# Patient Record
Sex: FEMALE | Race: BLACK OR AFRICAN AMERICAN | NOT HISPANIC OR LATINO | Employment: OTHER | URBAN - METROPOLITAN AREA
[De-identification: names, ages, dates, MRNs, and addresses within clinical notes are randomized per-mention and may not be internally consistent; named-entity substitution may affect disease eponyms.]

---

## 2017-01-01 DIAGNOSIS — E11.9 DIABETES MELLITUS TYPE II, CONTROLLED, WITH NO COMPLICATIONS (HCC): ICD-10-CM

## 2017-01-04 ENCOUNTER — HOSPITAL ENCOUNTER (OUTPATIENT)
Dept: CARDIOLOGY | Facility: HOSPITAL | Age: 72
Discharge: HOME | End: 2017-01-04
Attending: INTERNAL MEDICINE | Admitting: INTERNAL MEDICINE

## 2017-01-04 DIAGNOSIS — R06.09 DYSPNEA ON EXERTION: ICD-10-CM

## 2017-01-04 PROCEDURE — 93306 TTE W/DOPPLER COMPLETE: CPT

## 2017-01-04 PROCEDURE — 93306 TTE W/DOPPLER COMPLETE: CPT | Performed by: INTERNAL MEDICINE

## 2017-01-06 ENCOUNTER — TELEPHONE (OUTPATIENT)
Dept: INTERNAL MEDICINE | Facility: CLINIC | Age: 72
End: 2017-01-06

## 2017-01-18 ENCOUNTER — TELEPHONE (OUTPATIENT)
Dept: INTERNAL MEDICINE | Facility: CLINIC | Age: 72
End: 2017-01-18

## 2017-01-18 NOTE — TELEPHONE ENCOUNTER
----- Message from Sunny Ricardo Jr., MD sent at 1/18/2017  9:38 AM EST -----  2-D echocardiogram is normal with good valve and heart muscle function.

## 2017-01-20 LAB
BH CV ECHO MEAS - ACS: 1.8 CM
BH CV ECHO MEAS - AO MEAN PG (FULL): 1 MMHG
BH CV ECHO MEAS - AO MEAN PG: 5 MMHG
BH CV ECHO MEAS - AO ROOT AREA (BSA CORRECTED): 1.4
BH CV ECHO MEAS - AO ROOT AREA: 6.6 CM^2
BH CV ECHO MEAS - AO ROOT DIAM: 2.9 CM
BH CV ECHO MEAS - AO V2 MEAN: 103 CM/SEC
BH CV ECHO MEAS - AO V2 VTI: 33.8 CM
BH CV ECHO MEAS - AVA(I,A): 2.4 CM^2
BH CV ECHO MEAS - AVA(I,D): 2.4 CM^2
BH CV ECHO MEAS - BSA(HAYCOCK): 2.1 M^2
BH CV ECHO MEAS - BSA: 2 M^2
BH CV ECHO MEAS - BZI_BMI: 38.6 KILOGRAMS/M^2
BH CV ECHO MEAS - BZI_METRIC_HEIGHT: 160 CM
BH CV ECHO MEAS - BZI_METRIC_WEIGHT: 98.9 KG
BH CV ECHO MEAS - CONTRAST EF (2CH): 48.9 ML/M^2
BH CV ECHO MEAS - CONTRAST EF 4CH: 56.1 ML/M^2
BH CV ECHO MEAS - EDV(CUBED): 185.2 ML
BH CV ECHO MEAS - EDV(MOD-SP2): 90 ML
BH CV ECHO MEAS - EDV(MOD-SP4): 89 ML
BH CV ECHO MEAS - EDV(TEICH): 160 ML
BH CV ECHO MEAS - EF(CUBED): 74.8 %
BH CV ECHO MEAS - EF(MOD-SP2): 52 %
BH CV ECHO MEAS - EF(MOD-SP4): 54 %
BH CV ECHO MEAS - EF(TEICH): 66 %
BH CV ECHO MEAS - ESV(CUBED): 46.7 ML
BH CV ECHO MEAS - ESV(MOD-SP2): 42 ML
BH CV ECHO MEAS - ESV(MOD-SP4): 41 ML
BH CV ECHO MEAS - ESV(TEICH): 54.4 ML
BH CV ECHO MEAS - FS: 36.8 %
BH CV ECHO MEAS - IVS/LVPW: 1.1
BH CV ECHO MEAS - IVSD: 0.9 CM
BH CV ECHO MEAS - LAT PEAK E' VEL: 7 CM/SEC
BH CV ECHO MEAS - LV DIASTOLIC VOL/BSA (35-75): 53.3 ML/M^2
BH CV ECHO MEAS - LV MASS(C)D: 183.7 GRAMS
BH CV ECHO MEAS - LV MASS(C)DI: 91.6 GRAMS/M^2
BH CV ECHO MEAS - LV MEAN PG: 4 MMHG
BH CV ECHO MEAS - LV SYSTOLIC VOL/BSA (12-30): 23.4 ML/M^2
BH CV ECHO MEAS - LV V1 MEAN: 84.3 CM/SEC
BH CV ECHO MEAS - LV V1 VTI: 26.1 CM
BH CV ECHO MEAS - LVIDD: 5.7 CM
BH CV ECHO MEAS - LVIDS: 3.6 CM
BH CV ECHO MEAS - LVLD AP2: 7.7 CM
BH CV ECHO MEAS - LVLD AP4: 7.7 CM
BH CV ECHO MEAS - LVLS AP2: 7.1 CM
BH CV ECHO MEAS - LVLS AP4: 6.7 CM
BH CV ECHO MEAS - LVOT AREA (M): 3.1 CM^2
BH CV ECHO MEAS - LVOT AREA: 3.1 CM^2
BH CV ECHO MEAS - LVOT DIAM: 2 CM
BH CV ECHO MEAS - LVPWD: 0.8 CM
BH CV ECHO MEAS - MED PEAK E' VEL: 7 CM/SEC
BH CV ECHO MEAS - MR MAX PG: 60.5 MMHG
BH CV ECHO MEAS - MR MAX VEL: 384 CM/SEC
BH CV ECHO MEAS - MV A DUR: 0.11 SEC
BH CV ECHO MEAS - MV A MAX VEL: 120 CM/SEC
BH CV ECHO MEAS - MV DEC SLOPE: 738 CM/SEC^2
BH CV ECHO MEAS - MV DEC TIME: 0.11 SEC
BH CV ECHO MEAS - MV E MAX VEL: 103 CM/SEC
BH CV ECHO MEAS - MV E/A: 0.86
BH CV ECHO MEAS - MV MEAN PG: 2 MMHG
BH CV ECHO MEAS - MV P1/2T MAX VEL: 112 CM/SEC
BH CV ECHO MEAS - MV P1/2T: 44.4 MSEC
BH CV ECHO MEAS - MV V2 MEAN: 65.1 CM/SEC
BH CV ECHO MEAS - MV V2 VTI: 24.5 CM
BH CV ECHO MEAS - MVA P1/2T LCG: 2 CM^2
BH CV ECHO MEAS - MVA(P1/2T): 4.9 CM^2
BH CV ECHO MEAS - MVA(VTI): 3.3 CM^2
BH CV ECHO MEAS - PA ACC SLOPE: 0 CM/SEC^2
BH CV ECHO MEAS - PA ACC TIME: 0.13 SEC
BH CV ECHO MEAS - PA MAX PG (FULL): 1.3 MMHG
BH CV ECHO MEAS - PA MAX PG: 3.9 MMHG
BH CV ECHO MEAS - PA PR(ACCEL): 20.5 MMHG
BH CV ECHO MEAS - PA V2 MAX: 98.5 CM/SEC
BH CV ECHO MEAS - PULM A REVS DUR: 0.09 SEC
BH CV ECHO MEAS - PULM A REVS VEL: 26.2 CM/SEC
BH CV ECHO MEAS - PULM DIAS VEL: 58.2 CM/SEC
BH CV ECHO MEAS - PULM S/D: 0.92
BH CV ECHO MEAS - PULM SYS VEL: 53.6 CM/SEC
BH CV ECHO MEAS - PVA(V,A): 1.9 CM^2
BH CV ECHO MEAS - PVA(V,D): 1.9 CM^2
BH CV ECHO MEAS - QP/QS: 0.45
BH CV ECHO MEAS - RAP SYSTOLE: 3 MMHG
BH CV ECHO MEAS - RV MAX PG: 2.6 MMHG
BH CV ECHO MEAS - RV MEAN PG: 1 MMHG
BH CV ECHO MEAS - RV V1 MAX: 80.5 CM/SEC
BH CV ECHO MEAS - RV V1 MEAN: 48.4 CM/SEC
BH CV ECHO MEAS - RV V1 VTI: 16.1 CM
BH CV ECHO MEAS - RVOT AREA: 2.3 CM^2
BH CV ECHO MEAS - RVOT DIAM: 1.7 CM
BH CV ECHO MEAS - RVSP: 31 MMHG
BH CV ECHO MEAS - SI(AO): 111.3 ML/M^2
BH CV ECHO MEAS - SI(CUBED): 69.1 ML/M^2
BH CV ECHO MEAS - SI(LVOT): 40.9 ML/M^2
BH CV ECHO MEAS - SI(MOD-SP2): 21.9 ML/M^2
BH CV ECHO MEAS - SI(MOD-SP4): 29.9 ML/M^2
BH CV ECHO MEAS - SI(TEICH): 52.6 ML/M^2
BH CV ECHO MEAS - SV(AO): 223.3 ML
BH CV ECHO MEAS - SV(CUBED): 138.5 ML
BH CV ECHO MEAS - SV(LVOT): 82 ML
BH CV ECHO MEAS - SV(MOD-SP2): 44 ML
BH CV ECHO MEAS - SV(MOD-SP4): 60 ML
BH CV ECHO MEAS - SV(RVOT): 36.5 ML
BH CV ECHO MEAS - SV(TEICH): 105.6 ML
BH CV ECHO MEAS - TAPSE (>1.6): 2.3 CM2
BH CV ECHO MEAS - TR MAX VEL: 264 CM/SEC
BH CV XLRA - RV BASE: 2.6 CM
BH CV XLRA - TDI S': 14 CM/SEC
E/E' RATIO: 15
LEFT ATRIUM VOLUME INDEX: 26 ML/M2

## 2017-01-27 ENCOUNTER — TELEPHONE (OUTPATIENT)
Dept: ENDOCRINOLOGY | Age: 72
End: 2017-01-27

## 2017-01-27 DIAGNOSIS — R68.89 ABNORMAL ENDOCRINE LABORATORY TEST FINDING: Primary | ICD-10-CM

## 2017-01-27 NOTE — TELEPHONE ENCOUNTER
----- Message from Senait Ordaz sent at 1/26/2017  9:15 AM EST -----  Contact: patient  Should have received a fax from [facility] team last night     Medication went from a tier 2 to a tier 4  Asking for a tier reduction for     potassium chloride (KAYCIEL) 20 MEQ/15ML (10%) solution    No refills needed at this time    Let dr wall know she is asking for a tier reduction    Has to take liquid     Second medication has warning of side effects    Medication toviaz   Can not take pill potassium when taking toviaz  That's why she changed to the liquid

## 2017-01-27 NOTE — TELEPHONE ENCOUNTER
----- Message from Senait Ordaz sent at 1/26/2017  9:15 AM EST -----  Contact: patient  Should have received a fax from [facility] team last night     Medication went from a tier 2 to a tier 4  Asking for a tier reduction for     potassium chloride (KAYCIEL) 20 MEQ/15ML (10%) solution    No refills needed at this time    Let dr wall know she is asking for a tier reduction    Has to take liquid     Second medication has warning of side effects    Medication toviaz   Can not take pill potassium when taking toviaz  That's why she changed to the liquid

## 2017-02-04 LAB
BUN SERPL-MCNC: 8 MG/DL (ref 8–23)
BUN/CREAT SERPL: 9.8 (ref 7–25)
CALCIUM SERPL-MCNC: 9.7 MG/DL (ref 8.6–10.5)
CHLORIDE SERPL-SCNC: 102 MMOL/L (ref 98–107)
CO2 SERPL-SCNC: 26 MMOL/L (ref 22–29)
CREAT SERPL-MCNC: 0.82 MG/DL (ref 0.57–1)
GLUCOSE SERPL-MCNC: 159 MG/DL (ref 65–99)
POTASSIUM SERPL-SCNC: 3.7 MMOL/L (ref 3.5–5.2)
SODIUM SERPL-SCNC: 142 MMOL/L (ref 136–145)

## 2017-02-17 ENCOUNTER — TELEPHONE (OUTPATIENT)
Dept: ENDOCRINOLOGY | Age: 72
End: 2017-02-17

## 2017-02-17 NOTE — TELEPHONE ENCOUNTER
----- Message from Jessica Ellington sent at 2/17/2017  1:36 PM EST -----  Contact: PATIENT  THE PATIENT IS WONDERING IF SHE NEEDS TO CONTINUE TAKING HER POTASSIUM BASED ON HER LABS FROM 2/3/17. PLEASE CALL THE PATIENT TO LET HER KNOW.

## 2017-02-24 ENCOUNTER — TELEPHONE (OUTPATIENT)
Dept: INTERNAL MEDICINE | Facility: CLINIC | Age: 72
End: 2017-02-24

## 2017-02-24 DIAGNOSIS — I10 ESSENTIAL HYPERTENSION: ICD-10-CM

## 2017-02-24 RX ORDER — AMLODIPINE BESYLATE 2.5 MG/1
2.5 TABLET ORAL DAILY
Qty: 90 TABLET | Refills: 3 | Status: SHIPPED | OUTPATIENT
Start: 2017-02-24 | End: 2018-01-02 | Stop reason: SDUPTHER

## 2017-02-24 NOTE — TELEPHONE ENCOUNTER
----- Message from Marilou Storm sent at 2/24/2017  2:31 PM EST -----  Contact: pt  Pt calling would like a new prescription for Amlodipine sent into the pharmacy. She says last time it was written the directions were wrong.     Pt says she take Amlodipine 2.5 mg, 1 table daily, 90 days    To [facility] mail order     #[phone]

## 2017-03-15 DIAGNOSIS — F32.A DEPRESSION: ICD-10-CM

## 2017-03-15 RX ORDER — ATORVASTATIN CALCIUM 10 MG/1
TABLET, FILM COATED ORAL
Qty: 90 TABLET | Refills: 1 | Status: SHIPPED | OUTPATIENT
Start: 2017-03-15 | End: 2017-09-06 | Stop reason: SDUPTHER

## 2017-03-16 RX ORDER — VENLAFAXINE 75 MG/1
TABLET ORAL
Qty: 360 TABLET | Refills: 0 | OUTPATIENT
Start: 2017-03-16

## 2017-03-25 DIAGNOSIS — F32.A DEPRESSION: ICD-10-CM

## 2017-03-27 RX ORDER — VENLAFAXINE 75 MG/1
TABLET ORAL
Qty: 360 TABLET | Refills: 1 | Status: SHIPPED | OUTPATIENT
Start: 2017-03-27 | End: 2017-08-22 | Stop reason: SDUPTHER

## 2017-04-11 ENCOUNTER — OFFICE VISIT (OUTPATIENT)
Dept: INTERNAL MEDICINE | Facility: CLINIC | Age: 72
End: 2017-04-11

## 2017-04-11 VITALS
DIASTOLIC BLOOD PRESSURE: 84 MMHG | HEIGHT: 64 IN | HEART RATE: 90 BPM | BODY MASS INDEX: 37.11 KG/M2 | SYSTOLIC BLOOD PRESSURE: 142 MMHG | WEIGHT: 217.4 LBS

## 2017-04-11 DIAGNOSIS — I47.9 PAROXYSMAL TACHYCARDIA (HCC): Primary | ICD-10-CM

## 2017-04-11 PROCEDURE — 99213 OFFICE O/P EST LOW 20 MIN: CPT | Performed by: INTERNAL MEDICINE

## 2017-04-11 NOTE — PROGRESS NOTES
Subjective   Dejah Casey is a 72 y.o. female.     History of Present Illness she relates a long history of paroxysmal tachycardia for which she had been seeing a cardiologist in the past and for which she had been placed on metoprolol ER 25 mg once daily.  This medication was discontinued in January due to her complaint of fatigue when seen a cardiologist.  Since that time she has noticed increasing difficulty with paroxysmal tachycardia especially with exertion but also at rest.  Recent episode lasted 15-20 minutes and was associated with some sweating and dyspnea without chest pain or dizziness.  Her heart rate was 140 by her estimation.  She had episodes while on metoprolol though less frequent.  She is unclear as to the specific arrhythmia noted by her cardiologist.  She also relates that her sense of fatigue has not changed off low-dose metoprolol.        Review of Systems   Constitutional: Positive for fatigue. Negative for activity change and appetite change.   Respiratory: Positive for shortness of breath. Negative for cough, chest tightness and wheezing.    Cardiovascular: Positive for palpitations. Negative for chest pain and leg swelling.   Gastrointestinal: Negative for abdominal pain, anal bleeding, blood in stool, diarrhea, nausea and vomiting.   Neurological: Negative for dizziness, syncope, weakness and headaches.       Objective   Physical Exam   Constitutional: She is oriented to person, place, and time. She appears well-developed and well-nourished. She is active. She does not appear ill.   Eyes: Conjunctivae are normal.   Neck: Carotid bruit is not present. Thyromegaly present. No thyroid mass present.       Cardiovascular: Regular rhythm, S1 normal and S2 normal.  Tachycardia present.  Exam reveals no S3 and no S4.    No murmur heard.  Pulmonary/Chest: No tachypnea. No respiratory distress. She has no decreased breath sounds. She has no wheezes. She has no rhonchi. She has no rales.    Abdominal: Soft. Normal appearance and bowel sounds are normal. She exhibits no abdominal bruit and no mass. There is no hepatosplenomegaly. There is no tenderness.       Vascular Status -  Her exam exhibits no right foot edema. Her exam exhibits no left foot edema.  Neurological: She is alert and oriented to person, place, and time.   Psychiatric: She has a normal mood and affect. Her speech is normal and behavior is normal. Judgment and thought content normal. Cognition and memory are normal.       Assessment/Plan EKG shows borderline sinus tachycardia with a rate of 100.  Recent echocardiogram was normal with an ejection fraction of 54%.    Assessment #1 paroxysmal tachycardia-question sinus tachycardia versus SVT versus PAF.  The fact that she is not on anticoagulative therapy makes it more likely to be sinus tachycardia or SVT.  This was discussed with her.    Plan #1 restart metoprolol ER 25 mg by mouth daily.  I offered to make her an appointment to see her cardiologist but she does not wish to do so at present.  Routine follow-up with me in 6 months.

## 2017-04-12 DIAGNOSIS — I47.9 PAROXYSMAL TACHYCARDIA (HCC): Primary | ICD-10-CM

## 2017-04-12 PROCEDURE — 93000 ELECTROCARDIOGRAM COMPLETE: CPT | Performed by: INTERNAL MEDICINE

## 2017-04-25 ENCOUNTER — TELEPHONE (OUTPATIENT)
Dept: NEPHROLOGY | Facility: HOSPITAL | Age: 72
End: 2017-04-25

## 2017-04-25 NOTE — TELEPHONE ENCOUNTER
Regarding: Visit Follow-Up Question  Contact: [phone]  ----- Message from Ruma Chow sent at 4/25/2017 10:01 AM EDT -----       ----- Message from Dejah Casey to Sunny Ricardo Jr., MD sent at 4/25/2017  4:42 AM -----   Jeri Ricardo,    [facility].  I would like to have a Cardiologist with [facility] so all my doctors will have access to my medical records.  Could you recommend a Cardiologist for me please?    Thank you.    Dejah Casey  1945  [id number]  [place] [address]  [phone]

## 2017-05-15 ENCOUNTER — TELEPHONE (OUTPATIENT)
Dept: INTERNAL MEDICINE | Facility: CLINIC | Age: 72
End: 2017-05-15

## 2017-05-15 DIAGNOSIS — I47.9 PAROXYSMAL TACHYCARDIA (HCC): Primary | ICD-10-CM

## 2017-05-19 DIAGNOSIS — E11.9 CONTROLLED TYPE 2 DIABETES MELLITUS WITHOUT COMPLICATION, UNSPECIFIED LONG TERM INSULIN USE STATUS: ICD-10-CM

## 2017-05-19 RX ORDER — LANCETS
EACH MISCELLANEOUS
Qty: 300 EACH | Refills: 3 | Status: SHIPPED | OUTPATIENT
Start: 2017-05-19 | End: 2018-04-05 | Stop reason: CLARIF

## 2017-06-07 ENCOUNTER — OFFICE VISIT (OUTPATIENT)
Dept: ENDOCRINOLOGY | Age: 72
End: 2017-06-07

## 2017-06-07 VITALS
HEIGHT: 64 IN | SYSTOLIC BLOOD PRESSURE: 152 MMHG | HEART RATE: 89 BPM | OXYGEN SATURATION: 95 % | DIASTOLIC BLOOD PRESSURE: 88 MMHG | BODY MASS INDEX: 37.22 KG/M2 | WEIGHT: 218 LBS

## 2017-06-07 DIAGNOSIS — E11.9 TYPE 2 DIABETES MELLITUS WITHOUT COMPLICATION, WITHOUT LONG-TERM CURRENT USE OF INSULIN (HCC): Primary | ICD-10-CM

## 2017-06-07 DIAGNOSIS — E04.2 NONTOXIC MULTINODULAR GOITER: ICD-10-CM

## 2017-06-07 DIAGNOSIS — M85.80 OSTEOPENIA: ICD-10-CM

## 2017-06-07 DIAGNOSIS — E55.9 VITAMIN D DEFICIENCY: ICD-10-CM

## 2017-06-07 DIAGNOSIS — I10 ESSENTIAL HYPERTENSION: ICD-10-CM

## 2017-06-07 DIAGNOSIS — R68.89 ABNORMAL ENDOCRINE LABORATORY TEST FINDING: ICD-10-CM

## 2017-06-07 DIAGNOSIS — E78.5 HYPERLIPIDEMIA, UNSPECIFIED HYPERLIPIDEMIA TYPE: ICD-10-CM

## 2017-06-07 PROCEDURE — 99214 OFFICE O/P EST MOD 30 MIN: CPT | Performed by: INTERNAL MEDICINE

## 2017-06-07 RX ORDER — METOPROLOL SUCCINATE 25 MG/1
25 TABLET, EXTENDED RELEASE ORAL DAILY
COMMUNITY
Start: 2017-06-04 | End: 2017-06-26 | Stop reason: SDUPTHER

## 2017-06-07 RX ORDER — DESONIDE 0.5 MG/G
CREAM TOPICAL
Refills: 5 | COMMUNITY
Start: 2017-04-28 | End: 2017-06-07

## 2017-06-07 NOTE — PROGRESS NOTES
Subjective   Dejah Casey is a 72 y.o. female.     HPI Comments: F.u for dm2,hyperlipidemia, hypertension, OAB,cad, nontoxic MNG,DJD,osteopenia / testing bs 1 x day / last dm eye exam 1/16/ last dm foot exam today with dr Hernandez     Diabetes   Hypoglycemia symptoms include nervousness/anxiousness. Associated symptoms include fatigue.   Hyperlipidemia   Associated symptoms include shortness of breath.   Hypertension   Associated symptoms include shortness of breath. Palpitations: on metoprolol.   Coronary Artery Disease   Symptoms include shortness of breath. Palpitations: on metoprolol. Risk factors include hyperlipidemia.      Patient is a 72-year-old female who came in for followup. She was found to have a goiter on examination last August 2012. She had thyroid ultrasound done on 08/21/2012 which showed a multinodular goiter with the dominant solid nodule in the left measuring 1.8 cm. She had followup thyroid ultrasound in 4/14 showed multinodular goiter with stable nodules. Thyroid function tests have been normal.        She denies any voice changes. She denies any pressure symptoms or dysphagia. She denies any bowel changes. She denies any heat or cold intolerance. She has no previous history of head or neck radiation therapy.      She has known diabetes mellitus since 2003 and has been on metformin 500 mg twice a day. Blood sugar has ranged from . She has no microalbuminuria on urine sample taken 12/16. Her last eye exam was 1/16. She has early cataracts but no retinopathy. She denies any paresthesias.      She has hyperlipidemia and has been on Lipitor 10 mg once a day.  She denies any myalgia.  She has no significant weight change since December 2016.  Her last meal was at 1:30 PM.     She has mild coronary artery disease by cardiac catheterization done in 2008 by Dr. CAMILLE Mcmullen. She denies any chest pain. She is taken off Toprol in January 2017 by Dr. Mcmullen and was restarted in April 2017 because of  tachycardia.  Holter monitor done in August 2016 showed sinus rhythm without any sustained atrial or ventricular tachyarrhythmia's.  Some PACs, PVCs and 2 very short episodes of atrial tachycardia noted.  Echocardiogram done in August 2016 showed left ventricular wall motion and contractility at the lower limits of normal.  Ejection fraction is 50-55%.      She has hypertension and has been on amlodipine 2.5 mg once a day and Toprol. She was taken off hydrochlorothiazide because of hypercalcemia. She had cough with lisinopril in the past. She has not used ARB in the past.       She has overactive bladder diagnosed by Dr. Dean and improved with Toviaz. She was taken off Vesicare because of cost.        She has DJD of knees and had a steroid injection in her left knee done by Dr. Mcgee several years ago. She complains of pain on bottom of left foot and has been off meloxicam.      She has osteopenia on bone density done at [facility] last November 2015. She is on vitamin D 400 u/day and calcium plus D supplements. She is a nonsmoker and non-alcohol drinker. There is no family history of osteoporosis.      The following portions of the patient's history were reviewed and updated as appropriate: allergies, current medications, past family history, past medical history, past social history, past surgical history and problem list.    Review of Systems   Constitutional: Positive for fatigue.   Eyes: Positive for visual disturbance ( cloudy ).   Respiratory: Positive for shortness of breath.    Cardiovascular: Palpitations: on metoprolol.   Gastrointestinal: Negative.    Endocrine: Negative.    Genitourinary: Negative.    Musculoskeletal: Joint swelling: knees    Skin: Negative.    Allergic/Immunologic: Negative.    Neurological: Negative.    Hematological: Bruises/bleeds easily.   Psychiatric/Behavioral: Positive for sleep disturbance ( sleep apnea on c pap machine ). The patient is nervous/anxious.        Objective   "    Vitals:    06/07/17 1421   BP: 152/88   BP Location: Right arm   Patient Position: Sitting   Cuff Size: Large Adult   Pulse: 89   SpO2: 95%   Weight: 218 lb (98.9 kg)   Height: 63.75\" (161.9 cm)     Physical Exam   Constitutional: She is oriented to person, place, and time. She appears well-developed and well-nourished. No distress.   HENT:   Head: Normocephalic.   Nose: Nose normal.   Mouth/Throat: No oropharyngeal exudate.   Eyes: Conjunctivae and EOM are normal. Right eye exhibits no discharge. Left eye exhibits no discharge. No scleral icterus.   Neck: Normal range of motion. Neck supple. No JVD present. No tracheal deviation present. No thyromegaly present.   Cardiovascular: Normal rate, regular rhythm, normal heart sounds and intact distal pulses.  Exam reveals no gallop and no friction rub.    No murmur heard.  Pulmonary/Chest: Effort normal and breath sounds normal. No respiratory distress. She has no wheezes. She has no rales. She exhibits no tenderness.   Abdominal: Soft. Bowel sounds are normal. She exhibits no distension and no mass. There is no tenderness.   Musculoskeletal: She exhibits no edema, tenderness or deformity.   No plantar ulcers   Lymphadenopathy:     She has no cervical adenopathy.   Neurological: She is alert and oriented to person, place, and time. She has normal reflexes. She displays normal reflexes. No cranial nerve deficit. Coordination normal.   Intact light touch on both upper and lower ext   Skin: Skin is warm and dry. No rash noted. No erythema. No pallor.   Psychiatric: She has a normal mood and affect. Her behavior is normal.     Hospital Outpatient Visit on 01/04/2017   Component Date Value Ref Range Status   • BSA 01/04/2017 2.0  m^2 Preliminary   • IVSd 01/04/2017 0.9  cm Preliminary   • LVIDd 01/04/2017 5.7  cm Preliminary   • LVIDs 01/04/2017 3.6  cm Preliminary   • LVPWd 01/04/2017 0.8  cm Preliminary   • IVS/LVPW 01/04/2017 1.1   Preliminary   • FS 01/04/2017 36.8  % " Preliminary   • EDV(Teich) 01/04/2017 160.0  ml Preliminary   • ESV(Teich) 01/04/2017 54.4  ml Preliminary   • EF(Teich) 01/04/2017 66.0  % Preliminary   • EDV(cubed) 01/04/2017 185.2  ml Preliminary   • ESV(cubed) 01/04/2017 46.7  ml Preliminary   • EF(cubed) 01/04/2017 74.8  % Preliminary   • LV mass(C)d 01/04/2017 183.7  grams Preliminary   • LV mass(C)dI 01/04/2017 91.6  grams/m^2 Preliminary   • SV(Teich) 01/04/2017 105.6  ml Preliminary   • SI(Teich) 01/04/2017 52.6  ml/m^2 Preliminary   • SV(cubed) 01/04/2017 138.5  ml Preliminary   • SI(cubed) 01/04/2017 69.1  ml/m^2 Preliminary   • Ao root diam 01/04/2017 2.9  cm Preliminary   • Ao root area 01/04/2017 6.6  cm^2 Preliminary   • ACS 01/04/2017 1.8  cm Preliminary   • LVOT diam 01/04/2017 2.0  cm Preliminary   • LVOT area 01/04/2017 3.1  cm^2 Preliminary   • LVOT area(traced) 01/04/2017 3.1  cm^2 Preliminary   • RVOT diam 01/04/2017 1.7  cm Preliminary   • RVOT area 01/04/2017 2.3  cm^2 Preliminary   • LVLd ap4 01/04/2017 7.7  cm Preliminary   • EDV(MOD-sp4) 01/04/2017 89.0  ml Preliminary   • LVLs ap4 01/04/2017 6.7  cm Preliminary   • ESV(MOD-sp4) 01/04/2017 41.0  ml Preliminary   • EF(MOD-sp4) 01/04/2017 54.0  % Preliminary   • LVLd ap2 01/04/2017 7.7  cm Preliminary   • EDV(MOD-sp2) 01/04/2017 90.0  ml Preliminary   • LVLs ap2 01/04/2017 7.1  cm Preliminary   • ESV(MOD-sp2) 01/04/2017 42.0  ml Preliminary   • EF(MOD-sp2) 01/04/2017 52.0  % Preliminary   • SV(MOD-sp4) 01/04/2017 60.0  ml Preliminary   • SI(MOD-sp4) 01/04/2017 29.9  ml/m^2 Preliminary   • SV(MOD-sp2) 01/04/2017 44.0  ml Preliminary   • SI(MOD-sp2) 01/04/2017 21.9  ml/m^2 Preliminary   • Ao root area (BSA corrected) 01/04/2017 1.4   Preliminary   • Ao root area (BSA corrected) 01/04/2017 48.9  ml/m^2 Preliminary   • CONTRAST EF 4CH 01/04/2017 56.1  ml/m^2 Preliminary   • LV Diastolic corrected for BSA 01/04/2017 53.3  ml/m^2 Preliminary   • LV Systolic corrected for BSA 01/04/2017 23.4   ml/m^2 Preliminary   • MV A dur 01/04/2017 0.11  sec Preliminary   • MV E max mode 01/04/2017 103.0  cm/sec Preliminary   • MV A max mode 01/04/2017 120.0  cm/sec Preliminary   • MV E/A 01/04/2017 0.86   Preliminary   • MV V2 mean 01/04/2017 65.1  cm/sec Preliminary   • MV mean PG 01/04/2017 2.0  mmHg Preliminary   • MV V2 VTI 01/04/2017 24.5  cm Preliminary   • MVA(VTI) 01/04/2017 3.3  cm^2 Preliminary   • MV P1/2t max mode 01/04/2017 112.0  cm/sec Preliminary   • MV P1/2t 01/04/2017 44.4  msec Preliminary   • MVA(P1/2t) 01/04/2017 4.9  cm^2 Preliminary   • MV dec slope 01/04/2017 738.0  cm/sec^2 Preliminary   • MV dec time 01/04/2017 0.11  sec Preliminary   • Ao V2 mean 01/04/2017 103.0  cm/sec Preliminary   • Ao mean PG 01/04/2017 5.0  mmHg Preliminary   • Ao mean PG (full) 01/04/2017 1.0  mmHg Preliminary   • Ao V2 VTI 01/04/2017 33.8  cm Preliminary   • GAMALIEL(I,A) 01/04/2017 2.4  cm^2 Preliminary   • GAMALIEL(I,D) 01/04/2017 2.4  cm^2 Preliminary   • LV V1 mean PG 01/04/2017 4.0  mmHg Preliminary   • LV V1 mean 01/04/2017 84.3  cm/sec Preliminary   • LV V1 VTI 01/04/2017 26.1  cm Preliminary   • MR max mode 01/04/2017 384.0  cm/sec Preliminary   • MR max PG 01/04/2017 60.5  mmHg Preliminary   • SV(Ao) 01/04/2017 223.3  ml Preliminary   • SI(Ao) 01/04/2017 111.3  ml/m^2 Preliminary   • SV(LVOT) 01/04/2017 82.0  ml Preliminary   • SV(RVOT) 01/04/2017 36.5  ml Preliminary   • SI(LVOT) 01/04/2017 40.9  ml/m^2 Preliminary   • PA V2 max 01/04/2017 98.5  cm/sec Preliminary   • PA max PG 01/04/2017 3.9  mmHg Preliminary   • PA max PG (full) 01/04/2017 1.3  mmHg Preliminary   • BH CV ECHO MARE - PVA(V,A) 01/04/2017 1.9  cm^2 Preliminary   • BH CV ECHO MARE - PVA(V,D) 01/04/2017 1.9  cm^2 Preliminary   • PA acc slope 01/04/2017 0  cm/sec^2 Preliminary   • PA acc time 01/04/2017 0.13  sec Preliminary   • RV V1 max PG 01/04/2017 2.6  mmHg Preliminary   • RV V1 mean PG 01/04/2017 1.0  mmHg Preliminary   • RV V1 max 01/04/2017 80.5   cm/sec Preliminary   • RV V1 mean 01/04/2017 48.4  cm/sec Preliminary   • RV V1 VTI 01/04/2017 16.1  cm Preliminary   • TR max michele 01/04/2017 264.0  cm/sec Preliminary   • PA pr(Accel) 01/04/2017 20.5  mmHg Preliminary   • Pulm Sys Michele 01/04/2017 53.6  cm/sec Preliminary   • Pulm Bhatti Michele 01/04/2017 58.2  cm/sec Preliminary   • Pulm S/D 01/04/2017 0.92   Preliminary   • Qp/Qs 01/04/2017 0.45   Preliminary   • Pulm A Revs Dur 01/04/2017 0.09  sec Preliminary   • Pulm A Revs Michele 01/04/2017 26.2  cm/sec Preliminary   • MVA P1/2T LCG 01/04/2017 2.0  cm^2 Preliminary   • BH CV ECHO MARE - BZI_BMI 01/04/2017 38.6  kilograms/m^2 Preliminary   • BH CV ECHO MARE - BSA(HAY[place]OCK) 01/04/2017 2.1  m^2 Preliminary   • BH CV ECHO MARE - BZI_METRIC_WEIGHT 01/04/2017 98.9  kg Preliminary   • BH CV ECHO MARE - BZI_METRIC_HEIGHT 01/04/2017 160.0  cm Preliminary   • TDI S' 01/04/2017 14.00  cm/sec Final-Edited   • RV Base 01/04/2017 2.60  cm Final-Edited   • E/E' ratio 01/04/2017 15.0   Final-Edited   • LA Volume Index 01/04/2017 26.0  mL/m2 Final-Edited   • Lat Peak E' Michele 01/04/2017 7.0  cm/sec Final-Edited   • Med Peak E' Michele 01/04/2017 7.00  cm/sec Final-Edited   • RAP systole 01/04/2017 3  mmHg Final-Edited   • RVSP(TR) 01/04/2017 31  mmHg Final-Edited   • TAPSE (>1.6) 01/04/2017 2.30  cm2 Final-Edited     Assessment/Plan   Dejah was seen today for diabetes, hyperlipidemia, hypertension and coronary artery disease.    Diagnoses and all orders for this visit:    Type 2 diabetes mellitus without complication, without long-term current use of insulin  -     Comprehensive Metabolic Panel  -     Hemoglobin A1c    Abnormal endocrine laboratory test finding  -     Basic Metabolic Panel    Nontoxic multinodular goiter  -     TSH  -     T4, Free    Vitamin D deficiency  -     Vitamin D 25 Hydroxy    Osteopenia    Essential hypertension    Hyperlipidemia, unspecified hyperlipidemia type  -     Lipid Panel      Check thyroid function  tests.  Continue metformin.  Check hemoglobin A1c.  Continue Lipitor.  Check lipid profile.  Continue amlodipine and Toprol-XL.  Continue vitamin D and calcium plus D.  Check vitamin D.  Consider follow-up bone density after November 2017.    Send copy of my notes and labs to Dr. Ricardo, Dr. Castillo, and Dr. Worley    RTC 4 mos.

## 2017-06-08 LAB
25(OH)D3+25(OH)D2 SERPL-MCNC: 58.6 NG/ML (ref 30–100)
ALBUMIN SERPL-MCNC: 4.5 G/DL (ref 3.5–5.2)
ALBUMIN/GLOB SERPL: 1.5 G/DL
ALP SERPL-CCNC: 93 U/L (ref 39–117)
ALT SERPL-CCNC: 17 U/L (ref 1–33)
AST SERPL-CCNC: 16 U/L (ref 1–32)
BILIRUB SERPL-MCNC: 0.2 MG/DL (ref 0.1–1.2)
BUN SERPL-MCNC: 14 MG/DL (ref 8–23)
BUN/CREAT SERPL: 15.4 (ref 7–25)
CALCIUM SERPL-MCNC: 10.6 MG/DL (ref 8.6–10.5)
CHLORIDE SERPL-SCNC: 100 MMOL/L (ref 98–107)
CHOLEST SERPL-MCNC: 146 MG/DL (ref 0–200)
CO2 SERPL-SCNC: 24.4 MMOL/L (ref 22–29)
CREAT SERPL-MCNC: 0.91 MG/DL (ref 0.57–1)
GLOBULIN SER CALC-MCNC: 3.1 GM/DL
GLUCOSE SERPL-MCNC: 75 MG/DL (ref 65–99)
HBA1C MFR BLD: 6.3 % (ref 4.8–5.6)
HDLC SERPL-MCNC: 66 MG/DL (ref 40–60)
LDLC SERPL CALC-MCNC: 58 MG/DL (ref 0–100)
POTASSIUM SERPL-SCNC: 4.2 MMOL/L (ref 3.5–5.2)
PROT SERPL-MCNC: 7.6 G/DL (ref 6–8.5)
SODIUM SERPL-SCNC: 140 MMOL/L (ref 136–145)
T4 FREE SERPL-MCNC: 0.95 NG/DL (ref 0.93–1.7)
TRIGL SERPL-MCNC: 110 MG/DL (ref 0–150)
TSH SERPL DL<=0.005 MIU/L-ACNC: 0.66 MIU/ML (ref 0.27–4.2)
VLDLC SERPL CALC-MCNC: 22 MG/DL (ref 5–40)

## 2017-06-09 ENCOUNTER — OFFICE VISIT (OUTPATIENT)
Dept: CARDIOLOGY | Facility: CLINIC | Age: 72
End: 2017-06-09

## 2017-06-09 VITALS
BODY MASS INDEX: 37.71 KG/M2 | DIASTOLIC BLOOD PRESSURE: 82 MMHG | SYSTOLIC BLOOD PRESSURE: 128 MMHG | WEIGHT: 218 LBS | HEART RATE: 88 BPM

## 2017-06-09 DIAGNOSIS — I47.9 PAROXYSMAL TACHYCARDIA (HCC): ICD-10-CM

## 2017-06-09 DIAGNOSIS — G47.33 OBSTRUCTIVE SLEEP APNEA ON CPAP: ICD-10-CM

## 2017-06-09 DIAGNOSIS — Z99.89 OBSTRUCTIVE SLEEP APNEA ON CPAP: ICD-10-CM

## 2017-06-09 DIAGNOSIS — E78.2 MIXED HYPERLIPIDEMIA: Primary | ICD-10-CM

## 2017-06-09 DIAGNOSIS — R94.31 ABNORMAL ELECTROCARDIOGRAM: ICD-10-CM

## 2017-06-09 DIAGNOSIS — R94.31 ABNORMAL ECG: ICD-10-CM

## 2017-06-09 DIAGNOSIS — I10 ESSENTIAL HYPERTENSION: ICD-10-CM

## 2017-06-09 PROCEDURE — 93000 ELECTROCARDIOGRAM COMPLETE: CPT | Performed by: INTERNAL MEDICINE

## 2017-06-09 PROCEDURE — 99204 OFFICE O/P NEW MOD 45 MIN: CPT | Performed by: INTERNAL MEDICINE

## 2017-06-09 NOTE — PROGRESS NOTES
[facility]  Cardiology Consult Note  .  Patient Identification:  Name: Dejah Casey  Age: 72 y.o.  Sex: female  :  1945  MRN: [id number]       2017      Requesting Physician: Dr. Ricardo  Reason for Consultation: Shortness of breath    Chief Complaint   Patient presents with   • Rapid Heart Rate     HPI     She is mildy obese and complains of shortness of breath on exertion for the last year. She can walk upto two blocks. She denies any chest pains now but in the past had some chest pressure in the chest. This doesn't have any association with exercise. Not much swelling of the legs. Her ECG shows LVH> no syncopal episodes. Her BP has been stable. No pnd or orthopnea. Not much leg edema.    She is a diabetic and her lipid panel is good. Her past echo showed normal LVEF, LVH    Past Medical History:  Past Medical History:   Diagnosis Date   • Anxiety    • CAD (coronary artery disease)    • Contact dermatitis    • Depression    • Headache    • History of mammogram     2016   • Hypercalcemia    • Hyperlipidemia    • Hypertension    • Nightmares    • Nontoxic multinodular goiter    • Obesity    • Osteoarthritis    • Osteopenia    • Rotator cuff tendinitis    • Superficial phlebitis    • Type 2 diabetes mellitus    • Vitiligo      Past Surgical History:  Past Surgical History:   Procedure Laterality Date   • BREAST LUMPECTOMY Right    • COLONOSCOPY  08/15/2010   • HYSTERECTOMY     • TONSILLECTOMY     • TOTAL HIP ARTHROPLASTY Right       Home Meds:    (Not in a hospital admission)  Current Meds:     Current Outpatient Prescriptions:   •  ACCU-CHEK GUMARO PLUS test strip, TEST ONCE DAILY, Disp: 100 each, Rfl: 3  •  ACCU-CHEK SOFTCLIX LANCETS lancets, TEST ONCE DAILY, Disp: 300 each, Rfl: 3  •  amLODIPine (NORVASC) 2.5 MG tablet, Take 1 tablet by mouth Daily., Disp: 90 tablet, Rfl: 3  •  aspirin 81 MG tablet, Take 81 mg by mouth daily., Disp: , Rfl:   •  atorvastatin (LIPITOR) 10 MG tablet,  TAKE 1 TABLET DAILY., Disp: 90 tablet, Rfl: 1  •  Calcium Carb-Cholecalciferol 500-200 MG-UNIT tablet, Take 2 tablets by mouth daily., Disp: , Rfl:   •  Cholecalciferol (VITAMIN D-3) 1000 UNITS capsule, Take 800 Units by mouth daily., Disp: , Rfl:   •  clonazePAM (KlonoPIN) 1 MG tablet, Take 1 tablet by mouth at night as needed. Prescribed by Pulmonary, Dr. Bell, Disp: , Rfl:   •  fesoterodine fumarate (TOVIAZ ER) 4 MG tablet sustained-release 24 hour capsule, Take 4 mg by mouth daily., Disp: , Rfl:   •  loratadine (CLARITIN) 5 MG chewable tablet, Chew 10 mg Daily., Disp: , Rfl:   •  metFORMIN (GLUCOPHAGE) 500 MG tablet, TAKE 1 TABLET TWICE DAILY WITH MEALS, Disp: 180 tablet, Rfl: 1  •  metoprolol succinate XL (TOPROL-XL) 25 MG 24 hr tablet, Take 25 mg by mouth Daily., Disp: , Rfl:   •  Multiple Vitamins-Minerals (CENTRUM SILVER ADULT 50+) tablet, Take 1 tablet by mouth., Disp: , Rfl:   •  venlafaxine (EFFEXOR) 75 MG tablet, TAKE 2 TABLETS TWICE DAILY, Disp: 360 tablet, Rfl: 1  Allergies:  Allergies   Allergen Reactions   • Dexamethasone Dermatitis   • Lisinopril      Social History:   Social History   Substance Use Topics   • Smoking status: Never Smoker   • Smokeless tobacco: Not on file   • Alcohol use No      Family History:  Family History   Problem Relation Age of Onset   • Diabetes Mother    • Thyroid disease Mother    • Hypertension Father    • Asthma Sister    • Hypertension Sister    • Diabetes Brother    • Kidney disease Brother      CHRONIC RENAL FAILURE   • Diabetes Sister    • Thyroid disease Sister    • Lung cancer Brother    • Kidney disease Brother    • Diabetes Brother       Review of Systems   Constitution: Positive for chills and malaise/fatigue. Negative for fever.   HENT: Positive for headaches.    Eyes: Negative for blurred vision and double vision.   Cardiovascular: Positive for irregular heartbeat and palpitations. Negative for chest pain and leg swelling.   Respiratory: Positive for  shortness of breath. Snoring: cpap.    Skin: Positive for color change.   Musculoskeletal: Positive for arthritis and joint pain.   Gastrointestinal: Negative for abdominal pain, nausea and vomiting.   Neurological: Negative for dizziness, light-headedness and numbness.      Al the other Ros are stable  Objective:  /82  Pulse 88  Wt 218 lb (98.9 kg)  BMI 37.71 kg/m2  Exam:  Physical Exam    General: No acute distress, well developed, well nourished    Skin: Warm and dry, no diaphoresis noted; well hydrated; no rash; no pallor or cyanosis   HEENT: Normal Pupills; no conjunctiva erythema; external ear and nose normal; oral mucosa moist, no xanthelasma, lips not cyanotic   Neck: Supple; no carotid bruits; no JVP; thyroid not enlarged. Trahea mid line    Heart: S1S2 regular rate and rhythm; no murmurs, no rubs or gallops are auscultated.   Chest: Respirations regular, unlabored at rest, bilateral breath sounds have good air entry throughout all lung fields; no crackles, rubs or wheezes auscultated.     Abdomen: Soft, non-tender, non-distended, positive bowel sounds, no organomegaly   Extremities: Bilateral lower extremities have no pre-tibial pitting edema; Femoral pulses are palpable   Musculoskeletal:  Moves all extremities well; no deformities; no tenderness; no clubbing of the fingers   Neurological/  Psychological:  Alert and oriented x 3; no new  motor deficits; speech and behavior appropriate; normal mood and affect    Rest of the exam is stable       ECG 12 Lead  Date/Time: 6/9/2017 11:09 AM  Performed by: MANGO BLAKE  Authorized by: MANGO BLAKE   Rhythm: sinus rhythm  Rate: normal  QRS axis: left  Other findings: LVH          Comparison to previous ECG:  Similar to  previous ecg    Assessment:    Problem List Items Addressed This Visit        Cardiovascular and Mediastinum    Essential hypertension    Hyperlipidemia - Primary    Paroxysmal tachycardia       Respiratory     Obstructive sleep apnea on CPAP          Recommendations:    Overall clincially she has been stable with no angina now. Overall not a good historian. She denies any pnd or orthopnea. She uses a cpap.  I will obtain a stress test and her pain is more atypical . And weight loss is remphasized.      I not only counseled the patient today on the risk factor modification of significant factors noted in the assessment and plan, and I also recommended that the patient reduce salt and saturated animal fat intake in diet, about the advantages of plant based diet, as well as to perform scheduled exercise on a regular basis.    Thi Castillo MD  6/9/2017, 11:09 AM

## 2017-06-22 ENCOUNTER — HOSPITAL ENCOUNTER (OUTPATIENT)
Dept: CARDIOLOGY | Facility: HOSPITAL | Age: 72
Discharge: HOME | End: 2017-06-22
Attending: INTERNAL MEDICINE

## 2017-06-22 VITALS
HEART RATE: 85 BPM | DIASTOLIC BLOOD PRESSURE: 80 MMHG | BODY MASS INDEX: 38.62 KG/M2 | SYSTOLIC BLOOD PRESSURE: 130 MMHG | HEIGHT: 63 IN | WEIGHT: 218 LBS

## 2017-06-22 DIAGNOSIS — I10 ESSENTIAL HYPERTENSION: ICD-10-CM

## 2017-06-22 DIAGNOSIS — I47.9 PAROXYSMAL TACHYCARDIA (HCC): ICD-10-CM

## 2017-06-22 DIAGNOSIS — G47.33 OBSTRUCTIVE SLEEP APNEA ON CPAP: ICD-10-CM

## 2017-06-22 DIAGNOSIS — E78.2 MIXED HYPERLIPIDEMIA: ICD-10-CM

## 2017-06-22 DIAGNOSIS — R94.31 ABNORMAL ELECTROCARDIOGRAM: ICD-10-CM

## 2017-06-22 DIAGNOSIS — Z99.89 OBSTRUCTIVE SLEEP APNEA ON CPAP: ICD-10-CM

## 2017-06-22 PROCEDURE — 0 TECHNETIUM SESTAMIBI: Performed by: INTERNAL MEDICINE

## 2017-06-22 PROCEDURE — A9500 TC99M SESTAMIBI: HCPCS | Performed by: INTERNAL MEDICINE

## 2017-06-22 PROCEDURE — 93018 CV STRESS TEST I&R ONLY: CPT | Performed by: INTERNAL MEDICINE

## 2017-06-22 PROCEDURE — 78452 HT MUSCLE IMAGE SPECT MULT: CPT

## 2017-06-22 PROCEDURE — 93017 CV STRESS TEST TRACING ONLY: CPT

## 2017-06-22 PROCEDURE — 78452 HT MUSCLE IMAGE SPECT MULT: CPT | Performed by: INTERNAL MEDICINE

## 2017-06-22 PROCEDURE — 93016 CV STRESS TEST SUPVJ ONLY: CPT | Performed by: INTERNAL MEDICINE

## 2017-06-22 RX ADMIN — Medication 1 DOSE: at 07:54

## 2017-06-22 RX ADMIN — Medication 1 DOSE: at 09:36

## 2017-06-26 ENCOUNTER — TELEPHONE (OUTPATIENT)
Dept: INTERNAL MEDICINE | Facility: CLINIC | Age: 72
End: 2017-06-26

## 2017-06-26 DIAGNOSIS — E11.9 CONTROLLED TYPE 2 DIABETES MELLITUS WITHOUT COMPLICATION, UNSPECIFIED LONG TERM INSULIN USE STATUS: ICD-10-CM

## 2017-06-26 RX ORDER — LANCING DEVICE/LANCETS
1 KIT MISCELLANEOUS DAILY
Qty: 1 KIT | Refills: 0 | Status: SHIPPED | OUTPATIENT
Start: 2017-06-26 | End: 2021-11-16 | Stop reason: SDUPTHER

## 2017-06-26 RX ORDER — METOPROLOL SUCCINATE 25 MG/1
25 TABLET, EXTENDED RELEASE ORAL DAILY
Qty: 90 TABLET | Refills: 3 | Status: SHIPPED | OUTPATIENT
Start: 2017-06-26 | End: 2018-01-31 | Stop reason: ALTCHOICE

## 2017-06-26 NOTE — TELEPHONE ENCOUNTER
----- Message from Nohemi Hinojosa MA sent at 6/26/2017  1:51 PM EDT -----  Pt calling for refill    Toprol XL 25mg qd #90    [facility]

## 2017-06-29 ENCOUNTER — TELEPHONE (OUTPATIENT)
Dept: CARDIOLOGY | Facility: CLINIC | Age: 72
End: 2017-06-29

## 2017-06-30 LAB
BH CV STRESS BP STAGE 1: NORMAL
BH CV STRESS BP STAGE 2: NORMAL
BH CV STRESS BP STAGE 3: NORMAL
BH CV STRESS DURATION MIN STAGE 1: 3
BH CV STRESS DURATION MIN STAGE 2: 3
BH CV STRESS DURATION MIN STAGE 3: 2
BH CV STRESS DURATION SEC STAGE 1: 0
BH CV STRESS DURATION SEC STAGE 2: 0
BH CV STRESS DURATION SEC STAGE 3: 0
BH CV STRESS GRADE STAGE 1: 0
BH CV STRESS GRADE STAGE 2: 5
BH CV STRESS GRADE STAGE 3: 10
BH CV STRESS HR STAGE 1: 121
BH CV STRESS HR STAGE 2: 131
BH CV STRESS HR STAGE 3: 142
BH CV STRESS METS STAGE 1: 2.3
BH CV STRESS METS STAGE 2: 3.5
BH CV STRESS METS STAGE 3: 4.2
BH CV STRESS PROTOCOL 1: NORMAL
BH CV STRESS RECOVERY BP: NORMAL MMHG
BH CV STRESS RECOVERY HR: 93 BPM
BH CV STRESS SPEED STAGE 1: 1.7
BH CV STRESS SPEED STAGE 2: 1.7
BH CV STRESS SPEED STAGE 3: 1.7
BH CV STRESS STAGE 1: 1
BH CV STRESS STAGE 2: 2
BH CV STRESS STAGE 3: 3
LV EF NUC BP: 52 %
MAXIMAL PREDICTED HEART RATE: 147 BPM
PERCENT MAX PREDICTED HR: 96.6 %
STRESS BASELINE BP: NORMAL MMHG
STRESS BASELINE HR: 87 BPM
STRESS PERCENT HR: 114 %
STRESS POST ESTIMATED WORKLOAD: 4.2 METS
STRESS POST EXERCISE DUR MIN: 8 MIN
STRESS POST EXERCISE DUR SEC: 0 SEC
STRESS POST PEAK BP: NORMAL MMHG
STRESS POST PEAK HR: 142 BPM
STRESS TARGET HR: 125 BPM

## 2017-08-01 ENCOUNTER — OFFICE VISIT (OUTPATIENT)
Dept: INTERNAL MEDICINE | Facility: CLINIC | Age: 72
End: 2017-08-01

## 2017-08-01 VITALS
SYSTOLIC BLOOD PRESSURE: 140 MMHG | DIASTOLIC BLOOD PRESSURE: 78 MMHG | BODY MASS INDEX: 37.18 KG/M2 | WEIGHT: 217.8 LBS | HEART RATE: 90 BPM | HEIGHT: 64 IN

## 2017-08-01 DIAGNOSIS — Z12.31 ENCOUNTER FOR MAMMOGRAM TO ESTABLISH BASELINE MAMMOGRAM: ICD-10-CM

## 2017-08-01 DIAGNOSIS — R10.9 RIGHT FLANK PAIN: Primary | ICD-10-CM

## 2017-08-01 PROCEDURE — 99213 OFFICE O/P EST LOW 20 MIN: CPT | Performed by: INTERNAL MEDICINE

## 2017-08-01 NOTE — PROGRESS NOTES
Subjective   Dejah Casey is a 72 y.o. female.     History of Present Illness she is here today for evaluation of right flank pain present for 3 or 4 weeks.  It occurs daily and lasts a few minutes.  It is described as an ache.  It does not awaken her from sleep.  It is especially common when she is up from a seated position or gets up out of bed.  She denies any associated nausea and vomiting, change in bowel habit, melanotic stool, rectal bleeding.  She denies any back pain or dysuria.  She has not had any cough or dyspnea.  She was having pain on the left side in a similar area previously.        Review of Systems   Constitutional: Negative for activity change, appetite change, chills, diaphoresis, fatigue, fever and unexpected weight change.   Respiratory: Negative for cough, chest tightness, shortness of breath and wheezing.    Cardiovascular: Negative for chest pain, palpitations and leg swelling.   Gastrointestinal: Negative for abdominal pain, anal bleeding, blood in stool, constipation, diarrhea, nausea and vomiting.   Genitourinary: Positive for flank pain. Negative for dysuria and hematuria.   Musculoskeletal: Negative for arthralgias, back pain and gait problem.   Neurological: Negative for dizziness, syncope and weakness.       Objective   Physical Exam   Constitutional: She is oriented to person, place, and time. Vital signs are normal. She appears well-developed and well-nourished. She is active. She does not appear ill.   Eyes: Conjunctivae are normal.   Cardiovascular: Normal rate, regular rhythm, S1 normal and S2 normal.  Exam reveals no S3 and no S4.    No murmur heard.  Pulmonary/Chest: No tachypnea. No respiratory distress. She has no decreased breath sounds. She has no wheezes. She has no rhonchi. She has no rales.   Abdominal: Soft. Normal appearance and bowel sounds are normal. She exhibits no abdominal bruit and no mass. There is no hepatosplenomegaly. There is no tenderness.    Musculoskeletal:        Arms:      Vascular Status -  Her exam exhibits no right foot edema. Her exam exhibits no left foot edema.  Neurological: She is alert and oriented to person, place, and time. Gait normal.   Psychiatric: She has a normal mood and affect. Her speech is normal and behavior is normal. Judgment and thought content normal. Cognition and memory are normal.       Assessment/Plan June lab work showed a normal CBC with exception of a calcium of 10.6.  Hemoglobin A1c 6.3.  TSH normal.  Total cholesterol 146 triglycerides 110 HDL cholesterol 66 LDL cholesterol 58    Assessment #1 right flank pain-it is difficult to make much of this other than musculoskeletal.    Plan #1 urinalysis, CBC, CMP.  #2 CT scan of the abdomen without IV contrast.  #3 yearly mammogram.  Routine follow-up with me in 3 weeks.

## 2017-08-11 ENCOUNTER — HOSPITAL ENCOUNTER (OUTPATIENT)
Dept: CT IMAGING | Facility: HOSPITAL | Age: 72
Discharge: HOME | End: 2017-08-11
Attending: INTERNAL MEDICINE | Admitting: INTERNAL MEDICINE

## 2017-08-11 PROCEDURE — 74176 CT ABD & PELVIS W/O CONTRAST: CPT

## 2017-08-21 ENCOUNTER — OFFICE VISIT (OUTPATIENT)
Dept: INTERNAL MEDICINE | Facility: CLINIC | Age: 72
End: 2017-08-21

## 2017-08-21 VITALS
HEART RATE: 84 BPM | SYSTOLIC BLOOD PRESSURE: 134 MMHG | HEIGHT: 64 IN | DIASTOLIC BLOOD PRESSURE: 84 MMHG | WEIGHT: 217.8 LBS | BODY MASS INDEX: 37.18 KG/M2

## 2017-08-21 DIAGNOSIS — R10.9 RIGHT FLANK PAIN: ICD-10-CM

## 2017-08-21 DIAGNOSIS — M47.26 OSTEOARTHRITIS OF SPINE WITH RADICULOPATHY, LUMBAR REGION: Primary | ICD-10-CM

## 2017-08-21 PROCEDURE — 99213 OFFICE O/P EST LOW 20 MIN: CPT | Performed by: INTERNAL MEDICINE

## 2017-08-21 NOTE — PROGRESS NOTES
Subjective   Dejah Casey is a 72 y.o. female.     History of Present Illness she is here today for follow-up of right greater than left flank pain now present for approximately 7 or 8 weeks.  It is never present at rest or nocturnally.  It only occurs after arising from a chair or getting out of bed and persists while walking.  It subsides fairly quickly when sitting down.  There is no radiation of the pain down the legs or associated numbness in the legs.  She denies any back pain.  It is described as an ache.  She denies any cough or chest pain.  There has been no dysuria, change in bowel habit, melanotic stool, or rectal bleeding.  Her appetite has been good.  She denies any anorexia.  There has been no nausea or vomiting.        Review of Systems   Constitutional: Negative for activity change, appetite change, fatigue and fever.   Respiratory: Positive for shortness of breath. Negative for cough, chest tightness and wheezing.    Cardiovascular: Negative for chest pain, palpitations and leg swelling.   Gastrointestinal: Positive for abdominal pain. Negative for anal bleeding, blood in stool, constipation, diarrhea, nausea and vomiting.   Genitourinary: Negative for dysuria, flank pain and hematuria.   Musculoskeletal: Negative for arthralgias, back pain and gait problem.   Neurological: Negative for dizziness, weakness and headaches.       Objective   Physical Exam   Constitutional: She is oriented to person, place, and time. Vital signs are normal. She appears well-developed and well-nourished. She is active. She does not appear ill.   Eyes: Conjunctivae are normal.   Cardiovascular: Normal rate, regular rhythm, S1 normal and S2 normal.  Exam reveals no S3 and no S4.    No murmur heard.  Pulmonary/Chest: No tachypnea. No respiratory distress. She has no decreased breath sounds. She has no wheezes. She has no rhonchi. She has no rales.   Abdominal: Soft. Normal appearance and bowel sounds are normal. She  exhibits no abdominal bruit and no mass. There is no hepatosplenomegaly. There is no tenderness.   Musculoskeletal:        Arms:      Vascular Status -  Her exam exhibits no right foot edema. Her exam exhibits no left foot edema.  Neurological: She is alert and oriented to person, place, and time. She has normal strength. Gait normal.   Negative straight leg raising bilaterally   Psychiatric: She has a normal mood and affect. Her speech is normal and behavior is normal. Judgment and thought content normal. Cognition and memory are normal.       Assessment/Plan CT scan of the abdomen and pelvis was normal    Assessment #1 flank pain-very likely related to lumbar spine degenerative joint and degenerative disc disease.  There is nothing to suggest significant intra-abdominal or intrathoracic process.  This was discussed with the patient and her .    Plan #1 MRI of the lumbar spine without IV contrast.

## 2017-08-22 DIAGNOSIS — E11.9 DIABETES MELLITUS TYPE II, CONTROLLED, WITH NO COMPLICATIONS (HCC): ICD-10-CM

## 2017-08-22 DIAGNOSIS — F32.A DEPRESSION: ICD-10-CM

## 2017-08-23 RX ORDER — BLOOD SUGAR DIAGNOSTIC
STRIP MISCELLANEOUS
Qty: 100 EACH | Refills: 3 | Status: SHIPPED | OUTPATIENT
Start: 2017-08-23 | End: 2018-11-27 | Stop reason: SDUPTHER

## 2017-08-23 RX ORDER — VENLAFAXINE 75 MG/1
TABLET ORAL
Qty: 360 TABLET | Refills: 1 | Status: SHIPPED | OUTPATIENT
Start: 2017-08-23 | End: 2018-03-13 | Stop reason: SDUPTHER

## 2017-09-01 ENCOUNTER — HOSPITAL ENCOUNTER (OUTPATIENT)
Dept: MRI IMAGING | Facility: HOSPITAL | Age: 72
Discharge: HOME | End: 2017-09-01
Attending: INTERNAL MEDICINE | Admitting: INTERNAL MEDICINE

## 2017-09-01 ENCOUNTER — HOSPITAL ENCOUNTER (OUTPATIENT)
Dept: MAMMOGRAPHY | Facility: HOSPITAL | Age: 72
Discharge: HOME | End: 2017-09-01

## 2017-09-01 DIAGNOSIS — Z12.31 ENCOUNTER FOR MAMMOGRAM TO ESTABLISH BASELINE MAMMOGRAM: ICD-10-CM

## 2017-09-01 PROCEDURE — G0202 SCR MAMMO BI INCL CAD: HCPCS

## 2017-09-01 PROCEDURE — 72148 MRI LUMBAR SPINE W/O DYE: CPT

## 2017-09-05 ENCOUNTER — TELEPHONE (OUTPATIENT)
Dept: INTERNAL MEDICINE | Facility: CLINIC | Age: 72
End: 2017-09-05

## 2017-09-05 DIAGNOSIS — M51.36 DISC DEGENERATION, LUMBAR: ICD-10-CM

## 2017-09-05 DIAGNOSIS — I47.9 PAROXYSMAL TACHYCARDIA (HCC): ICD-10-CM

## 2017-09-05 DIAGNOSIS — M19.90 ARTHRITIS: Primary | ICD-10-CM

## 2017-09-05 NOTE — TELEPHONE ENCOUNTER
Mrs. Casey does want to go to physical therapy.      She also needs a referral for a new cardiologist as Dr. Castillo is leaving the practice.

## 2017-09-05 NOTE — TELEPHONE ENCOUNTER
----- Message from Sunny Ricardo Jr., MD sent at 9/5/2017 10:52 AM EDT -----  MRI of the lumbar spine confirms arthritis and disc degeneration as suspected.  Do you wish to undergo physical therapy?

## 2017-09-07 RX ORDER — ATORVASTATIN CALCIUM 10 MG/1
TABLET, FILM COATED ORAL
Qty: 90 TABLET | Refills: 1 | Status: SHIPPED | OUTPATIENT
Start: 2017-09-07 | End: 2017-12-11 | Stop reason: SDUPTHER

## 2017-09-11 ENCOUNTER — TREATMENT (OUTPATIENT)
Dept: PHYSICAL THERAPY | Facility: CLINIC | Age: 72
End: 2017-09-11

## 2017-09-11 DIAGNOSIS — M51.36 DEGENERATIVE DISC DISEASE, LUMBAR: Primary | ICD-10-CM

## 2017-09-11 PROCEDURE — 97161 PT EVAL LOW COMPLEX 20 MIN: CPT | Performed by: PHYSICAL THERAPIST

## 2017-09-11 PROCEDURE — 97035 APP MDLTY 1+ULTRASOUND EA 15: CPT | Performed by: PHYSICAL THERAPIST

## 2017-09-11 PROCEDURE — 97140 MANUAL THERAPY 1/> REGIONS: CPT | Performed by: PHYSICAL THERAPIST

## 2017-09-11 PROCEDURE — G0283 ELEC STIM OTHER THAN WOUND: HCPCS | Performed by: PHYSICAL THERAPIST

## 2017-09-11 NOTE — PROGRESS NOTES
Physical Therapy Initial Evaluation and Plan of Care    Patient: Dejah Casey   : 1945  Diagnosis/ICD-10 Code:  Degenerative disc disease, lumbar [M51.36]  Referring practitioner: Sunny Ricardo Jr., MD    Subjective Evaluation    History of Present Illness  Date of onset: 2017  Mechanism of injury: Slowly developed LBP with radicular symptoms in the left LE, takes Tylenol occasionally for pain  To PMD - MRI DDD, disc bulging, to PT    No specific outside activities, does light housekeeping       Patient Occupation: Reired electronic worker Pain  Current pain ratin  At best pain ratin  At worst pain ratin  Location: Right lateral thigh, mild in lower back, denies NT  Quality: dull ache  Relieving factors: change in position, rest and relaxation (supine, in recliner witb heat on)  Aggravating factors: standing, squatting, ambulation, lifting and movement    Social Support  Lives in: one-story house  Lives with: spouse    Diagnostic Tests  MRI studies: abnormal (DDD)    Treatments  Current treatment: physical therapy  Patient Goals  Patient goals for therapy: decreased pain and independence with ADLs/IADLs  Patient goal: Be able to get out more without pain           Objective     Special Questions  Patient is experiencing disturbed sleep.     Additional Special Questions  Negative Valsalva, Reports right LLD with 1/2 inch lift in left shoe    Postural Observations  Seated posture: good  Standing posture: good    Additional Postural Observation Details  Very guarded transitional movements    Palpation   Left   Hypertonic in the erector spinae, lumbar paraspinals and quadratus lumborum.   Tenderness of the erector spinae, lumbar paraspinals and quadratus lumborum.     Right   Hypertonic in the erector spinae, lumbar paraspinals and quadratus lumborum. Tenderness of the erector spinae, lumbar paraspinals and quadratus lumborum.     Tenderness     Lumbar Spine  Tenderness in the spinous process.      Right Hip   Tenderness in the PSIS.     Additional Tenderness Details  Tenderness to palpation L4 & L5 spinous process    Neurological Testing   Sensation     Lumbar   Left   Intact: light touch    Right   Intact: light touch    Active Range of Motion     Lumbar   Flexion: 25 degrees with pain  Extension: 50 degrees   Left lateral flexion: 75 degrees   Right lateral flexion: 50 degrees with pain  Left rotation: 75 degrees   Right rotation: 75 degrees with pain    Strength/Myotome Testing     Lumbar   Left   Normal strength    Right   Normal strength    Tests     Lumbar     Left   Negative passive SLR.     Right   Negative passive SLR.     Left Pelvic Girdle/Sacrum   Positive: sacral spring.     Right Pelvic Girdle/Sacrum   Positive: sacral spring.          Assessment & Plan     Assessment  Impairments: abnormal gait, abnormal muscle tone, abnormal or restricted ROM, activity intolerance, lacks appropriate home exercise program and pain with function  Assessment details: 72 y.o. Female with DDD and lumbar pain presents with: 1. Intermittent lumbar and right LE pain, 2. Decreased spinal AROM, 3. Increased muscle tone in lumbar paravertebral and quadratus lumborum muscles, 4. No myotomal weakness, 5. Decreased pain with lumbar distraction, 6. Decreased tolerance for many normal activities to include standing, walking  Prognosis: good  Functional Limitations: carrying objects, lifting, sleeping, walking, pulling, pushing, uncomfortable because of pain, standing and stooping  Goals  Plan Goals: Short Term Goals: 2 weeks  Patient will be able to tolerate initial exercises  Patient will have pain <5/10  Patient will be able to sleep without pain interruption  Patient will be able to transfer sit to stand without pain    Long Term Goals: 4 weeks  Patient will be independent in performing home exercise program.  Patient will have functional pain free spinal AROM  Patient will be able to stand/walk for >15 minutes without  increased symptoms   Patient will be able to climb up/down stairs without increased symptoms    Plan  Therapy options: will be seen for skilled physical therapy services  Planned modality interventions: ultrasound, traction, thermotherapy (hydrocollator packs) and electrical stimulation/Russian stimulation  Planned therapy interventions: manual therapy, spinal/joint mobilization, strengthening, stretching, home exercise program and soft tissue mobilization  Frequency: 2x week  Duration in visits: 8  Duration in weeks: 4  Treatment plan discussed with: patient and caregiver  Plan details:  was presents for entire treatment and discussion        Manual Therapy:    10     mins  19038;  Therapeutic Exercise:    7     mins  56467;     Neuromuscular Giancarlo:    0    mins  78231;    Therapeutic Activity:     0     mins  47990;       Evaluation Time:     25  mins  Timed Treatment:   25   mins   Total Treatment:     80   mins    PT SIGNATURE: Suzie Fortune, PT   DATE TREATMENT INITIATED: 9/11/2017    Initial Certification  Certification Period: 12/10/2017  I certify that the therapy services are furnished while this patient is under my care.  The services outlined above are required by this patient, and will be reviewed every 90 days.     PHYSICIAN: Sunny Ricardo Jr., MD      DATE:     Please sign and return via fax to [phone].. Thank you, [facility].

## 2017-09-14 ENCOUNTER — TREATMENT (OUTPATIENT)
Dept: PHYSICAL THERAPY | Facility: CLINIC | Age: 72
End: 2017-09-14

## 2017-09-14 DIAGNOSIS — M51.36 DEGENERATIVE DISC DISEASE, LUMBAR: Primary | ICD-10-CM

## 2017-09-14 PROCEDURE — 97140 MANUAL THERAPY 1/> REGIONS: CPT | Performed by: PHYSICAL THERAPIST

## 2017-09-14 PROCEDURE — G0283 ELEC STIM OTHER THAN WOUND: HCPCS | Performed by: PHYSICAL THERAPIST

## 2017-09-14 PROCEDURE — 97110 THERAPEUTIC EXERCISES: CPT | Performed by: PHYSICAL THERAPIST

## 2017-09-14 NOTE — PROGRESS NOTES
Physical Therapy Daily Progress Note    VISIT#: 2    Subjective   Dejah Casey reports: that she was more sore after her last session.  States that she is having quite a bit of muscle spasm in the bilateral hamstrings.  Still sleeping without pain interruption.       Objective   Presents holding on to  for support as she has occasional sharp pains with walking    Very guarded transiitonal movements    See Exercise, Manual, and Modality Logs for complete treatment.     Patient Education: Discussed possibility of taking OTC anti inflams if she is able to take them with her current meds and medical history. Check with her PMD    Assessment/Plan  Patient still having quite a bit of back pain and radicular symptoms.  Unable to tolerate some positions for treatment. Should respond to anti inflams if OK to take them.    Progress per Plan of Care           Manual Therapy:    10     mins  11139;  Therapeutic Exercise:    20     mins  80083;     Neuromuscular Giancarlo:    0    mins  71311;    Therapeutic Activity:     0     mins  56686;       Timed Treatment:   30   mins   Total Treatment:     60   mins    Suzie Fortune, PT  [place] License # [id number]  Physical Therapist

## 2017-09-18 ENCOUNTER — TREATMENT (OUTPATIENT)
Dept: PHYSICAL THERAPY | Facility: CLINIC | Age: 72
End: 2017-09-18

## 2017-09-18 DIAGNOSIS — M51.36 DEGENERATIVE DISC DISEASE, LUMBAR: Primary | ICD-10-CM

## 2017-09-18 PROCEDURE — 97140 MANUAL THERAPY 1/> REGIONS: CPT | Performed by: PHYSICAL THERAPIST

## 2017-09-18 PROCEDURE — 97035 APP MDLTY 1+ULTRASOUND EA 15: CPT | Performed by: PHYSICAL THERAPIST

## 2017-09-18 PROCEDURE — G0283 ELEC STIM OTHER THAN WOUND: HCPCS | Performed by: PHYSICAL THERAPIST

## 2017-09-18 NOTE — PROGRESS NOTES
Physical Therapy Daily Progress Note    VISIT#: 3    Subjective   Dejah Casey reports: that she is started to take naproxen 1 qd as she is also taking aspirin.   States that she still has pain in the posterior thighs but denies any pain into the lower legs.  Reports having some difficutly with the hip abd/ER exercise      Objective   Presents ambulating with a cane today    Relief with lumbar distraction in both prone and supine    See Exercise, Manual, and Modality Logs for complete treatment.     Patient Education: reinstruction in technique for some exercises    Assessment/Plan  Patient receives relief with spinal distraction but still has pain with WB.  Decreased after Tx tdoay.    Progress per Plan of Care           Manual Therapy:    12     mins  02856;  Therapeutic Exercise:    5     mins  23988;   Reinstructed in proper exercise technique  Neuromuscular Giancarlo:    0    mins  54144;    Therapeutic Activity:     0     mins  01358;       Timed Treatment:   25   mins   Total Treatment:     60   mins    Suzie Fortune, PT  [place] License # [id number]  Physical Therapist

## 2017-09-20 ENCOUNTER — TREATMENT (OUTPATIENT)
Dept: PHYSICAL THERAPY | Facility: CLINIC | Age: 72
End: 2017-09-20

## 2017-09-20 PROCEDURE — 97110 THERAPEUTIC EXERCISES: CPT | Performed by: PHYSICAL THERAPIST

## 2017-09-20 PROCEDURE — G0283 ELEC STIM OTHER THAN WOUND: HCPCS | Performed by: PHYSICAL THERAPIST

## 2017-09-20 PROCEDURE — 97140 MANUAL THERAPY 1/> REGIONS: CPT | Performed by: PHYSICAL THERAPIST

## 2017-09-20 PROCEDURE — 97035 APP MDLTY 1+ULTRASOUND EA 15: CPT | Performed by: PHYSICAL THERAPIST

## 2017-09-20 NOTE — PROGRESS NOTES
Physical Therapy Daily Progress Note    VISIT#: 4    Subjective   Dejah Casey reports: that she is not having the burning pain in her posterior thighs now but is having some tightness in them.  Denies any back pain today. States that she feels like she is able to walk a little better today.      Objective   Presents with a straight cane for gait assist but with a quicker clarence than last session    spinal flexion 5% with increased back pain    Decreased pain with supine leg pulls, spinal distraction    See Exercise, Manual, and Modality Logs for complete treatment.     Patient Education: new clam shell exercise    Assessment/Plan  Slow yet steady improvement as her mobility has increased slightly and her leg pain has decreased.  Needs added core stability.    Progress per Plan of Care           Manual Therapy:    12     mins  37765;  Therapeutic Exercise:    20     mins  92345;     Neuromuscular Giancarlo:    0    mins  91391;    Therapeutic Activity:     0     mins  94046;       Timed Treatment:   40   mins   Total Treatment:     70   mins    Suzie Fortune, PT  [place] License # [id number]  Physical Therapist

## 2017-09-26 ENCOUNTER — TREATMENT (OUTPATIENT)
Dept: PHYSICAL THERAPY | Facility: CLINIC | Age: 72
End: 2017-09-26

## 2017-09-26 DIAGNOSIS — M51.36 DEGENERATIVE DISC DISEASE, LUMBAR: Primary | ICD-10-CM

## 2017-09-26 PROCEDURE — G0283 ELEC STIM OTHER THAN WOUND: HCPCS | Performed by: PHYSICAL THERAPIST

## 2017-09-26 PROCEDURE — 97110 THERAPEUTIC EXERCISES: CPT | Performed by: PHYSICAL THERAPIST

## 2017-09-26 NOTE — PROGRESS NOTES
Physical Therapy Daily Progress Note    VISIT#: 5    Subjective   Dejah Casey reports: that her left hip was very sore upon awakening this morning, but after moving around some she feels better.  Denies any LE symptoms today.  Denies any central back pain or right hip pain.       Objective   Presents without her cane walking with a fluid gait pattern    Spinal flexion 75%    Tenderness in left SI region as well as piriformis muscle     See Exercise, Manual, and Modality Logs for complete treatment.     Patient Education:    Assessment/Plan  Pain has decreased and mobility is much more fluid.  Lumbar strain resolving and no radicular symptoms.  Persistent tenderness in piriformis but pelvis is level.    Progress strengthening /stabilization /functional activity           Manual Therapy:    0     mins  53830;  Therapeutic Exercise:    25     mins  22496;     Neuromuscular Giancarlo:    0    mins  34518;    Therapeutic Activity:     0     mins  44012;       Timed Treatment:   33   mins   Total Treatment:     60   mins    Suzie Fortune, PT  [place] License # [id number]  Physical Therapist

## 2017-09-28 ENCOUNTER — TREATMENT (OUTPATIENT)
Dept: PHYSICAL THERAPY | Facility: CLINIC | Age: 72
End: 2017-09-28

## 2017-09-28 DIAGNOSIS — M51.36 DEGENERATIVE DISC DISEASE, LUMBAR: Primary | ICD-10-CM

## 2017-09-28 PROCEDURE — G0283 ELEC STIM OTHER THAN WOUND: HCPCS | Performed by: PHYSICAL THERAPIST

## 2017-09-28 PROCEDURE — 97110 THERAPEUTIC EXERCISES: CPT | Performed by: PHYSICAL THERAPIST

## 2017-09-28 NOTE — PROGRESS NOTES
Physical Therapy Daily Progress Note    VISIT#: 6    Subjective   Dejah Casey reports: that she is feeling so much better than she was a few weeks ago.  States that she has had pain only with sitting in the straight backed dining room chair.       Objective   Presents walking with a normal gait pattern without the assist of her cane    Spinal flexion 75%, extension 50%    See Exercise, Manual, and Modality Logs for complete treatment.     Patient Education: discussion of equipment to use at gym    Assessment/Plan  Patient doing very well.  Pain has greatly decreased and activity tolerance has increased.  No lumbar pain today at all.  Nearing ready for DC.      Anticipate DC next Visit  Progress to gym work outs         Manual Therapy:    0     mins  26698;  Therapeutic Exercise:    30/40     mins  38384;     Neuromuscular Giancarlo:    0    mins  63359;    Therapeutic Activity:     0     mins  81892;       Timed Treatment:   30   mins   Total Treatment:     60   mins    Suzie Fortune, PT  [place] License # [id number]  Physical Therapist

## 2017-10-03 ENCOUNTER — TREATMENT (OUTPATIENT)
Dept: PHYSICAL THERAPY | Facility: CLINIC | Age: 72
End: 2017-10-03

## 2017-10-03 DIAGNOSIS — M51.36 DEGENERATIVE DISC DISEASE, LUMBAR: Primary | ICD-10-CM

## 2017-10-03 PROCEDURE — 97110 THERAPEUTIC EXERCISES: CPT | Performed by: PHYSICAL THERAPIST

## 2017-10-03 PROCEDURE — G0283 ELEC STIM OTHER THAN WOUND: HCPCS | Performed by: PHYSICAL THERAPIST

## 2017-10-03 PROCEDURE — 97530 THERAPEUTIC ACTIVITIES: CPT | Performed by: PHYSICAL THERAPIST

## 2017-10-03 NOTE — PROGRESS NOTES
Physical Therapy Daily Progress Note - Discharge summary    VISIT#: 7    Subjective   Dejah Casey reports: that she is no longer having the back pain that she initially presented with but is having some muscle soreness in the right buttocks from the exercises.  States that she has now joined two different gyms      Objective   Spinal AROM - full in all planes of motion for lisbeth's age    Sensation - intact    Tenderness to palpation in both piriformis muscles, no longer tenderness in the lumbar PVM    See Exercise, Manual, and Modality Logs for complete treatment.     Patient Education: discussion of progression to work outs at the gym.  Use pain as her guide and avoid any high impact activities.     Assessment/Plan  Lisbeth doing very well in resolution of lumbar strain.  No longer any lumbar symptoms but does have some muscle soreness from exercise.  Planning on starting work outs at  [employer]ym once MD gives her the OK.    Discharge Therapy at this time.  All goals have been met.           Manual Therapy:    0     mins  05492;  Therapeutic Exercise:    25     mins  96018;     Neuromuscular Giancarlo:    0    mins  11517;    Therapeutic Activity:     15     mins  23571;   Patient education about old vs new exercises, pacing self with home activities     Timed Treatment:   40   mins   Total Treatment:     70   mins    Suzie Fortune, PT  [place] License # [id number]  Physical Therapist

## 2017-10-11 ENCOUNTER — OFFICE VISIT (OUTPATIENT)
Dept: INTERNAL MEDICINE | Facility: CLINIC | Age: 72
End: 2017-10-11

## 2017-10-11 VITALS
SYSTOLIC BLOOD PRESSURE: 126 MMHG | HEIGHT: 64 IN | BODY MASS INDEX: 37.05 KG/M2 | RESPIRATION RATE: 14 BRPM | DIASTOLIC BLOOD PRESSURE: 72 MMHG | WEIGHT: 217 LBS

## 2017-10-11 DIAGNOSIS — M47.26 OSTEOARTHRITIS OF SPINE WITH RADICULOPATHY, LUMBAR REGION: Primary | ICD-10-CM

## 2017-10-11 PROCEDURE — 99213 OFFICE O/P EST LOW 20 MIN: CPT | Performed by: INTERNAL MEDICINE

## 2017-10-11 NOTE — PROGRESS NOTES
Subjective   Dejah Casey is a 72 y.o. female.     History of Present Illness she is here today for follow-up of lumbar spine degenerative joint and degenerative disc disease.  I am happy to see that after 7 physical therapy sessions she has no further pain.        Review of Systems   Constitutional: Positive for activity change. Negative for appetite change and fatigue.   Respiratory: Negative for chest tightness and shortness of breath.    Cardiovascular: Negative for chest pain and leg swelling.   Gastrointestinal: Negative for abdominal pain.   Genitourinary: Negative for dysuria, flank pain and hematuria.   Musculoskeletal: Negative for arthralgias, back pain and gait problem.   Neurological: Negative for dizziness and weakness.       Objective   Physical Exam   Constitutional: She is oriented to person, place, and time. Vital signs are normal. She appears well-developed and well-nourished. She is active. She does not appear ill.   Eyes: Conjunctivae are normal.   Cardiovascular: Normal rate, regular rhythm, S1 normal and S2 normal.  Exam reveals no S3 and no S4.    No murmur heard.  Pulmonary/Chest: No tachypnea. No respiratory distress. She has no decreased breath sounds. She has no wheezes. She has no rhonchi. She has no rales.   Abdominal: Soft. Normal appearance and bowel sounds are normal. She exhibits no abdominal bruit and no mass. There is no hepatosplenomegaly. There is no tenderness.       Vascular Status -  Her exam exhibits no right foot edema. Her exam exhibits no left foot edema.  Neurological: She is alert and oriented to person, place, and time. Gait normal.   Psychiatric: She has a normal mood and affect. Her speech is normal and behavior is normal. Judgment and thought content normal. Cognition and memory are normal.       Assessment/Plan MRI of the lumbar spine shows mild to moderate canal stenosis at L2-L3 and moderate canal stenosis at L3-L4    Assessment #1 lumbar spine degenerative  joint and degenerative disc disease-much improved after physical therapy and she is back to normal physical activity.    Plan #1 no change medication #2 she is instructed to use medium heat for 10-15 minutes each morning over the low back area and then do 5 minutes of gentle stretching exercises every day.  Routine follow-up with me in 4 months.

## 2017-10-19 RX ORDER — LANCETS
EACH MISCELLANEOUS
Qty: 102 EACH | Refills: 3 | Status: SHIPPED | OUTPATIENT
Start: 2017-10-19 | End: 2017-10-20 | Stop reason: SDUPTHER

## 2017-10-20 RX ORDER — LANCETS
EACH MISCELLANEOUS
Qty: 102 EACH | Refills: 3 | Status: SHIPPED | OUTPATIENT
Start: 2017-10-20 | End: 2018-10-24 | Stop reason: SDUPTHER

## 2017-10-25 ENCOUNTER — TELEPHONE (OUTPATIENT)
Dept: INTERNAL MEDICINE | Facility: CLINIC | Age: 72
End: 2017-10-25

## 2017-11-03 ENCOUNTER — OFFICE VISIT (OUTPATIENT)
Dept: CARDIOLOGY | Facility: CLINIC | Age: 72
End: 2017-11-03

## 2017-11-03 ENCOUNTER — LAB (OUTPATIENT)
Dept: LAB | Facility: HOSPITAL | Age: 72
End: 2017-11-03

## 2017-11-03 VITALS
HEART RATE: 85 BPM | SYSTOLIC BLOOD PRESSURE: 124 MMHG | HEIGHT: 64 IN | DIASTOLIC BLOOD PRESSURE: 76 MMHG | BODY MASS INDEX: 37.56 KG/M2 | WEIGHT: 220 LBS

## 2017-11-03 DIAGNOSIS — R06.09 DYSPNEA ON EXERTION: ICD-10-CM

## 2017-11-03 DIAGNOSIS — R06.09 DYSPNEA ON EXERTION: Primary | ICD-10-CM

## 2017-11-03 DIAGNOSIS — I10 ESSENTIAL HYPERTENSION: ICD-10-CM

## 2017-11-03 DIAGNOSIS — I49.49 ECTOPIC BEATS: ICD-10-CM

## 2017-11-03 LAB — NT-PROBNP SERPL-MCNC: 298.3 PG/ML (ref 0–900)

## 2017-11-03 PROCEDURE — 36415 COLL VENOUS BLD VENIPUNCTURE: CPT

## 2017-11-03 PROCEDURE — 83880 ASSAY OF NATRIURETIC PEPTIDE: CPT

## 2017-11-03 PROCEDURE — 93000 ELECTROCARDIOGRAM COMPLETE: CPT | Performed by: INTERNAL MEDICINE

## 2017-11-03 PROCEDURE — 99214 OFFICE O/P EST MOD 30 MIN: CPT | Performed by: INTERNAL MEDICINE

## 2017-11-03 NOTE — PROGRESS NOTES
Date of Office Visit: 2017  Encounter Provider: Daniel Leal MD  Place of Service: [facility] CARDIOLOGY  Patient Name: Dejah Casey  :1945    Chief Complaint   Patient presents with   • Rapid Heart Rate     NEW PT    :     HPI: Dejah Casey is a 72 y.o. female who presents today to [facility] care.  She was previously followed by Dr. Mcmullen ([facility]) and last saw him in 2017. This year, she was evaluated by Dr. Castillo.    In 2016, she was having palpitations; a Holter showed rare PVCs and PACs and rare bursts of PACs lasting up to six beats.  Metoprolol was started and her palpitations have resolved.    In , she had a cath which showed minimal coronary atherosclerosis (30% LAD); a SPECT stress in 2017 was completely normal.  This was performed due to dyspnea.    She had an echocardiogram in 2017 that was unremarkable; it showed an LVEF of 55%, grade I diastolic dysfunction, and mild MR.      She continues to have exertional dyspnea.  She denies chest pain.  She denies lightheadedness or syncope.  She has occasional pedal edema.  She was previously on HCTZ but it was stopped in  due to hypercalcemia; her serum calcium level has remained elevated though.    She is a somewhat difficult historian.  She is a little vague.    Past Medical History:   Diagnosis Date   • Anxiety    • Contact dermatitis    • DDD (degenerative disc disease), lumbar    • Depression    • Essential hypertension    • Female climacteric state    • Headache    • History of mammogram     2016   • History of total right hip replacement    • Hypercalcemia    • Hyperlipidemia    • Nightmares REM-sleep type    • Nonocclusive coronary atherosclerosis of native coronary artery     cath in  with 30% LAD disease   • Nontoxic multinodular goiter    • Obesity    • Osteoarthritis     both knees, back and hips   • Osteopenia    • Pain, joint, shoulder    • Polycythemia     • Rotator cuff tendinitis    • Snoring    • Superficial phlebitis    • Type 2 diabetes mellitus     Type II   • Vitiligo        Past Surgical History:   Procedure Laterality Date   • BREAST BIOPSY     • COLONOSCOPY  08/15/2010   • HYSTERECTOMY     • TONSILLECTOMY     • TOTAL HIP ARTHROPLASTY Right        Social History     Social History   • Marital status:      Spouse name: N/A   • Number of children: N/A   • Years of education: N/A     Occupational History   • Not on file.     Social History Main Topics   • Smoking status: Never Smoker   • Smokeless tobacco: Never Used      Comment: I have never smoked.   • Alcohol use No      Comment: CAFFEINE: DECAF ONCE PER WK.    • Drug use: Defer   • Sexual activity: Not on file     Other Topics Concern   • Not on file     Social History Narrative       Family History   Problem Relation Age of Onset   • Diabetes Mother    • Thyroid disease Mother    • Hypertension Father    • Heart disease Father      Father   • Asthma Sister    • Hypertension Sister    • Diabetes Brother    • Kidney disease Brother      CHRONIC RENAL FAILURE   • Diabetes Sister    • Thyroid disease Sister    • Lung cancer Brother    • Kidney disease Brother    • Diabetes Brother    • Hypertension Brother        Review of Systems   Constitution: Positive for malaise/fatigue.   Cardiovascular: Positive for dyspnea on exertion.   Respiratory: Positive for snoring.    Musculoskeletal: Positive for joint pain.   All other systems reviewed and are negative.      Allergies   Allergen Reactions   • Dexamethasone Dermatitis   • Lisinopril          Current Outpatient Prescriptions:   •  ACCU-CHEK GUMARO PLUS test strip, TEST ONE TIME DAILY, Disp: 100 each, Rfl: 3  •  ACCU-CHEK FASTCLIX LANCETS misc, Use to check blood sugar once daily, Disp: 102 each, Rfl: 3  •  ACCU-CHEK SOFTCLIX LANCETS lancets, TEST ONCE DAILY, Disp: 300 each, Rfl: 3  •  amLODIPine (NORVASC) 2.5 MG tablet, Take 1 tablet by mouth Daily.,  "Disp: 90 tablet, Rfl: 3  •  aspirin 81 MG tablet, Take 81 mg by mouth daily., Disp: , Rfl:   •  atorvastatin (LIPITOR) 10 MG tablet, TAKE 1 TABLET EVERY DAY, Disp: 90 tablet, Rfl: 1  •  Calcium Carb-Cholecalciferol 500-200 MG-UNIT tablet, Take 2 tablets by mouth daily., Disp: , Rfl:   •  Cholecalciferol (VITAMIN D-3) 1000 UNITS capsule, Take 800 Units by mouth daily., Disp: , Rfl:   •  clonazePAM (KlonoPIN) 1 MG tablet, Take 1 tablet by mouth at night as needed. Prescribed by Pulmonary, Dr. Bell, Disp: , Rfl:   •  fesoterodine fumarate (TOVIAZ ER) 4 MG tablet sustained-release 24 hour capsule, Take 4 mg by mouth daily., Disp: , Rfl:   •  Lancets Misc. (ACCU-CHEK FASTCLIX LANCET) kit, 1 kit Daily., Disp: 1 kit, Rfl: 0  •  loratadine (CLARITIN) 5 MG chewable tablet, Chew 10 mg Daily., Disp: , Rfl:   •  metFORMIN (GLUCOPHAGE) 500 MG tablet, Take 1 tablet by mouth 2 (Two) Times a Day With Meals., Disp: 180 tablet, Rfl: 3  •  metoprolol succinate XL (TOPROL-XL) 25 MG 24 hr tablet, Take 1 tablet by mouth Daily., Disp: 90 tablet, Rfl: 3  •  Multiple Vitamins-Minerals (CENTRUM SILVER ADULT 50+) tablet, Take 1 tablet by mouth., Disp: , Rfl:   •  venlafaxine (EFFEXOR) 75 MG tablet, TAKE 2 TABLETS TWICE DAILY, Disp: 360 tablet, Rfl: 1     Objective:     Vitals:    11/03/17 1201   BP: 124/76   BP Location: Left arm   Pulse: 85   Weight: 220 lb (99.8 kg)   Height: 63.75\" (161.9 cm)     Body mass index is 38.06 kg/(m^2).    Physical Exam   Constitutional: She is oriented to person, place, and time.   Obese   HENT:   Head: Normocephalic.   Nose: Nose normal.   Mouth/Throat: Oropharynx is clear and moist.   Eyes: Conjunctivae and EOM are normal. Pupils are equal, round, and reactive to light.   Neck: Normal range of motion.   Cannot assess for JVD due to habitus   Cardiovascular: Normal rate, regular rhythm, normal heart sounds and intact distal pulses.    No murmur heard.  Pulmonary/Chest: Effort normal.   Abdominal: Soft. "   Obesity limits abdominal exam   Musculoskeletal: Normal range of motion. She exhibits no edema.   Neurological: She is alert and oriented to person, place, and time. No cranial nerve deficit.   Skin: Skin is warm and dry. No rash noted.   Psychiatric: She has a normal mood and affect. Her behavior is normal. Judgment and thought content normal.   Vitals reviewed.        ECG 12 Lead  Date/Time: 11/3/2017 2:03 PM  Performed by: DANIEL LEAL  Authorized by: DANIEL LEAL   Comparison: compared with previous ECG   Similar to previous ECG  Rhythm: sinus rhythm  Conduction: conduction normal  ST Segments: ST segments normal  T Waves: T waves normal  QRS axis: normal  Other: no other findings  Clinical impression: normal ECG              Assessment:       Diagnosis Plan   1. Dyspnea on exertion  proBNP   2. Essential hypertension     3. Ectopic beats            Plan:       1. She has had somewhat longstanding exertional dyspnea.  This year, she has had an echo and a SPECT stress, both of which are normal. I checked a proBNP today, and it's completely normal for age at 300.  I do not feel her dyspnea is cardiac.  I suspect it's related to obesity and deconditioning.  I don't feel she needs a diuretic with the normal BNP.  If she did, it would have to be furosemide due to her potassium/calcium issues.    2.  Her BP is within goal.    3.  She had palpitations before and a Holter showed benign ectopics.  There were bursts of PACs lasting up to six beats but there was not sustained tachycardia.  This is a pretty normal findings.  Her palpitations have resolved with metoprolol.      Her cardiac status seems stable; I will see her as needed.   Sincerely,       Daniel Leal MD

## 2017-12-08 ENCOUNTER — OFFICE VISIT (OUTPATIENT)
Dept: ENDOCRINOLOGY | Age: 72
End: 2017-12-08

## 2017-12-08 VITALS
SYSTOLIC BLOOD PRESSURE: 152 MMHG | WEIGHT: 219 LBS | DIASTOLIC BLOOD PRESSURE: 86 MMHG | HEART RATE: 94 BPM | BODY MASS INDEX: 37.39 KG/M2 | HEIGHT: 64 IN | OXYGEN SATURATION: 97 %

## 2017-12-08 DIAGNOSIS — G47.33 OBSTRUCTIVE SLEEP APNEA ON CPAP: ICD-10-CM

## 2017-12-08 DIAGNOSIS — R93.6 ABNORMAL FINDINGS ON DIAGNOSTIC IMAGING OF LIMBS: ICD-10-CM

## 2017-12-08 DIAGNOSIS — E55.9 VITAMIN D DEFICIENCY: ICD-10-CM

## 2017-12-08 DIAGNOSIS — E04.2 NONTOXIC MULTINODULAR GOITER: ICD-10-CM

## 2017-12-08 DIAGNOSIS — Z99.89 OBSTRUCTIVE SLEEP APNEA ON CPAP: ICD-10-CM

## 2017-12-08 DIAGNOSIS — I10 ESSENTIAL HYPERTENSION: ICD-10-CM

## 2017-12-08 DIAGNOSIS — E78.5 HYPERLIPIDEMIA, UNSPECIFIED HYPERLIPIDEMIA TYPE: ICD-10-CM

## 2017-12-08 DIAGNOSIS — M85.80 OSTEOPENIA, UNSPECIFIED LOCATION: ICD-10-CM

## 2017-12-08 DIAGNOSIS — E11.9 TYPE 2 DIABETES MELLITUS WITHOUT COMPLICATION, WITHOUT LONG-TERM CURRENT USE OF INSULIN (HCC): Primary | ICD-10-CM

## 2017-12-08 PROCEDURE — 99214 OFFICE O/P EST MOD 30 MIN: CPT | Performed by: INTERNAL MEDICINE

## 2017-12-08 NOTE — PROGRESS NOTES
Subjective   Dejah Casey is a 72 y.o. female.     HPI Comments: F/u for dm2,hyperlipidemia, hypertension, OAB,CAD, nontoxic MNG,DJD,osteopenia / testing bs 1 x day / last dm eye exam 1/2016/ last dm foot exam 6/7/17 with dr Hernandez     Diabetes   She has type 2 diabetes mellitus. MedicAlert identification noted. The initial diagnosis of diabetes was made 14 years ago. Hypoglycemia symptoms include mood changes, nervousness/anxiousness, sleepiness, sweats and tremors. Pertinent negatives for hypoglycemia include no confusion, dizziness, headaches, hunger, pallor, seizures or speech difficulty. Associated symptoms include blurred vision, chest pain, fatigue, visual change and weakness. Pertinent negatives for diabetes include no foot paresthesias, no foot ulcerations, no polydipsia, no polyphagia, no polyuria and no weight loss. Hypoglycemia complications include required assistance. Pertinent negatives for hypoglycemia complications include no blackouts, no hospitalization, no nocturnal hypoglycemia and no required glucagon injection. Symptoms are stable. Diabetic complications include heart disease. Pertinent negatives for diabetic complications include no CVA, impotence, nephropathy, peripheral neuropathy, PVD or retinopathy. Risk factors for coronary artery disease include hypertension, obesity, post-menopausal and stress. Current diabetic treatment includes diet and oral agent (monotherapy). She is compliant with treatment all of the time. Her weight is stable. She is following a diabetic, generally healthy and low salt diet. Meal planning includes avoidance of concentrated sweets and carbohydrate counting. She has not had a previous visit with a dietitian. She participates in exercise three times a week. She monitors blood glucose at home 1-2 x per day. Blood glucose monitoring compliance is excellent. There is no change in her home blood glucose trend. Her lunch blood glucose is taken after 3 pm. Her lunch  blood glucose range is generally  mg/dl. Her highest blood glucose is  mg/dl. Her overall blood glucose range is  mg/dl. She does not see a podiatrist.Eye exam is not current.   Hyperlipidemia   Associated symptoms include chest pain and shortness of breath.   Hypertension   Associated symptoms include blurred vision, chest pain, shortness of breath and sweats. Pertinent negatives include no headaches. There is no history of CVA, PVD or retinopathy.   Coronary Artery Disease   Symptoms include chest pain and shortness of breath. Pertinent negatives include no dizziness. Risk factors include hyperlipidemia.   Goiter   Associated symptoms include chest pain, fatigue, a visual change and weakness. Pertinent negatives include no headaches.      Patient is a 72-year-old female who came in for followup. She was found to have a goiter on examination last August 2012. She had thyroid ultrasound done on 08/21/2012 which showed a multinodular goiter with the dominant solid nodule in the left measuring 1.8 cm. She had followup thyroid ultrasound in 4/14 at [facility] showed multinodular goiter with stable nodules. Thyroid function tests have been normal.        She denies any voice changes. She denies any pressure symptoms or dysphagia. She denies any bowel changes. She denies any heat or cold intolerance. She has no previous history of head or neck radiation therapy.      She has known diabetes mellitus since 2003 and has been on metformin 500 mg twice a day. Blood sugar has ranged from . She has no microalbuminuria on urine sample taken 12/16. Her last eye exam was 1/16. She has early cataracts but no retinopathy. She has intermittent tingling in her feet but no pain.       She has hyperlipidemia and has been on Lipitor 10 mg once a day.  She denies any myalgia.  She has no significant weight change since 6/17.  Her last meal was at 2 PM.     She has mild coronary artery disease by cardiac  catheterization done in 2008 by Dr. CAMILLE Mcmullen. She denies any chest pain.  She is now following up with Dr. Leal.      She has hypertension and has been on amlodipine 2.5 mg once a day and Toprol. She was taken off hydrochlorothiazide because of hypercalcemia. She had cough with lisinopril in the past. She has not used ARB in the past.       She has overactive bladder diagnosed by Dr. Dean and improved with Toviaz. She was taken off Vesicare because of cost.    He has low back pain due to lumbar degenerative arthritis with spinal stenosis.  She was referred to physical therapy by Dr. Ricardo.  Back pain has improved.  She denies urinary or bowel incontinence tenderness or muscle weakness.    She has osteopenia on bone density done at [facility] in November 2015.  She is on vitamin D 800 units per day and calcium 500 plus D 1 tab BID.    The following portions of the patient's history were reviewed and updated as appropriate: allergies, current medications, past family history, past medical history, past social history, past surgical history and problem list.    Review of Systems   Constitutional: Positive for fatigue. Negative for weight loss.   Eyes: Positive for blurred vision and visual disturbance.   Respiratory: Positive for shortness of breath.    Cardiovascular: Positive for chest pain.   Gastrointestinal: Negative.    Endocrine: Negative.  Negative for polydipsia, polyphagia and polyuria.   Genitourinary: Negative.  Negative for impotence.   Musculoskeletal: Negative.    Skin: Negative.  Negative for pallor.   Allergic/Immunologic: Negative.    Neurological: Positive for tremors and weakness. Negative for dizziness, seizures, speech difficulty and headaches.   Hematological: Bruises/bleeds easily.   Psychiatric/Behavioral: Positive for sleep disturbance (sleep apnea on c pap machine ). Negative for confusion. The patient is nervous/anxious.        Objective      Vitals:    12/08/17 1452   BP: 152/86   BP  "Location: Right arm   Patient Position: Sitting   Cuff Size: Large Adult   Pulse: 94   SpO2: 97%   Weight: 99.3 kg (219 lb)   Height: 161.9 cm (63.74\")     Physical Exam   Constitutional: She is oriented to person, place, and time. She appears well-developed and well-nourished. No distress.   HENT:   Head: Normocephalic.   Nose: Nose normal.   Mouth/Throat: No oropharyngeal exudate.   Eyes: Conjunctivae and EOM are normal. Right eye exhibits no discharge. Left eye exhibits no discharge. No scleral icterus.   Neck: Neck supple. No JVD present. No tracheal deviation present. No thyromegaly present.   Cardiovascular: Normal rate, regular rhythm, normal heart sounds and intact distal pulses.  Exam reveals no gallop and no friction rub.    No murmur heard.  Pulmonary/Chest: Effort normal and breath sounds normal. No respiratory distress. She has no wheezes. She has no rales. She exhibits no tenderness.   Abdominal: Soft. Bowel sounds are normal. She exhibits no distension and no mass. There is no tenderness.   Musculoskeletal: Normal range of motion. She exhibits no edema, tenderness or deformity.   Lymphadenopathy:     She has no cervical adenopathy.   Neurological: She is alert and oriented to person, place, and time. She has normal reflexes. She displays normal reflexes.   Intact light touch   Skin: Skin is warm and dry. No rash noted. No erythema.   Psychiatric: She has a normal mood and affect. Her behavior is normal.     Lab on 11/03/2017   Component Date Value Ref Range Status   • proBNP 11/03/2017 298.3  0.0 - 900.0 pg/mL Final     Assessment/Plan   Dejah was seen today for diabetes, hyperlipidemia, hypertension, coronary artery disease and goiter.    Diagnoses and all orders for this visit:    Type 2 diabetes mellitus without complication, without long-term current use of insulin  -     Comprehensive Metabolic Panel  -     Hemoglobin A1c  -     Microalbumin / Creatinine Urine Ratio - Urine, Clean " Catch    Nontoxic multinodular goiter    Essential hypertension    Hyperlipidemia, unspecified hyperlipidemia type  -     Lipid Panel  -     TSH  -     T4, Free  -     US Thyroid    Obstructive sleep apnea on CPAP    Vitamin D deficiency    Osteopenia, unspecified location  -     DEXA Bone Density Axial    Abnormal findings on diagnostic imaging of limbs   -     DEXA Bone Density Axial      Check thyroid function tests.  Schedule thyroid ultrasound at [facility].  Continue Toprol and amlodipine.  Continue Lipitor.  Continue calcium plus D one tablet twice a day and vitamin D 800 units per day.  Schedule follow-up bone density at [facility].    Send copy of my notes and labs to Dr. Ricardo.    RTC 6 mos

## 2017-12-09 LAB
ALBUMIN SERPL-MCNC: 4.2 G/DL (ref 3.5–5.2)
ALBUMIN/CREAT UR: 8.2 MG/G CREAT (ref 0–30)
ALBUMIN/GLOB SERPL: 1.3 G/DL
ALP SERPL-CCNC: 106 U/L (ref 39–117)
ALT SERPL-CCNC: 11 U/L (ref 1–33)
AST SERPL-CCNC: 13 U/L (ref 1–32)
BILIRUB SERPL-MCNC: 0.2 MG/DL (ref 0.1–1.2)
BUN SERPL-MCNC: 14 MG/DL (ref 8–23)
BUN/CREAT SERPL: 15.7 (ref 7–25)
CALCIUM SERPL-MCNC: 10.2 MG/DL (ref 8.6–10.5)
CHLORIDE SERPL-SCNC: 103 MMOL/L (ref 98–107)
CHOLEST SERPL-MCNC: 234 MG/DL (ref 0–200)
CO2 SERPL-SCNC: 30.8 MMOL/L (ref 22–29)
CREAT SERPL-MCNC: 0.89 MG/DL (ref 0.57–1)
CREAT UR-MCNC: 140.9 MG/DL
GFR SERPLBLD CREATININE-BSD FMLA CKD-EPI: 62 ML/MIN/1.73
GFR SERPLBLD CREATININE-BSD FMLA CKD-EPI: 75 ML/MIN/1.73
GLOBULIN SER CALC-MCNC: 3.2 GM/DL
GLUCOSE SERPL-MCNC: 117 MG/DL (ref 65–99)
HBA1C MFR BLD: 6.4 % (ref 4.8–5.6)
HDLC SERPL-MCNC: 74 MG/DL (ref 40–60)
INTERPRETATION: NORMAL
LDLC SERPL CALC-MCNC: 140 MG/DL (ref 0–100)
Lab: NORMAL
MICROALBUMIN UR-MCNC: 11.5 UG/ML
POTASSIUM SERPL-SCNC: 4.7 MMOL/L (ref 3.5–5.2)
PROT SERPL-MCNC: 7.4 G/DL (ref 6–8.5)
SODIUM SERPL-SCNC: 146 MMOL/L (ref 136–145)
T4 FREE SERPL-MCNC: 0.88 NG/DL (ref 0.93–1.7)
TRIGL SERPL-MCNC: 101 MG/DL (ref 0–150)
TSH SERPL DL<=0.005 MIU/L-ACNC: 0.98 MIU/ML (ref 0.27–4.2)
VLDLC SERPL CALC-MCNC: 20.2 MG/DL (ref 5–40)

## 2017-12-11 RX ORDER — ATORVASTATIN CALCIUM 20 MG/1
20 TABLET, FILM COATED ORAL NIGHTLY
Qty: 90 TABLET | Refills: 1 | Status: SHIPPED | OUTPATIENT
Start: 2017-12-11 | End: 2018-05-29 | Stop reason: SDUPTHER

## 2017-12-27 ENCOUNTER — HOSPITAL ENCOUNTER (OUTPATIENT)
Dept: BONE DENSITY | Facility: HOSPITAL | Age: 72
Discharge: HOME | End: 2017-12-27
Attending: INTERNAL MEDICINE

## 2017-12-27 ENCOUNTER — HOSPITAL ENCOUNTER (OUTPATIENT)
Dept: ULTRASOUND IMAGING | Facility: HOSPITAL | Age: 72
Discharge: HOME | End: 2017-12-27
Attending: INTERNAL MEDICINE | Admitting: INTERNAL MEDICINE

## 2017-12-27 PROCEDURE — 76536 US EXAM OF HEAD AND NECK: CPT

## 2017-12-27 PROCEDURE — 77080 DXA BONE DENSITY AXIAL: CPT

## 2018-01-02 ENCOUNTER — TELEPHONE (OUTPATIENT)
Dept: INTERNAL MEDICINE | Facility: CLINIC | Age: 73
End: 2018-01-02

## 2018-01-02 DIAGNOSIS — I10 ESSENTIAL HYPERTENSION: ICD-10-CM

## 2018-01-02 RX ORDER — AMLODIPINE BESYLATE 5 MG/1
5 TABLET ORAL DAILY
Qty: 90 TABLET | Refills: 3 | Status: SHIPPED | OUTPATIENT
Start: 2018-01-02 | End: 2019-01-04 | Stop reason: SDUPTHER

## 2018-01-02 NOTE — TELEPHONE ENCOUNTER
----- Message from Nohemi Hinojosa MA sent at 1/2/2018  1:42 PM EST -----  Pt having episodes of high blood pressures 150's/90's  [facility] verified these average.    Pt would like to know should she resume 5mg Amlodipine if so pls send it to [facility]-until then she could take 2 of the 2.5mg she has on hand    Pt#[phone]

## 2018-01-02 NOTE — TELEPHONE ENCOUNTER
Please resume 5 mg a day, refills ordered in [facility] home delivery, ask patient to schedule robert with  in a month.

## 2018-01-31 ENCOUNTER — OFFICE VISIT (OUTPATIENT)
Dept: INTERNAL MEDICINE | Facility: CLINIC | Age: 73
End: 2018-01-31

## 2018-01-31 VITALS
DIASTOLIC BLOOD PRESSURE: 82 MMHG | WEIGHT: 219 LBS | OXYGEN SATURATION: 96 % | HEIGHT: 64 IN | SYSTOLIC BLOOD PRESSURE: 140 MMHG | HEART RATE: 103 BPM | BODY MASS INDEX: 37.39 KG/M2

## 2018-01-31 DIAGNOSIS — E04.2 NONTOXIC MULTINODULAR GOITER: ICD-10-CM

## 2018-01-31 DIAGNOSIS — E11.9 TYPE 2 DIABETES MELLITUS WITHOUT COMPLICATION, WITHOUT LONG-TERM CURRENT USE OF INSULIN (HCC): ICD-10-CM

## 2018-01-31 DIAGNOSIS — E78.5 HYPERLIPIDEMIA, UNSPECIFIED HYPERLIPIDEMIA TYPE: ICD-10-CM

## 2018-01-31 DIAGNOSIS — I10 ESSENTIAL HYPERTENSION: Primary | ICD-10-CM

## 2018-01-31 PROBLEM — I47.9 PAROXYSMAL TACHYCARDIA: Status: RESOLVED | Noted: 2017-04-11 | Resolved: 2018-01-31

## 2018-01-31 PROBLEM — R10.9 RIGHT FLANK PAIN: Status: RESOLVED | Noted: 2017-08-01 | Resolved: 2018-01-31

## 2018-01-31 PROCEDURE — 99214 OFFICE O/P EST MOD 30 MIN: CPT | Performed by: INTERNAL MEDICINE

## 2018-01-31 RX ORDER — METOPROLOL SUCCINATE 50 MG/1
50 TABLET, EXTENDED RELEASE ORAL DAILY
Qty: 90 TABLET | Refills: 3 | Status: SHIPPED | OUTPATIENT
Start: 2018-01-31 | End: 2019-01-31 | Stop reason: SDUPTHER

## 2018-01-31 NOTE — PROGRESS NOTES
Subjective   Dejah Casey is a 73 y.o. female.     History of Present Illness she is here today for her yearly follow-up which includes hypertension, hypercholesterolemia, diabetes mellitus, and obstructive sleep apnea.  Overall she has done well although she has chronic dyspnea with relatively mild exertion.  Evaluation in the past is been negative for significant cardiopulmonary problems.  She denies any PND or chest pain.  There has been no swelling in the ankles.  She uses her CPAP nightly for obstructive sleep apnea.  She denies any dizziness, syncope, or focal neurologic episodes.  Her blood pressure generally runs 140s to 150s systolic over 80s to 90s diastolic.  She has one per night nocturia without dysuria.  The remainder of her review systems is negative.  She had a normal mammogram last year.        Review of Systems   Constitutional: Negative for activity change, appetite change and fatigue.   HENT: Negative for trouble swallowing.    Respiratory: Positive for shortness of breath. Negative for cough, chest tightness and wheezing.    Cardiovascular: Negative for chest pain, palpitations and leg swelling.   Gastrointestinal: Negative for abdominal pain, anal bleeding, blood in stool, constipation, diarrhea, nausea and vomiting.   Genitourinary: Negative for dysuria, flank pain, frequency and hematuria.   Musculoskeletal: Negative for gait problem and neck pain.   Neurological: Negative for dizziness, syncope, facial asymmetry, speech difficulty, weakness and numbness.   Psychiatric/Behavioral: Negative for dysphoric mood. The patient is not nervous/anxious.        Objective   Physical Exam   Constitutional: She is oriented to person, place, and time. She appears well-developed and well-nourished. She is active. She does not appear ill.   Eyes: Conjunctivae are normal.   Fundoscopic exam:       The right eye shows no AV nicking, no exudate and no hemorrhage.        The left eye shows no AV nicking, no  exudate and no hemorrhage.   Neck: Carotid bruit is not present. Thyromegaly present. No thyroid mass present.       Cardiovascular: Normal rate, regular rhythm and S1 normal.  Exam reveals no S3 and no S4.    No murmur heard.  Pulses:       Dorsalis pedis pulses are 2+ on the right side, and 2+ on the left side.   Pulmonary/Chest: No tachypnea. No respiratory distress. She has no decreased breath sounds. She has no wheezes. She has no rhonchi. She has no rales.   Abdominal: Soft. Normal appearance and bowel sounds are normal. She exhibits no abdominal bruit and no mass. There is no hepatosplenomegaly. There is no tenderness.       Vascular Status -  Her exam exhibits no right foot edema. Her exam exhibits no left foot edema.  Neurological: She is alert and oriented to person, place, and time. Gait normal.   Reflex Scores:       Bicep reflexes are 2+ on the right side.  Psychiatric: She has a normal mood and affect. Her speech is normal and behavior is normal. Judgment and thought content normal. Cognition and memory are normal.       Assessment/Plan Lexiscan was negative for ischemia in June of this year.  Echocardiogram done in 2016 showed an ejection fraction of 54%.  December lab showed a normal TSH and CMP.  Free T4 was 0.88.  Hemoglobin A1c 6.4.  Total cholesterol 231 triglycerides 101 HDL cholesteryl 74 LDL cholesterol 140.  Blood pressure by me today 142/86.    Assessment #1 hypertension-needs somewhat better control #2 diabetes mellitus-as usual very good control #3 hypercholesterolemia-now on 20 mg of Lipitor.  No sign of target organ injury #4 obstructive sleep apnea-compliant and benefiting    Plan #1 increase metoprolol ER to 50 mg by mouth daily.  Routine follow-up with me in 4 months.

## 2018-03-13 DIAGNOSIS — F32.A DEPRESSION, UNSPECIFIED DEPRESSION TYPE: ICD-10-CM

## 2018-03-13 RX ORDER — VENLAFAXINE 75 MG/1
150 TABLET ORAL 2 TIMES DAILY
Qty: 360 TABLET | Refills: 3 | Status: SHIPPED | OUTPATIENT
Start: 2018-03-13 | End: 2019-05-09 | Stop reason: SDUPTHER

## 2018-04-25 ENCOUNTER — TELEPHONE (OUTPATIENT)
Dept: INTERNAL MEDICINE | Facility: CLINIC | Age: 73
End: 2018-04-25

## 2018-04-25 NOTE — TELEPHONE ENCOUNTER
----- Message from Vanessa Whitley sent at 4/25/2018  1:54 PM EDT -----  Contact: Patient  Patient calling she is taking metoprolol succinate XL (TOPROL-XL) 50 MG 24 hr tablet and her feet and ankles swell during the day and they go down at night. Please advise    Patient:[phone]    Pharmacy:[facility] - [facility] [address] - [phone] [facility] [phone]

## 2018-04-25 NOTE — TELEPHONE ENCOUNTER
I doubt seriously it has anything to do with metoprolol.  I would suggest she make an appointment for next week but not on Monday.

## 2018-05-01 ENCOUNTER — OFFICE VISIT (OUTPATIENT)
Dept: INTERNAL MEDICINE | Facility: CLINIC | Age: 73
End: 2018-05-01

## 2018-05-01 VITALS
WEIGHT: 217 LBS | DIASTOLIC BLOOD PRESSURE: 80 MMHG | HEART RATE: 81 BPM | OXYGEN SATURATION: 98 % | SYSTOLIC BLOOD PRESSURE: 126 MMHG | BODY MASS INDEX: 37.05 KG/M2 | HEIGHT: 64 IN

## 2018-05-01 DIAGNOSIS — I87.2 CHRONIC VENOUS INSUFFICIENCY: Primary | ICD-10-CM

## 2018-05-01 DIAGNOSIS — I10 ESSENTIAL HYPERTENSION: ICD-10-CM

## 2018-05-01 PROCEDURE — 99213 OFFICE O/P EST LOW 20 MIN: CPT | Performed by: INTERNAL MEDICINE

## 2018-05-01 RX ORDER — CLONAZEPAM 1 MG/1
0.5 TABLET ORAL DAILY
COMMUNITY
End: 2018-06-12

## 2018-05-01 RX ORDER — LORATADINE 10 MG/1
10 TABLET, ORALLY DISINTEGRATING ORAL EVERY OTHER DAY
COMMUNITY
End: 2020-06-19

## 2018-05-01 NOTE — PROGRESS NOTES
Subjective   Dejah Casey is a 73 y.o. female.     History of Present Illness she is here today for follow-up of hypertension as well as dyspnea with mild exertion and swelling in the legs left greater than right.  Swelling has been present over the last 2 weeks.  The dyspnea on exertion by her history has been present for several months.  At her last visit metoprolol was increased to 50 mg per day for better control of hypertension.        Review of Systems   Constitutional: Negative for activity change, appetite change and fatigue.   Respiratory: Positive for shortness of breath. Negative for cough, chest tightness and wheezing.    Cardiovascular: Positive for leg swelling. Negative for chest pain and palpitations.   Gastrointestinal: Negative for abdominal pain.   Genitourinary: Negative for flank pain and hematuria.   Neurological: Negative for dizziness, syncope and weakness.       Objective   Physical Exam   Constitutional: She is oriented to person, place, and time. Vital signs are normal. She appears well-developed and well-nourished. She is active. She does not appear ill.   Eyes: Conjunctivae are normal.   Cardiovascular: Normal rate, regular rhythm, S1 normal and S2 normal.  Exam reveals no S3 and no S4.    No murmur heard.  Pulses:       Dorsalis pedis pulses are 2+ on the right side, and 2+ on the left side.   Pulmonary/Chest: No tachypnea. No respiratory distress. She has no decreased breath sounds. She has no wheezes. She has no rhonchi. She has no rales.   Abdominal: Soft. Normal appearance and bowel sounds are normal. She exhibits no abdominal bruit and no mass. There is no hepatosplenomegaly. There is no tenderness.     Vascular Status -  Her right foot exhibits no edema. Her left foot exhibits abnormal foot edema (Trace to 1+ malleolar edema).  Neurological: She is alert and oriented to person, place, and time. Gait normal.   Psychiatric: She has a normal mood and affect. Her speech is normal  and behavior is normal. Judgment and thought content normal. Cognition and memory are normal.         Assessment/Plan the patient had a negative stress test in June 2017.  Ejection fraction was 52%.  November lab showed a normal urinalysis, TSH, CMP, BNP.  It should be noted that stress test was done due to dyspnea on exertion.    Assessment #1 hypertension-improved control with increased dose of metoprolol # 2 dyspnea on exertion-this is a chronic problem related to being overweight and poor conditioning.  Her edema is related to chronic venous insufficiency.  All of this was discussed with her and her .    Plan #1 compression stockings have been recommended and a prescription has been written.  #2 no change medication.  Routine follow-up in 4 months.

## 2018-05-29 DIAGNOSIS — E11.9 CONTROLLED TYPE 2 DIABETES MELLITUS WITHOUT COMPLICATION, UNSPECIFIED LONG TERM INSULIN USE STATUS: ICD-10-CM

## 2018-05-29 RX ORDER — ATORVASTATIN CALCIUM 20 MG/1
TABLET, FILM COATED ORAL
Qty: 90 TABLET | Refills: 1 | Status: SHIPPED | OUTPATIENT
Start: 2018-05-29 | End: 2019-01-04 | Stop reason: SDUPTHER

## 2018-06-12 ENCOUNTER — OFFICE VISIT (OUTPATIENT)
Dept: ENDOCRINOLOGY | Age: 73
End: 2018-06-12

## 2018-06-12 VITALS
WEIGHT: 218.6 LBS | HEART RATE: 98 BPM | BODY MASS INDEX: 37.32 KG/M2 | HEIGHT: 64 IN | DIASTOLIC BLOOD PRESSURE: 80 MMHG | OXYGEN SATURATION: 97 % | SYSTOLIC BLOOD PRESSURE: 132 MMHG

## 2018-06-12 DIAGNOSIS — I25.10 CORONARY ARTERY DISEASE INVOLVING NATIVE CORONARY ARTERY OF NATIVE HEART WITHOUT ANGINA PECTORIS: ICD-10-CM

## 2018-06-12 DIAGNOSIS — E55.9 VITAMIN D DEFICIENCY: ICD-10-CM

## 2018-06-12 DIAGNOSIS — I10 ESSENTIAL HYPERTENSION: ICD-10-CM

## 2018-06-12 DIAGNOSIS — E78.5 HYPERLIPIDEMIA, UNSPECIFIED HYPERLIPIDEMIA TYPE: ICD-10-CM

## 2018-06-12 DIAGNOSIS — M85.80 OSTEOPENIA, UNSPECIFIED LOCATION: ICD-10-CM

## 2018-06-12 DIAGNOSIS — G47.33 OBSTRUCTIVE SLEEP APNEA ON CPAP: ICD-10-CM

## 2018-06-12 DIAGNOSIS — E11.9 TYPE 2 DIABETES MELLITUS WITHOUT COMPLICATION, WITHOUT LONG-TERM CURRENT USE OF INSULIN (HCC): Primary | ICD-10-CM

## 2018-06-12 DIAGNOSIS — E04.2 NONTOXIC MULTINODULAR GOITER: ICD-10-CM

## 2018-06-12 DIAGNOSIS — Z99.89 OBSTRUCTIVE SLEEP APNEA ON CPAP: ICD-10-CM

## 2018-06-12 DIAGNOSIS — N32.81 OVERACTIVE BLADDER: ICD-10-CM

## 2018-06-12 PROBLEM — J42 CHRONIC BRONCHITIS: Status: ACTIVE | Noted: 2018-06-12

## 2018-06-12 PROCEDURE — 99214 OFFICE O/P EST MOD 30 MIN: CPT | Performed by: INTERNAL MEDICINE

## 2018-06-12 RX ORDER — TIOTROPIUM BROMIDE AND OLODATEROL 3.124; 2.736 UG/1; UG/1
2 SPRAY, METERED RESPIRATORY (INHALATION) AS NEEDED
COMMUNITY
Start: 2018-05-04

## 2018-06-12 NOTE — PROGRESS NOTES
Subjective   Dejah Casey is a 73 y.o. female.     F/u for dm 2, hyperlipidemia, hypertension, OAB,CAD, nontoxic MNG,osteopenia/ testing bs 1 x day / last dm eye exam 5/11/18 with dr NILSA Birch / last dm foot exam today with dr Hernandez       Diabetes   She has type 2 diabetes mellitus. MedicAlert identification noted. The initial diagnosis of diabetes was made 15 years ago. Hypoglycemia symptoms include mood changes, nervousness/anxiousness and sweats. Pertinent negatives for hypoglycemia include no confusion, dizziness, headaches, hunger, pallor, seizures, sleepiness, speech difficulty or tremors. Associated symptoms include fatigue. Pertinent negatives for diabetes include no blurred vision, no chest pain, no foot paresthesias, no foot ulcerations, no polydipsia, no polyphagia, no polyuria, no visual change, no weakness and no weight loss. Pertinent negatives for hypoglycemia complications include no blackouts, no hospitalization, no nocturnal hypoglycemia, no required assistance and no required glucagon injection. Symptoms are stable. Diabetic complications include heart disease. Pertinent negatives for diabetic complications include no CVA, impotence, nephropathy, peripheral neuropathy, PVD or retinopathy. Risk factors for coronary artery disease include hypertension, obesity, post-menopausal and stress. Current diabetic treatment includes diet and oral agent (monotherapy). She is compliant with treatment all of the time. Her weight is stable. She is following a diabetic diet. Meal planning includes avoidance of concentrated sweets. She has not had a previous visit with a dietitian. She participates in exercise intermittently. She monitors blood glucose at home 1-2 x per day. Blood glucose monitoring compliance is excellent. There is no change in her home blood glucose trend. Her dinner blood glucose is taken between 4-5 pm. Her dinner blood glucose range is generally  mg/dl. Her highest blood glucose is   mg/dl. Her overall blood glucose range is  mg/dl. She does not see a podiatrist.Eye exam is current.   Hyperlipidemia   Associated symptoms include shortness of breath. Pertinent negatives include no chest pain.   Hypertension   Associated symptoms include shortness of breath and sweats. Pertinent negatives include no blurred vision, chest pain or headaches. There is no history of CVA, PVD or retinopathy.   Coronary Artery Disease   Symptoms include leg swelling and shortness of breath. Pertinent negatives include no chest pain or dizziness. Risk factors include hyperlipidemia.   Goiter   Associated symptoms include fatigue and joint swelling (left  knee and right  ankle  ). Pertinent negatives include no chest pain, headaches, visual change or weakness.      Patient is a 73-year-old female who came in for followup. She was found to have a goiter on examination last August 2012. She had thyroid ultrasound done on 08/21/2012 which showed a multinodular goiter with the dominant solid nodule in the left measuring 1.8 cm. She had followup thyroid ultrasound in 4/14 at [facility] showed multinodular goiter with stable nodules. Thyroid function tests have been normal.        She denies any voice changes. She denies any pressure symptoms or dysphagia. She denies any bowel changes. She denies any heat or cold intolerance. She has no previous history of head or neck radiation therapy.      She has known diabetes mellitus since 2003 and has been on metformin 500 mg twice a day. Blood sugar has ranged from . She has no microalbuminuria on urine sample taken 12/17. Her last eye exam was 5/18. She has early cataracts but no retinopathy. She denies tingling in her feet.       She has hyperlipidemia and has been on Lipitor 10 mg once a day.  She denies any myalgia.  She has no significant weight change since 12/17.  Her last meal was at 1 PM.     She has mild coronary artery disease by cardiac catheterization done  in 2008 by Dr. CAMILLE Mcmullen. She denies any chest pain.  She is now following up with Dr. Leal.      She has hypertension and has been on amlodipine 2.5 mg once a day and Toprol. She was taken off hydrochlorothiazide because of hypercalcemia. She had cough with lisinopril in the past. She has not used ARB in the past.       She has overactive bladder diagnosed by Dr. Dean and is on Myrbetriq.  She was taken off Toviaz because of mouth dryness.  She was taken off Vesicare because of cost.     She has low back pain due to lumbar degenerative arthritis with spinal stenosis which improved with physical therapy.  She is no longer having back pain     She has osteopenia on bone density done at [facility] in November 2015.  She is on vitamin D 800 units per day and calcium 500 plus D 1 tab BID..  Bone density done in December 2017 showed improvement of the left hip density and a slight decrease in the lumbar spine.    She was treated on Stiolto by Dr. Worley but does not know the diagnosis associated with it..  She has sleep apnea and uses a CPAP.  She is not longer is snoring but wakes up tired.     The following portions of the patient's history were reviewed and updated as appropriate: allergies, current medications, past family history, past medical history, past social history, past surgical history and problem list.    Review of Systems   Constitutional: Positive for fatigue. Negative for weight loss.   HENT: Negative.    Eyes: Negative.  Negative for blurred vision.   Respiratory: Positive for shortness of breath.    Cardiovascular: Positive for leg swelling. Negative for chest pain.   Gastrointestinal: Negative.    Endocrine: Negative.  Negative for polydipsia, polyphagia and polyuria.   Genitourinary: Negative.  Negative for impotence.   Musculoskeletal: Positive for joint swelling (left  knee and right  ankle  ).   Skin: Negative.  Negative for pallor.   Allergic/Immunologic: Negative.    Neurological: Negative.   "Negative for dizziness, tremors, seizures, speech difficulty, weakness and headaches.   Hematological: Bruises/bleeds easily.   Psychiatric/Behavioral: Negative for confusion. The patient is nervous/anxious.        Objective      Vitals:    06/12/18 1456   BP: 132/80   BP Location: Left arm   Patient Position: Sitting   Cuff Size: Large Adult   Pulse: 98   SpO2: 97%   Weight: 99.2 kg (218 lb 9.6 oz)   Height: 161.9 cm (63.74\")     Physical Exam   Constitutional: She is oriented to person, place, and time. She appears well-developed and well-nourished. No distress.   HENT:   Head: Normocephalic.   Nose: Nose normal.   Mouth/Throat: No oropharyngeal exudate.   Eyes: Conjunctivae and EOM are normal. Right eye exhibits no discharge. Left eye exhibits no discharge. No scleral icterus.   Neck: Neck supple. No JVD present. No tracheal deviation present. No thyromegaly present.   Cardiovascular: Normal rate, regular rhythm, normal heart sounds and intact distal pulses.  Exam reveals no friction rub.    No murmur heard.  Pulmonary/Chest: Effort normal and breath sounds normal. No respiratory distress. She has no wheezes. She has no rales. She exhibits no tenderness.   Abdominal: Soft. Bowel sounds are normal. She exhibits no distension and no mass. There is no tenderness. There is no guarding.   Musculoskeletal: Normal range of motion. She exhibits no edema, tenderness or deformity.   Lymphadenopathy:     She has no cervical adenopathy.   Neurological: She is alert and oriented to person, place, and time. She displays normal reflexes. No cranial nerve deficit. She exhibits normal muscle tone. Coordination normal.   Skin: Skin is warm and dry. No rash noted. No erythema. No pallor.   Psychiatric: She has a normal mood and affect. Her behavior is normal.     Office Visit on 12/08/2017   Component Date Value Ref Range Status   • Glucose 12/08/2017 117* 65 - 99 mg/dL Final   • BUN 12/08/2017 14  8 - 23 mg/dL Final   • Creatinine " 12/08/2017 0.89  0.57 - 1.00 mg/dL Final   • eGFR Non  Am 12/08/2017 62  >60 mL/min/1.73 Final    Comment: The MDRD GFR formula is only valid for adults with stable  renal function between ages 18 and 70.     • eGFR  Am 12/08/2017 75  >60 mL/min/1.73 Final   • BUN/Creatinine Ratio 12/08/2017 15.7  7.0 - 25.0 Final   • Sodium 12/08/2017 146* 136 - 145 mmol/L Final   • Potassium 12/08/2017 4.7  3.5 - 5.2 mmol/L Final   • Chloride 12/08/2017 103  98 - 107 mmol/L Final   • Total CO2 12/08/2017 30.8* 22.0 - 29.0 mmol/L Final   • Calcium 12/08/2017 10.2  8.6 - 10.5 mg/dL Final   • Total Protein 12/08/2017 7.4  6.0 - 8.5 g/dL Final   • Albumin 12/08/2017 4.20  3.50 - 5.20 g/dL Final   • Globulin 12/08/2017 3.2  gm/dL Final   • A/G Ratio 12/08/2017 1.3  g/dL Final   • Total Bilirubin 12/08/2017 0.2  0.1 - 1.2 mg/dL Final   • Alkaline Phosphatase 12/08/2017 106  39 - 117 U/L Final   • AST (SGOT) 12/08/2017 13  1 - 32 U/L Final   • ALT (SGPT) 12/08/2017 11  1 - 33 U/L Final   • Total Cholesterol 12/08/2017 234* 0 - 200 mg/dL Final   • Triglycerides 12/08/2017 101  0 - 150 mg/dL Final   • HDL Cholesterol 12/08/2017 74* 40 - 60 mg/dL Final   • VLDL Cholesterol 12/08/2017 20.2  5 - 40 mg/dL Final   • LDL Cholesterol  12/08/2017 140* 0 - 100 mg/dL Final   • Hemoglobin A1C 12/08/2017 6.40* 4.80 - 5.60 % Final    Comment: Hemoglobin A1C Ranges:  Increased Risk for Diabetes  5.7% to 6.4%  Diabetes                     >= 6.5%  Diabetic Goal                < 7.0%     • Creatinine, Urine 12/08/2017 140.9  Not Estab. mg/dL Final   • Microalbumin, Urine 12/08/2017 11.5  Not Estab. ug/mL Final   • Microalbumin/Creatinine Ratio 12/08/2017 8.2  0.0 - 30.0 mg/g creat Final   • TSH 12/08/2017 0.976  0.270 - 4.200 mIU/mL Final   • Free T4 12/08/2017 0.88* 0.93 - 1.70 ng/dL Final   • Interpretation 12/08/2017 Note   Final    Supplemental report is available.   • PDF Image 12/08/2017 Not applicable   Final     Assessment/Plan    Dejah was seen today for diabetes, hyperlipidemia, hypertension, coronary artery disease and goiter.    Diagnoses and all orders for this visit:    Type 2 diabetes mellitus without complication, without long-term current use of insulin  -     Comprehensive Metabolic Panel  -     Hemoglobin A1c    Nontoxic multinodular goiter  -     Comprehensive Metabolic Panel  -     TSH  -     T4, Free    Hyperlipidemia, unspecified hyperlipidemia type  -     Comprehensive Metabolic Panel  -     Lipid Panel  -     TSH  -     T4, Free    Essential hypertension  -     Comprehensive Metabolic Panel    Coronary artery disease involving native coronary artery of native heart without angina pectoris  -     Comprehensive Metabolic Panel    Obstructive sleep apnea on CPAP    Vitamin D deficiency  -     Comprehensive Metabolic Panel  -     Vitamin D 25 Hydroxy    Osteopenia, unspecified location  -     Comprehensive Metabolic Panel  -     Vitamin D 25 Hydroxy    Overactive bladder      Check thyroid function tests.  Repeat thyroid ultrasound with next follow-up.  Continue metformin 500 mg twice a day.  Check hemoglobin A1c.  Continue amlodipine and Toprol per Dr. Leal.  Continue Myrbetriq.  Continue vitamin D 800 units per day and calcium plus D one tablet twice a day.  Check vitamin D levels.  Continue CPAP and Stiolto per Dr. Worley.    RTC 4 mos.    Send copy of my note and labs to Dr. Ricardo, Dr. Worley, Dr. Dean and Dr. Leal.

## 2018-06-13 LAB
25(OH)D3+25(OH)D2 SERPL-MCNC: 71.8 NG/ML (ref 30–100)
ALBUMIN SERPL-MCNC: 4.5 G/DL (ref 3.5–5.2)
ALBUMIN/GLOB SERPL: 1.6 G/DL
ALP SERPL-CCNC: 90 U/L (ref 39–117)
ALT SERPL-CCNC: 13 U/L (ref 1–33)
AST SERPL-CCNC: 10 U/L (ref 1–32)
BILIRUB SERPL-MCNC: 0.2 MG/DL (ref 0.1–1.2)
BUN SERPL-MCNC: 15 MG/DL (ref 8–23)
BUN/CREAT SERPL: 15.5 (ref 7–25)
CALCIUM SERPL-MCNC: 10.7 MG/DL (ref 8.6–10.5)
CHLORIDE SERPL-SCNC: 104 MMOL/L (ref 98–107)
CHOLEST SERPL-MCNC: 128 MG/DL (ref 0–200)
CO2 SERPL-SCNC: 30.5 MMOL/L (ref 22–29)
CREAT SERPL-MCNC: 0.97 MG/DL (ref 0.57–1)
GFR SERPLBLD CREATININE-BSD FMLA CKD-EPI: 56 ML/MIN/1.73
GFR SERPLBLD CREATININE-BSD FMLA CKD-EPI: 68 ML/MIN/1.73
GLOBULIN SER CALC-MCNC: 2.8 GM/DL
GLUCOSE SERPL-MCNC: 115 MG/DL (ref 65–99)
HBA1C MFR BLD: 6 % (ref 4.8–5.6)
HDLC SERPL-MCNC: 68 MG/DL (ref 40–60)
INTERPRETATION: NORMAL
LDLC SERPL CALC-MCNC: 37 MG/DL (ref 0–100)
Lab: NORMAL
POTASSIUM SERPL-SCNC: 4.7 MMOL/L (ref 3.5–5.2)
PROT SERPL-MCNC: 7.3 G/DL (ref 6–8.5)
SODIUM SERPL-SCNC: 145 MMOL/L (ref 136–145)
T4 FREE SERPL-MCNC: 0.99 NG/DL (ref 0.93–1.7)
TRIGL SERPL-MCNC: 117 MG/DL (ref 0–150)
TSH SERPL DL<=0.005 MIU/L-ACNC: 0.78 MIU/ML (ref 0.27–4.2)
VLDLC SERPL CALC-MCNC: 23.4 MG/DL (ref 5–40)

## 2018-07-06 ENCOUNTER — OFFICE VISIT (OUTPATIENT)
Dept: INTERNAL MEDICINE | Facility: CLINIC | Age: 73
End: 2018-07-06

## 2018-07-06 VITALS
WEIGHT: 217 LBS | BODY MASS INDEX: 37.05 KG/M2 | HEART RATE: 104 BPM | OXYGEN SATURATION: 96 % | HEIGHT: 64 IN | SYSTOLIC BLOOD PRESSURE: 124 MMHG | DIASTOLIC BLOOD PRESSURE: 74 MMHG

## 2018-07-06 DIAGNOSIS — G47.33 OBSTRUCTIVE SLEEP APNEA ON CPAP: ICD-10-CM

## 2018-07-06 DIAGNOSIS — J41.0 SIMPLE CHRONIC BRONCHITIS (HCC): ICD-10-CM

## 2018-07-06 DIAGNOSIS — Z99.89 OBSTRUCTIVE SLEEP APNEA ON CPAP: ICD-10-CM

## 2018-07-06 DIAGNOSIS — M17.12 PRIMARY OSTEOARTHRITIS OF LEFT KNEE: ICD-10-CM

## 2018-07-06 DIAGNOSIS — E11.9 TYPE 2 DIABETES MELLITUS WITHOUT COMPLICATION, WITHOUT LONG-TERM CURRENT USE OF INSULIN (HCC): ICD-10-CM

## 2018-07-06 DIAGNOSIS — I25.10 CORONARY ARTERY DISEASE INVOLVING NATIVE CORONARY ARTERY OF NATIVE HEART WITHOUT ANGINA PECTORIS: Primary | ICD-10-CM

## 2018-07-06 DIAGNOSIS — I10 ESSENTIAL HYPERTENSION: ICD-10-CM

## 2018-07-06 PROCEDURE — 99213 OFFICE O/P EST LOW 20 MIN: CPT | Performed by: INTERNAL MEDICINE

## 2018-07-06 NOTE — PROGRESS NOTES
Subjective   Dejah Casey is a 73 y.o. female.     History of Present Illness she is here today for preoperative clearance for left total knee replacement on August 6.  Previously she has successfully undergone right total hip replacement in 2013.  She uses CPAP nightly.  She does relate some recent difficulty with dyspnea with light housework tasks although no dyspnea with exertion when walking.  She has been using a walker for the last 2 weeks due to worsening left knee pain.  She denies any PND, chest pain, wheezing, or persistent cough.  There has been no dizziness, syncope, or focal neurologic episodes.  She denies any abdominal pain, melanotic stool, rectal bleeding, dysphagia, or change in bowel habit.  She has one per night nocturia without dysuria.  The remainder of her review systems is negative.        Review of Systems   Constitutional: Positive for activity change. Negative for appetite change and fatigue.   HENT: Negative for trouble swallowing.    Respiratory: Positive for shortness of breath. Negative for cough, chest tightness and wheezing.    Cardiovascular: Negative for chest pain, palpitations and leg swelling.   Gastrointestinal: Negative for abdominal pain, anal bleeding, blood in stool, constipation, nausea and vomiting.   Genitourinary: Negative for dysuria, flank pain, frequency and hematuria.   Neurological: Negative for dizziness, syncope, facial asymmetry, speech difficulty, weakness, numbness and headache.       Objective   Physical Exam   Constitutional: She is oriented to person, place, and time. Vital signs are normal. She appears well-developed and well-nourished. She is active. She does not appear ill.   Eyes: Conjunctivae are normal.   Neck: Carotid bruit is not present. Thyromegaly present. No thyroid mass present.   Cardiovascular: Normal rate, regular rhythm, S1 normal and S2 normal.  Exam reveals no S3 and no S4.    No murmur heard.  Pulmonary/Chest: No tachypnea. No  respiratory distress. She has no decreased breath sounds. She has no wheezes. She has no rhonchi. She has no rales.   Abdominal: Soft. Normal appearance and bowel sounds are normal. She exhibits no abdominal bruit and no mass. There is no hepatosplenomegaly. There is no tenderness.     Vascular Status -  Her right foot exhibits no edema. Her left foot exhibits no edema.  Neurological: She is alert and oriented to person, place, and time. Gait abnormal.   Moderate slowing but stable walk with use of a walker.   Psychiatric: She has a normal mood and affect. Her speech is normal and behavior is normal. Judgment and thought content normal. Cognition and memory are normal.         Assessment/Plan recent lab showed normal CMP.  Hemoglobin A1c 6.0.  Total cholesterol 128 triglycerides 117 HDL cholesterol 68 LDL cholesterol 37    Assessment #1 coronary artery disease-quiescent but needs to be reassessed preoperatively and this was discussed with the patient and her .    Plan #1 no change medication #2 Lexiscan.  Routine follow-up in 4 months.

## 2018-07-13 ENCOUNTER — TELEPHONE (OUTPATIENT)
Dept: INTERNAL MEDICINE | Facility: CLINIC | Age: 73
End: 2018-07-13

## 2018-07-13 ENCOUNTER — TELEPHONE (OUTPATIENT)
Dept: CARDIOLOGY | Facility: CLINIC | Age: 73
End: 2018-07-13

## 2018-07-13 NOTE — TELEPHONE ENCOUNTER
----- Message from Annika Hernandez sent at 7/13/2018  9:17 AM EDT -----  Contact: [facility] - Dr Ricardo's pt - RE: Surgival Clearance letter  [facility] calling and would like a note or addendum stating pt is cleared for surgery. Last office note for surgical clearance does not state if pt is cleared for surgery or not. Could you please write note & fax to [facility] for pt to have surgery? Please advise. thanks      [facility]  #[phone]  Fax # [phone]

## 2018-07-13 NOTE — TELEPHONE ENCOUNTER
Date of Office Visit:  2018  Encounter Provider:  Daniel Leal MD  Place of Service:  [facility] CARDIOLOGY  Patient Name: Dejah Casey  :  1945      Dear Dr. Mcgee,    I have seen Ms. Casey for benign palpitations.  She does not have a history of CAD or CHF, and had unremarkable cardiac testing in 2017.      Please contact our office with any questions or concerns. As always, it has been a pleasure to participate in your patient's care.      Daniel Leal MD  [facility]

## 2018-07-13 NOTE — TELEPHONE ENCOUNTER
Laura with Dr. Bishnu Mcgee's office called for cardiac clearance on pt for a left total knee replacement.   Pt was last seen on 11/3/17    Mini [phone]

## 2018-07-16 ENCOUNTER — HOSPITAL ENCOUNTER (OUTPATIENT)
Dept: NUCLEAR MEDICINE | Facility: HOSPITAL | Age: 73
Discharge: HOME | End: 2018-07-16
Attending: INTERNAL MEDICINE

## 2018-07-16 DIAGNOSIS — I25.10 CORONARY ARTERY DISEASE INVOLVING NATIVE CORONARY ARTERY OF NATIVE HEART WITHOUT ANGINA PECTORIS: ICD-10-CM

## 2018-07-16 LAB
BH CV STRESS COMMENTS STAGE 1: NORMAL
BH CV STRESS DOSE REGADENOSON STAGE 1: 0.4
BH CV STRESS DURATION MIN STAGE 1: 0
BH CV STRESS DURATION SEC STAGE 1: 10
BH CV STRESS PROTOCOL 1: NORMAL
BH CV STRESS RECOVERY BP: NORMAL MMHG
BH CV STRESS RECOVERY HR: 90 BPM
BH CV STRESS STAGE 1: 1
LV EF NUC BP: 53 %
MAXIMAL PREDICTED HEART RATE: 146 BPM
PERCENT MAX PREDICTED HR: 71.23 %
STRESS BASELINE BP: NORMAL MMHG
STRESS BASELINE HR: 72 BPM
STRESS PERCENT HR: 84 %
STRESS POST PEAK BP: NORMAL MMHG
STRESS POST PEAK HR: 104 BPM
STRESS TARGET HR: 124 BPM

## 2018-07-16 PROCEDURE — 93017 CV STRESS TEST TRACING ONLY: CPT

## 2018-07-16 PROCEDURE — 0 TECHNETIUM SESTAMIBI: Performed by: INTERNAL MEDICINE

## 2018-07-16 PROCEDURE — A9500 TC99M SESTAMIBI: HCPCS | Performed by: INTERNAL MEDICINE

## 2018-07-16 PROCEDURE — 78452 HT MUSCLE IMAGE SPECT MULT: CPT | Performed by: INTERNAL MEDICINE

## 2018-07-16 PROCEDURE — 93016 CV STRESS TEST SUPVJ ONLY: CPT | Performed by: INTERNAL MEDICINE

## 2018-07-16 PROCEDURE — 78452 HT MUSCLE IMAGE SPECT MULT: CPT

## 2018-07-16 PROCEDURE — 93018 CV STRESS TEST I&R ONLY: CPT | Performed by: INTERNAL MEDICINE

## 2018-07-16 PROCEDURE — 25010000002 REGADENOSON 0.4 MG/5ML SOLUTION: Performed by: INTERNAL MEDICINE

## 2018-07-16 RX ORDER — CLONAZEPAM 1 MG/1
1.5 TABLET ORAL NIGHTLY PRN
COMMUNITY
End: 2020-06-19

## 2018-07-16 RX ADMIN — REGADENOSON 0.4 MG: 0.08 INJECTION, SOLUTION INTRAVENOUS at 11:30

## 2018-07-16 RX ADMIN — TECHNETIUM TC 99M SESTAMIBI 1 DOSE: 1 INJECTION INTRAVENOUS at 11:15

## 2018-07-16 RX ADMIN — TECHNETIUM TC 99M SESTAMIBI 1 DOSE: 1 INJECTION INTRAVENOUS at 08:30

## 2018-07-20 ENCOUNTER — TELEPHONE (OUTPATIENT)
Dept: INTERNAL MEDICINE | Facility: CLINIC | Age: 73
End: 2018-07-20

## 2018-07-20 NOTE — TELEPHONE ENCOUNTER
----- Message from Dejah Casey sent at 2018  2:36 PM EDT -----  Regarding: Non-Urgent Medical Question  Contact: [phone]  2018    [facility] Dr. Ricardo,    Is there a difference between Compression Stockings and Compression Socks?  If they are the same, will it be alright for me to wear Compression Socks?    Thank You,  Dejah Casey   1945  [phone]   [email or web address]

## 2018-08-27 ENCOUNTER — TELEPHONE (OUTPATIENT)
Dept: INTERNAL MEDICINE | Facility: CLINIC | Age: 73
End: 2018-08-27

## 2018-08-27 NOTE — TELEPHONE ENCOUNTER
----- Message from Vanessa Whitley sent at 8/27/2018  2:35 PM EDT -----  Contact: Patient  Patient calling states she had surgery on August 5 and released from rehab on 8/23. Was told top follow up with her PCP. Also her last x-ray showed a spot on her lung and was to follow up. Please advise.     Patient:[phone]

## 2018-08-27 NOTE — TELEPHONE ENCOUNTER
I called patient and spoke with her and her  and they are wanting to follow up with someone that can look at the spot on her lung and follow up with that. Would you like to refer her to another doctor? Please advise

## 2018-08-27 NOTE — TELEPHONE ENCOUNTER
Patient stated that she didn't have much energy and wouldn't be able to make in in for an appointment since she just had her knee replaced and that she also had an appointment for physical therapy Wednesday. I spoke with Dr Ricardo and he suggest that when she gets established with a new doctor that she bring up that there was a spot on her lung and they could maybe do another Xray and maybe a CT. But Dr Ricardo stated that a spot on the lung is very common and is more then likely nothing to worry about. I called and informed patient and she wanted to know what doctor she could go to. I gave her Dr Turcios,Dr Guthrie, and Dr Sun and their phone numbers to try to call to make an appointment to get established.

## 2018-10-24 ENCOUNTER — OFFICE VISIT (OUTPATIENT)
Dept: ENDOCRINOLOGY | Age: 73
End: 2018-10-24

## 2018-10-24 VITALS
OXYGEN SATURATION: 98 % | WEIGHT: 197.8 LBS | BODY MASS INDEX: 33.77 KG/M2 | SYSTOLIC BLOOD PRESSURE: 134 MMHG | HEART RATE: 93 BPM | DIASTOLIC BLOOD PRESSURE: 70 MMHG | HEIGHT: 64 IN

## 2018-10-24 DIAGNOSIS — M17.0 PRIMARY OSTEOARTHRITIS OF BOTH KNEES: ICD-10-CM

## 2018-10-24 DIAGNOSIS — E11.9 TYPE 2 DIABETES MELLITUS WITHOUT COMPLICATION, WITHOUT LONG-TERM CURRENT USE OF INSULIN (HCC): ICD-10-CM

## 2018-10-24 DIAGNOSIS — M85.80 OSTEOPENIA, UNSPECIFIED LOCATION: ICD-10-CM

## 2018-10-24 DIAGNOSIS — E55.9 VITAMIN D DEFICIENCY: ICD-10-CM

## 2018-10-24 DIAGNOSIS — E04.2 NONTOXIC MULTINODULAR GOITER: Primary | ICD-10-CM

## 2018-10-24 DIAGNOSIS — E78.5 HYPERLIPIDEMIA, UNSPECIFIED HYPERLIPIDEMIA TYPE: ICD-10-CM

## 2018-10-24 DIAGNOSIS — I25.10 CORONARY ARTERY DISEASE INVOLVING NATIVE CORONARY ARTERY OF NATIVE HEART WITHOUT ANGINA PECTORIS: ICD-10-CM

## 2018-10-24 DIAGNOSIS — I10 ESSENTIAL HYPERTENSION: ICD-10-CM

## 2018-10-24 PROCEDURE — 99214 OFFICE O/P EST MOD 30 MIN: CPT | Performed by: INTERNAL MEDICINE

## 2018-10-24 RX ORDER — HYDROCODONE BITARTRATE AND ACETAMINOPHEN 7.5; 325 MG/1; MG/1
1 TABLET ORAL
COMMUNITY
Start: 2018-09-18 | End: 2018-10-24

## 2018-10-24 RX ORDER — LANCETS
EACH MISCELLANEOUS
Qty: 102 EACH | Refills: 5 | Status: SHIPPED | OUTPATIENT
Start: 2018-10-24 | End: 2020-03-17 | Stop reason: SDUPTHER

## 2018-10-24 NOTE — PROGRESS NOTES
Subjective   Dejah Casey is a 73 y.o. female.     F/u for dm 2,hyperlipidemia, hypertension, OAB,CAD,nontoxic MNG,osteopenia / testing bs 1 x day / last dm eye exam 5/11/18 with dr Birch/ last dm foot exam 6/12/18 with dr Hernandez       Diabetes   She presents for her follow-up diabetic visit. She has type 2 diabetes mellitus. Hypoglycemia symptoms include nervousness/anxiousness. Associated symptoms include fatigue.   Hyperlipidemia     Hypertension     Coronary Artery Disease   Symptoms include leg swelling. Risk factors include hyperlipidemia.   Goiter   Associated symptoms include chills and fatigue.      Patient is a 73-year-old female who came in for followup. She was found to have a goiter on examination last August 2012. She had thyroid ultrasound done on 08/21/2012 which showed a multinodular goiter with the dominant solid nodule in the left measuring 1.8 cm. She had followup thyroid ultrasound in 4/14 at [facility] showed multinodular goiter with stable nodules. Thyroid function tests have been normal.        She denies any voice changes. She denies any pressure symptoms or dysphagia. She denies any bowel changes. She denies any heat or cold intolerance. She has no previous history of head or neck radiation therapy.      She has known diabetes mellitus since 2003 and has been on metformin 500 mg twice a day. Blood sugar has ranged from . She has no microalbuminuria on urine sample taken 12/17. Her last eye exam was 5/18. She has early cataracts but no retinopathy. She denies tingling in her feet.       She has hyperlipidemia and has been on Lipitor 10 mg once a day.  She denies any myalgia.  She has lost 21 pounds 6 since June 2018 due to her her 's cooking.  Her last meal was at 2 PM.     She has mild coronary artery disease by cardiac catheterization done in 2008 by Dr. CAMILLE Mcmullen. She denies any chest pain.  She had a normal nuclear stress tests in July 2018.  She is following up with   [facility].      She has hypertension and has been on amlodipine 2.5 mg once a day and Toprol. She was taken off hydrochlorothiazide because of hypercalcemia. She had cough with lisinopril in the past. She has not used ARB in the past.       She has overactive bladder diagnosed by Dr. Dean and is on Myrbetriq.  She was taken off Toviaz because of mouth dryness.  She was taken off Vesicare because of cost.     She has low back pain due to lumbar degenerative arthritis with spinal stenosis which improved with physical therapy.  She is no longer having back pain     She has osteopenia on bone density done at [facility] in November 2015.  She is on vitamin D 800 units per day and calcium 500 plus D 1 tab BID..  Bone density done in December 2017 showed improvement of the left hip density and a slight decrease in the lumbar spine.     She was treated on Stiolto by Dr. Worley.  She is following with Dr. Worley with regards to the abnormal CT of the chest which was read as possible pericardial cyst or lymphadenopathy.   She has sleep apnea and uses a CPAP.  She is not longer is snoring but wakes up tired.    She had a left knee replacement complicated by pneumonia in Aug 2018.  She did not require blood transfusion.  Physical therapy is on hold due to pain on her right knee.  She will see Dr. Larson tomorrow    The following portions of the patient's history were reviewed and updated as appropriate: allergies, current medications, past family history, past medical history, past social history, past surgical history and problem list.    Review of Systems   Constitutional: Positive for chills and fatigue.   HENT: Negative.    Eyes: Negative.    Respiratory: Positive for choking.    Cardiovascular: Positive for leg swelling.   Gastrointestinal: Negative.    Endocrine: Negative.    Genitourinary: Positive for frequency and urgency.   Musculoskeletal: Negative.    Skin: Negative.    Allergic/Immunologic: Negative.   "  Neurological: Negative.    Hematological: Bruises/bleeds easily.   Psychiatric/Behavioral: Positive for sleep disturbance (sleep apnea using CPAP machine ). The patient is nervous/anxious.        Objective      Vitals:    10/24/18 1510   BP: 134/70   BP Location: Right arm   Patient Position: Sitting   Cuff Size: Large Adult   Pulse: 93   SpO2: 98%   Weight: 89.7 kg (197 lb 12.8 oz)   Height: 161.9 cm (63.74\")     Physical Exam   Constitutional: She is oriented to person, place, and time. She appears well-developed and well-nourished. No distress.   HENT:   Head: Normocephalic.   Nose: Nose normal.   Mouth/Throat: No oropharyngeal exudate.   Eyes: Conjunctivae and EOM are normal. Right eye exhibits no discharge. Left eye exhibits no discharge. No scleral icterus.   Neck: Neck supple. No JVD present. No tracheal deviation present. No thyromegaly present.   Cardiovascular: Normal rate, regular rhythm and normal heart sounds.  Exam reveals no friction rub.    No murmur heard.  Pulmonary/Chest: Effort normal and breath sounds normal. No respiratory distress. She has no wheezes. She has no rales. She exhibits no tenderness.   Abdominal: Soft. Bowel sounds are normal. She exhibits no distension. There is no tenderness. There is no guarding.   Musculoskeletal: Normal range of motion. She exhibits edema.   No calf tenderness   Lymphadenopathy:     She has no cervical adenopathy.   Neurological: She is alert and oriented to person, place, and time.   Skin: Skin is warm and dry.   Psychiatric: She has a normal mood and affect. Her behavior is normal.     Hospital Outpatient Visit on 07/16/2018   Component Date Value Ref Range Status   •  CV STRESS PROTOCOL 1 07/16/2018 Pharmacologic   Final   • Stage 1 07/16/2018 1   Final   • Duration Min Stage 1 07/16/2018 0   Final   • Duration Sec Stage 1 07/16/2018 10   Final   • Stress Dose Regadenoson Stage 1 07/16/2018 0.4   Final   • Stress Comments Stage 1 07/16/2018 10 sec " bolus injection   Final   • Baseline HR 07/16/2018 72  bpm Final   • Baseline BP 07/16/2018 133/71  mmHg Final   • Peak HR 07/16/2018 104  bpm Final   • Percent Max Pred HR 07/16/2018 71.23  % Final   • Percent Target HR 07/16/2018 84  % Final   • Peak BP 07/16/2018 133/73  mmHg Final   • Recovery HR 07/16/2018 90  bpm Final   • Recovery BP 07/16/2018 110/64  mmHg Final   • Target HR (85%) 07/16/2018 124  bpm Final   • Max. Pred. HR (100%) 07/16/2018 146  bpm Final   • Nuc Stress EF 07/16/2018 53  % Final     Assessment/Plan   Dejah was seen today for diabetes, hyperlipidemia, hypertension, coronary artery disease, goiter and sleep apnea.    Diagnoses and all orders for this visit:    Nontoxic multinodular goiter  -     Comprehensive Metabolic Panel  -     TSH  -     T4, Free    Type 2 diabetes mellitus without complication, without long-term current use of insulin (CMS/[facility])  -     Comprehensive Metabolic Panel  -     Hemoglobin A1c  -     ACCU-CHEK FASTCLIX LANCETS misc; Use to check blood sugar once daily    Hyperlipidemia, unspecified hyperlipidemia type  -     Comprehensive Metabolic Panel  -     TSH  -     T4, Free    Essential hypertension  -     Comprehensive Metabolic Panel    Coronary artery disease involving native coronary artery of native heart without angina pectoris  -     Comprehensive Metabolic Panel  -     Lipid Panel    Osteopenia, unspecified location  -     Comprehensive Metabolic Panel  -     Vitamin D 25 Hydroxy    Primary osteoarthritis of both knees    Vitamin D deficiency  -     Comprehensive Metabolic Panel  -     Vitamin D 25 Hydroxy      Check thyroid function tests.    Continue metformin.  Check hemoglobin A1c.  Continue Lipitor 10 mg per day.  Check lipid profile.  Continue amlodipine and Toprol.  Continue vitamin D 800 units per day and calcium plus D one tablet twice a day.  Check vitamin D levels.  Follow-up with Dr. Larson and Dr. Worley as scheduled.    Send copy of my notes and  labs to Dr. Worley and Dr. Bishnu Larson    RT 4 mos

## 2018-10-30 LAB
25(OH)D3+25(OH)D2 SERPL-MCNC: 54.1 NG/ML (ref 30–100)
ALBUMIN SERPL-MCNC: 4.3 G/DL (ref 3.5–5.2)
ALBUMIN/GLOB SERPL: 1.4 G/DL
ALP SERPL-CCNC: 95 U/L (ref 39–117)
ALT SERPL-CCNC: 7 U/L (ref 1–33)
AST SERPL-CCNC: 6 U/L (ref 1–32)
BILIRUB SERPL-MCNC: 0.2 MG/DL (ref 0.1–1.2)
BUN SERPL-MCNC: 7 MG/DL (ref 8–23)
BUN/CREAT SERPL: 8.1 (ref 7–25)
CALCIUM SERPL-MCNC: 10.1 MG/DL (ref 8.6–10.5)
CHLORIDE SERPL-SCNC: 101 MMOL/L (ref 98–107)
CHOLEST SERPL-MCNC: 121 MG/DL (ref 0–200)
CO2 SERPL-SCNC: 25.7 MMOL/L (ref 22–29)
CREAT SERPL-MCNC: 0.86 MG/DL (ref 0.57–1)
GLOBULIN SER CALC-MCNC: 3.1 GM/DL
GLUCOSE SERPL-MCNC: 98 MG/DL (ref 65–99)
HBA1C MFR BLD: 5.4 % (ref 4.8–5.6)
HDLC SERPL-MCNC: 61 MG/DL (ref 40–60)
INTERPRETATION: NORMAL
LDLC SERPL CALC-MCNC: 46 MG/DL (ref 0–100)
Lab: NORMAL
POTASSIUM SERPL-SCNC: 4.8 MMOL/L (ref 3.5–5.2)
PROT SERPL-MCNC: 7.4 G/DL (ref 6–8.5)
SODIUM SERPL-SCNC: 140 MMOL/L (ref 136–145)
T4 FREE SERPL-MCNC: 0.97 NG/DL (ref 0.93–1.7)
TRIGL SERPL-MCNC: 70 MG/DL (ref 0–150)
TSH SERPL DL<=0.005 MIU/L-ACNC: 0.66 MIU/ML (ref 0.27–4.2)
VLDLC SERPL CALC-MCNC: 14 MG/DL (ref 5–40)

## 2018-11-01 DIAGNOSIS — I10 ESSENTIAL HYPERTENSION: Primary | ICD-10-CM

## 2018-11-01 DIAGNOSIS — E11.9 TYPE 2 DIABETES MELLITUS WITHOUT COMPLICATION, WITHOUT LONG-TERM CURRENT USE OF INSULIN (HCC): ICD-10-CM

## 2018-11-01 DIAGNOSIS — E78.5 HYPERLIPIDEMIA, UNSPECIFIED HYPERLIPIDEMIA TYPE: ICD-10-CM

## 2018-11-01 DIAGNOSIS — E04.2 NONTOXIC MULTINODULAR GOITER: ICD-10-CM

## 2018-11-01 DIAGNOSIS — E55.9 VITAMIN D DEFICIENCY: ICD-10-CM

## 2018-11-27 ENCOUNTER — OFFICE VISIT (OUTPATIENT)
Dept: FAMILY MEDICINE CLINIC | Facility: CLINIC | Age: 73
End: 2018-11-27

## 2018-11-27 VITALS
BODY MASS INDEX: 32.78 KG/M2 | RESPIRATION RATE: 16 BRPM | HEART RATE: 88 BPM | SYSTOLIC BLOOD PRESSURE: 112 MMHG | DIASTOLIC BLOOD PRESSURE: 60 MMHG | WEIGHT: 192 LBS | TEMPERATURE: 98.6 F | HEIGHT: 64 IN

## 2018-11-27 DIAGNOSIS — E11.9 TYPE 2 DIABETES MELLITUS WITHOUT COMPLICATION, WITHOUT LONG-TERM CURRENT USE OF INSULIN (HCC): ICD-10-CM

## 2018-11-27 DIAGNOSIS — N32.81 OVERACTIVE BLADDER: ICD-10-CM

## 2018-11-27 DIAGNOSIS — J41.0 SIMPLE CHRONIC BRONCHITIS (HCC): ICD-10-CM

## 2018-11-27 DIAGNOSIS — M85.80 OSTEOPENIA, UNSPECIFIED LOCATION: ICD-10-CM

## 2018-11-27 DIAGNOSIS — E78.5 HYPERLIPIDEMIA, UNSPECIFIED HYPERLIPIDEMIA TYPE: ICD-10-CM

## 2018-11-27 DIAGNOSIS — E11.9 DIABETES MELLITUS TYPE II, CONTROLLED, WITH NO COMPLICATIONS (HCC): ICD-10-CM

## 2018-11-27 DIAGNOSIS — I10 ESSENTIAL HYPERTENSION: Primary | ICD-10-CM

## 2018-11-27 DIAGNOSIS — E04.2 NONTOXIC MULTINODULAR GOITER: ICD-10-CM

## 2018-11-27 PROCEDURE — 99204 OFFICE O/P NEW MOD 45 MIN: CPT | Performed by: FAMILY MEDICINE

## 2018-11-27 NOTE — PROGRESS NOTES
Subjective   Dejah Casey is a 73 y.o. female.     73 year old female here to [facility] after her last PCP retired.     DM: Diagnosed in 2003, patient is on Metformin 500 mg BID and DM is controlled. Last A1C in 10/2018 was normal. Her last DM eye exam was 5/2018 and foot exam was 6/2018. BG ranges:     HTN: Patient is on Amlodipine 2.5 mg QD and Metoprolol XR 50 mg QD. She was on HCTZ in the past however had to be d/c due to hypercalcemia. Has not been on an ARB. Lisinopril d/c due to cough. When her BP is elevated she feels anxious, notes palpitations and at times vision changes. Has not had issues with this lately. BP today is: 112/60.    CAD: Patient had a cardiac cath in 2005 which showed mild CAD. She had nuclear stress test done in 2018 (surgery clearance) and per recs this was normal. Does not follow regularly with a Cardiologist.     HLD: Patient's most recent lipid panel showed slight depression of HDL and LDL was noted to be 46; was started on Atorvastatin by Endo. Patient is taking this currently.     Overactive bladder: Patient has been taking Myrbetriq ER 50 mg QD for this and this controls her symptoms. Of note, when she was post op for TKR in 8/2018, the Percocet she was initially prescribed for pain control interacted with her Myrbetriq and dec its effectiveness. When Percocet was d/c'd, symptoms resolved. Dr. Dean of Urology follows this. Has a follow up scheduled in Dec 2018.     Nontoxic Multinodular Goiter: Follows with Endo; in 2012 thyroid US showed multinodular goiter with dominant solid nodule on L; 1.8 cm. Thyroid labs have remained normal and stable per recs. Repeat US in 2014 showed stable multinodular goiter. Patient recently saw Endo last month; at that time noted no voice changes/pressure symptoms/dysphagia. Patient continues to deny these symptoms today. Most recent thyroid labs: 10/2018 and were normal. Sees Endo again in March 2019 with labs planned for Feb 2019.     H/o DDD at  "L spine: pain improved; patient unsure if she attributes this to PT however today patient denies any issues with back pain    Osteopenia: Next DXA scan due 12/2019. Scan in 2015 showed osteopenia. Since then patient had been on Vit D and Ca supplements. Last scan in 12/2017 showed increase in L hip density and decrease in L spine density.     Depression: Rx'd Effexor 75mg QD. Has been on this for at least 6 years. Had some issues with depression symptoms getting worse after her L TKR in August; she had run out of Effexor and was between physicians and found herself crying very easily and often. However once she was able to get back onto her medication she notes she has not had any issues. Will occasionally have a sad day 1-2x a month. Does not feel she needs a therapist at the time.     SISSY on CPAP: Patient uses CPAP nightly. Follows with Pulm. Feels well rested after using CPAP. Denies snoring. Uses all night.     REM Behavior disorder: Takes Klonopin 1.5 mg QHS; recently increased from 1 mg QHS due to some breakthrough episodes. Follows with Pulm/Sleep for this.    Chronic Bronchitis: Patient unsure why this diagnosis is in her chart. She notes she is also following with Pulm regarding an abnormal chest x-ray which showed a \"spot on her lug\" Per Endo note from last month there is said to be a CT chest which showed concern for pericardial cyst vs LN. Patient unsure about this and no CT chest report seen in [facility]. She does note that Pulm is in the process of scheduling an US or some sort of imaging for her lungs soon. Per recs patient started on Stiolto Respimat by Pulm. Patient states this was due to a cough she had which is well controlled now. Daryl is Dr. Marcum.     Flu vaccine: UTD  PNA vaccines: UTD  C-scope: UTD; due in 2020  Mammogram: UTD; due 9/2019  DXA scan: UTD; due 12/2019    Of note is having R TKR in 1 week.          No flowsheet data found.    The following portions of the patient's history were " reviewed and updated as appropriate: allergies, current medications, past family history, past medical history, past social history, past surgical history and problem list.    Review of Systems   Constitutional: Negative for chills and fatigue.   Eyes: Positive for blurred vision (when BP spikes).   Respiratory: Negative for shortness of breath.    Cardiovascular: Negative for chest pain and palpitations.   Musculoskeletal: Negative for neck pain.       Objective   Physical Exam   Constitutional: She is oriented to person, place, and time. She appears well-developed and well-nourished.   HENT:   Head: Normocephalic and atraumatic.   Right Ear: Hearing, tympanic membrane, external ear and ear canal normal.   Left Ear: Hearing, tympanic membrane, external ear and ear canal normal.   Nose: Nose normal.   Mouth/Throat: Uvula is midline and mucous membranes are normal. No oropharyngeal exudate, posterior oropharyngeal edema, posterior oropharyngeal erythema or tonsillar abscesses.   Eyes: Conjunctivae and EOM are normal. Pupils are equal, round, and reactive to light.   Cardiovascular: Normal rate and regular rhythm.   Pulmonary/Chest: Effort normal and breath sounds normal. No respiratory distress. She has no decreased breath sounds.   Musculoskeletal:        Right knee: She exhibits decreased range of motion (2/2 pain). Tenderness found. Medial joint line and lateral joint line tenderness noted.   Ambulates with walker  L knee post op 8/2018 TKR   Lymphadenopathy:        Right cervical: No superficial cervical adenopathy present.       Left cervical: No superficial cervical adenopathy present.   Neurological: She is alert and oriented to person, place, and time.   Psychiatric: She has a normal mood and affect. Her speech is normal.       Assessment/Plan     Diagnoses and all orders for this visit:    Essential hypertension        - Cont meds     Hyperlipidemia, unspecified hyperlipidemia type        -  Cont  meds    Type 2 diabetes mellitus without complication, without long-term current use of insulin (CMS/[facility])  -     glucose blood (ACCU-CHEK GUMARO PLUS) test strip; Use as instructed    Nontoxic multinodular goiter        -  Cont follow up with Endo     Overactive bladder        - Cont Myrbetriq; follow up with Urology scheduled for next month    Osteopenia, unspecified location        - Cont Vit D and Ca supplements; next DXA scan due 12/2019    Simple chronic bronchitis (CMS/[facility])        - Will request records from Pul and review     Diabetes mellitus type II, controlled, with no complications (CMS/[facility])  -     glucose blood (ACCU-CHEK GUMARO PLUS) test strip; Use as instructed        - Cont Metformin    Lab orders already placed by Endo to FU in 2/2019.  RTC in 6 months for follow up or sooner if needed.     Patient may call for refills in January. At that time will refill meds prescribed by last PCP so as to give enough to last till 6 month follow up. Then will cont to refill meds only in person.

## 2018-12-13 ENCOUNTER — HOSPITAL ENCOUNTER (EMERGENCY)
Facility: HOSPITAL | Age: 73
Discharge: HOME | End: 2018-12-13
Attending: EMERGENCY MEDICINE | Admitting: EMERGENCY MEDICINE

## 2018-12-13 ENCOUNTER — APPOINTMENT (OUTPATIENT)
Dept: CT IMAGING | Facility: HOSPITAL | Age: 73
End: 2018-12-13

## 2018-12-13 ENCOUNTER — APPOINTMENT (OUTPATIENT)
Dept: CARDIOLOGY | Facility: HOSPITAL | Age: 73
End: 2018-12-13
Attending: EMERGENCY MEDICINE

## 2018-12-13 ENCOUNTER — APPOINTMENT (OUTPATIENT)
Dept: GENERAL RADIOLOGY | Facility: HOSPITAL | Age: 73
End: 2018-12-13

## 2018-12-13 ENCOUNTER — TELEPHONE (OUTPATIENT)
Dept: CARDIOLOGY | Facility: CLINIC | Age: 73
End: 2018-12-13

## 2018-12-13 VITALS
HEART RATE: 87 BPM | RESPIRATION RATE: 15 BRPM | DIASTOLIC BLOOD PRESSURE: 68 MMHG | TEMPERATURE: 97.1 F | WEIGHT: 195.3 LBS | SYSTOLIC BLOOD PRESSURE: 138 MMHG | OXYGEN SATURATION: 94 % | HEIGHT: 64 IN | BODY MASS INDEX: 33.34 KG/M2

## 2018-12-13 DIAGNOSIS — R07.89 ATYPICAL CHEST PAIN: Primary | ICD-10-CM

## 2018-12-13 LAB
ALBUMIN SERPL-MCNC: 3.6 G/DL (ref 3.5–5.2)
ALBUMIN/GLOB SERPL: 1.1 G/DL
ALP SERPL-CCNC: 87 U/L (ref 39–117)
ALT SERPL W P-5'-P-CCNC: 8 U/L (ref 1–33)
ANION GAP SERPL CALCULATED.3IONS-SCNC: 12.4 MMOL/L
AST SERPL-CCNC: 12 U/L (ref 1–32)
BASOPHILS # BLD AUTO: 0.04 10*3/MM3 (ref 0–0.2)
BASOPHILS NFR BLD AUTO: 0.4 % (ref 0–1.5)
BH CV LOWER VASCULAR LEFT COMMON FEMORAL AUGMENT: NORMAL
BH CV LOWER VASCULAR LEFT COMMON FEMORAL COMPETENT: NORMAL
BH CV LOWER VASCULAR LEFT COMMON FEMORAL COMPRESS: NORMAL
BH CV LOWER VASCULAR LEFT COMMON FEMORAL PHASIC: NORMAL
BH CV LOWER VASCULAR LEFT COMMON FEMORAL SPONT: NORMAL
BH CV LOWER VASCULAR RIGHT COMMON FEMORAL AUGMENT: NORMAL
BH CV LOWER VASCULAR RIGHT COMMON FEMORAL COMPETENT: NORMAL
BH CV LOWER VASCULAR RIGHT COMMON FEMORAL COMPRESS: NORMAL
BH CV LOWER VASCULAR RIGHT COMMON FEMORAL PHASIC: NORMAL
BH CV LOWER VASCULAR RIGHT COMMON FEMORAL SPONT: NORMAL
BH CV LOWER VASCULAR RIGHT DISTAL FEMORAL COMPRESS: NORMAL
BH CV LOWER VASCULAR RIGHT GASTRONEMIUS COMPRESS: NORMAL
BH CV LOWER VASCULAR RIGHT GREATER SAPH AK COMPRESS: NORMAL
BH CV LOWER VASCULAR RIGHT GREATER SAPH BK COMPRESS: NORMAL
BH CV LOWER VASCULAR RIGHT MID FEMORAL AUGMENT: NORMAL
BH CV LOWER VASCULAR RIGHT MID FEMORAL COMPETENT: NORMAL
BH CV LOWER VASCULAR RIGHT MID FEMORAL COMPRESS: NORMAL
BH CV LOWER VASCULAR RIGHT MID FEMORAL PHASIC: NORMAL
BH CV LOWER VASCULAR RIGHT MID FEMORAL SPONT: NORMAL
BH CV LOWER VASCULAR RIGHT PERONEAL COMPRESS: NORMAL
BH CV LOWER VASCULAR RIGHT POPLITEAL AUGMENT: NORMAL
BH CV LOWER VASCULAR RIGHT POPLITEAL COMPETENT: NORMAL
BH CV LOWER VASCULAR RIGHT POPLITEAL COMPRESS: NORMAL
BH CV LOWER VASCULAR RIGHT POPLITEAL PHASIC: NORMAL
BH CV LOWER VASCULAR RIGHT POPLITEAL SPONT: NORMAL
BH CV LOWER VASCULAR RIGHT POSTERIOR TIBIAL COMPRESS: NORMAL
BH CV LOWER VASCULAR RIGHT PROXIMAL FEMORAL COMPRESS: NORMAL
BH CV LOWER VASCULAR RIGHT SAPHENOFEMORAL JUNCTION AUGMENT: NORMAL
BH CV LOWER VASCULAR RIGHT SAPHENOFEMORAL JUNCTION COMPETENT: NORMAL
BH CV LOWER VASCULAR RIGHT SAPHENOFEMORAL JUNCTION COMPRESS: NORMAL
BH CV LOWER VASCULAR RIGHT SAPHENOFEMORAL JUNCTION PHASIC: NORMAL
BH CV LOWER VASCULAR RIGHT SAPHENOFEMORAL JUNCTION SPONT: NORMAL
BILIRUB SERPL-MCNC: 0.3 MG/DL (ref 0.1–1.2)
BUN BLD-MCNC: 11 MG/DL (ref 8–23)
BUN/CREAT SERPL: 15.9 (ref 7–25)
CALCIUM SPEC-SCNC: 9.6 MG/DL (ref 8.6–10.5)
CHLORIDE SERPL-SCNC: 101 MMOL/L (ref 98–107)
CO2 SERPL-SCNC: 27.6 MMOL/L (ref 22–29)
CREAT BLD-MCNC: 0.69 MG/DL (ref 0.57–1)
D DIMER PPP FEU-MCNC: 3.02 MCGFEU/ML (ref 0–0.49)
DEPRECATED RDW RBC AUTO: 43.6 FL (ref 37–54)
EOSINOPHIL # BLD AUTO: 0.23 10*3/MM3 (ref 0–0.7)
EOSINOPHIL NFR BLD AUTO: 2.2 % (ref 0.3–6.2)
ERYTHROCYTE [DISTWIDTH] IN BLOOD BY AUTOMATED COUNT: 14.6 % (ref 11.7–13)
GFR SERPL CREATININE-BSD FRML MDRD: 101 ML/MIN/1.73
GLOBULIN UR ELPH-MCNC: 3.4 GM/DL
GLUCOSE BLD-MCNC: 115 MG/DL (ref 65–99)
HCT VFR BLD AUTO: 35.8 % (ref 35.6–45.5)
HGB BLD-MCNC: 11 G/DL (ref 11.9–15.5)
IMM GRANULOCYTES # BLD: 0.02 10*3/MM3 (ref 0–0.03)
IMM GRANULOCYTES NFR BLD: 0.2 % (ref 0–0.5)
INR PPP: 1.9 (ref 0.9–1.1)
LYMPHOCYTES # BLD AUTO: 1.1 10*3/MM3 (ref 0.9–4.8)
LYMPHOCYTES NFR BLD AUTO: 10.5 % (ref 19.6–45.3)
MCH RBC QN AUTO: 25.2 PG (ref 26.9–32)
MCHC RBC AUTO-ENTMCNC: 30.7 G/DL (ref 32.4–36.3)
MCV RBC AUTO: 81.9 FL (ref 80.5–98.2)
MONOCYTES # BLD AUTO: 0.78 10*3/MM3 (ref 0.2–1.2)
MONOCYTES NFR BLD AUTO: 7.4 % (ref 5–12)
NEUTROPHILS # BLD AUTO: 8.35 10*3/MM3 (ref 1.9–8.1)
NEUTROPHILS NFR BLD AUTO: 79.3 % (ref 42.7–76)
PLATELET # BLD AUTO: 481 10*3/MM3 (ref 140–500)
PMV BLD AUTO: 8.9 FL (ref 6–12)
POTASSIUM BLD-SCNC: 3.6 MMOL/L (ref 3.5–5.2)
PROT SERPL-MCNC: 7 G/DL (ref 6–8.5)
PROTHROMBIN TIME: 21.5 SECONDS (ref 11.7–14.2)
RBC # BLD AUTO: 4.37 10*6/MM3 (ref 3.9–5.2)
SODIUM BLD-SCNC: 141 MMOL/L (ref 136–145)
TROPONIN T SERPL-MCNC: <0.01 NG/ML (ref 0–0.03)
TROPONIN T SERPL-MCNC: <0.01 NG/ML (ref 0–0.03)
WBC NRBC COR # BLD: 10.52 10*3/MM3 (ref 4.5–10.7)
WHOLE BLOOD HOLD SPECIMEN: NORMAL

## 2018-12-13 PROCEDURE — 80053 COMPREHEN METABOLIC PANEL: CPT | Performed by: EMERGENCY MEDICINE

## 2018-12-13 PROCEDURE — 85379 FIBRIN DEGRADATION QUANT: CPT | Performed by: EMERGENCY MEDICINE

## 2018-12-13 PROCEDURE — 84484 ASSAY OF TROPONIN QUANT: CPT | Performed by: EMERGENCY MEDICINE

## 2018-12-13 PROCEDURE — 0 IOPAMIDOL PER 1 ML: Performed by: EMERGENCY MEDICINE

## 2018-12-13 PROCEDURE — 93005 ELECTROCARDIOGRAM TRACING: CPT | Performed by: EMERGENCY MEDICINE

## 2018-12-13 PROCEDURE — 71046 X-RAY EXAM CHEST 2 VIEWS: CPT

## 2018-12-13 PROCEDURE — 71275 CT ANGIOGRAPHY CHEST: CPT

## 2018-12-13 PROCEDURE — 93971 EXTREMITY STUDY: CPT

## 2018-12-13 PROCEDURE — 85610 PROTHROMBIN TIME: CPT | Performed by: EMERGENCY MEDICINE

## 2018-12-13 PROCEDURE — 85025 COMPLETE CBC W/AUTO DIFF WBC: CPT | Performed by: EMERGENCY MEDICINE

## 2018-12-13 PROCEDURE — 99283 EMERGENCY DEPT VISIT LOW MDM: CPT

## 2018-12-13 PROCEDURE — 93010 ELECTROCARDIOGRAM REPORT: CPT | Performed by: INTERNAL MEDICINE

## 2018-12-13 RX ORDER — SODIUM CHLORIDE 0.9 % (FLUSH) 0.9 %
10 SYRINGE (ML) INJECTION AS NEEDED
Status: DISCONTINUED | OUTPATIENT
Start: 2018-12-13 | End: 2018-12-13 | Stop reason: HOSPADM

## 2018-12-13 RX ADMIN — IOPAMIDOL 95 ML: 755 INJECTION, SOLUTION INTRAVENOUS at 19:26

## 2018-12-13 NOTE — ED PROVIDER NOTES
EMERGENCY DEPARTMENT ENCOUNTER    CHIEF COMPLAINT  Chief Complaint: chest pressure  History given by: patient  History limited by: none  Room Number: 14/14  PMD: Nanci Bansal MD Dr. Kemp, [facility] [place]    HPI:  Pt is a 73 y.o. female who presents complaining of chest pain that began this morning before 0900 lasting approximately 5 minutes. Pain presented while she was cleaning the bathroom and started as a pressure in the sternum that did not radiate. She had associated diaphoresis and SOA. Sx were not exacerbated or alleviated by any activities. She's had three other episodes since the initial and were not brought on by exertion. Sx was a 7/10 at its worse, but currently is pain free. Pt has hx of total R knee replacement 10 days ago (Dr. Mcgee, [facility]). Pt is not a smoker and does not use ETOH. She denies known family cardiac hx.    Duration: 5 minutes  Onset: sudden  Timing: constant  Location: sternal  Radiation: none  Quality: 'pressure'  Intensity/Severity: moderate  Progression: resolved  Associated Symptoms: diaphoresis, SOA.  Aggravating Factors: denies  Alleviating Factors: denies  Previous Episodes: none  Treatment before arrival: none    PAST MEDICAL HISTORY  Active Ambulatory Problems     Diagnosis Date Noted   • Essential hypertension 03/17/2016   • Hyperlipidemia 03/17/2016   • Overactive bladder 03/17/2016   • Osteoarthritis 03/17/2016   • Type 2 diabetes mellitus without complication, without long-term current use of insulin (CMS/[facility]) 03/17/2016   • Vitamin D deficiency 03/17/2016   • Nontoxic multinodular goiter 06/06/2016   • Osteopenia 06/06/2016   • Chronic depression 08/25/2016   • Obstructive sleep apnea on CPAP 08/25/2016   • Osteoarthritis of spine with radiculopathy, lumbar region 08/25/2016   • Chronic venous insufficiency 05/01/2018   • Chronic bronchitis (CMS/[facility]) 06/12/2018   • Coronary artery disease involving native coronary artery without angina pectoris 06/12/2018     Resolved Ambulatory  Problems     Diagnosis Date Noted   • Anxiety 03/17/2016   • Erythrocytosis 03/17/2016   • Menopausal syndrome 03/17/2016   • Headache 03/17/2016   • Hypercalcemia 03/17/2016   • Hypertension 03/17/2016   • Adiposity 03/17/2016   • Knee pain, left 05/20/2016   • Coronary artery disease involving native coronary artery of native heart without angina pectoris 06/06/2016   • Routine health maintenance 07/28/2016   • Breast cancer screening 07/28/2016   • Arthritis of left hip 07/28/2016   • Sleep disorder 08/25/2016   • Left foot pain 11/22/2016   • Paroxysmal tachycardia (CMS/HCC) 04/11/2017   • Right flank pain 08/01/2017     Past Medical History:   Diagnosis Date   • Allergic    • Anxiety    • Cataract    • Contact dermatitis    • DDD (degenerative disc disease), lumbar    • Depression    • Essential hypertension    • Female climacteric state    • Headache    • History of mammogram    • History of total right hip replacement 2013   • Hypercalcemia    • Hyperlipidemia    • Low back pain    • Neuromuscular disorder (CMS/HCC) March 2016   • Nightmares REM-sleep type    • Nonocclusive coronary atherosclerosis of native coronary artery    • Nontoxic multinodular goiter    • Obesity    • Osteoarthritis    • Osteopenia    • Pain, joint, shoulder    • Polycythemia    • Rotator cuff tendinitis    • Snoring    • Superficial phlebitis    • Type 2 diabetes mellitus (CMS/HCC)    • Vitiligo        PAST SURGICAL HISTORY  Past Surgical History:   Procedure Laterality Date   • BREAST BIOPSY     • CARDIAC CATHETERIZATION     • COLONOSCOPY  08/15/2010   • HYSTERECTOMY     • JOINT REPLACEMENT  July 10, 2013    Total Hip Replacement   • REPLACEMENT TOTAL KNEE Left 08/06/2018    Dr. Bishnu Larson   • TONSILLECTOMY     • TOTAL HIP ARTHROPLASTY Right        FAMILY HISTORY  Family History   Problem Relation Age of Onset   • Diabetes Mother         Mother   • Thyroid disease Mother    • Hypertension Father         Father   • Heart disease  Father         Father   • Arthritis Father         Father   • Hyperlipidemia Father         Father   • Asthma Sister    • Hypertension Sister    • Diabetes Brother    • Kidney disease Brother         CHRONIC RENAL FAILURE   • Diabetes Sister    • Thyroid disease Sister    • Lung cancer Brother    • Kidney disease Brother    • Diabetes Brother    • Hypertension Brother         Brother   • Diabetes Brother         Brother   • Kidney disease Brother         Brother, Renal Failure   • Vision loss Brother         Brother   • Asthma Sister         Sister   • Hypertension Sister         Sister   • Miscarriages / Stillbirths Sister         Sister   • Cancer Brother         Brother   • Diabetes Sister         Sister   • Hypertension Sister         Sister   • Early death Sister    • Kidney disease Brother         Brother,  Renal Failure   • Stroke Brother         Brother   • Thyroid disease Sister         Siister       SOCIAL HISTORY  Social History     Socioeconomic History   • Marital status:      Spouse name: Not on file   • Number of children: Not on file   • Years of education: Not on file   • Highest education level: Not on file   Social Needs   • Financial resource strain: Not on file   • Food insecurity - worry: Not on file   • Food insecurity - inability: Not on file   • Transportation needs - medical: Not on file   • Transportation needs - non-medical: Not on file   Occupational History   • Not on file   Tobacco Use   • Smoking status: Never Smoker   • Smokeless tobacco: Never Used   • Tobacco comment: I have never smoked.   Substance and Sexual Activity   • Alcohol use: No     Comment: CAFFEINE: DECAF ONCE PER WK.    • Drug use: No   • Sexual activity: Yes     Partners: Male     Birth control/protection: Post-menopausal     Comment: I had a Hysterectomy in 1982.   Other Topics Concern   • Not on file   Social History Narrative   • Not on file       ALLERGIES  Dexamethasone and Lisinopril    REVIEW OF  SYSTEMS  Review of Systems   Constitutional: Positive for diaphoresis. Negative for fever.   HENT: Negative for sore throat.    Eyes: Negative.    Respiratory: Positive for shortness of breath. Negative for cough.    Cardiovascular: Positive for chest pain.   Gastrointestinal: Negative for abdominal pain, diarrhea and vomiting.   Genitourinary: Negative for dysuria.   Musculoskeletal: Negative for neck pain.   Skin: Negative for rash.   Allergic/Immunologic: Negative.    Neurological: Negative for weakness, numbness and headaches.   Hematological: Negative.    Psychiatric/Behavioral: Negative.    All other systems reviewed and are negative.      PHYSICAL EXAM  ED Triage Vitals   Temp Heart Rate Resp BP SpO2   12/13/18 1617 12/13/18 1617 12/13/18 1617 12/13/18 1939 12/13/18 1617   97.1 °F (36.2 °C) 116 22 138/68 97 %      Temp src Heart Rate Source Patient Position BP Location FiO2 (%)   12/13/18 1617 12/13/18 1617 -- -- --   Tympanic Monitor            Physical Exam   Constitutional: She is oriented to person, place, and time. No distress.   HENT:   Head: Normocephalic and atraumatic.   Eyes: EOM are normal. Pupils are equal, round, and reactive to light.   Neck: Normal range of motion. Neck supple.   Cardiovascular: Normal rate, regular rhythm and normal heart sounds.   No murmur heard.  Pulmonary/Chest: Effort normal and breath sounds normal. No respiratory distress.   Abdominal: Soft. There is no tenderness. There is no rebound and no guarding.   Musculoskeletal:   RLE: healing anterior knee incision and slightly larger than LLE. 2+ pedal edema on the RLE and 1+ pedal edema on the LLE. R posterior calf tenderness and medial R thigh tenderness.   Neurological: She is alert and oriented to person, place, and time. She has normal sensation and normal strength.   Skin: Skin is warm and dry. No rash noted.   Psychiatric: Mood and affect normal.   Nursing note and vitals reviewed.      LAB RESULTS  Lab Results (last 24  hours)     Procedure Component Value Units Date/Time    CBC & Differential [[id number]] Collected:  12/13/18 1725    Specimen:  Blood Updated:  12/13/18 1744    Narrative:       The following orders were created for panel order CBC & Differential.  Procedure                               Abnormality         Status                     ---------                               -----------         ------                     CBC Auto Differential[[id number]]        Abnormal            Final result                 Please view results for these tests on the individual orders.    Comprehensive Metabolic Panel [[id number]]  (Abnormal) Collected:  12/13/18 1725    Specimen:  Blood Updated:  12/13/18 1800     Glucose 115 mg/dL      BUN 11 mg/dL      Creatinine 0.69 mg/dL      Sodium 141 mmol/L      Potassium 3.6 mmol/L      Chloride 101 mmol/L      CO2 27.6 mmol/L      Calcium 9.6 mg/dL      Total Protein 7.0 g/dL      Albumin 3.60 g/dL      ALT (SGPT) 8 U/L      AST (SGOT) 12 U/L      Alkaline Phosphatase 87 U/L      Total Bilirubin 0.3 mg/dL      eGFR  African Amer 101 mL/min/1.73      Globulin 3.4 gm/dL      A/G Ratio 1.1 g/dL      BUN/Creatinine Ratio 15.9     Anion Gap 12.4 mmol/L     Narrative:       The MDRD GFR formula is only valid for adults with stable renal function between ages 18 and 70.    Protime-INR [[id number]]  (Abnormal) Collected:  12/13/18 1725    Specimen:  Blood Updated:  12/13/18 1800     Protime 21.5 Seconds      INR 1.90    Troponin [[id number]]  (Normal) Collected:  12/13/18 1725    Specimen:  Blood Updated:  12/13/18 1800     Troponin T <0.010 ng/mL     Narrative:       Troponin T Reference Ranges:  Less than 0.03 ng/mL:    Negative for AMI  0.03 to 0.09 ng/mL:      Indeterminant for AMI  Greater than 0.09 ng/mL: Positive for AMI    CBC Auto Differential [[id number]]  (Abnormal) Collected:  12/13/18 1725    Specimen:  Blood Updated:  12/13/18 1744     WBC 10.52 10*3/mm3      RBC 4.37 10*6/mm3       Hemoglobin 11.0 g/dL      Hematocrit 35.8 %      MCV 81.9 fL      MCH 25.2 pg      MCHC 30.7 g/dL      RDW 14.6 %      RDW-SD 43.6 fl      MPV 8.9 fL      Platelets 481 10*3/mm3      Neutrophil % 79.3 %      Lymphocyte % 10.5 %      Monocyte % 7.4 %      Eosinophil % 2.2 %      Basophil % 0.4 %      Immature Grans % 0.2 %      Neutrophils, Absolute 8.35 10*3/mm3      Lymphocytes, Absolute 1.10 10*3/mm3      Monocytes, Absolute 0.78 10*3/mm3      Eosinophils, Absolute 0.23 10*3/mm3      Basophils, Absolute 0.04 10*3/mm3      Immature Grans, Absolute 0.02 10*3/mm3     D-dimer, Quantitative [[id number]]  (Abnormal) Collected:  12/13/18 1725    Specimen:  Blood Updated:  12/13/18 1800     D-Dimer, Quantitative 3.02 MCGFEU/mL     Narrative:       The Stago D-Dimer test used in conjunction with a clinical pretest probability (PTP) assessment model, has been approved by the FDA to rule out the presence of venous thromboembolism (VTE) in outpatients suspected of deep venous thrombosis (DVT) or pulmonary embolism (PE).     Troponin [[id number]]  (Normal) Collected:  12/13/18 1943    Specimen:  Blood Updated:  12/13/18 2017     Troponin T <0.010 ng/mL     Narrative:       Troponin T Reference Ranges:  Less than 0.03 ng/mL:    Negative for AMI  0.03 to 0.09 ng/mL:      Indeterminant for AMI  Greater than 0.09 ng/mL: Positive for AMI          I ordered the above labs and reviewed the results    RADIOLOGY  CT Angiogram Chest With Contrast   1. No pulmonary embolus, aortic aneurysm or dissection.  2. Rounded fluid-filled structure in the right paratracheal region,  probable bronchogenic cyst, less likely prominent superior pericardial  recess.      XR Chest 2 View   Final Result   Tiny calcified benign granuloma right lung base. Fine linear  opacity left mid lung zone suggests parenchymal scar. No pulmonary edema  nor pleural effusion. No focal consolidation. Heart size within normal  limits. There is atherosclerotic calcification  of the aorta. Mediastinum  is otherwise satisfactory in appearance. The remainder is unremarkable.        I ordered the above noted radiological studies. Interpreted by radiologist. Reviewed by me in PACS.       PROCEDURES  Procedures  EKG          EKG time: 1622  Rhythm/Rate: NSR 99  P waves and HI: nml  QRS, axis: nml   ST and T waves: nml     Interpreted Contemporaneously by me, independently viewed  No prior for comparison.    PROGRESS AND CONSULTS     1914  BP-   HR- 116 Temp- 97.1 °F (36.2 °C) (Tympanic) O2 sat- 97%  Rechecked the patient who is in NAD and is resting comfortably. Discussed CXR being unremarkable, doppler showing no evidence of DVT, as well as Troponin is <0.010. Pt also made aware that her d-dimer is 3.02 and the plan to do CTA chest. Pt told that if CTA chest is nml pt will be d/c w/ her to f/u as needed w/ PCP.    2019  BP- 138/68 HR- 87 Temp- 97.1 °F (36.2 °C) (Tympanic) O2 sat- 97%  Rechecked the patient who is in NAD and is resting comfortably. Discussed repeat troponin was still negative and that since sx presented with bending over it could be stomach related. Pt told the plan to d/c. Pt instructed to hold her Metformin for the next 24 hours due to contrast dye. Pt and family also given pain medication instructions and advised to start taking a stool softner. Pt given return to ED instructions. Pt understands and agrees with the plan, all questions answered.    MEDICAL DECISION MAKING  Results were reviewed/discussed with the patient and they were also made aware of online access. Pt also made aware that some labs, such as cultures, will not be resulted during ER visit and follow up with PMD is necessary.     MDM  Number of Diagnoses or Management Options     Amount and/or Complexity of Data Reviewed  Clinical lab tests: reviewed (D-dimer 3.02)  Tests in the radiology section of CPT®: reviewed (Doppler-No DVT  CTA chest-negative, no PE  CXR-Tiny calcified benign granuloma right lung  base. Fine linear opacity left mid lung zone suggests parenchymal scar. No pulmonary edema nor pleural effusion. No focal consolidation. Heart size within normal limits. There is atherosclerotic calcification of the aorta. Mediastinum is otherwise satisfactory in appearance. The remainder is unremarkable.)  Tests in the medicine section of CPT®: reviewed (See EKG procedure note.)  Independent visualization of images, tracings, or specimens: yes    Patient Progress  Patient progress: stable         DIAGNOSIS  Final diagnoses:   Atypical chest pain       DISPOSITION  DISCHARGE    Patient discharged in stable condition.    Reviewed implications of results, diagnosis, meds, responsibility to follow up, warning signs and symptoms of possible worsening, potential complications and reasons to return to ER.    Patient/Family voiced understanding of above instructions.    Discussed plan for discharge, as there is no emergent indication for admission. Patient referred to primary care provider for BP management due to today's BP. Pt/family is agreeable and understands need for follow up and repeat testing.  Pt is aware that discharge does not mean that nothing is wrong but it indicates no emergency is present that requires admission and they must continue care with follow-up as given below or physician of their choice.     FOLLOW-UP  Nanci Bansal MD  [address] [facility] [address]  [facility] [address]  [phone]    Schedule an appointment as soon as possible for a visit            Medication List      Changed    venlafaxine 75 MG tablet  Commonly known as:  EFFEXOR  Take 2 tablets by mouth 2 (Two) Times a Day.  What changed:  when to take this            Latest Documented Vital Signs:  As of 8:34 PM  BP- 138/68 HR- 87 Temp- 97.1 °F (36.2 °C) (Tympanic) O2 sat- 97%    --  Documentation assistance provided by laxmi Medina for Dr. Hidalgo.  Information recorded by the laxmi was done at my direction and has been  verified and validated by me.     Sarah Medina  12/13/18 2031       Sajan Hidalgo MD  12/13/18 1606

## 2018-12-13 NOTE — TELEPHONE ENCOUNTER
12/13/18  2:04 PM  Dejah Casey  1945    Home Phone [phone]       Dejah Casey is a patient of Dr. Leal, was being seen by Parminder PT, said she had chest pain/pressure (6), diaphoresis and SOA this morning. BP at time of call was 160/100, 150/82 after calming down, and HR was 96.    Talked w/Dr. Leal, pt has not been seen in the last 12 months, and she would not comment other than to say she needed to be seen at the ER, if she is having issues.    Called Parminder Junior PT back and informed. PT was agreeable.    Litzy Cruz  Triage RN

## 2018-12-14 NOTE — PROGRESS NOTES
Right leg venous Doppler study preliminary report: negative for dvt.  Called report to Sajan Hidalgo MD.

## 2018-12-14 NOTE — ED NOTES
Report received from Leena FLEMING, this RN will now provide care      Samantha Faith RN  12/13/18 2022

## 2018-12-14 NOTE — DISCHARGE INSTRUCTIONS
Try over the counter Pepcid or Prilosec for two weeks.  Please return to the ED if you develop sudden sweating, dizziness or chest pain changes or worsens.

## 2018-12-18 ENCOUNTER — OFFICE VISIT (OUTPATIENT)
Dept: FAMILY MEDICINE CLINIC | Facility: CLINIC | Age: 73
End: 2018-12-18

## 2018-12-18 VITALS
SYSTOLIC BLOOD PRESSURE: 136 MMHG | RESPIRATION RATE: 16 BRPM | TEMPERATURE: 97.3 F | OXYGEN SATURATION: 99 % | WEIGHT: 192 LBS | HEART RATE: 97 BPM | HEIGHT: 64 IN | DIASTOLIC BLOOD PRESSURE: 80 MMHG | BODY MASS INDEX: 32.78 KG/M2

## 2018-12-18 DIAGNOSIS — Z87.898 H/O CHEST PAIN: Primary | ICD-10-CM

## 2018-12-18 PROCEDURE — 99213 OFFICE O/P EST LOW 20 MIN: CPT | Performed by: FAMILY MEDICINE

## 2018-12-18 NOTE — PROGRESS NOTES
Subjective   Dejah Casey is a 73 y.o. female.     73-year-old female with history of hypertension, hyperlipidemia, type 2 diabetes and shoulder sleep apnea presents today for a ER visit follow-up.  Was seen in the ER last week for up to the chest pain which lasted about 5 minutes.  When seen in the ER EKG showed normal sinus rhythm, d-dimer was elevated at around 3, CTA was negative for pulmonary embolism and lower extremity Dopplers were negative for DVT.  Patient has not had chest pain or shortness of breath since that episode.  She is to hear from physical therapy today about continuing her physical therapy as she is postop total knee replacement about 2 weeks.  Taking oxycodone for pain as needed; patient reports she could take it every 4 hours await is prescribed however she is taking it less frequently.  No repeat of chest pain or shortness of breath and about a week ago.  Has follow-up with orthopedics on 12/27.         No flowsheet data found.    The following portions of the patient's history were reviewed and updated as appropriate: allergies, current medications, past family history, past medical history, past social history, past surgical history and problem list.    Review of Systems   Constitutional: Negative for chills and fever.   Respiratory: Negative for cough and shortness of breath.    Cardiovascular: Negative for chest pain, palpitations and leg swelling.   Musculoskeletal: Positive for arthralgias and myalgias.       Objective   Physical Exam   Constitutional: She appears well-developed and well-nourished.   HENT:   Head: Normocephalic and atraumatic.   Eyes: Conjunctivae and EOM are normal. Pupils are equal, round, and reactive to light.   Neck: Normal range of motion. Neck supple.   Cardiovascular: Normal rate, regular rhythm, S1 normal, S2 normal and normal heart sounds.   No murmur heard.  Pulmonary/Chest: Effort normal. No stridor. She has no decreased breath sounds. She has no wheezes.  She has no rhonchi. She has no rales.   Musculoskeletal:   Status post total knee replacement on right; incision site is clean/dry and not draining.  No surrounding induration.  Mild tenderness to palpation.   Psychiatric: She has a normal mood and affect.               Assessment/Plan     Dejah was seen today for shortness of breath and chest pain.    Diagnoses and all orders for this visit:    H/O chest pain      Repeated symptoms of chest pain or shortness of breath.  Blood pressure stable today.  Normal heart sounds are normal lung sounds on exam.  Incision site of total knee replacement and right is healing well.  Discussed d-dimer level being around 3 is nonspecific and does not need to be rechecked today given patient has not had any chest pain or shortness of breath or calf pain.  Patient has follow-up with orthopedics on 12/27.  Advised to go to ER for any repeat of chest pain or shortness of breath.  Patient expressed understanding and agreeable to plan.

## 2019-01-04 DIAGNOSIS — I10 ESSENTIAL HYPERTENSION: ICD-10-CM

## 2019-01-04 RX ORDER — AMLODIPINE BESYLATE 5 MG/1
TABLET ORAL
Qty: 90 TABLET | Refills: 3 | Status: SHIPPED | OUTPATIENT
Start: 2019-01-04 | End: 2019-12-16 | Stop reason: SDUPTHER

## 2019-01-04 RX ORDER — ATORVASTATIN CALCIUM 20 MG/1
TABLET, FILM COATED ORAL
Qty: 90 TABLET | Refills: 1 | Status: SHIPPED | OUTPATIENT
Start: 2019-01-04 | End: 2019-04-13 | Stop reason: SDUPTHER

## 2019-01-31 RX ORDER — METOPROLOL SUCCINATE 50 MG/1
50 TABLET, EXTENDED RELEASE ORAL DAILY
Qty: 90 TABLET | Refills: 3 | Status: SHIPPED | OUTPATIENT
Start: 2019-01-31 | End: 2019-12-30

## 2019-02-20 ENCOUNTER — RESULTS ENCOUNTER (OUTPATIENT)
Dept: ENDOCRINOLOGY | Age: 74
End: 2019-02-20

## 2019-02-20 DIAGNOSIS — E55.9 VITAMIN D DEFICIENCY: ICD-10-CM

## 2019-02-20 DIAGNOSIS — E11.9 TYPE 2 DIABETES MELLITUS WITHOUT COMPLICATION, WITHOUT LONG-TERM CURRENT USE OF INSULIN (HCC): ICD-10-CM

## 2019-02-20 DIAGNOSIS — E04.2 NONTOXIC MULTINODULAR GOITER: ICD-10-CM

## 2019-02-20 DIAGNOSIS — I10 ESSENTIAL HYPERTENSION: ICD-10-CM

## 2019-02-20 DIAGNOSIS — E78.5 HYPERLIPIDEMIA, UNSPECIFIED HYPERLIPIDEMIA TYPE: ICD-10-CM

## 2019-02-21 LAB
25(OH)D3+25(OH)D2 SERPL-MCNC: 43.9 NG/ML (ref 30–100)
ALBUMIN SERPL-MCNC: 4.2 G/DL (ref 3.5–4.8)
ALBUMIN/GLOB SERPL: 1.6 {RATIO} (ref 1.2–2.2)
ALP SERPL-CCNC: 110 IU/L (ref 39–117)
ALT SERPL-CCNC: 7 IU/L (ref 0–32)
AST SERPL-CCNC: 7 IU/L (ref 0–40)
BILIRUB SERPL-MCNC: 0.4 MG/DL (ref 0–1.2)
BUN SERPL-MCNC: 10 MG/DL (ref 8–27)
BUN/CREAT SERPL: 13 (ref 12–28)
CALCIUM SERPL-MCNC: 10 MG/DL (ref 8.7–10.3)
CHLORIDE SERPL-SCNC: 103 MMOL/L (ref 96–106)
CHOLEST SERPL-MCNC: 122 MG/DL (ref 100–199)
CO2 SERPL-SCNC: 25 MMOL/L (ref 20–29)
CREAT SERPL-MCNC: 0.8 MG/DL (ref 0.57–1)
GLOBULIN SER CALC-MCNC: 2.7 G/DL (ref 1.5–4.5)
GLUCOSE SERPL-MCNC: 123 MG/DL (ref 65–99)
HBA1C MFR BLD: 5.7 % (ref 4.8–5.6)
HDLC SERPL-MCNC: 62 MG/DL
INTERPRETATION: NORMAL
LDLC SERPL CALC-MCNC: 45 MG/DL (ref 0–99)
Lab: NORMAL
POTASSIUM SERPL-SCNC: 4.3 MMOL/L (ref 3.5–5.2)
PROT SERPL-MCNC: 6.9 G/DL (ref 6–8.5)
SODIUM SERPL-SCNC: 142 MMOL/L (ref 134–144)
T4 FREE SERPL-MCNC: 0.88 NG/DL (ref 0.82–1.77)
TRIGL SERPL-MCNC: 73 MG/DL (ref 0–149)
TSH SERPL DL<=0.005 MIU/L-ACNC: 0.5 UIU/ML (ref 0.45–4.5)
VLDLC SERPL CALC-MCNC: 15 MG/DL (ref 5–40)

## 2019-03-05 RX ORDER — POLYETHYLENE GLYCOL 3350
POWDER (GRAM) MISCELLANEOUS
COMMUNITY
Start: 2018-12-05 | End: 2019-03-06

## 2019-03-05 RX ORDER — OXYCODONE AND ACETAMINOPHEN 7.5; 325 MG/1; MG/1
TABLET ORAL
Refills: 0 | COMMUNITY
Start: 2018-12-05 | End: 2019-03-06

## 2019-03-05 RX ORDER — RIVAROXABAN 10 MG/1
10 TABLET, FILM COATED ORAL
COMMUNITY
Start: 2018-12-05 | End: 2019-03-06

## 2019-03-05 NOTE — PROGRESS NOTES
Subjective   Dejah Casey is a 74 y.o. female.     F/u for dm 2, hyperlipidemia, hypertension, OAB,CAD, nontoxic MNG, osteopenia / testing bs 1 x day / last dm eye exan 5/11/18 with dr Birch / last dm foot exam today with dr Hernandez        Patient is a 74-year-old female who came in for followup. She was found to have a goiter on examination last August 2012. She had thyroid ultrasound done on 08/21/2012 which showed a multinodular goiter with the dominant solid nodule in the left measuring 1.8 cm. She had followup thyroid ultrasound in 4/14 at [facility] showed multinodular goiter with stable nodules. Thyroid function tests have been normal.        She denies any voice changes. She denies any pressure symptoms or dysphagia. She denies any bowel changes. She denies any heat or cold intolerance. She has no previous history of head or neck radiation therapy.      She has known diabetes mellitus since 2003 and has been on metformin 500 mg twice a day. Blood sugar has ranged from . She has no microalbuminuria on urine sample taken 12/17. Her last eye exam was 5/18. She has early cataracts but no retinopathy. She denies tingling in her feet.       She has hyperlipidemia and has been on Lipitor 10 mg once a day.  She denies any myalgia.  She has lost 5 pounds since Oct 2018 due to her her 's cooking.       She has mild coronary artery disease by cardiac catheterization done in 2008 by Dr. CAMILLE Mcmullen. She denies any chest pain.  She had a normal nuclear stress tests in July 2018.  She is following up with Dr. Leal.      She has hypertension and has been on amlodipine 2.5 mg once a day and Toprol. She was taken off hydrochlorothiazide because of hypercalcemia. She had cough with lisinopril in the past. She has not used ARB in the past.       She has overactive bladder diagnosed by Dr. Dean and is on Myrbetriq.  She was taken off Toviaz because of mouth dryness.  She was taken off Vesicare because of  "cost.     She has osteopenia on bone density done at [facility] in November 2015.  She is on vitamin D 800 units per day and calcium 500 plus D 1 tab BID..  Bone density done in December 2017 showed improvement of the left hip density and a slight decrease in the lumbar spine.    She had a right knee replacement in December 2018 and is going through outpatient rehabilitation     She was treated on Stiolto by Dr. Worley.  She is following with Dr. Worley with regards to the abnormal CT of the chest which was read as possible pericardial cyst or lymphadenopathy.   She has sleep apnea and uses a CPAP.  She is no longer is snoring but wakes up tired.       The following portions of the patient's history were reviewed and updated as appropriate: allergies, current medications, past family history, past medical history, past social history, past surgical history and problem list.    Review of Systems   Constitutional: Positive for fatigue.   Eyes: Negative.    Respiratory: Negative.    Cardiovascular: Negative.    Gastrointestinal: Negative.    Endocrine: Negative.    Genitourinary: Negative.  Negative for frequency.   Musculoskeletal: Negative.    Skin: Negative.    Allergic/Immunologic: Negative.    Neurological: Positive for headaches.   Hematological: Bruises/bleeds easily.   Psychiatric/Behavioral: The patient is nervous/anxious.        Objective      Vitals:    03/06/19 1510   BP: 132/76   BP Location: Left arm   Patient Position: Sitting   Cuff Size: Large Adult   Pulse: 97   SpO2: 96%   Weight: 87.5 kg (192 lb 12.8 oz)   Height: 162.6 cm (64.02\")     Physical Exam   Constitutional: She is oriented to person, place, and time. She appears well-developed and well-nourished. No distress.   HENT:   Head: Normocephalic.   Nose: Nose normal.   Mouth/Throat: No oropharyngeal exudate.   Eyes: Conjunctivae and EOM are normal. Right eye exhibits no discharge. Left eye exhibits no discharge. No scleral icterus.   Neck: " Neck supple. No JVD present. No tracheal deviation present. No thyromegaly present.   Cardiovascular: Normal rate, regular rhythm, normal heart sounds and intact distal pulses. Exam reveals no gallop and no friction rub.   No murmur heard.  Pulmonary/Chest: Effort normal and breath sounds normal. No stridor. No respiratory distress. She has no wheezes. She has no rales.   Abdominal: Soft. Bowel sounds are normal. She exhibits no distension and no mass. There is no tenderness. There is no rebound and no guarding.   Musculoskeletal: She exhibits edema. She exhibits no tenderness or deformity.   Lymphadenopathy:     She has no cervical adenopathy.   Neurological: She is alert and oriented to person, place, and time. She displays normal reflexes. She exhibits normal muscle tone. Coordination normal.   Skin: Skin is warm and dry. No rash noted. She is not diaphoretic. No erythema. No pallor.   Psychiatric: She has a normal mood and affect. Her behavior is normal.     Results Encounter on 02/20/2019   Component Date Value Ref Range Status   • Glucose 02/20/2019 123* 65 - 99 mg/dL Final   • BUN 02/20/2019 10  8 - 27 mg/dL Final   • Creatinine 02/20/2019 0.80  0.57 - 1.00 mg/dL Final   • eGFR Non  Am 02/20/2019 72  >59 mL/min/1.73 Final   • eGFR African Am 02/20/2019 83  >59 mL/min/1.73 Final   • BUN/Creatinine Ratio 02/20/2019 13  12 - 28 Final   • Sodium 02/20/2019 142  134 - 144 mmol/L Final   • Potassium 02/20/2019 4.3  3.5 - 5.2 mmol/L Final   • Chloride 02/20/2019 103  96 - 106 mmol/L Final   • Total CO2 02/20/2019 25  20 - 29 mmol/L Final   • Calcium 02/20/2019 10.0  8.7 - 10.3 mg/dL Final   • Total Protein 02/20/2019 6.9  6.0 - 8.5 g/dL Final   • Albumin 02/20/2019 4.2  3.5 - 4.8 g/dL Final   • Globulin 02/20/2019 2.7  1.5 - 4.5 g/dL Final   • A/G Ratio 02/20/2019 1.6  1.2 - 2.2 Final   • Total Bilirubin 02/20/2019 0.4  0.0 - 1.2 mg/dL Final   • Alkaline Phosphatase 02/20/2019 110  39 - 117 IU/L Final   • AST  (SGOT) 02/20/2019 7  0 - 40 IU/L Final   • ALT (SGPT) 02/20/2019 7  0 - 32 IU/L Final   • Total Cholesterol 02/20/2019 122  100 - 199 mg/dL Final   • Triglycerides 02/20/2019 73  0 - 149 mg/dL Final   • HDL Cholesterol 02/20/2019 62  >39 mg/dL Final   • VLDL Cholesterol 02/20/2019 15  5 - 40 mg/dL Final   • LDL Cholesterol  02/20/2019 45  0 - 99 mg/dL Final   • Hemoglobin A1C 02/20/2019 5.7* 4.8 - 5.6 % Final    Comment:          Prediabetes: 5.7 - 6.4           Diabetes: >6.4           Glycemic control for adults with diabetes: <7.0     • Free T4 02/20/2019 0.88  0.82 - 1.77 ng/dL Final   • TSH 02/20/2019 0.503  0.450 - 4.500 uIU/mL Final   • 25 Hydroxy, Vitamin D 02/20/2019 43.9  30.0 - 100.0 ng/mL Final    Comment: Vitamin D deficiency has been defined by the [place] of  Medicine and an Endocrine Society practice guideline as a  level of serum 25-OH vitamin D less than 20 ng/mL (1,2).  The Endocrine Society went on to further define vitamin D  insufficiency as a level between 21 and 29 ng/mL (2).  1. IOM ([place] of [employer]). 2010. Dietary reference     intakes for calcium and D. [place]: The     National Academies Press.  2. Pepper MF, Aurelio NC, Nga COOK, et al.     Evaluation, treatment, and prevention of vitamin D     deficiency: an Endocrine Society clinical practice     guideline. JCEM. 2011 Jul; 96(7):1911-30.     • Interpretation 02/20/2019 Note   Final    Supplemental report is available.   • PDF Image 02/20/2019 Not applicable   Final     Assessment/Plan   Dejah was seen today for diabetes, hyperlipidemia, hypertension, coronary artery disease and goiter.    Diagnoses and all orders for this visit:    Nontoxic multinodular goiter    Type 2 diabetes mellitus without complication, without long-term current use of insulin (CMS/[facility])  -     Microalbumin / Creatinine Urine Ratio - Urine, Clean Catch    Essential hypertension    Hyperlipidemia, unspecified hyperlipidemia type    Coronary  artery disease involving native coronary artery of native heart without angina pectoris    Obstructive sleep apnea on CPAP    Chronic venous insufficiency    Vitamin D deficiency    Osteopenia, unspecified location      Continue Metformin 500 mg twice daily.  Continue Lipitor 10 mg/day.    Continue amlodipine and Toprol.  Continue vitamin D 800 units/day and calcium plus D 1 tablet twice a day.  Continue CPAP.    Send copy of my note to Dr. Bansal.    RTC 4 mos

## 2019-03-06 ENCOUNTER — OFFICE VISIT (OUTPATIENT)
Dept: ENDOCRINOLOGY | Age: 74
End: 2019-03-06

## 2019-03-06 VITALS
SYSTOLIC BLOOD PRESSURE: 132 MMHG | DIASTOLIC BLOOD PRESSURE: 76 MMHG | HEART RATE: 97 BPM | WEIGHT: 192.8 LBS | HEIGHT: 64 IN | BODY MASS INDEX: 32.91 KG/M2 | OXYGEN SATURATION: 96 %

## 2019-03-06 DIAGNOSIS — M85.80 OSTEOPENIA, UNSPECIFIED LOCATION: ICD-10-CM

## 2019-03-06 DIAGNOSIS — E11.9 TYPE 2 DIABETES MELLITUS WITHOUT COMPLICATION, WITHOUT LONG-TERM CURRENT USE OF INSULIN (HCC): ICD-10-CM

## 2019-03-06 DIAGNOSIS — I10 ESSENTIAL HYPERTENSION: ICD-10-CM

## 2019-03-06 DIAGNOSIS — I25.10 CORONARY ARTERY DISEASE INVOLVING NATIVE CORONARY ARTERY OF NATIVE HEART WITHOUT ANGINA PECTORIS: ICD-10-CM

## 2019-03-06 DIAGNOSIS — E04.2 NONTOXIC MULTINODULAR GOITER: Primary | ICD-10-CM

## 2019-03-06 DIAGNOSIS — E55.9 VITAMIN D DEFICIENCY: ICD-10-CM

## 2019-03-06 DIAGNOSIS — I87.2 CHRONIC VENOUS INSUFFICIENCY: ICD-10-CM

## 2019-03-06 DIAGNOSIS — G47.33 OBSTRUCTIVE SLEEP APNEA ON CPAP: ICD-10-CM

## 2019-03-06 DIAGNOSIS — E78.5 HYPERLIPIDEMIA, UNSPECIFIED HYPERLIPIDEMIA TYPE: ICD-10-CM

## 2019-03-06 DIAGNOSIS — Z99.89 OBSTRUCTIVE SLEEP APNEA ON CPAP: ICD-10-CM

## 2019-03-06 PROCEDURE — 99214 OFFICE O/P EST MOD 30 MIN: CPT | Performed by: INTERNAL MEDICINE

## 2019-03-07 LAB
ALBUMIN/CREAT UR: 5.9 MG/G CREAT (ref 0–30)
CREAT UR-MCNC: 117.8 MG/DL
MICROALBUMIN UR-MCNC: 6.9 UG/ML

## 2019-04-15 RX ORDER — ATORVASTATIN CALCIUM 10 MG/1
10 TABLET, FILM COATED ORAL NIGHTLY
Qty: 90 TABLET | Refills: 1 | Status: SHIPPED | OUTPATIENT
Start: 2019-04-15 | End: 2019-11-20 | Stop reason: SDUPTHER

## 2019-05-02 ENCOUNTER — OFFICE VISIT (OUTPATIENT)
Dept: FAMILY MEDICINE CLINIC | Facility: CLINIC | Age: 74
End: 2019-05-02

## 2019-05-02 VITALS
RESPIRATION RATE: 16 BRPM | DIASTOLIC BLOOD PRESSURE: 50 MMHG | HEIGHT: 64 IN | WEIGHT: 192 LBS | OXYGEN SATURATION: 97 % | SYSTOLIC BLOOD PRESSURE: 100 MMHG | HEART RATE: 84 BPM | BODY MASS INDEX: 32.78 KG/M2 | TEMPERATURE: 99.1 F

## 2019-05-02 DIAGNOSIS — R22.2 SUPRACLAVICULAR MASS: ICD-10-CM

## 2019-05-02 DIAGNOSIS — M54.2 NECK PAIN: Primary | ICD-10-CM

## 2019-05-02 PROCEDURE — 99214 OFFICE O/P EST MOD 30 MIN: CPT | Performed by: NURSE PRACTITIONER

## 2019-05-02 PROCEDURE — 72050 X-RAY EXAM NECK SPINE 4/5VWS: CPT | Performed by: NURSE PRACTITIONER

## 2019-05-02 NOTE — PROGRESS NOTES
Subjective   Dejah Casey is a 74 y.o. female.     History of Present Illness   Dejah Casey 74 y.o. female who presents for evaluation of neck pain. Event that precipitated these symptoms:  none recalled by the patient but believes it happened in her sleep due to restlessness from nightmare.  Onset of symptoms was 3 weeks ago, and have been gradually worsening since that time.  Location of this is in the right , left and trapezius area.   Current pain symptoms are described as aching and occurs constantly.  The pain intensity is moderate.  Other associated symptoms include:  ROM pain. Treatment efforts have included: tylenol. with relief.  Patient has had no prior neck problems.       Reports noticing enlargement to left clavicular area for about a week.  Area is tender to the touch.     The following portions of the patient's history were reviewed and updated as appropriate: allergies, current medications, past family history, past medical history, past social history, past surgical history and problem list.    Review of Systems   HENT: Negative for trouble swallowing.    Respiratory: Negative for shortness of breath.    Cardiovascular: Negative for chest pain and palpitations.   Musculoskeletal: Positive for neck pain and neck stiffness.   Neurological: Negative for weakness, numbness and headaches.   Psychiatric/Behavioral: Negative for behavioral problems.       Objective   Physical Exam   Constitutional: She is oriented to person, place, and time. She appears well-developed and well-nourished.   Neck: No spinous process tenderness and no muscular tenderness present. Normal range of motion present.   Full ROM but reports pain with rotation to both sides.      Swelling to left supraclavicular joint.  Tenderness to palpation    Pulmonary/Chest: Effort normal.   Neurological: She is alert and oriented to person, place, and time. No sensory deficit.   Reflex Scores:       Tricep reflexes are 1+ on the right  side and 1+ on the left side.       Bicep reflexes are 1+ on the right side and 1+ on the left side.       Brachioradialis reflexes are 1+ on the right side and 1+ on the left side.   strength equal bilaterally    Psychiatric: She has a normal mood and affect. Judgment normal.   Nursing note and vitals reviewed.  5 view xray of cervical spine ordered and reviewed by me.  Normal curvature.  Osteophyte formation noted C5 and C6. No comparison on file.     Assessment/Plan   Dejah was seen today for neck pain and mass.    Diagnoses and all orders for this visit:    Neck pain  -     XR Spine Cervical Complete 4 or 5 View (In Office)    Supraclavicular mass  -     US Head Neck Soft Tissue        Topical analgesic compound prescribed through [facility]

## 2019-05-09 ENCOUNTER — TELEPHONE (OUTPATIENT)
Dept: FAMILY MEDICINE CLINIC | Facility: CLINIC | Age: 74
End: 2019-05-09

## 2019-05-09 DIAGNOSIS — F32.A DEPRESSION, UNSPECIFIED DEPRESSION TYPE: ICD-10-CM

## 2019-05-09 RX ORDER — VENLAFAXINE 75 MG/1
150 TABLET ORAL 2 TIMES DAILY
Qty: 360 TABLET | Refills: 1 | Status: SHIPPED | OUTPATIENT
Start: 2019-05-09 | End: 2019-05-20 | Stop reason: SDUPTHER

## 2019-05-09 RX ORDER — VENLAFAXINE 75 MG/1
150 TABLET ORAL 2 TIMES DAILY
Qty: 56 TABLET | Refills: 0 | Status: SHIPPED | OUTPATIENT
Start: 2019-05-09 | End: 2019-05-09 | Stop reason: SDUPTHER

## 2019-05-09 NOTE — TELEPHONE ENCOUNTER
Pt calling asking for a Med refill 90 day supply to [facility]    Short term supply was sent to local [facility]

## 2019-05-15 ENCOUNTER — HOSPITAL ENCOUNTER (OUTPATIENT)
Dept: ULTRASOUND IMAGING | Facility: HOSPITAL | Age: 74
Discharge: HOME | End: 2019-05-15
Admitting: NURSE PRACTITIONER

## 2019-05-15 PROCEDURE — 76536 US EXAM OF HEAD AND NECK: CPT

## 2019-05-20 ENCOUNTER — TELEPHONE (OUTPATIENT)
Dept: FAMILY MEDICINE CLINIC | Facility: CLINIC | Age: 74
End: 2019-05-20

## 2019-05-20 DIAGNOSIS — F32.A DEPRESSION, UNSPECIFIED DEPRESSION TYPE: ICD-10-CM

## 2019-05-20 RX ORDER — VENLAFAXINE 75 MG/1
150 TABLET ORAL 2 TIMES DAILY
Qty: 360 TABLET | Refills: 1 | Status: SHIPPED | OUTPATIENT
Start: 2019-05-20 | End: 2019-05-20 | Stop reason: SDUPTHER

## 2019-05-20 RX ORDER — VENLAFAXINE 75 MG/1
150 TABLET ORAL 2 TIMES DAILY
Qty: 120 TABLET | Refills: 0 | Status: SHIPPED | OUTPATIENT
Start: 2019-05-20 | End: 2019-06-25 | Stop reason: SDUPTHER

## 2019-06-21 ENCOUNTER — OFFICE VISIT (OUTPATIENT)
Dept: FAMILY MEDICINE CLINIC | Facility: CLINIC | Age: 74
End: 2019-06-21

## 2019-06-21 VITALS
RESPIRATION RATE: 16 BRPM | SYSTOLIC BLOOD PRESSURE: 134 MMHG | TEMPERATURE: 98.1 F | BODY MASS INDEX: 35.17 KG/M2 | DIASTOLIC BLOOD PRESSURE: 85 MMHG | WEIGHT: 206 LBS | HEART RATE: 85 BPM | HEIGHT: 64 IN | OXYGEN SATURATION: 98 %

## 2019-06-21 DIAGNOSIS — F32.A CHRONIC DEPRESSION: ICD-10-CM

## 2019-06-21 DIAGNOSIS — I10 ESSENTIAL HYPERTENSION: Primary | ICD-10-CM

## 2019-06-21 DIAGNOSIS — E78.5 HYPERLIPIDEMIA, UNSPECIFIED HYPERLIPIDEMIA TYPE: ICD-10-CM

## 2019-06-21 PROBLEM — J39.8: Status: ACTIVE | Noted: 2018-11-29

## 2019-06-21 PROBLEM — Z96.651 H/O TOTAL KNEE REPLACEMENT, RIGHT: Status: ACTIVE | Noted: 2018-12-03

## 2019-06-21 PROBLEM — Q34.9: Status: ACTIVE | Noted: 2018-11-29

## 2019-06-21 PROCEDURE — 99214 OFFICE O/P EST MOD 30 MIN: CPT | Performed by: FAMILY MEDICINE

## 2019-06-21 NOTE — PROGRESS NOTES
Subjective   Dejah Casey is a 74 y.o. female.     74-year female presents today to follow-up Hypertension, hyperlipidemia.  Follows with endocrinology for type 2 diabetes mellitus, nontoxic multinodular goiter.    Hypertension: Patient is on amlodipine 5 mg a day and metoprolol 50 mg a day.  Blood pressure in office today at 134/85. Continues to deny CP/SOB/HA with vision changes.    Hyperlipidemia: Patient is on Lipitor 10 mg a day.  Last lipid panel was checked by her endocrinologist in February 2019 and was okay.  Labs also show the diabetes is well under control as well.  Thyroid was also normal.    Patient follows with urology for overactive bladder; is on Myrbetriq 50 mg a day.  Has had some episodes of urinary incontinence during sleep.  Has appointment with urologist coming up soon.  Feels like Myrbetriq is not working as well as it used to. She reports she is taking it consistently as prescribed. Feels that nighttime awakenings to urinate and/or due to urinary incontinence are interrupting her sleep and making her feel more tired.     Chronic bronchitis: Says that she does have follow-up coming up with pulmonary (for her chronic bronchitis as well as her sleep apnea.  Has increased fatigue which could be related to increased nighttime awakenings related to nocturia.  Has some issues with shortness of breath which will discuss with pulmonary when she follows up with them soon.    Osteopenia: Is on vitamin D and calcium supplements at her next DEXA scan is due in December 2019.    Chronic depression: Is on venlafaxine.  Sometimes does feel overwhelmed with her feelings of sadness however this is not often.  Denies suicidal ideation self injury or homicidal ideation.    Can do Medicare wellness visit in 6 months when she is due for further labs.    At last visit discussed memory issues.  These have stayed the same since last visit they have not worsened.  Patient is doing sudoku daily which she is very  happy about and is very good at as well. Her  is also with her today and reports that he feels her memory issues have not worsened since she began doing sudoku daily.          No flowsheet data found.    The following portions of the patient's history were reviewed and updated as appropriate: allergies, current medications, past family history, past medical history, past social history, past surgical history and problem list.    Review of Systems   Constitutional: Negative for chills and fever.   HENT: Negative for congestion.    Respiratory: Negative for apnea and shortness of breath.    Genitourinary: Positive for urinary incontinence. Negative for decreased urine volume, dysuria, hematuria, vaginal bleeding and vaginal discharge.   Psychiatric/Behavioral: Negative for sleep disturbance and depressed mood. The patient is not nervous/anxious.        Objective   Physical Exam   Constitutional: She appears well-developed and well-nourished.   HENT:   Head: Normocephalic and atraumatic.   Nose: Congestion present.   Mouth/Throat: Uvula is midline, oropharynx is clear and moist and mucous membranes are normal.   Eyes: EOM are normal. Pupils are equal, round, and reactive to light.   Cardiovascular: Normal rate and regular rhythm.   No murmur heard.  Pulmonary/Chest: Effort normal and breath sounds normal. No stridor. No respiratory distress. She has no wheezes. She has no rales.   Musculoskeletal: Normal range of motion.   Neurological: She is alert.   Skin: Skin is warm.   Psychiatric: She has a normal mood and affect. Her behavior is normal.               Assessment/Plan     Dejah was seen today for hypertension.    Diagnoses and all orders for this visit:    Essential hypertension    Hyperlipidemia, unspecified hyperlipidemia type    Chronic depression    BP under good control. No new symptoms.     RTC in 6 months for Medicare wellness visit; prior to visit we will get lipid panel, CMP, CBC, TSH.  She reports  she does not need refills at this time.    Discussed with urology about increased issues with urination and overactive bladder and also discussed with pulmonologist about issues of shortness of breath and make sure sleep apnea is well under control with her current CPAP settings.

## 2019-06-25 DIAGNOSIS — F32.A DEPRESSION, UNSPECIFIED DEPRESSION TYPE: ICD-10-CM

## 2019-06-25 RX ORDER — VENLAFAXINE 75 MG/1
75 TABLET ORAL 2 TIMES DAILY
Qty: 360 TABLET | Refills: 0 | Status: SHIPPED | OUTPATIENT
Start: 2019-06-25 | End: 2019-08-12

## 2019-07-22 ENCOUNTER — TELEPHONE (OUTPATIENT)
Dept: FAMILY MEDICINE CLINIC | Facility: CLINIC | Age: 74
End: 2019-07-22

## 2019-08-06 ENCOUNTER — TELEPHONE (OUTPATIENT)
Dept: FAMILY MEDICINE CLINIC | Facility: CLINIC | Age: 74
End: 2019-08-06

## 2019-08-06 DIAGNOSIS — E11.9 DIABETES MELLITUS WITHOUT COMPLICATION (HCC): ICD-10-CM

## 2019-08-08 DIAGNOSIS — E55.9 VITAMIN D DEFICIENCY: ICD-10-CM

## 2019-08-08 DIAGNOSIS — E04.2 NONTOXIC MULTINODULAR GOITER: ICD-10-CM

## 2019-08-08 DIAGNOSIS — I10 ESSENTIAL HYPERTENSION: Primary | ICD-10-CM

## 2019-08-08 DIAGNOSIS — E78.5 HYPERLIPIDEMIA, UNSPECIFIED HYPERLIPIDEMIA TYPE: ICD-10-CM

## 2019-08-08 DIAGNOSIS — E11.9 TYPE 2 DIABETES MELLITUS WITHOUT COMPLICATION, WITHOUT LONG-TERM CURRENT USE OF INSULIN (HCC): ICD-10-CM

## 2019-08-09 LAB
25(OH)D3+25(OH)D2 SERPL-MCNC: 59.6 NG/ML (ref 30–100)
ALBUMIN SERPL-MCNC: 4 G/DL (ref 3.5–5.2)
ALBUMIN/GLOB SERPL: 1.2 G/DL
ALP SERPL-CCNC: 117 U/L (ref 39–117)
ALT SERPL-CCNC: 13 U/L (ref 1–33)
AST SERPL-CCNC: 11 U/L (ref 1–32)
BILIRUB SERPL-MCNC: 0.3 MG/DL (ref 0.2–1.2)
BUN SERPL-MCNC: 13 MG/DL (ref 8–23)
BUN/CREAT SERPL: 14.8 (ref 7–25)
CALCIUM SERPL-MCNC: 10 MG/DL (ref 8.6–10.5)
CHLORIDE SERPL-SCNC: 104 MMOL/L (ref 98–107)
CHOLEST SERPL-MCNC: 144 MG/DL (ref 0–200)
CO2 SERPL-SCNC: 26.6 MMOL/L (ref 22–29)
CREAT SERPL-MCNC: 0.88 MG/DL (ref 0.57–1)
GLOBULIN SER CALC-MCNC: 3.3 GM/DL
GLUCOSE SERPL-MCNC: 104 MG/DL (ref 65–99)
HBA1C MFR BLD: 6.15 % (ref 4.8–5.6)
HDLC SERPL-MCNC: 70 MG/DL (ref 40–60)
INTERPRETATION: NORMAL
LDLC SERPL CALC-MCNC: 55 MG/DL (ref 0–100)
Lab: NORMAL
POTASSIUM SERPL-SCNC: 3.8 MMOL/L (ref 3.5–5.2)
PROT SERPL-MCNC: 7.3 G/DL (ref 6–8.5)
SODIUM SERPL-SCNC: 145 MMOL/L (ref 136–145)
T4 FREE SERPL-MCNC: 0.91 NG/DL (ref 0.93–1.7)
TRIGL SERPL-MCNC: 93 MG/DL (ref 0–150)
TSH SERPL DL<=0.005 MIU/L-ACNC: 1.17 MIU/ML (ref 0.27–4.2)
VLDLC SERPL CALC-MCNC: 18.6 MG/DL

## 2019-08-12 ENCOUNTER — OFFICE VISIT (OUTPATIENT)
Dept: FAMILY MEDICINE CLINIC | Facility: CLINIC | Age: 74
End: 2019-08-12

## 2019-08-12 VITALS
RESPIRATION RATE: 16 BRPM | HEIGHT: 64 IN | TEMPERATURE: 97.8 F | HEART RATE: 84 BPM | DIASTOLIC BLOOD PRESSURE: 78 MMHG | OXYGEN SATURATION: 97 % | WEIGHT: 205 LBS | BODY MASS INDEX: 35 KG/M2 | SYSTOLIC BLOOD PRESSURE: 120 MMHG

## 2019-08-12 DIAGNOSIS — F32.A CHRONIC DEPRESSION: Primary | ICD-10-CM

## 2019-08-12 PROCEDURE — 99212 OFFICE O/P EST SF 10 MIN: CPT | Performed by: FAMILY MEDICINE

## 2019-08-12 RX ORDER — VENLAFAXINE 75 MG/1
150 TABLET ORAL 2 TIMES DAILY
Qty: 90 TABLET | Refills: 1 | Status: SHIPPED | OUTPATIENT
Start: 2019-08-12 | End: 2019-12-13 | Stop reason: SDUPTHER

## 2019-08-12 NOTE — PROGRESS NOTES
Subjective   Dejah Casey is a 74 y.o. female.     74-year female presents today for follow-up on medical management for depression.    Chronic depression: Patient is on venlafaxine.  Denies suicidal homicidal ideation or self injury.  More recently patient venlafaxine was refilled by me at 1 tablet 75 mg twice a day.  Patient called back and said that she was taking 2 tablets twice a day.  We had called [facility] pharmacy to confirm previous prescriptions and they confirm that she takes 75 mg 1 tablet twice a day.  Patient stated she would contact the pharmacy and was told that they would mail her her records.  She presents today for follow-up on this.  Patient is records from [facility] which showed that she was prescribed it is 2 tablets twice a day.  We called [facility] again today as well as her [facility] pharmacy which does confirm that there were prescriptions written for 2 tablets twice a day.  Patient needs refills today.           No flowsheet data found.    The following portions of the patient's history were reviewed and updated as appropriate: allergies, current medications, past family history, past medical history, past social history, past surgical history and problem list.    Review of Systems   Constitutional: Negative for chills and fever.   HENT: Negative for congestion.    Respiratory: Negative for apnea and shortness of breath.    Cardiovascular: Negative for chest pain, palpitations and leg swelling.   Psychiatric/Behavioral: Negative for sleep disturbance, suicidal ideas, depressed mood and stress. The patient is not nervous/anxious.        Objective   Physical Exam   Constitutional: She appears well-developed and well-nourished.   HENT:   Head: Normocephalic and atraumatic.   Nose: No congestion.   Mouth/Throat: Uvula is midline, oropharynx is clear and moist and mucous membranes are normal.   Eyes: EOM are normal. Pupils are equal, round, and reactive to light.   Cardiovascular: Normal rate and regular  rhythm.   No murmur heard.  Pulmonary/Chest: Effort normal and breath sounds normal. No stridor. No respiratory distress. She has no wheezes. She has no rales.   Neurological: She is alert.   Skin: Skin is warm.   Psychiatric: She has a normal mood and affect. Her behavior is normal.               Assessment/Plan     Dejah was seen today for anxiety and depression.    Diagnoses and all orders for this visit:    Chronic depression  -     venlafaxine (EFFEXOR) 75 MG tablet; Take 2 tablets by mouth 2 (Two) Times a Day.      Refilled venlafaxine 75 mg 2 tablets twice a day for 90 days with 1 refill.  Return to clinic for next scheduled follow-up or sooner if needed.

## 2019-08-13 NOTE — PROGRESS NOTES
Subjective   Dejah Casey is a 74 y.o. female.     F/u for dm 2, hyperlipidemia, hypertension, OAB, CAD, nontoxic MNG,osteopenia / testing bs 1 x day / last dm eye exam July 2019  with dr Birch / last dm foot exam 3/6/19 with dr Hernandez     Diabetes   She has type 2 diabetes mellitus. MedicAlert identification noted. The initial diagnosis of diabetes was made 15 years ago. Hypoglycemia symptoms include mood changes, nervousness/anxiousness, sleepiness and sweats. Pertinent negatives for hypoglycemia include no confusion, dizziness, headaches, hunger, pallor, seizures, speech difficulty or tremors. Associated symptoms include blurred vision, fatigue, polyuria, visual change and weakness. Pertinent negatives for diabetes include no chest pain, no foot paresthesias, no foot ulcerations, no polydipsia, no polyphagia and no weight loss. Pertinent negatives for hypoglycemia complications include no blackouts, no hospitalization, no nocturnal hypoglycemia, no required assistance and no required glucagon injection. Symptoms are stable. Pertinent negatives for diabetic complications include no CVA, heart disease, impotence, nephropathy, peripheral neuropathy, PVD or retinopathy. Risk factors for coronary artery disease include hypertension, obesity, post-menopausal and stress. Current diabetic treatment includes diet and oral agent (monotherapy). She is compliant with treatment all of the time. Her weight is fluctuating minimally. She is following a generally healthy diet. Meal planning includes avoidance of concentrated sweets and carbohydrate counting. She has not had a previous visit with a dietitian. She participates in exercise intermittently. She monitors blood glucose at home 1-2 x per day. Blood glucose monitoring compliance is excellent. There is no change in her home blood glucose trend. Her breakfast blood glucose is taken after 10 am. Her breakfast blood glucose range is generally 70-90 mg/dl. Her lunch blood  glucose is taken after 3 pm. Her lunch blood glucose range is generally 70-90 mg/dl. Her dinner blood glucose is taken after 8 pm. Her highest blood glucose is  mg/dl. Her overall blood glucose range is  mg/dl. She does not see a podiatrist.Eye exam is current.      Patient is a 74-year-old female who came in for followup. She was found to have a goiter on examination last August 2012. She had thyroid ultrasound done on 08/21/2012 which showed a multinodular goiter with the dominant solid nodule in the left measuring 1.8 cm. She had followup thyroid ultrasound in 4/14 at [facility] showed multinodular goiter with stable nodules.  TSH done in August 2019 was normal at 1.17.      She denies any voice changes. She denies any pressure symptoms or dysphagia. She denies any bowel changes. She denies any heat or cold intolerance. She has no previous history of head or neck radiation therapy.      She has known diabetes mellitus since 2003 and has been on metformin 500 mg twice a day. Blood sugar has ranged from . She has no microalbuminuria on urine sample taken 3/19.   Her last eye exam was 7/19. She had bilateral cataract surgery.  She has no retinopathy.. She denies tingling in her feet.  Hemoglobin A1c done in August 2019 was 6.15%.     She has hyperlipidemia and has been on Lipitor 10 mg once a day.  She denies any myalgia.  She has gained 16 pounds with her 's cooking.  Lipid profile done in August 2019 are as follows: Cholesterol 144.  HDL 70.  LDL 55.  Triglycerides 93.    She has mild coronary artery disease by cardiac catheterization done in 2008 by Dr. CAMILLE Mcmullen. She denies any chest pain.  She had a normal nuclear stress tests in July 2018.  She is following up with Dr. Leal.      She has hypertension and has been on amlodipine 5 mg once a day and Toprol 50 mg/day. She was taken off hydrochlorothiazide because of hypercalcemia. She had cough with lisinopril in the past. She has not used  ARB in the past.       She has overactive bladder diagnosed by Dr. Dean and is on Myrbetriq.  She was taken off Toviaz because of mouth dryness.  She was taken off Vesicare because of cost.     She has osteopenia on bone density done at [facility] in November 2015.  She is on vitamin D 800 units per day and calcium 500 plus D 1 tab BID..  Bone density done in December 2017 showed improvement of the left hip density and a slight decrease in the lumbar spine.  25 hydroxy vitamin D done in August 2019 is normal at 59.6 ng/mL.     She was treated on Stiolto by Dr. Worley.  She is following with Dr. Worley with regards to the abnormal CT of the chest which was read as possible pericardial cyst or lymphadenopathy.   She has sleep apnea and uses a CPAP.  She is no longer is snoring.  She wakes up tired.    The following portions of the patient's history were reviewed and updated as appropriate: allergies, current medications, past family history, past medical history, past social history, past surgical history and problem list.    Review of Systems   Constitutional: Positive for fatigue. Negative for weight loss.   HENT: Negative.    Eyes: Positive for blurred vision.   Respiratory: Positive for shortness of breath.    Cardiovascular: Positive for leg swelling. Negative for chest pain.   Gastrointestinal: Negative.    Endocrine: Positive for polyuria. Negative for polydipsia and polyphagia.   Genitourinary: Negative.  Negative for impotence.   Musculoskeletal: Negative.    Skin: Negative.  Negative for pallor.   Allergic/Immunologic: Negative.    Neurological: Positive for weakness. Negative for dizziness, tremors, seizures, speech difficulty and headaches.   Hematological: Bruises/bleeds easily.   Psychiatric/Behavioral: Positive for sleep disturbance (sleep apnea using CPAP machine ). Negative for confusion. The patient is nervous/anxious.        Objective      Vitals:    08/14/19 1529   BP: 132/84   BP Location:  "Right arm   Patient Position: Sitting   Cuff Size: Large Adult   Pulse: 93   SpO2: 95%   Weight: 94.7 kg (208 lb 12.8 oz)   Height: 162.6 cm (64.02\")     Physical Exam   Constitutional: She is oriented to person, place, and time. She appears well-developed and well-nourished. No distress.   HENT:   Head: Normocephalic.   Right Ear: External ear normal.   Left Ear: External ear normal.   Nose: Nose normal.   Mouth/Throat: Oropharynx is clear and moist. No oropharyngeal exudate.   Eyes: Conjunctivae and EOM are normal. Right eye exhibits no discharge. Left eye exhibits no discharge. No scleral icterus.   Neck: Neck supple. No JVD present. No tracheal deviation present. No thyromegaly present.   Cardiovascular: Normal rate, regular rhythm, normal heart sounds and intact distal pulses. Exam reveals no gallop and no friction rub.   No murmur heard.  Pulmonary/Chest: Effort normal and breath sounds normal. No stridor. No respiratory distress. She has no wheezes. She has no rales.   Abdominal: Soft. Bowel sounds are normal. She exhibits no distension and no mass. There is no tenderness. There is no rebound and no guarding.   Musculoskeletal: Normal range of motion. She exhibits no edema, tenderness or deformity.   Lymphadenopathy:     She has no cervical adenopathy.   Neurological: She is alert and oriented to person, place, and time. She displays normal reflexes. No cranial nerve deficit. She exhibits normal muscle tone. Coordination normal.   Skin: Skin is warm and dry. No erythema. No pallor.   Psychiatric: She has a normal mood and affect. Her behavior is normal.     Orders Only on 08/08/2019   Component Date Value Ref Range Status   • Hemoglobin A1C 08/08/2019 6.15* 4.80 - 5.60 % Final    Comment: Hemoglobin A1C Ranges:  Increased Risk for Diabetes  5.7% to 6.4%  Diabetes                     >= 6.5%  Diabetic Goal                < 7.0%     • 25 Hydroxy, Vitamin D 08/08/2019 59.6  30.0 - 100.0 ng/ml Final    Comment: " Reference Range for Total Vitamin D 25(OH)  Deficiency <20.0 ng/mL  Insufficiency 21-29 ng/mL  Sufficiency  ng/mL  Toxicity >100 ng/ml     • Glucose 08/08/2019 104* 65 - 99 mg/dL Final   • BUN 08/08/2019 13  8 - 23 mg/dL Final   • Creatinine 08/08/2019 0.88  0.57 - 1.00 mg/dL Final   • eGFR Non  Am 08/08/2019 63  >60 mL/min/1.73 Final    Comment: The MDRD GFR formula is only valid for adults with stable  renal function between ages 18 and 70.     • eGFR  Am 08/08/2019 76  >60 mL/min/1.73 Final   • BUN/Creatinine Ratio 08/08/2019 14.8  7.0 - 25.0 Final   • Sodium 08/08/2019 145  136 - 145 mmol/L Final   • Potassium 08/08/2019 3.8  3.5 - 5.2 mmol/L Final   • Chloride 08/08/2019 104  98 - 107 mmol/L Final   • Total CO2 08/08/2019 26.6  22.0 - 29.0 mmol/L Final   • Calcium 08/08/2019 10.0  8.6 - 10.5 mg/dL Final   • Total Protein 08/08/2019 7.3  6.0 - 8.5 g/dL Final   • Albumin 08/08/2019 4.00  3.50 - 5.20 g/dL Final   • Globulin 08/08/2019 3.3  gm/dL Final   • A/G Ratio 08/08/2019 1.2  g/dL Final   • Total Bilirubin 08/08/2019 0.3  0.2 - 1.2 mg/dL Final   • Alkaline Phosphatase 08/08/2019 117  39 - 117 U/L Final   • AST (SGOT) 08/08/2019 11  1 - 32 U/L Final   • ALT (SGPT) 08/08/2019 13  1 - 33 U/L Final   • Total Cholesterol 08/08/2019 144  0 - 200 mg/dL Final   • Triglycerides 08/08/2019 93  0 - 150 mg/dL Final   • HDL Cholesterol 08/08/2019 70* 40 - 60 mg/dL Final   • VLDL Cholesterol 08/08/2019 18.6  mg/dL Final   • LDL Cholesterol  08/08/2019 55  0 - 100 mg/dL Final   • Free T4 08/08/2019 0.91* 0.93 - 1.70 ng/dL Final   • TSH 08/08/2019 1.170  0.270 - 4.200 mIU/mL Final   • Interpretation 08/08/2019 Note   Final    Supplemental report is available.   • PDF Image 08/08/2019 Not applicable   Final     Assessment/Plan   Dejah was seen today for diabetes, hyperlipidemia, hypertension, coronary artery disease and goiter.    Diagnoses and all orders for this visit:    Type 2 diabetes mellitus without  complication, without long-term current use of insulin (CMS/HCC)    Nontoxic multinodular goiter    Essential hypertension    Hyperlipidemia, unspecified hyperlipidemia type    Coronary artery disease involving native coronary artery of native heart without angina pectoris    Obstructive sleep apnea on CPAP    Vitamin D deficiency    Osteopenia of multiple sites      Continue Metformin 500 mg twice a day.  Continue Lipitor 10 mg/day  Continue vitamin D 800 units/day and calcium 500+ D 1 tablet twice daily.  Suggest follow-up bone density in December 2019.  Continue CPAP.  Follow-up with Dr. Marcum as scheduled.    Copy of my note sent to Dr. Bansal, Dr. Leal, Dr. Dean, Dr. Robert Birch and Dr. Marcum    RTC 4 mos

## 2019-08-14 ENCOUNTER — OFFICE VISIT (OUTPATIENT)
Dept: ENDOCRINOLOGY | Age: 74
End: 2019-08-14

## 2019-08-14 VITALS
SYSTOLIC BLOOD PRESSURE: 132 MMHG | WEIGHT: 208.8 LBS | BODY MASS INDEX: 35.65 KG/M2 | HEART RATE: 93 BPM | DIASTOLIC BLOOD PRESSURE: 84 MMHG | OXYGEN SATURATION: 95 % | HEIGHT: 64 IN

## 2019-08-14 DIAGNOSIS — E55.9 VITAMIN D DEFICIENCY: ICD-10-CM

## 2019-08-14 DIAGNOSIS — M85.89 OSTEOPENIA OF MULTIPLE SITES: ICD-10-CM

## 2019-08-14 DIAGNOSIS — I25.10 CORONARY ARTERY DISEASE INVOLVING NATIVE CORONARY ARTERY OF NATIVE HEART WITHOUT ANGINA PECTORIS: ICD-10-CM

## 2019-08-14 DIAGNOSIS — G47.33 OBSTRUCTIVE SLEEP APNEA ON CPAP: ICD-10-CM

## 2019-08-14 DIAGNOSIS — E78.5 HYPERLIPIDEMIA, UNSPECIFIED HYPERLIPIDEMIA TYPE: ICD-10-CM

## 2019-08-14 DIAGNOSIS — E11.9 TYPE 2 DIABETES MELLITUS WITHOUT COMPLICATION, WITHOUT LONG-TERM CURRENT USE OF INSULIN (HCC): Primary | ICD-10-CM

## 2019-08-14 DIAGNOSIS — Z99.89 OBSTRUCTIVE SLEEP APNEA ON CPAP: ICD-10-CM

## 2019-08-14 DIAGNOSIS — I10 ESSENTIAL HYPERTENSION: ICD-10-CM

## 2019-08-14 DIAGNOSIS — E04.2 NONTOXIC MULTINODULAR GOITER: ICD-10-CM

## 2019-08-14 PROCEDURE — 99214 OFFICE O/P EST MOD 30 MIN: CPT | Performed by: INTERNAL MEDICINE

## 2019-10-16 DIAGNOSIS — E11.9 TYPE 2 DIABETES MELLITUS WITHOUT COMPLICATION, WITHOUT LONG-TERM CURRENT USE OF INSULIN (HCC): ICD-10-CM

## 2019-10-16 DIAGNOSIS — E11.9 DIABETES MELLITUS TYPE II, CONTROLLED, WITH NO COMPLICATIONS (HCC): ICD-10-CM

## 2019-10-23 ENCOUNTER — TELEPHONE (OUTPATIENT)
Dept: FAMILY MEDICINE CLINIC | Facility: CLINIC | Age: 74
End: 2019-10-23

## 2019-10-23 DIAGNOSIS — Z12.31 ENCOUNTER FOR SCREENING MAMMOGRAM FOR MALIGNANT NEOPLASM OF BREAST: Primary | ICD-10-CM

## 2019-10-24 ENCOUNTER — TRANSCRIBE ORDERS (OUTPATIENT)
Dept: ADMINISTRATIVE | Facility: HOSPITAL | Age: 74
End: 2019-10-24

## 2019-10-24 DIAGNOSIS — Z12.31 VISIT FOR SCREENING MAMMOGRAM: Primary | ICD-10-CM

## 2019-10-29 ENCOUNTER — HOSPITAL ENCOUNTER (OUTPATIENT)
Dept: MAMMOGRAPHY | Facility: HOSPITAL | Age: 74
Discharge: HOME | End: 2019-10-29
Admitting: FAMILY MEDICINE

## 2019-10-29 DIAGNOSIS — Z12.31 VISIT FOR SCREENING MAMMOGRAM: ICD-10-CM

## 2019-10-29 PROCEDURE — 77067 SCR MAMMO BI INCL CAD: CPT

## 2019-10-29 PROCEDURE — 77063 BREAST TOMOSYNTHESIS BI: CPT

## 2019-11-20 DIAGNOSIS — E04.2 NONTOXIC MULTINODULAR GOITER: ICD-10-CM

## 2019-11-20 DIAGNOSIS — E11.9 TYPE 2 DIABETES MELLITUS WITHOUT COMPLICATION, WITHOUT LONG-TERM CURRENT USE OF INSULIN (HCC): Primary | ICD-10-CM

## 2019-11-20 DIAGNOSIS — I10 ESSENTIAL HYPERTENSION: ICD-10-CM

## 2019-11-20 DIAGNOSIS — E78.5 HYPERLIPIDEMIA, UNSPECIFIED HYPERLIPIDEMIA TYPE: ICD-10-CM

## 2019-11-20 RX ORDER — ATORVASTATIN CALCIUM 10 MG/1
TABLET, FILM COATED ORAL
Qty: 90 TABLET | Refills: 0 | Status: SHIPPED | OUTPATIENT
Start: 2019-11-20 | End: 2020-02-14

## 2019-12-03 ENCOUNTER — RESULTS ENCOUNTER (OUTPATIENT)
Dept: ENDOCRINOLOGY | Age: 74
End: 2019-12-03

## 2019-12-03 DIAGNOSIS — E78.5 HYPERLIPIDEMIA, UNSPECIFIED HYPERLIPIDEMIA TYPE: ICD-10-CM

## 2019-12-03 DIAGNOSIS — E11.9 TYPE 2 DIABETES MELLITUS WITHOUT COMPLICATION, WITHOUT LONG-TERM CURRENT USE OF INSULIN (HCC): ICD-10-CM

## 2019-12-03 DIAGNOSIS — E04.2 NONTOXIC MULTINODULAR GOITER: ICD-10-CM

## 2019-12-03 DIAGNOSIS — I10 ESSENTIAL HYPERTENSION: ICD-10-CM

## 2019-12-04 LAB
ALBUMIN SERPL-MCNC: 4 G/DL (ref 3.5–5.2)
ALBUMIN/GLOB SERPL: 1.4 G/DL
ALP SERPL-CCNC: 105 U/L (ref 39–117)
ALT SERPL-CCNC: 9 U/L (ref 1–33)
AST SERPL-CCNC: 8 U/L (ref 1–32)
BILIRUB SERPL-MCNC: 0.3 MG/DL (ref 0.2–1.2)
BUN SERPL-MCNC: 13 MG/DL (ref 8–23)
BUN/CREAT SERPL: 15.1 (ref 7–25)
CALCIUM SERPL-MCNC: 9 MG/DL (ref 8.6–10.5)
CHLORIDE SERPL-SCNC: 103 MMOL/L (ref 98–107)
CHOLEST SERPL-MCNC: 127 MG/DL (ref 0–200)
CO2 SERPL-SCNC: 26.8 MMOL/L (ref 22–29)
CREAT SERPL-MCNC: 0.86 MG/DL (ref 0.57–1)
GLOBULIN SER CALC-MCNC: 2.8 GM/DL
GLUCOSE SERPL-MCNC: 120 MG/DL (ref 65–99)
HBA1C MFR BLD: 6.1 % (ref 4.8–5.6)
HDLC SERPL-MCNC: 60 MG/DL (ref 40–60)
INTERPRETATION: NORMAL
LDLC SERPL CALC-MCNC: 42 MG/DL (ref 0–100)
Lab: NORMAL
POTASSIUM SERPL-SCNC: 4.1 MMOL/L (ref 3.5–5.2)
PROT SERPL-MCNC: 6.8 G/DL (ref 6–8.5)
SODIUM SERPL-SCNC: 143 MMOL/L (ref 136–145)
T4 FREE SERPL-MCNC: 0.92 NG/DL (ref 0.93–1.7)
TRIGL SERPL-MCNC: 124 MG/DL (ref 0–150)
TSH SERPL DL<=0.005 MIU/L-ACNC: 0.55 UIU/ML (ref 0.27–4.2)
VLDLC SERPL CALC-MCNC: 24.8 MG/DL

## 2019-12-13 DIAGNOSIS — F32.A CHRONIC DEPRESSION: ICD-10-CM

## 2019-12-13 DIAGNOSIS — I10 ESSENTIAL HYPERTENSION: ICD-10-CM

## 2019-12-13 RX ORDER — AMLODIPINE BESYLATE 5 MG/1
TABLET ORAL
Qty: 90 TABLET | Refills: 0 | OUTPATIENT
Start: 2019-12-13

## 2019-12-16 ENCOUNTER — OFFICE VISIT (OUTPATIENT)
Dept: FAMILY MEDICINE CLINIC | Facility: CLINIC | Age: 74
End: 2019-12-16

## 2019-12-16 VITALS
OXYGEN SATURATION: 98 % | DIASTOLIC BLOOD PRESSURE: 79 MMHG | SYSTOLIC BLOOD PRESSURE: 125 MMHG | RESPIRATION RATE: 16 BRPM | TEMPERATURE: 98.4 F | HEIGHT: 64 IN | WEIGHT: 220 LBS | HEART RATE: 94 BPM | BODY MASS INDEX: 37.56 KG/M2

## 2019-12-16 DIAGNOSIS — I10 ESSENTIAL HYPERTENSION: ICD-10-CM

## 2019-12-16 DIAGNOSIS — R06.09 DYSPNEA ON EXERTION: Primary | ICD-10-CM

## 2019-12-16 DIAGNOSIS — R93.7 ABNORMAL FINDINGS ON DIAGNOSTIC IMAGING OF OTHER PARTS OF MUSCULOSKELETAL SYSTEM: ICD-10-CM

## 2019-12-16 DIAGNOSIS — F32.A CHRONIC DEPRESSION: ICD-10-CM

## 2019-12-16 DIAGNOSIS — Z98.49 STATUS POST CATARACT EXTRACTION, UNSPECIFIED LATERALITY: ICD-10-CM

## 2019-12-16 DIAGNOSIS — M85.80 OSTEOPENIA, UNSPECIFIED LOCATION: ICD-10-CM

## 2019-12-16 PROBLEM — J42 CHRONIC BRONCHITIS (HCC): Status: RESOLVED | Noted: 2018-06-12 | Resolved: 2019-12-16

## 2019-12-16 PROCEDURE — 96160 PT-FOCUSED HLTH RISK ASSMT: CPT | Performed by: FAMILY MEDICINE

## 2019-12-16 PROCEDURE — G0439 PPPS, SUBSEQ VISIT: HCPCS | Performed by: FAMILY MEDICINE

## 2019-12-16 RX ORDER — TRIPROLIDINE/PSEUDOEPHEDRINE 2.5MG-60MG
TABLET ORAL
Refills: 1 | COMMUNITY
Start: 2019-11-11 | End: 2019-12-17

## 2019-12-16 RX ORDER — VENLAFAXINE 75 MG/1
TABLET ORAL
Qty: 180 TABLET | Refills: 0 | Status: SHIPPED | OUTPATIENT
Start: 2019-12-16 | End: 2019-12-16 | Stop reason: SDUPTHER

## 2019-12-16 RX ORDER — VENLAFAXINE 75 MG/1
150 TABLET ORAL 2 TIMES DAILY
Qty: 180 TABLET | Refills: 1 | Status: SHIPPED | OUTPATIENT
Start: 2019-12-16 | End: 2020-03-02 | Stop reason: SDUPTHER

## 2019-12-16 RX ORDER — AMLODIPINE BESYLATE 5 MG/1
5 TABLET ORAL DAILY
Qty: 90 TABLET | Refills: 1 | Status: SHIPPED | OUTPATIENT
Start: 2019-12-16 | End: 2020-06-19 | Stop reason: SDUPTHER

## 2019-12-16 NOTE — PROGRESS NOTES
Subjective   Dejah Casey is a 74 y.o. female.     F/u for dm2, hyperlipidemia, hypertension, OAB, CAD, nontoxic MNG, osteopenia/ testing bs 1 x day /last dm eye exam July 2019 with dr Birch/ last dm foot exam 3/6/19 with dr Hernandez / flu vaccine @[facility]      Patient is a 74-year-old female who came in for followup. She was found to have a goiter on examination last August 2012. She had thyroid ultrasound done on 08/21/2012 which showed a multinodular goiter with the dominant solid nodule in the left measuring 1.8 cm. She had followup thyroid ultrasound in 4/14 at [facility] showed multinodular goiter with stable nodules.  TSH done in December 2019 is normal at 0.545.      She denies any voice changes. She denies any pressure symptoms or dysphagia. She denies any bowel changes. She denies any heat or cold intolerance. She has no previous history of head or neck radiation therapy.      She has known diabetes mellitus since 2003 and has been on metformin 500 mg twice a day. Blood sugar has ranged from . She has no microalbuminuria on urine sample taken 3/19.   Her last eye exam was 7/19. She had bilateral cataract surgery.  She has no retinopathy but has constant tearing.  She denies tingling in her feet.  Hemoglobin A1c done in 12/19 was 6.1%     She has hyperlipidemia and has been on Lipitor 10 mg once a day.  She denies any myalgia.  She has gained 10 pounds with her 's cooking.  Lipid profile done in December 2019 are as follows: Cholesterol 127.  HDL 60.  LDL 42.  Triglycerides 124.     She has mild coronary artery disease by cardiac catheterization done in 2008 by Dr. CAMILLE Mcmullen. She denies any chest pain.  She had a normal nuclear stress tests in July 2018.  She is following up with Dr. Leal.      She has hypertension and has been on amlodipine 5 mg once a day and Toprol 50 mg/day. She was taken off hydrochlorothiazide because of hypercalcemia. She had cough with lisinopril in the past. She has not used  ARB in the past.       She has overactive bladder diagnosed by Dr. Dean and is on Myrbetriq.  She was taken off Toviaz because of mouth dryness.  She was taken off Vesicare because of cost.     She has osteopenia on bone density done at [facility] in November 2015.  She is on vitamin D 800 units per day and calcium 500 plus D 1 tab BID..  Bone density done in December 2017 showed improvement of the left hip density and a slight decrease in the lumbar spine.  25 hydroxyvitamin D done in August 2019 is normal at 59.6 ng/mL.  She is being scheduled for follow-up bone density     She was treated on Stiolto by Dr. Worley.  She is following with Dr. Worley/Dr. Larson with regards to the abnormal CT of the chest which was read as possible pericardial cyst or lymphadenopathy.   She has sleep apnea and will get a new CPAP.  She is no longer is snoring.  She wakes up tired.    The following portions of the patient's history were reviewed and updated as appropriate: allergies, current medications, past family history, past medical history, past social history, past surgical history and problem list.    Review of Systems   Constitutional: Negative.    HENT: Negative.    Eyes: Positive for discharge.   Respiratory: Negative for cough, shortness of breath and wheezing.    Cardiovascular: Negative for chest pain and palpitations.   Gastrointestinal: Negative for abdominal distention, anal bleeding, blood in stool and nausea.   Endocrine: Negative for cold intolerance and heat intolerance.   Genitourinary: Negative.  Negative for hematuria.   Musculoskeletal: Negative for arthralgias and myalgias.   Neurological: Negative for dizziness, weakness and numbness.   Hematological: Negative for adenopathy. Does not bruise/bleed easily.     Objective      Vitals:    12/17/19 1512   BP: 146/62   BP Location: Right arm   Patient Position: Sitting   Cuff Size: Large Adult   Pulse: 99   SpO2: 99%   Weight: 99.1 kg (218 lb 6.4 oz)  "  Height: 162.6 cm (64.02\")     Physical Exam   Constitutional: She is oriented to person, place, and time. She appears well-developed and well-nourished. No distress.   HENT:   Head: Normocephalic.   Right Ear: External ear normal.   Left Ear: External ear normal.   Mouth/Throat: No oropharyngeal exudate.   Eyes: Conjunctivae and EOM are normal. Right eye exhibits no discharge. Left eye exhibits no discharge. No scleral icterus.   Neck: Neck supple. No JVD present. No tracheal deviation present. No thyromegaly present.   Cardiovascular: Normal rate, regular rhythm, normal heart sounds and intact distal pulses. Exam reveals no gallop and no friction rub.   No murmur heard.  Pulmonary/Chest: Effort normal and breath sounds normal. No stridor. No respiratory distress. She has no wheezes. She has no rales. She exhibits no tenderness.   Abdominal: Soft. Bowel sounds are normal. She exhibits no distension and no mass. There is no tenderness. There is no guarding.   Musculoskeletal: Normal range of motion. She exhibits no edema, tenderness or deformity.   Lymphadenopathy:     She has no cervical adenopathy.   Neurological: She is alert and oriented to person, place, and time. She displays normal reflexes. She exhibits normal muscle tone.   Skin: Skin is warm and dry. No rash noted. No erythema. No pallor.   Psychiatric: She has a normal mood and affect. Her behavior is normal.     Results Encounter on 12/03/2019   Component Date Value Ref Range Status   • Hemoglobin A1C 12/03/2019 6.10* 4.80 - 5.60 % Final    Comment: Hemoglobin A1C Ranges:  Increased Risk for Diabetes  5.7% to 6.4%  Diabetes                     >= 6.5%  Diabetic Goal                < 7.0%     • Glucose 12/03/2019 120* 65 - 99 mg/dL Final   • BUN 12/03/2019 13  8 - 23 mg/dL Final   • Creatinine 12/03/2019 0.86  0.57 - 1.00 mg/dL Final   • eGFR Non  Am 12/03/2019 64  >60 mL/min/1.73 Final    Comment: The MDRD GFR formula is only valid for adults " with stable  renal function between ages 18 and 70.     • eGFR  Am 12/03/2019 78  >60 mL/min/1.73 Final   • BUN/Creatinine Ratio 12/03/2019 15.1  7.0 - 25.0 Final   • Sodium 12/03/2019 143  136 - 145 mmol/L Final   • Potassium 12/03/2019 4.1  3.5 - 5.2 mmol/L Final   • Chloride 12/03/2019 103  98 - 107 mmol/L Final   • Total CO2 12/03/2019 26.8  22.0 - 29.0 mmol/L Final   • Calcium 12/03/2019 9.0  8.6 - 10.5 mg/dL Final   • Total Protein 12/03/2019 6.8  6.0 - 8.5 g/dL Final   • Albumin 12/03/2019 4.00  3.50 - 5.20 g/dL Final   • Globulin 12/03/2019 2.8  gm/dL Final   • A/G Ratio 12/03/2019 1.4  g/dL Final   • Total Bilirubin 12/03/2019 0.3  0.2 - 1.2 mg/dL Final   • Alkaline Phosphatase 12/03/2019 105  39 - 117 U/L Final   • AST (SGOT) 12/03/2019 8  1 - 32 U/L Final   • ALT (SGPT) 12/03/2019 9  1 - 33 U/L Final   • Total Cholesterol 12/03/2019 127  0 - 200 mg/dL Final   • Triglycerides 12/03/2019 124  0 - 150 mg/dL Final   • HDL Cholesterol 12/03/2019 60  40 - 60 mg/dL Final   • VLDL Cholesterol 12/03/2019 24.8  mg/dL Final   • LDL Cholesterol  12/03/2019 42  0 - 100 mg/dL Final   • Free T4 12/03/2019 0.92* 0.93 - 1.70 ng/dL Final   • TSH 12/03/2019 0.545  0.270 - 4.200 uIU/mL Final   • Interpretation 12/03/2019 Note   Final    Supplemental report is available.   • PDF Image 12/03/2019 Not applicable   Final     Assessment/Plan   Dejah was seen today for diabetes, hyperlipidemia, hypertension, goiter and coronary artery disease.    Diagnoses and all orders for this visit:    Type 2 diabetes mellitus without complication, without long-term current use of insulin (CMS/[facility])    Nontoxic multinodular goiter    Vitamin D deficiency    Essential hypertension    Hyperlipidemia, unspecified hyperlipidemia type    Coronary artery disease involving native coronary artery of native heart without angina pectoris    Obstructive sleep apnea on CPAP    Osteopenia of multiple sites      Continue metformin 500 mg twice a  day.  Continue Lipitor 10 mg/day.  Continue amlodipine and Toprol.  Will defer blood pressure control to Dr. Olivares  Continue vitamin D 800 units/day and calcium 500+ D1 tablet twice daily.    Copy of my note sent to Dr. Bansal, Dr. Robert Birch and Dr. Marcum    RTC 4 mos.

## 2019-12-16 NOTE — PROGRESS NOTES
The ABCs of the Annual Wellness Visit  Subsequent Medicare Wellness Visit    Chief Complaint   Patient presents with   • Medicare Wellness-subsequent       Subjective   History of Present Illness:  Dejah Casey is a 74 y.o. female who presents for a Subsequent Medicare Wellness Visit.    Hypertension: Amlodipine 5 mg a day Toprol 50 mg a day.  Blood pressure in the office today 125/79.  Blood pressure ranges at home are similar.  Denies chest pain shortness with headache or vision changes.    Hyperlipidemia: Lipitor 10 mg a day.  Follows with endocrinology.  Last labs by endocrinologist were done in 2 weeks ago.  Lipid panel was essentially normal.  At that point noted that diabetes is well controlled with normal thyroid function tests as well.  Next follow-up endocrinology is tomorrow.    Overactive bladder: Follows with urology. Reports Myrbetriq doing well.     Chronic bronchitis: Follows with pulmonary regularly; doing well per notes from last month.    Osteopenia: Is on vitamin D and calcium supplements.  Next DEXA scan is due December 2019.  Needs order for this today.    Chronic depression: Is on venlafaxine.  Needs refill today.  Continues to control her symptoms.  Denies suicidal ideation or self injury or homicidal ideation.    Memory issues: Doing well; no changes; sudoku daily    HEALTH RISK ASSESSMENT    Recent Hospitalizations:  No hospitalization(s) within the last year.    Current Medical Providers:  Patient Care Team:  Nanci Bansal MD as PCP - General (Family Medicine)  Edin Dean Jr., MD as Consulting Physician (Urology)  Robert Birch MD as Consulting Physician (Ophthalmology)  Paige Ortiz MD (Dermatology)  Burt Sen MD as Surgeon (Orthopedic Surgery)  Yan Marcum MD as Consulting Physician (Pulmonary Disease)  Tyrel Hernandez MD as Consulting Physician (Endocrinology)  Daniel Leal MD as Consulting Physician  (Cardiology)  Fede Sales MD (Sports Medicine)  Bishnu Larson MD as Surgeon (Orthopedic Surgery)    Smoking Status:  Social History     Tobacco Use   Smoking Status Never Smoker   Smokeless Tobacco Never Used   Tobacco Comment    I have never smoked.       Alcohol Consumption:  Social History     Substance and Sexual Activity   Alcohol Use No    Comment: CAFFEINE: DECAF ONCE PER WK.        Depression Screen:   PHQ-2/PHQ-9 Depression Screening 12/16/2019   Little interest or pleasure in doing things 0   Feeling down, depressed, or hopeless 2   Trouble falling or staying asleep, or sleeping too much 0   Feeling tired or having little energy 3   Poor appetite or overeating 0   Feeling bad about yourself - or that you are a failure or have let yourself or your family down 1   Trouble concentrating on things, such as reading the newspaper or watching television 0   Moving or speaking so slowly that other people could have noticed. Or the opposite - being so fidgety or restless that you have been moving around a lot more than usual 3   Thoughts that you would be better off dead, or of hurting yourself in some way 0   Total Score 9   If you checked off any problems, how difficult have these problems made it for you to do your work, take care of things at home, or get along with other people? Somewhat difficult       Fall Risk Screen:  STEADI Fall Risk Assessment was completed, and patient is at MODERATE risk for falls. Assessment completed on:12/16/2019    Health Habits and Functional and Cognitive Screening:  Functional & Cognitive Status 12/16/2019   Do you have difficulty preparing food and eating? No   Do you have difficulty bathing yourself, getting dressed or grooming yourself? No   Do you have difficulty using the toilet? No   Do you have difficulty moving around from place to place? No   Do you have trouble with steps or getting out of a bed or a chair? Yes   Current Diet Well Balanced Diet   Dental  Exam Not up to date   Eye Exam Up to date   Exercise (times per week) 2 times per week   Current Exercise Activities Include Walking   Do you need help using the phone?  No   Are you deaf or do you have serious difficulty hearing?  No   Do you need help with transportation? No   Do you need help shopping? Yes   Do you need help preparing meals?  No   Do you need help with housework?  No   Do you need help with laundry? No   Do you need help taking your medications? No   Do you need help managing money? No   Do you ever drive or ride in a car without wearing a seat belt? No   Have you felt unusual stress, anger or loneliness in the last month? Yes   Who do you live with? Spouse   If you need help, do you have trouble finding someone available to you? No   Have you been bothered in the last four weeks by sexual problems? No   Do you have difficulty concentrating, remembering or making decisions? No         Does the patient have evidence of cognitive impairment? Yes    Asprin use counseling:Taking ASA appropriately as indicated    Age-appropriate Screening Schedule:  Refer to the list below for future screening recommendations based on patient's age, sex and/or medical conditions. Orders for these recommended tests are listed in the plan section. The patient has been provided with a written plan.    Health Maintenance   Topic Date Due   • TDAP/TD VACCINES (2 - Td) 08/16/2017   • ZOSTER VACCINE (1 of 2) 06/21/2020 (Originally 1/8/1994)   • DXA SCAN  12/27/2019   • DIABETIC FOOT EXAM  03/06/2020   • URINE MICROALBUMIN  03/06/2020   • HEMOGLOBIN A1C  06/03/2020   • DIABETIC EYE EXAM  06/10/2020   • COLONOSCOPY  08/25/2020   • LIPID PANEL  12/03/2020   • MAMMOGRAM  10/29/2021   • INFLUENZA VACCINE  Completed   • PNEUMOCOCCAL VACCINES (65+ LOW/MEDIUM RISK)  Completed          The following portions of the patient's history were reviewed and updated as appropriate: allergies, current medications, past family history, past  medical history, past social history, past surgical history and problem list.    Outpatient Medications Prior to Visit   Medication Sig Dispense Refill   • ACCU-CHEK FASTCLIX LANCETS misc Use to check blood sugar once daily 102 each 5   • aspirin 81 MG tablet Take 81 mg by mouth daily.     • atorvastatin (LIPITOR) 10 MG tablet TAKE 1 TABLET EVERY NIGHT. 90 tablet 0   • Calcium Carb-Cholecalciferol 500-200 MG-UNIT tablet Take 2 tablets by mouth daily.     • clonazePAM (KlonoPIN) 1 MG tablet Take 1.5 mg by mouth At Night As Needed (nightmares).     • DUREZOL 0.05 % ophthalmic emulsion INSTILL 1 DROP INTO AFFECTED EYE EVERY 2 HOURS FOR 4 DAYS, THEN 4 TIMES A DAY  1   • glucose blood (ACCU-CHEK GUMARO PLUS) test strip Check BS once daily 100 each 3   • Lancets Misc. (ACCU-CHEK FASTCLIX LANCET) kit 1 kit Daily. 1 kit 0   • loratadine (CLARITIN REDITABS) 10 MG disintegrating tablet Take 10 mg by mouth Every Other Day.     • metFORMIN (GLUCOPHAGE) 500 MG tablet Take 1 tablet by mouth 2 (Two) Times a Day With Meals. 180 tablet 3   • metoprolol succinate XL (TOPROL-XL) 50 MG 24 hr tablet Take 1 tablet by mouth Daily. 90 tablet 3   • Mirabegron ER (MYRBETRIQ) 50 MG tablet sustained-release 24 hour 24 hr tablet Take 50 mg by mouth Daily.     • STIOLTO RESPIMAT 2.5-2.5 MCG/ACT aerosol solution inhaler 2 puffs Daily.     • amLODIPine (NORVASC) 5 MG tablet TAKE 1 TABLET EVERY DAY 90 tablet 3   • venlafaxine (EFFEXOR) 75 MG tablet Take 2 tablets by mouth 2 (Two) Times a Day. 90 tablet 1   • venlafaxine (EFFEXOR) 75 MG tablet TAKE 2 TABLETS TWICE DAILY 180 tablet 0     No facility-administered medications prior to visit.        Patient Active Problem List   Diagnosis   • Essential hypertension   • Hyperlipidemia   • Overactive bladder   • Osteoarthritis   • Type 2 diabetes mellitus without complication, without long-term current use of insulin (CMS/[facility])   • Vitamin D deficiency   • Nontoxic multinodular goiter   • Osteopenia   •  "Chronic depression   • Obstructive sleep apnea on CPAP   • Osteoarthritis of spine with radiculopathy, lumbar region   • Chronic venous insufficiency   • Coronary artery disease involving native coronary artery without angina pectoris   • H/O total knee replacement, right   • Tracheal cyst       Advanced Care Planning:  Patient does not have an advance directive - information provided to the patient today    Review of Systems   Constitutional: Negative for fever.   HENT: Negative for nosebleeds and trouble swallowing.    Eyes: Negative for visual disturbance.   Respiratory: Positive for shortness of breath. Negative for choking and stridor.    Cardiovascular: Negative for chest pain and palpitations.   Gastrointestinal: Negative for blood in stool.   Endocrine: Negative for polydipsia.   Genitourinary: Negative for genital sores and hematuria.   Musculoskeletal: Negative for joint swelling and neck pain.   Skin: Negative for color change and rash.   Allergic/Immunologic: Negative for immunocompromised state.   Neurological: Negative for seizures, facial asymmetry, speech difficulty and headaches.   Hematological: Negative for adenopathy.   Psychiatric/Behavioral: Negative for behavioral problems, self-injury and suicidal ideas.       Compared to one year ago, the patient feels her physical health is better.  Compared to one year ago, the patient feels her mental health is better.    Reviewed chart for potential of high risk medication in the elderly: yes  Reviewed chart for potential of harmful drug interactions in the elderly:yes    Objective         Vitals:    12/16/19 1305   BP: 125/79   Pulse: 94   Resp: 16   Temp: 98.4 °F (36.9 °C)   TempSrc: Oral   SpO2: 98%   Weight: 99.8 kg (220 lb)   Height: 162.6 cm (64.02\")   PainSc: 0-No pain       Body mass index is 37.74 kg/m².  Discussed the patient's BMI with her. The BMI is above average; BMI management plan is completed.    Physical Exam   Constitutional: She is " oriented to person, place, and time. She appears well-developed and well-nourished.   HENT:   Head: Normocephalic and atraumatic.   Right Ear: External ear normal.   Left Ear: External ear normal.   Nose: Nose normal.   Mouth/Throat: Oropharynx is clear and moist.   Eyes: Pupils are equal, round, and reactive to light. Conjunctivae and EOM are normal.   Neck: Normal range of motion.   Cardiovascular: Normal rate and regular rhythm.   Pulmonary/Chest: Effort normal and breath sounds normal. No stridor. No respiratory distress.   Abdominal: Soft. Bowel sounds are normal. She exhibits no distension. There is no tenderness.   Neurological: She is alert and oriented to person, place, and time.   Skin: Skin is warm.   Psychiatric: She has a normal mood and affect. Her behavior is normal.       Lab Results   Component Value Date     (H) 12/03/2019    CHLPL 127 12/03/2019    TRIG 124 12/03/2019    HDL 60 12/03/2019    LDL 42 12/03/2019    VLDL 24.8 12/03/2019    HGBA1C 6.10 (H) 12/03/2019        Assessment/Plan   Medicare Risks and Personalized Health Plan  CMS Preventative Services Quick Reference  Advance Directive Discussion  Cardiovascular risk  Dementia/Memory   Immunizations Discussed/Encouraged (specific immunizations; Td and Shingrix )  Osteoprorosis Risk    The above risks/problems have been discussed with the patient.  Pertinent information has been shared with the patient in the After Visit Summary.  Follow up plans and orders are seen below in the Assessment/Plan Section.    Diagnoses and all orders for this visit:    1. Dyspnea on exertion (Primary)  -     Adult Transthoracic Echo Complete W/ Cont if Necessary Per Protocol; Future    2. Osteopenia, unspecified location  -     DEXA Bone Density Axial; Future    3. Abnormal findings on diagnostic imaging of other parts of musculoskeletal system   -     DEXA Bone Density Axial; Future    4. Status post cataract extraction, unspecified laterality  -      Ambulatory Referral to Ophthalmology      Follow Up:  Return in about 6 months (around 6/16/2020) for Next scheduled follow up.     An After Visit Summary and PPPS were given to the patient.

## 2019-12-17 ENCOUNTER — OFFICE VISIT (OUTPATIENT)
Dept: ENDOCRINOLOGY | Age: 74
End: 2019-12-17

## 2019-12-17 VITALS
HEIGHT: 64 IN | BODY MASS INDEX: 37.28 KG/M2 | WEIGHT: 218.4 LBS | OXYGEN SATURATION: 99 % | SYSTOLIC BLOOD PRESSURE: 146 MMHG | HEART RATE: 99 BPM | DIASTOLIC BLOOD PRESSURE: 62 MMHG

## 2019-12-17 DIAGNOSIS — Z99.89 OBSTRUCTIVE SLEEP APNEA ON CPAP: ICD-10-CM

## 2019-12-17 DIAGNOSIS — E11.9 TYPE 2 DIABETES MELLITUS WITHOUT COMPLICATION, WITHOUT LONG-TERM CURRENT USE OF INSULIN (HCC): Primary | ICD-10-CM

## 2019-12-17 DIAGNOSIS — I25.10 CORONARY ARTERY DISEASE INVOLVING NATIVE CORONARY ARTERY OF NATIVE HEART WITHOUT ANGINA PECTORIS: ICD-10-CM

## 2019-12-17 DIAGNOSIS — M85.89 OSTEOPENIA OF MULTIPLE SITES: ICD-10-CM

## 2019-12-17 DIAGNOSIS — I10 ESSENTIAL HYPERTENSION: ICD-10-CM

## 2019-12-17 DIAGNOSIS — G47.33 OBSTRUCTIVE SLEEP APNEA ON CPAP: ICD-10-CM

## 2019-12-17 DIAGNOSIS — E04.2 NONTOXIC MULTINODULAR GOITER: ICD-10-CM

## 2019-12-17 DIAGNOSIS — E78.5 HYPERLIPIDEMIA, UNSPECIFIED HYPERLIPIDEMIA TYPE: ICD-10-CM

## 2019-12-17 DIAGNOSIS — E55.9 VITAMIN D DEFICIENCY: ICD-10-CM

## 2019-12-17 PROCEDURE — 99214 OFFICE O/P EST MOD 30 MIN: CPT | Performed by: INTERNAL MEDICINE

## 2019-12-30 RX ORDER — METOPROLOL SUCCINATE 50 MG/1
TABLET, EXTENDED RELEASE ORAL
Qty: 90 TABLET | Refills: 3 | Status: SHIPPED | OUTPATIENT
Start: 2019-12-30 | End: 2020-12-17 | Stop reason: SDUPTHER

## 2020-01-14 ENCOUNTER — OFFICE VISIT (OUTPATIENT)
Dept: FAMILY MEDICINE CLINIC | Facility: CLINIC | Age: 75
End: 2020-01-14

## 2020-01-14 VITALS
DIASTOLIC BLOOD PRESSURE: 86 MMHG | TEMPERATURE: 97.9 F | WEIGHT: 218 LBS | BODY MASS INDEX: 37.22 KG/M2 | HEIGHT: 64 IN | SYSTOLIC BLOOD PRESSURE: 141 MMHG | HEART RATE: 89 BPM | OXYGEN SATURATION: 96 % | RESPIRATION RATE: 16 BRPM

## 2020-01-14 DIAGNOSIS — E55.9 VITAMIN D DEFICIENCY: ICD-10-CM

## 2020-01-14 DIAGNOSIS — R53.83 FATIGUE, UNSPECIFIED TYPE: Primary | ICD-10-CM

## 2020-01-14 PROCEDURE — 99214 OFFICE O/P EST MOD 30 MIN: CPT | Performed by: FAMILY MEDICINE

## 2020-01-14 NOTE — PROGRESS NOTES
Subjective   Dejah Casey is a 75 y.o. female.     75-year-old female presents today to discuss weakness/fatigue.  I last saw patient December 16, 2019 for her Medicare wellness visit.  Echo was ordered due to dyspnea on exertion.  I do not see this as having been done.  She follow-up with her endocrinologist the following day.  She is advised to continue metformin Lipitor amlodipine and Toprol.  As well as vitamin D and calcium.    TSH beginning of December was normal T4 was essentially normal as well.  CBC has not been checked.  Vitamin D was checked in August and was normal.    In terms of depression patient reports Effexor only help so much.  Has been on this for about 10 years since she had found out about her  having an affair.  This still bothers her at times and causes her to have difficulty falling asleep at night.   is with her in the room today.  He asked if he should leave so she can talk in private however she said he could stay.  She reported at the time she did marriage counseling with them for some time and she also joined a support group at her [employer].  That helped a lot.  Is interested in getting back in with a support group to help think through everything.    Dry mouth: Saw her urologist yesterday and discussed if Myrbetriq was contributing however patient says urologist told her it was not contributing.           The following portions of the patient's history were reviewed and updated as appropriate: allergies, current medications, past family history, past medical history, past social history, past surgical history and problem list.    Review of Systems   Constitutional: Negative for chills and fever.   HENT: Negative for congestion.         Dry mouth   Respiratory: Negative for cough and shortness of breath.    Cardiovascular: Negative for chest pain, palpitations and leg swelling.   Gastrointestinal: Negative for abdominal pain, diarrhea and vomiting.    Psychiatric/Behavioral: Positive for dysphoric mood, sleep disturbance and stress. Negative for self-injury and suicidal ideas. The patient is not nervous/anxious.        Objective   Physical Exam   Constitutional: She appears well-developed and well-nourished.   HENT:   Head: Normocephalic and atraumatic.   Mouth/Throat: Uvula is midline, oropharynx is clear and moist and mucous membranes are normal.   Eyes: Pupils are equal, round, and reactive to light. EOM are normal.   Cardiovascular: Normal rate and regular rhythm.   No murmur heard.  Pulmonary/Chest: Effort normal and breath sounds normal. No stridor. No respiratory distress. She has no wheezes. She has no rales.   Neurological: She is alert.   Skin: Skin is warm.   Psychiatric: She has a normal mood and affect. Her behavior is normal.               Assessment/Plan     Dejah was seen today for fatigue and dry mouth.    Diagnoses and all orders for this visit:    Fatigue, unspecified type  -     CBC & Differential  -     Vitamin D 25 Hydroxy  -     Vitamin B12  -     Comprehensive Metabolic Panel    Vitamin D deficiency  -     Vitamin D 25 Hydroxy      Join [employer] support group to help with depression.  Continue Effexor.  Follow-up labs.  See his pulmonologist/sleep specialist beginning of February.  Discussed Stiolto contributing to dry mouth.  Stop Claritin and instead try Flonase to help with allergic rhinitis; see if this improves dry mouth.  Otherwise only to follow-up with urology again about Myrbetriq.

## 2020-01-15 LAB
25(OH)D3+25(OH)D2 SERPL-MCNC: 45.8 NG/ML (ref 30–100)
ALBUMIN SERPL-MCNC: 4 G/DL (ref 3.5–5.2)
ALBUMIN/GLOB SERPL: 1.4 G/DL
ALP SERPL-CCNC: 92 U/L (ref 39–117)
ALT SERPL-CCNC: 7 U/L (ref 1–33)
AST SERPL-CCNC: 9 U/L (ref 1–32)
BASOPHILS # BLD AUTO: 0.07 10*3/MM3 (ref 0–0.2)
BASOPHILS NFR BLD AUTO: 1.1 % (ref 0–1.5)
BILIRUB SERPL-MCNC: <0.2 MG/DL (ref 0.2–1.2)
BUN SERPL-MCNC: 14 MG/DL (ref 8–23)
BUN/CREAT SERPL: 15.4 (ref 7–25)
CALCIUM SERPL-MCNC: 9.3 MG/DL (ref 8.6–10.5)
CHLORIDE SERPL-SCNC: 103 MMOL/L (ref 98–107)
CO2 SERPL-SCNC: 27.8 MMOL/L (ref 22–29)
CREAT SERPL-MCNC: 0.91 MG/DL (ref 0.57–1)
EOSINOPHIL # BLD AUTO: 0.3 10*3/MM3 (ref 0–0.4)
EOSINOPHIL NFR BLD AUTO: 4.7 % (ref 0.3–6.2)
ERYTHROCYTE [DISTWIDTH] IN BLOOD BY AUTOMATED COUNT: 13.9 % (ref 12.3–15.4)
GLOBULIN SER CALC-MCNC: 2.8 GM/DL
GLUCOSE SERPL-MCNC: 141 MG/DL (ref 65–99)
HCT VFR BLD AUTO: 37.5 % (ref 34–46.6)
HGB BLD-MCNC: 12.2 G/DL (ref 12–15.9)
IMM GRANULOCYTES # BLD AUTO: 0.02 10*3/MM3 (ref 0–0.05)
IMM GRANULOCYTES NFR BLD AUTO: 0.3 % (ref 0–0.5)
LYMPHOCYTES # BLD AUTO: 1.02 10*3/MM3 (ref 0.7–3.1)
LYMPHOCYTES NFR BLD AUTO: 15.9 % (ref 19.6–45.3)
MCH RBC QN AUTO: 26.1 PG (ref 26.6–33)
MCHC RBC AUTO-ENTMCNC: 32.5 G/DL (ref 31.5–35.7)
MCV RBC AUTO: 80.3 FL (ref 79–97)
MONOCYTES # BLD AUTO: 0.49 10*3/MM3 (ref 0.1–0.9)
MONOCYTES NFR BLD AUTO: 7.6 % (ref 5–12)
NEUTROPHILS # BLD AUTO: 4.51 10*3/MM3 (ref 1.7–7)
NEUTROPHILS NFR BLD AUTO: 70.4 % (ref 42.7–76)
NRBC BLD AUTO-RTO: 0 /100 WBC (ref 0–0.2)
PLATELET # BLD AUTO: 325 10*3/MM3 (ref 140–450)
POTASSIUM SERPL-SCNC: 4.5 MMOL/L (ref 3.5–5.2)
PROT SERPL-MCNC: 6.8 G/DL (ref 6–8.5)
RBC # BLD AUTO: 4.67 10*6/MM3 (ref 3.77–5.28)
SODIUM SERPL-SCNC: 143 MMOL/L (ref 136–145)
VIT B12 SERPL-MCNC: 437 PG/ML (ref 211–946)
WBC # BLD AUTO: 6.41 10*3/MM3 (ref 3.4–10.8)

## 2020-01-15 NOTE — PROGRESS NOTES
All labs essentially normal.  Continue with plan of care as discussed at last visit in terms of depression.

## 2020-01-21 ENCOUNTER — HOSPITAL ENCOUNTER (OUTPATIENT)
Dept: CARDIOLOGY | Facility: HOSPITAL | Age: 75
Discharge: HOME | End: 2020-01-21

## 2020-01-21 ENCOUNTER — HOSPITAL ENCOUNTER (OUTPATIENT)
Dept: BONE DENSITY | Facility: HOSPITAL | Age: 75
Discharge: HOME | End: 2020-01-21
Admitting: FAMILY MEDICINE

## 2020-01-21 VITALS
WEIGHT: 218 LBS | OXYGEN SATURATION: 96 % | HEIGHT: 64 IN | DIASTOLIC BLOOD PRESSURE: 77 MMHG | HEART RATE: 105 BPM | BODY MASS INDEX: 37.22 KG/M2 | SYSTOLIC BLOOD PRESSURE: 141 MMHG

## 2020-01-21 DIAGNOSIS — M85.80 OSTEOPENIA, UNSPECIFIED LOCATION: ICD-10-CM

## 2020-01-21 DIAGNOSIS — R93.7 ABNORMAL FINDINGS ON DIAGNOSTIC IMAGING OF OTHER PARTS OF MUSCULOSKELETAL SYSTEM: ICD-10-CM

## 2020-01-21 DIAGNOSIS — R06.09 DYSPNEA ON EXERTION: ICD-10-CM

## 2020-01-21 LAB
AORTIC ARCH: 2.28 CM
AORTIC ROOT ANNULUS: 1.8 CM
ASCENDING AORTA: 2.8 CM
BH CV ECHO MEAS - ACS: 1.9 CM
BH CV ECHO MEAS - AO MAX PG (FULL): 5.9 MMHG
BH CV ECHO MEAS - AO MAX PG: 9.6 MMHG
BH CV ECHO MEAS - AO MEAN PG (FULL): 3 MMHG
BH CV ECHO MEAS - AO MEAN PG: 5 MMHG
BH CV ECHO MEAS - AO ROOT AREA (BSA CORRECTED): 1.4
BH CV ECHO MEAS - AO ROOT AREA: 6.6 CM^2
BH CV ECHO MEAS - AO ROOT DIAM: 2.9 CM
BH CV ECHO MEAS - AO V2 MAX: 155 CM/SEC
BH CV ECHO MEAS - AO V2 MEAN: 104 CM/SEC
BH CV ECHO MEAS - AO V2 VTI: 30.2 CM
BH CV ECHO MEAS - ASC AORTA: 2.8 CM
BH CV ECHO MEAS - AVA(I,A): 1.5 CM^2
BH CV ECHO MEAS - AVA(I,D): 1.5 CM^2
BH CV ECHO MEAS - AVA(V,A): 1.6 CM^2
BH CV ECHO MEAS - AVA(V,D): 1.6 CM^2
BH CV ECHO MEAS - BSA(HAYCOCK): 2.2 M^2
BH CV ECHO MEAS - BSA: 2 M^2
BH CV ECHO MEAS - BZI_BMI: 37.4 KILOGRAMS/M^2
BH CV ECHO MEAS - BZI_METRIC_HEIGHT: 162.6 CM
BH CV ECHO MEAS - BZI_METRIC_WEIGHT: 98.9 KG
BH CV ECHO MEAS - EDV(MOD-SP2): 64 ML
BH CV ECHO MEAS - EDV(MOD-SP4): 49 ML
BH CV ECHO MEAS - EDV(TEICH): 97.3 ML
BH CV ECHO MEAS - EF(CUBED): 72.3 %
BH CV ECHO MEAS - EF(MOD-BP): 65 %
BH CV ECHO MEAS - EF(MOD-SP2): 71.9 %
BH CV ECHO MEAS - EF(MOD-SP4): 61.2 %
BH CV ECHO MEAS - EF(TEICH): 64 %
BH CV ECHO MEAS - ESV(MOD-SP2): 18 ML
BH CV ECHO MEAS - ESV(MOD-SP4): 19 ML
BH CV ECHO MEAS - ESV(TEICH): 35 ML
BH CV ECHO MEAS - FS: 34.8 %
BH CV ECHO MEAS - IVS/LVPW: 0.91
BH CV ECHO MEAS - IVSD: 1 CM
BH CV ECHO MEAS - LAT PEAK E' VEL: 6 CM/SEC
BH CV ECHO MEAS - LV DIASTOLIC VOL/BSA (35-75): 24.1 ML/M^2
BH CV ECHO MEAS - LV MASS(C)D: 169.9 GRAMS
BH CV ECHO MEAS - LV MASS(C)DI: 83.7 GRAMS/M^2
BH CV ECHO MEAS - LV MAX PG: 3.7 MMHG
BH CV ECHO MEAS - LV MEAN PG: 2 MMHG
BH CV ECHO MEAS - LV SYSTOLIC VOL/BSA (12-30): 9.4 ML/M^2
BH CV ECHO MEAS - LV V1 MAX: 95.8 CM/SEC
BH CV ECHO MEAS - LV V1 MEAN: 69.3 CM/SEC
BH CV ECHO MEAS - LV V1 VTI: 17.6 CM
BH CV ECHO MEAS - LVIDD: 4.6 CM
BH CV ECHO MEAS - LVIDS: 3 CM
BH CV ECHO MEAS - LVLD AP2: 6.9 CM
BH CV ECHO MEAS - LVLD AP4: 5.5 CM
BH CV ECHO MEAS - LVLS AP2: 5.3 CM
BH CV ECHO MEAS - LVLS AP4: 4.7 CM
BH CV ECHO MEAS - LVOT AREA (M): 2.5 CM^2
BH CV ECHO MEAS - LVOT AREA: 2.5 CM^2
BH CV ECHO MEAS - LVOT DIAM: 1.8 CM
BH CV ECHO MEAS - LVPWD: 1.1 CM
BH CV ECHO MEAS - MED PEAK E' VEL: 6 CM/SEC
BH CV ECHO MEAS - MV A DUR: 0.08 SEC
BH CV ECHO MEAS - MV A MAX VEL: 122 CM/SEC
BH CV ECHO MEAS - MV DEC SLOPE: 656 CM/SEC^2
BH CV ECHO MEAS - MV DEC TIME: 0.17 SEC
BH CV ECHO MEAS - MV E MAX VEL: 92.7 CM/SEC
BH CV ECHO MEAS - MV E/A: 0.76
BH CV ECHO MEAS - MV MAX PG: 5.8 MMHG
BH CV ECHO MEAS - MV MEAN PG: 3 MMHG
BH CV ECHO MEAS - MV P1/2T MAX VEL: 106 CM/SEC
BH CV ECHO MEAS - MV P1/2T: 47.3 MSEC
BH CV ECHO MEAS - MV V2 MAX: 120 CM/SEC
BH CV ECHO MEAS - MV V2 MEAN: 87.4 CM/SEC
BH CV ECHO MEAS - MV V2 VTI: 28.9 CM
BH CV ECHO MEAS - MVA P1/2T LCG: 2.1 CM^2
BH CV ECHO MEAS - MVA(P1/2T): 4.6 CM^2
BH CV ECHO MEAS - MVA(VTI): 1.5 CM^2
BH CV ECHO MEAS - PA ACC TIME: 0.06 SEC
BH CV ECHO MEAS - PA MAX PG (FULL): -0.17 MMHG
BH CV ECHO MEAS - PA MAX PG: 2.8 MMHG
BH CV ECHO MEAS - PA PR(ACCEL): 52.9 MMHG
BH CV ECHO MEAS - PA V2 MAX: 84.2 CM/SEC
BH CV ECHO MEAS - PULM A REVS DUR: 0.07 SEC
BH CV ECHO MEAS - PULM A REVS VEL: 37 CM/SEC
BH CV ECHO MEAS - PULM DIAS VEL: 40.7 CM/SEC
BH CV ECHO MEAS - PULM S/D: 1.5
BH CV ECHO MEAS - PULM SYS VEL: 61.1 CM/SEC
BH CV ECHO MEAS - PVA(V,A): 2.9 CM^2
BH CV ECHO MEAS - PVA(V,D): 2.9 CM^2
BH CV ECHO MEAS - QP/QS: 0.8
BH CV ECHO MEAS - RAP SYSTOLE: 3 MMHG
BH CV ECHO MEAS - RV MAX PG: 3 MMHG
BH CV ECHO MEAS - RV MEAN PG: 2 MMHG
BH CV ECHO MEAS - RV V1 MAX: 86.7 CM/SEC
BH CV ECHO MEAS - RV V1 MEAN: 60 CM/SEC
BH CV ECHO MEAS - RV V1 VTI: 12.7 CM
BH CV ECHO MEAS - RVOT AREA: 2.8 CM^2
BH CV ECHO MEAS - RVOT DIAM: 1.9 CM
BH CV ECHO MEAS - RVSP: 29 MMHG
BH CV ECHO MEAS - SI(AO): 98.3 ML/M^2
BH CV ECHO MEAS - SI(CUBED): 34.7 ML/M^2
BH CV ECHO MEAS - SI(LVOT): 22.1 ML/M^2
BH CV ECHO MEAS - SI(MOD-SP2): 22.7 ML/M^2
BH CV ECHO MEAS - SI(MOD-SP4): 14.8 ML/M^2
BH CV ECHO MEAS - SI(TEICH): 30.7 ML/M^2
BH CV ECHO MEAS - SUP REN AO DIAM: 2 CM
BH CV ECHO MEAS - SV(AO): 199.5 ML
BH CV ECHO MEAS - SV(CUBED): 70.3 ML
BH CV ECHO MEAS - SV(LVOT): 44.8 ML
BH CV ECHO MEAS - SV(MOD-SP2): 46 ML
BH CV ECHO MEAS - SV(MOD-SP4): 30 ML
BH CV ECHO MEAS - SV(RVOT): 36 ML
BH CV ECHO MEAS - SV(TEICH): 62.3 ML
BH CV ECHO MEAS - TAPSE (>1.6): 2.3 CM2
BH CV ECHO MEAS - TR MAX VEL: 253 CM/SEC
BH CV ECHO MEASUREMENTS AVERAGE E/E' RATIO: 15.45
BH CV XLRA - RV BASE: 2.7 CM
BH CV XLRA - RV LENGTH: 6.8 CM
BH CV XLRA - RV MID: 3.1 CM
BH CV XLRA - TDI S': 14 CM/SEC
LEFT ATRIUM VOLUME INDEX: 30 ML/M2
LV EF 2D ECHO EST: 65 %
SINUS: 3 CM
STJ: 3.2 CM

## 2020-01-21 PROCEDURE — 93306 TTE W/DOPPLER COMPLETE: CPT | Performed by: INTERNAL MEDICINE

## 2020-01-21 PROCEDURE — 77080 DXA BONE DENSITY AXIAL: CPT

## 2020-01-21 PROCEDURE — 93306 TTE W/DOPPLER COMPLETE: CPT

## 2020-01-23 NOTE — PROGRESS NOTES
Echocardiogram shows normal ejection fraction of 65%.  Ejection fraction is Haldol your heart pumps blood to the rest of the body.  However did show some diastolic dysfunction.  Diastolic means how well your heart rate Lasix relaxes to fill with enough blood.  Please place referral to cardiology for diastolic dysfunction noted on echocardiogram as well as dyspnea on exertion.  Please let patient know of this as well.

## 2020-01-23 NOTE — PROGRESS NOTES
I think is been more than a year since she saw Dr. Leal so I think she does need a referral.  Call Dr. Leal's office and see.  If she does not need a referral then that is fine too.

## 2020-01-27 ENCOUNTER — TELEPHONE (OUTPATIENT)
Dept: FAMILY MEDICINE CLINIC | Facility: CLINIC | Age: 75
End: 2020-01-27

## 2020-01-29 ENCOUNTER — TELEPHONE (OUTPATIENT)
Dept: FAMILY MEDICINE CLINIC | Facility: CLINIC | Age: 75
End: 2020-01-29

## 2020-01-29 NOTE — TELEPHONE ENCOUNTER
Pt is wanting to know if she can take Omega 3 2,000mg? She went to her eye doctor and he was wanting her to see if that was ok?  Thanks

## 2020-01-31 ENCOUNTER — DOCUMENTATION (OUTPATIENT)
Dept: FAMILY MEDICINE CLINIC | Facility: CLINIC | Age: 75
End: 2020-01-31

## 2020-01-31 NOTE — PROGRESS NOTES
Ask patient to confirm with cardiology if okay to take aspirin with omega-3 pills recommended by eye doctor.

## 2020-02-05 ENCOUNTER — APPOINTMENT (OUTPATIENT)
Dept: GENERAL RADIOLOGY | Facility: HOSPITAL | Age: 75
End: 2020-02-05

## 2020-02-05 ENCOUNTER — OFFICE VISIT (OUTPATIENT)
Dept: CARDIOLOGY | Facility: CLINIC | Age: 75
End: 2020-02-05

## 2020-02-05 ENCOUNTER — HOSPITAL ENCOUNTER (EMERGENCY)
Facility: HOSPITAL | Age: 75
Discharge: HOME | End: 2020-02-05
Attending: EMERGENCY MEDICINE | Admitting: EMERGENCY MEDICINE

## 2020-02-05 VITALS
DIASTOLIC BLOOD PRESSURE: 81 MMHG | TEMPERATURE: 98.1 F | HEART RATE: 80 BPM | HEIGHT: 63 IN | OXYGEN SATURATION: 96 % | WEIGHT: 215 LBS | BODY MASS INDEX: 38.09 KG/M2 | RESPIRATION RATE: 16 BRPM | SYSTOLIC BLOOD PRESSURE: 144 MMHG

## 2020-02-05 VITALS
SYSTOLIC BLOOD PRESSURE: 134 MMHG | HEART RATE: 83 BPM | HEIGHT: 64 IN | WEIGHT: 216.2 LBS | BODY MASS INDEX: 36.91 KG/M2 | DIASTOLIC BLOOD PRESSURE: 76 MMHG

## 2020-02-05 DIAGNOSIS — I10 ESSENTIAL HYPERTENSION: ICD-10-CM

## 2020-02-05 DIAGNOSIS — R07.2 PRECORDIAL PAIN: ICD-10-CM

## 2020-02-05 DIAGNOSIS — S52.125A CLOSED NONDISPLACED FRACTURE OF HEAD OF LEFT RADIUS, INITIAL ENCOUNTER: ICD-10-CM

## 2020-02-05 DIAGNOSIS — S62.115A CLOSED NONDISPLACED FRACTURE OF TRIQUETRUM OF LEFT WRIST, INITIAL ENCOUNTER: Primary | ICD-10-CM

## 2020-02-05 DIAGNOSIS — R06.09 DYSPNEA ON EXERTION: ICD-10-CM

## 2020-02-05 DIAGNOSIS — I87.2 CHRONIC VENOUS INSUFFICIENCY: ICD-10-CM

## 2020-02-05 DIAGNOSIS — R53.83 FATIGUE, UNSPECIFIED TYPE: ICD-10-CM

## 2020-02-05 DIAGNOSIS — I25.10 NONOCCLUSIVE CORONARY ATHEROSCLEROSIS OF NATIVE CORONARY ARTERY: Primary | ICD-10-CM

## 2020-02-05 PROCEDURE — 99214 OFFICE O/P EST MOD 30 MIN: CPT | Performed by: INTERNAL MEDICINE

## 2020-02-05 PROCEDURE — 73110 X-RAY EXAM OF WRIST: CPT

## 2020-02-05 PROCEDURE — 99283 EMERGENCY DEPT VISIT LOW MDM: CPT

## 2020-02-05 PROCEDURE — 93000 ELECTROCARDIOGRAM COMPLETE: CPT | Performed by: INTERNAL MEDICINE

## 2020-02-05 RX ORDER — FUROSEMIDE 20 MG/1
20 TABLET ORAL 3 TIMES WEEKLY
Qty: 30 TABLET | Refills: 11 | Status: SHIPPED | OUTPATIENT
Start: 2020-02-05 | End: 2020-07-02 | Stop reason: SDUPTHER

## 2020-02-05 RX ORDER — ACETAMINOPHEN 500 MG
1000 TABLET ORAL ONCE
Status: COMPLETED | OUTPATIENT
Start: 2020-02-05 | End: 2020-02-05

## 2020-02-05 RX ADMIN — ACETAMINOPHEN 1000 MG: 500 TABLET, FILM COATED ORAL at 12:15

## 2020-02-05 NOTE — PROGRESS NOTES
Date of Office Visit: 20  Encounter Provider: Tamara Ravi MA  Place of Service: [facility]  Patient Name: Dejah Casey  :1945    No chief complaint on file.  :     HPI:     Ms. Casey is 75 y.o. and presents today ***      Past Medical History:   Diagnosis Date   • Allergic     Seasonal alleries, Lisinopril, Dexamethasone   • Anxiety    • Cataract     Cararacts in both eyes.   • Contact dermatitis    • DDD (degenerative disc disease), lumbar    • Depression    • Essential hypertension    • Female climacteric state    • Headache    • History of mammogram     2016   • History of total right hip replacement    • Hypercalcemia    • Hyperlipidemia    • Low back pain     Lumbar area.   • Neuromuscular disorder (CMS/HCC) 2016   • Nightmares REM-sleep type    • Nonocclusive coronary atherosclerosis of native coronary artery     cath in  with 30% LAD disease   • Nontoxic multinodular goiter    • Obesity    • Osteoarthritis     both knees, back and hips   • Osteopenia    • Pain, joint, shoulder    • Polycythemia    • Rotator cuff tendinitis    • Snoring    • Superficial phlebitis    • Type 2 diabetes mellitus (CMS/HCC)     Type II   • Vitiligo        Past Surgical History:   Procedure Laterality Date   • BREAST BIOPSY     • CARDIAC CATHETERIZATION     • CATARACT EXTRACTION, BILATERAL     • COLONOSCOPY  08/15/2010   • HYSTERECTOMY     • JOINT REPLACEMENT  July 10, 2013    Total Hip Replacement   • REPLACEMENT TOTAL KNEE Left 2018    Dr. Bishnu Larson   • REPLACEMENT TOTAL KNEE Right     [facility]    • TONSILLECTOMY     • TOTAL HIP ARTHROPLASTY Right        Social History     Socioeconomic History   • Marital status:      Spouse name: Not on file   • Number of children: Not on file   • Years of education: Not on file   • Highest education level: Not on file   Tobacco Use   • Smoking status: Never Smoker   • Smokeless tobacco: Never Used    • Tobacco comment: I have never smoked.   Substance and Sexual Activity   • Alcohol use: No     Comment: CAFFEINE: DECAF ONCE PER WK.    • Drug use: No   • Sexual activity: Yes     Partners: Male     Birth control/protection: Post-menopausal     Comment: I had a Hysterectomy in 1982.       Family History   Problem Relation Age of Onset   • Diabetes Mother         Mother   • Thyroid disease Mother    • Hypertension Father         Father   • Heart disease Father         Father   • Arthritis Father         Father   • Hyperlipidemia Father         Father   • Hypertension Brother         Brother   • Diabetes Brother         Brother   • Kidney disease Brother         Brother, Renal Failure   • Vision loss Brother         Brother   • Asthma Sister         Sister   • Hypertension Sister         Sister   • Miscarriages / Stillbirths Sister         Sister   • Cancer Brother         Brother   • Diabetes Sister         Sister   • Hypertension Sister         Sister   • Early death Sister    • Kidney disease Brother         Brother,  Renal Failure   • Stroke Brother         Brother       ROS    Allergies   Allergen Reactions   • Dexamethasone Dermatitis   • Lisinopril          Current Outpatient Medications:   •  ACCU-CHEK FASTCLIX LANCETS misc, Use to check blood sugar once daily, Disp: 102 each, Rfl: 5  •  amLODIPine (NORVASC) 5 MG tablet, Take 1 tablet by mouth Daily., Disp: 90 tablet, Rfl: 1  •  aspirin 81 MG tablet, Take 81 mg by mouth daily., Disp: , Rfl:   •  atorvastatin (LIPITOR) 10 MG tablet, TAKE 1 TABLET EVERY NIGHT., Disp: 90 tablet, Rfl: 0  •  Calcium Carb-Cholecalciferol 500-200 MG-UNIT tablet, Take 2 tablets by mouth daily., Disp: , Rfl:   •  Cholecalciferol (VITAMIN D3) 20 MCG (800 UNIT) tablet, Take 1 tablet/day by mouth., Disp: , Rfl:   •  clonazePAM (KlonoPIN) 1 MG tablet, Take 1.5 mg by mouth At Night As Needed (nightmares)., Disp: , Rfl:   •  glucose blood (ACCU-CHEK GUMARO PLUS) test strip, Check BS once  daily, Disp: 100 each, Rfl: 3  •  Lancets Misc. (ACCU-CHEK FASTCLIX LANCET) kit, 1 kit Daily., Disp: 1 kit, Rfl: 0  •  loratadine (CLARITIN REDITABS) 10 MG disintegrating tablet, Take 10 mg by mouth Every Other Day., Disp: , Rfl:   •  metFORMIN (GLUCOPHAGE) 500 MG tablet, Take 1 tablet by mouth 2 (Two) Times a Day With Meals., Disp: 180 tablet, Rfl: 3  •  metoprolol succinate XL (TOPROL-XL) 50 MG 24 hr tablet, TAKE 1 TABLET EVERY DAY, Disp: 90 tablet, Rfl: 3  •  Mirabegron ER (MYRBETRIQ) 50 MG tablet sustained-release 24 hour 24 hr tablet, Take 50 mg by mouth Daily., Disp: , Rfl:   •  STIOLTO RESPIMAT 2.5-2.5 MCG/ACT aerosol solution inhaler, 2 puffs Daily., Disp: , Rfl:   •  venlafaxine (EFFEXOR) 75 MG tablet, Take 2 tablets by mouth 2 (Two) Times a Day., Disp: 180 tablet, Rfl: 1      Objective:     There were no vitals filed for this visit.  There is no height or weight on file to calculate BMI.    Physical Exam    Procedures      Assessment:      No diagnosis found.       Plan:       ***    Sincerely,       Tamara Ravi MA

## 2020-02-05 NOTE — ED PROVIDER NOTES
Splint - Cast - Strapping  Date/Time: 2/5/2020 12:35 PM  Performed by: Amy Mead PA  Authorized by: Chandana Echevarria MD     Consent:     Consent obtained:  Verbal    Consent given by:  Patient    Risks discussed:  Numbness, discoloration, pain and swelling    Alternatives discussed:  No treatment  Pre-procedure details:     Sensation:  Normal    Skin color:  Normal  Procedure details:     Laterality:  Left    Location:  Wrist    Wrist:  L wrist    Cast type:  Short arm    Splint type:  Volar short arm    Supplies:  Elastic bandage, Ortho-Glass and cotton padding  Post-procedure details:     Pain:  Improved    Sensation:  Normal    Skin color:  Normal    Patient tolerance of procedure:  Tolerated well, no immediate complications           Amy Mead PA  02/05/20 1233

## 2020-02-05 NOTE — DISCHARGE INSTRUCTIONS
Tylenol for pain control, follow-up with orthopedics to establish a close follow-up appointment, continue current medications, return to the emergency department for worsening symptoms as needed.

## 2020-02-05 NOTE — PROGRESS NOTES
Date of Office Visit: 20  Encounter Provider: Daniel Leal MD  Place of Service: [facility] CARDIOLOGY  Patient Name: Dejah Casey  :1945    Chief Complaint   Patient presents with   • Chest Pain     intermittent    • Shortness of Breath     x 1 year    :     HPI:     Ms. Casey is 75 y.o. and presents today at the request of Dr Bansal for multiple symptoms    She was followed by [facility] until 2017, and then I saw her in 2017.     In , she had a cath which showed minimal coronary atherosclerosis (30% LAD)     In 2016, she was having palpitations; a Holter showed rare PVCs and PACs and rare bursts of PACs lasting up to six beats.  Metoprolol was started, which helped.      She had an echocardiogram in 2017 that was unremarkable; it showed an LVEF of 55%, grade I diastolic dysfunction, and mild MR.  A proBNP in  was normal (30).     In 2017, a perfusion stress test was completely normal.  This was performed due to dyspnea.  Another perfusion stress was performed in 2018 and was normal (it was also performed because of dyspnea).    Of note, she was previously on HCTZ but it was stopped in  due to hypercalcemia.    She is a somewhat difficult historian.  She is vague.      She reports shortness of breath.  When I ask if it's worse than it has been for years, she can't really tell me.  She has chronic leg swelling, which has also not worsened.  She has rare isolated palpitations.  She denies orthopnea or PND.  She denies lightheadedness or syncope.  She reports intermittent chest pressure which may occur once or twice a month.  It sometimes occurs with exertion and sometimes at rest.  It lasts several minutes.  It's not pleuritic and is not associated with nausea, diaphoresis, or dyspnea.      An echo was performed in 2020. It showed normal LVSF/wall motion, grade IA diastolic dysfunction, normal  valves, and normal RVSP.    She fell yesterday onto her left arm; it's very painful, bruised, and swollen.    Past Medical History:   Diagnosis Date   • Allergic     Seasonal alleries, Lisinopril, Dexamethasone   • Anxiety    • Cataract     Cararacts in both eyes.   • Contact dermatitis    • DDD (degenerative disc disease), lumbar    • Depression    • Essential hypertension    • Female climacteric state    • Headache    • History of mammogram     8/2016   • History of total right hip replacement 2013   • Hypercalcemia    • Hyperlipidemia    • Low back pain     Lumbar area.   • Neuromuscular disorder (CMS/HCC) March 2016   • Nightmares REM-sleep type    • Nonocclusive coronary atherosclerosis of native coronary artery     cath in 2008 with 30% LAD disease   • Nontoxic multinodular goiter    • Obesity    • Osteoarthritis     both knees, back and hips   • Osteopenia    • Pain, joint, shoulder    • Polycythemia    • Rotator cuff tendinitis    • Snoring    • Superficial phlebitis    • Type 2 diabetes mellitus (CMS/[facility])     Type II   • Vitiligo        Past Surgical History:   Procedure Laterality Date   • BREAST BIOPSY     • CARDIAC CATHETERIZATION     • CATARACT EXTRACTION, BILATERAL     • COLONOSCOPY  08/15/2010   • HYSTERECTOMY     • JOINT REPLACEMENT  July 10, 2013    Total Hip Replacement   • REPLACEMENT TOTAL KNEE Left 08/06/2018    Dr. Bishnu Larson   • REPLACEMENT TOTAL KNEE Right     [facility]    • TONSILLECTOMY     • TOTAL HIP ARTHROPLASTY Right        Social History     Socioeconomic History   • Marital status:      Spouse name: Not on file   • Number of children: Not on file   • Years of education: Not on file   • Highest education level: Not on file   Tobacco Use   • Smoking status: Never Smoker   • Smokeless tobacco: Never Used   • Tobacco comment: I have never smoked.   Substance and Sexual Activity   • Alcohol use: No     Comment: CAFFEINE: DECAF ONCE PER WK.    • Drug use: No   • Sexual activity: Yes      Partners: Male     Birth control/protection: Post-menopausal     Comment: I had a Hysterectomy in 1982.       Family History   Problem Relation Age of Onset   • Diabetes Mother         Mother   • Thyroid disease Mother    • Hypertension Father         Father   • Heart disease Father         Father   • Arthritis Father         Father   • Hyperlipidemia Father         Father   • Hypertension Brother         Brother   • Diabetes Brother         Brother   • Kidney disease Brother         Brother, Renal Failure   • Vision loss Brother         Brother   • Asthma Sister         Sister   • Hypertension Sister         Sister   • Miscarriages / Stillbirths Sister         Sister   • Cancer Brother         Brother   • Diabetes Sister         Sister   • Hypertension Sister         Sister   • Early death Sister    • Kidney disease Brother         Brother,  Renal Failure   • Stroke Brother         Brother       Review of Systems   Constitution: Positive for malaise/fatigue.   Cardiovascular: Positive for chest pain, dyspnea on exertion and leg swelling.   Musculoskeletal: Positive for joint pain.   Genitourinary: Positive for frequency.   All other systems reviewed and are negative.      Allergies   Allergen Reactions   • Dexamethasone Dermatitis   • Lisinopril Cough         Current Outpatient Medications:   •  ACCU-CHEK FASTCLIX LANCETS misc, Use to check blood sugar once daily, Disp: 102 each, Rfl: 5  •  amLODIPine (NORVASC) 5 MG tablet, Take 1 tablet by mouth Daily., Disp: 90 tablet, Rfl: 1  •  aspirin 81 MG tablet, Take 81 mg by mouth daily., Disp: , Rfl:   •  atorvastatin (LIPITOR) 10 MG tablet, TAKE 1 TABLET EVERY NIGHT., Disp: 90 tablet, Rfl: 0  •  Calcium Carb-Cholecalciferol 500-200 MG-UNIT tablet, Take 2 tablets by mouth daily., Disp: , Rfl:   •  Cholecalciferol (VITAMIN D3) 20 MCG (800 UNIT) tablet, Take 1 tablet/day by mouth., Disp: , Rfl:   •  clonazePAM (KlonoPIN) 1 MG tablet, Take 1.5 mg by mouth At Night As  "Needed (nightmares)., Disp: , Rfl:   •  glucose blood (ACCU-CHEK GUMARO PLUS) test strip, Check BS once daily, Disp: 100 each, Rfl: 3  •  Lancets Misc. (ACCU-CHEK FASTCLIX LANCET) kit, 1 kit Daily., Disp: 1 kit, Rfl: 0  •  loratadine (CLARITIN REDITABS) 10 MG disintegrating tablet, Take 10 mg by mouth Every Other Day., Disp: , Rfl:   •  metFORMIN (GLUCOPHAGE) 500 MG tablet, Take 1 tablet by mouth 2 (Two) Times a Day With Meals., Disp: 180 tablet, Rfl: 3  •  metoprolol succinate XL (TOPROL-XL) 50 MG 24 hr tablet, TAKE 1 TABLET EVERY DAY, Disp: 90 tablet, Rfl: 3  •  Mirabegron ER (MYRBETRIQ) 50 MG tablet sustained-release 24 hour 24 hr tablet, Take 50 mg by mouth Daily., Disp: , Rfl:   •  STIOLTO RESPIMAT 2.5-2.5 MCG/ACT aerosol solution inhaler, 2 puffs Daily., Disp: , Rfl:   •  venlafaxine (EFFEXOR) 75 MG tablet, Take 2 tablets by mouth 2 (Two) Times a Day., Disp: 180 tablet, Rfl: 1  •  furosemide (LASIX) 20 MG tablet, Take 1 tablet by mouth 3 (Three) Times a Week., Disp: 30 tablet, Rfl: 11      Objective:     Vitals:    02/05/20 0924   BP: 134/76   BP Location: Right arm   Pulse: 83   Weight: 98.1 kg (216 lb 3.2 oz)   Height: 162.6 cm (64\")     Body mass index is 37.11 kg/m².    Physical Exam   Constitutional: She is oriented to person, place, and time.   Obese   HENT:   Head: Normocephalic.   Nose: Nose normal.   Mouth/Throat: Oropharynx is clear and moist.   Eyes: Pupils are equal, round, and reactive to light. Conjunctivae and EOM are normal.   Neck: Normal range of motion. JVD present.   Cardiovascular: Normal rate, regular rhythm, normal heart sounds and intact distal pulses.   No murmur heard.  Pulmonary/Chest: Effort normal and breath sounds normal.   Abdominal: Soft. There is no tenderness.   Obesity limits abdominal exam   Musculoskeletal: Normal range of motion. She exhibits edema (very doughy legs without pitting).   Marked bruising/swelling of left forearm, very TTP   Neurological: She is alert and " oriented to person, place, and time. No cranial nerve deficit.   Skin: Skin is warm and dry. No rash noted.   Psychiatric: She has a normal mood and affect. Her behavior is normal. Judgment and thought content normal.   Vitals reviewed.        ECG 12 Lead  Date/Time: 2/5/2020 10:52 AM  Performed by: Daniel Leal MD  Authorized by: Daniel Leal MD   Comparison: compared with previous ECG   Similar to previous ECG  Rhythm: sinus rhythm  Conduction: conduction normal  ST Segments: ST segments normal  T Waves: T waves normal  QRS axis: normal  Other: no other findings    Clinical impression: normal ECG              Assessment:       Diagnosis Plan   1. Nonocclusive coronary atherosclerosis of native coronary artery     2. Essential hypertension     3. Chronic venous insufficiency     4. Precordial pain  Stress Test With Myocardial Perfusion One Day   5. Dyspnea on exertion     6. Fatigue, unspecified type            Plan:       She seems to have chronic complaints of dyspnea, fatigue, intermittent chest pain, and leg swelling.  Her leg swelling seems most consistent with venous insufficiency (worsened by amlodipine and obesity).    She has had normal stress tests in 2017 and 2018; as she continues to have these symptoms, I will repeat a perfusion stress.      Her echo was normal except for grade IA diastolic dysfunction.  I explained that it would be reasonable to use a very low dose of a diuretic.  Due to her previous calcium/potassium issues, we need to avoid thiazides and spironolactone.  I prescribed furosemide, 20mg daily on MWF.  She thinks this is going to cause issues with her overactive bladder.  I explained that if it makes her quality of life too poor, then she can stop it.  She doesn't seem to have overt CHF, although her body habitus makes her exam difficult.     Of note, she is going to the ED for her arm -- I'm worried she could have a radial or ulnar fracture given the swelling and  bruising.    Sincerely,       Daniel Leal MD

## 2020-02-05 NOTE — ED PROVIDER NOTES
EMERGENCY DEPARTMENT ENCOUNTER    Room Number:  HALA/A  Date of encounter:  2/5/2020  PCP: Nanci Bansal MD  Historian: Pt      HPI:  Chief Complaint: L wrist pain/swelling  A complete HPI/ROS/PMH/PSH/SH/FH are unobtainable due to: none  Context: Dejah Casey is a 75 y.o. female who presents to the ED c/o gradual onset, constant L wrist pain and swelling s/p mechanical fall yesterday morning around 0120 AM in her home. Pt was getting up to turn off the lights when she tripped and fell. She denies hitting her head or LOC. Pt attempted to alleviate pain with unspecified OTC ointment without successful relief. She denies any numbness or weakness to L hand/fingers. No other complaints at this time.       PAST MEDICAL HISTORY  Active Ambulatory Problems     Diagnosis Date Noted   • Essential hypertension 03/17/2016   • Hyperlipidemia 03/17/2016   • Overactive bladder 03/17/2016   • Osteoarthritis 03/17/2016   • Type 2 diabetes mellitus without complication, without long-term current use of insulin (CMS/[facility]) 03/17/2016   • Vitamin D deficiency 03/17/2016   • Nontoxic multinodular goiter 06/06/2016   • Osteopenia 06/06/2016   • Chronic depression 08/25/2016   • Obstructive sleep apnea on CPAP 08/25/2016   • Osteoarthritis of spine with radiculopathy, lumbar region 08/25/2016   • Chronic venous insufficiency 05/01/2018   • H/O total knee replacement, right 12/03/2018   • 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   • Nonocclusive coronary atherosclerosis of native coronary artery      Resolved Ambulatory Problems     Diagnosis Date Noted   • Anxiety 03/17/2016   • Erythrocytosis 03/17/2016   • Menopausal syndrome 03/17/2016   • Headache 03/17/2016   • Hypercalcemia 03/17/2016   • Hypertension 03/17/2016   • Adiposity 03/17/2016   • Knee pain, left 05/20/2016   • Coronary artery disease involving native coronary artery of native heart without angina pectoris 06/06/2016   • Routine health maintenance 07/28/2016   • Breast cancer screening  07/28/2016   • Arthritis of left hip 07/28/2016   • Sleep disorder 08/25/2016   • Left foot pain 11/22/2016   • Paroxysmal tachycardia (CMS/[facility]) 04/11/2017   • Right flank pain 08/01/2017   • Chronic bronchitis (CMS/[facility]) 06/12/2018     Past Medical History:   Diagnosis Date   • Allergic    • Cataract    • Contact dermatitis    • DDD (degenerative disc disease), lumbar    • Depression    • Female climacteric state    • History of mammogram    • History of total right hip replacement 2013   • Low back pain    • Neuromuscular disorder (CMS/[facility]) March 2016   • Nightmares REM-sleep type    • Obesity    • Pain, joint, shoulder    • Polycythemia    • Rotator cuff tendinitis    • Snoring    • Superficial phlebitis    • Type 2 diabetes mellitus (CMS/[facility])    • Vitiligo          PAST SURGICAL HISTORY  Past Surgical History:   Procedure Laterality Date   • BREAST BIOPSY     • CARDIAC CATHETERIZATION     • CATARACT EXTRACTION, BILATERAL     • COLONOSCOPY  08/15/2010   • HYSTERECTOMY     • JOINT REPLACEMENT  July 10, 2013    Total Hip Replacement   • REPLACEMENT TOTAL KNEE Left 08/06/2018    Dr. Bishnu Larson   • REPLACEMENT TOTAL KNEE Right     [facility]    • TONSILLECTOMY     • TOTAL HIP ARTHROPLASTY Right          FAMILY HISTORY  Family History   Problem Relation Age of Onset   • Diabetes Mother         Mother   • Thyroid disease Mother    • Hypertension Father         Father   • Heart disease Father         Father   • Arthritis Father         Father   • Hyperlipidemia Father         Father   • Hypertension Brother         Brother   • Diabetes Brother         Brother   • Kidney disease Brother         Brother, Renal Failure   • Vision loss Brother         Brother   • Asthma Sister         Sister   • Hypertension Sister         Sister   • Miscarriages / Stillbirths Sister         Sister   • Cancer Brother         Brother   • Diabetes Sister         Sister   • Hypertension Sister         Sister   • Early death Sister    • Kidney  disease Brother         Brother,  Renal Failure   • Stroke Brother         Brother         SOCIAL HISTORY  Social History     Socioeconomic History   • Marital status:      Spouse name: Not on file   • Number of children: Not on file   • Years of education: Not on file   • Highest education level: Not on file   Tobacco Use   • Smoking status: Never Smoker   • Smokeless tobacco: Never Used   • Tobacco comment: I have never smoked.   Substance and Sexual Activity   • Alcohol use: No     Comment: CAFFEINE: DECAF ONCE PER WK.    • Drug use: No   • Sexual activity: Yes     Partners: Male     Birth control/protection: Post-menopausal     Comment: I had a Hysterectomy in 1982.         ALLERGIES  Dexamethasone and Lisinopril        REVIEW OF SYSTEMS  Review of Systems   Constitutional: Negative for chills and fever.   Respiratory: Negative for shortness of breath.    Cardiovascular: Negative for chest pain.   Musculoskeletal: Positive for arthralgias (L wrist/hand).   Neurological: Negative for dizziness, syncope, weakness, numbness and headaches.       All systems reviewed and negative except for those discussed in HPI.       PHYSICAL EXAM    I have reviewed the triage vital signs and nursing notes.    ED Triage Vitals [02/05/20 1037]   Temp Heart Rate Resp BP SpO2   98.1 °F (36.7 °C) 99 16 -- 92 %      Temp src Heart Rate Source Patient Position BP Location FiO2 (%)   Tympanic -- -- -- --       Physical Exam  General: Awake, alert, no acute distress  HEENT: EOMI  Pulm: Symmetric chest rise, nonlabored breathing  CV: Regular rate and rhythm, 2+ radial pulses and normal cap refill  GI: Non-distended  MSK: No deformity, edema to distal L forearm with ecchymosis, decreased ROM secondary to pain, tenderness to L forearm  Skin: Warm, dry  Neuro: Alert and oriented x 3, moving all extremities, no focal deficits, NVI in L hand  Psych: Calm, cooperative    Vital signs and nursing notes reviewed.     LAB RESULTS  No results  found for this or any previous visit (from the past 24 hour(s)).        RADIOLOGY  Xr Wrist 3+ View Left    Result Date: 2/5/2020  LEFT WRIST X-RAYS  CLINICAL HISTORY: Patient fell. Wrist pain.  4 views of the left wrist were obtained. A minimally impacted fracture of the distal radial metaphysis producing slight flattening of the normal volar tilt of the distal radial articular surface is suspected. In addition, there is a small cortical avulsion fracture from the dorsal aspect of the triquetrum, visible the lateral view.  This report was finalized on 2/5/2020 11:41 AM by Dr. Jef Duke M.D.      I ordered the above noted radiological studies. Reviewed by me and discussed with radiologist.  See dictation for official radiology interpretation.      PROCEDURES    Procedures      MEDICATIONS GIVEN IN ER    Medications   acetaminophen (TYLENOL) tablet 1,000 mg (1,000 mg Oral Given 2/5/20 1215)         PROGRESS, DATA ANALYSIS, CONSULTS, AND MEDICAL DECISION MAKING    All radiology studies have been reviewed by me and discussed with radiologist dictating the report. Discussion below represents my analysis of pertinent findings related to patient's condition, differential diagnosis, treatment plan and final disposition.    1148: Rechecked pt who is resting in bed. Informed pt on imaging results that shows a minimally impacted fracture  of the distal radial metaphysis. Discussed plan to splint wrist and discharge home with tylenol. Pt understands and agrees with the plan, all questions answered.    ED Course as of Feb 05 1910   Wed Feb 05, 2020   1150 Patient came in today for evaluation for left wrist pain that occurred yesterday morning when she fell at home.  Swollen with bruising, initial suspicion for underlying fracture or fractures.  X-rays show the fractures as noted, volar splint will be placed, patient to be given Tylenol in the emergency department. She is NVI in the extremity. Recommend orthopedic follow-up  with Tylenol for continued pain control at home.  Return to the emergency department for worsening symptoms as needed.    [DC]      ED Course User Index  [DC] Chandana Echevarria MD       AS OF 7:10 PM VITALS:    BP - 144/81  HR - 80  TEMP - 98.1 °F (36.7 °C) (Tympanic)  02 SATS - 96%        DIAGNOSIS  Final diagnoses:   Closed nondisplaced fracture of triquetrum of left wrist, initial encounter   Closed nondisplaced fracture of head of left radius, initial encounter         DISPOSITION  DISCHARGE    Patient discharged in stable condition.    Reviewed implications of results, diagnosis, meds, responsibility to follow up, warning signs and symptoms of possible worsening, potential complications and reasons to return to ER.    Patient/Family voiced understanding of above instructions.    Discussed plan for discharge, as there is no emergent indication for admission. Pt/family is agreeable and understands need for follow up and repeat testing.  Pt is aware that discharge does not mean that nothing is wrong but it indicates no emergency is present that requires admission and they must continue care with follow-up as given below or physician of their choice.     FOLLOW-UP  [facility] Department  [address]  [facility] [address] [phone]    As needed, If symptoms worsen    Herb Conner MD  [address] [facility] [address]  [place] [address]  [phone]    Schedule an appointment as soon as possible for a visit            Medication List      No changes were made to your prescriptions during this visit.         Documentation assistance provided by laxmi Patel for Dr. Echevarria.  Information recorded by the laxmi was done at my direction and has been verified and validated by me.         Margo Patel  02/05/20 1242       Chandana Echevarria MD  02/05/20 2168

## 2020-02-05 NOTE — PROGRESS NOTES
RM:________    Referral Provider: No ref. provider found Nanci Bansal MD    PREVIOUS CARDIOLOGIST:   CARDIAC TESTING:     : 1945   AGE: 75 y.o.    2020  REASON FOR VISIT/  CC:      WT: ____________ BP: __________L __________R/ HR_______________    CHEST PAIN: _____________    SOA: ____________PALPS: __________      LIGHTHEADED: ___________ FATIGUE: _______________ EDEMA______________  ALLERGIES:  Dexamethasone and Lisinopril  SMOKING HISTORY  Social History     Tobacco Use   • Smoking status: Never Smoker   • Smokeless tobacco: Never Used   • Tobacco comment: I have never smoked.   Substance Use Topics   • Alcohol use: No     Comment: CAFFEINE: DECAF ONCE PER WK.    • Drug use: No          CHILDREN: _______________________       CAFFEINE USE________  ALCOHOL _____________  OCCUPATION_____________  Past Surgical History:   Procedure Laterality Date   • BREAST BIOPSY     • CARDIAC CATHETERIZATION     • CATARACT EXTRACTION, BILATERAL     • COLONOSCOPY  08/15/2010   • HYSTERECTOMY     • JOINT REPLACEMENT  July 10, 2013    Total Hip Replacement   • REPLACEMENT TOTAL KNEE Left 2018    Dr. Bishnu Larson   • REPLACEMENT TOTAL KNEE Right     [facility]    • TONSILLECTOMY     • TOTAL HIP ARTHROPLASTY Right         Family History   Problem Relation Age of Onset   • Diabetes Mother         Mother   • Thyroid disease Mother    • Hypertension Father         Father   • Heart disease Father         Father   • Arthritis Father         Father   • Hyperlipidemia Father         Father   • Hypertension Brother         Brother   • Diabetes Brother         Brother   • Kidney disease Brother         Brother, Renal Failure   • Vision loss Brother         Brother   • Asthma Sister         Sister   • Hypertension Sister         Sister   • Miscarriages / Stillbirths Sister         Sister   • Cancer Brother         Brother   • Diabetes Sister         Sister   • Hypertension Sister         Sister   • Early death Sister     • Kidney disease Brother         Brother,  Renal Failure   • Stroke Brother         Brother             Past Medical History:   Diagnosis Date   • Allergic     Seasonal alleries, Lisinopril, Dexamethasone   • Anxiety    • Cataract     Cararacts in both eyes.   • Contact dermatitis    • DDD (degenerative disc disease), lumbar    • Depression    • Essential hypertension    • Female climacteric state    • Headache    • History of mammogram     8/2016   • History of total right hip replacement 2013   • Hypercalcemia    • Hyperlipidemia    • Low back pain     Lumbar area.   • Neuromuscular disorder (CMS/[facility]) March 2016   • Nightmares REM-sleep type    • Nonocclusive coronary atherosclerosis of native coronary artery     cath in 2008 with 30% LAD disease   • Nontoxic multinodular goiter    • Obesity    • Osteoarthritis     both knees, back and hips   • Osteopenia    • Pain, joint, shoulder    • Polycythemia    • Rotator cuff tendinitis    • Snoring    • Superficial phlebitis    • Type 2 diabetes mellitus (CMS/[facility])     Type II   • Vitiligo          H/O: MI_____   STROKE________   GOUT_____   ANEMIA______     CAROTID________ HIV____ CAD_______ HYPERCHOL _____    H/O: CHF _____   RF____ DM___ HTN_______PVD____THYROID DISEASE_______    PMH: GI ____   HEPATITIS ___ KIDNEY DISEASE ___ LUNG DISEASE _______     SLEEP APNEA ____ BLOOD CLOTS ____ DVT ____ VEIN STRIPPING ___________     CANCER _________________________________ CHEMO/ RADIATION__________

## 2020-02-05 NOTE — ED NOTES
Pt reports falling on Tuesday morning with contact to L wrist.  On assessment, medial L wrist appears bruised with moderate swelling; pt denies loss of sensation in fingers, can move wrist and fingers.  Pt NAD at this time.     Edin Mcpherson RN  02/05/20 1037

## 2020-02-06 ENCOUNTER — PATIENT OUTREACH (OUTPATIENT)
Dept: CASE MANAGEMENT | Facility: OTHER | Age: 75
End: 2020-02-06

## 2020-02-06 NOTE — OUTREACH NOTE
Care Plan Note      Responses   Lifestyle Goals  Routine follow-up with doctor(s), Less pain, Fewer ER/urgent care visits   Barriers  Pain   Self Management  Medication Adherence, Other (See Comment) [Wear splint]   Suggested Appointments  Other (See Comment) [Follow up with Dr. Conner as scheduled (2/12/20)]   Annual Wellness Visit:   Patient Has Completed   Specific Disease Process Teaching  -- [Fracture care: Elevate wrist above heart to decrease swelling. Wear split until seen by Dr. Conner. Tylenol for pain. ]   Does patient have depression diagnosis?  Yes   Advanced Directives:  Patient Has [Patient states Living Will on file at [facility]. ]   Ed Visits past 12 months:  1   Hospitalizations past 12 months  None   Discharge destination:  Home   Medication Adherence  Medications understood [Patient states no pain at this time. Patient knows to take Tylenol for pain if needed. ]   Goal Progress  Making Progress Toward Goal(s)   Readiness Scale  7   Confidence Scale  7   Health Literacy  Good        The main concerns and/or symptoms the patient would like to address are: Spoke with patient regarding recent ED visit due to wrist fracture. Patient states she does not have pain at this time. Patient has follow up scheduled with Dr. Conner Wednesday at 1:30 (1/12/20). Patient voiced concern as to how to rest arm.    Education/instruction provided by Care Coordinator: Introduced self, explained CA role and provided contact information. Reviewed follow up appointment. Reviewed Tylenol for pain. Reviewed elevating wrist above heart to decrease swelling. Reviewed AD. Patient states living will on file at [facility]. Encouraged patient to bring copy to Dr. Bansal's office to be entered into [employer]. Patient appreciative of the call. No further needs identified. MyChart active. AWV complete.     Follow Up Outreach Due: as needed    Maude Vincent RN  Ambulatory     2/6/2020, 2:51 PM

## 2020-02-10 ENCOUNTER — HOSPITAL ENCOUNTER (OUTPATIENT)
Dept: CARDIOLOGY | Facility: HOSPITAL | Age: 75
Discharge: HOME | End: 2020-02-10
Admitting: INTERNAL MEDICINE

## 2020-02-10 DIAGNOSIS — R07.2 PRECORDIAL PAIN: ICD-10-CM

## 2020-02-10 LAB
BH CV NUCLEAR PRIOR STUDY: 3
BH CV STRESS BP STAGE 1: NORMAL
BH CV STRESS COMMENTS STAGE 1: NORMAL
BH CV STRESS DOSE REGADENOSON STAGE 1: 0.4
BH CV STRESS DURATION MIN STAGE 1: 0
BH CV STRESS DURATION SEC STAGE 1: 10
BH CV STRESS HR STAGE 1: 111
BH CV STRESS PROTOCOL 1: NORMAL
BH CV STRESS RECOVERY BP: NORMAL MMHG
BH CV STRESS RECOVERY HR: 99 BPM
BH CV STRESS STAGE 1: 1
LV EF NUC BP: 59 %
MAXIMAL PREDICTED HEART RATE: 144 BPM
PERCENT MAX PREDICTED HR: 77.08 %
STRESS BASELINE BP: NORMAL MMHG
STRESS BASELINE HR: 79 BPM
STRESS PERCENT HR: 91 %
STRESS POST EXERCISE DUR SEC: 10 SEC
STRESS POST PEAK BP: NORMAL MMHG
STRESS POST PEAK HR: 111 BPM
STRESS TARGET HR: 122 BPM

## 2020-02-10 PROCEDURE — 93017 CV STRESS TEST TRACING ONLY: CPT

## 2020-02-10 PROCEDURE — 93018 CV STRESS TEST I&R ONLY: CPT | Performed by: INTERNAL MEDICINE

## 2020-02-10 PROCEDURE — 78452 HT MUSCLE IMAGE SPECT MULT: CPT | Performed by: INTERNAL MEDICINE

## 2020-02-10 PROCEDURE — 93016 CV STRESS TEST SUPVJ ONLY: CPT | Performed by: INTERNAL MEDICINE

## 2020-02-10 PROCEDURE — 25010000002 REGADENOSON 0.4 MG/5ML SOLUTION: Performed by: INTERNAL MEDICINE

## 2020-02-10 PROCEDURE — 78452 HT MUSCLE IMAGE SPECT MULT: CPT

## 2020-02-10 PROCEDURE — 0 TECHNETIUM TETROFOSMIN KIT: Performed by: INTERNAL MEDICINE

## 2020-02-10 PROCEDURE — A9502 TC99M TETROFOSMIN: HCPCS | Performed by: INTERNAL MEDICINE

## 2020-02-10 RX ADMIN — TETROFOSMIN 1 DOSE: 1.38 INJECTION, POWDER, LYOPHILIZED, FOR SOLUTION INTRAVENOUS at 13:51

## 2020-02-10 RX ADMIN — REGADENOSON 0.4 MG: 0.08 INJECTION, SOLUTION INTRAVENOUS at 14:48

## 2020-02-10 RX ADMIN — TETROFOSMIN 1 DOSE: 1.38 INJECTION, POWDER, LYOPHILIZED, FOR SOLUTION INTRAVENOUS at 14:48

## 2020-02-14 RX ORDER — ATORVASTATIN CALCIUM 10 MG/1
TABLET, FILM COATED ORAL
Qty: 90 TABLET | Refills: 1 | Status: SHIPPED | OUTPATIENT
Start: 2020-02-14 | End: 2020-08-24

## 2020-03-02 RX ORDER — VENLAFAXINE 75 MG/1
150 TABLET ORAL 2 TIMES DAILY
Qty: 360 TABLET | Refills: 1 | Status: SHIPPED | OUTPATIENT
Start: 2020-03-02 | End: 2020-08-24

## 2020-03-10 ENCOUNTER — TELEPHONE (OUTPATIENT)
Dept: FAMILY MEDICINE CLINIC | Facility: CLINIC | Age: 75
End: 2020-03-10

## 2020-03-10 DIAGNOSIS — M85.80 OSTEOPENIA, UNSPECIFIED LOCATION: Primary | ICD-10-CM

## 2020-03-10 NOTE — TELEPHONE ENCOUNTER
----- Message from Nanci Bansal MD sent at 3/10/2020  2:13 PM EDT -----  DEXA scan shows osteopenia of left hip.  Compared to December 2017 it looks like this is worse; bone mineral density decreased by 6%.  This is however a small change.  Calcium and vitamin D levels recently are normal.  You may benefit from some physical therapy to work on muscle and core strengthening.  This helps prevent falls and fractures in the future.  If patient agrees to physical therapy for osteopenia please place order.

## 2020-03-17 DIAGNOSIS — E11.9 TYPE 2 DIABETES MELLITUS WITHOUT COMPLICATION, WITHOUT LONG-TERM CURRENT USE OF INSULIN (HCC): ICD-10-CM

## 2020-03-17 RX ORDER — LANCETS
EACH MISCELLANEOUS
Qty: 102 EACH | Refills: 5 | Status: SHIPPED | OUTPATIENT
Start: 2020-03-17 | End: 2021-11-16 | Stop reason: SDUPTHER

## 2020-04-13 ENCOUNTER — TELEPHONE (OUTPATIENT)
Dept: ENDOCRINOLOGY | Age: 75
End: 2020-04-13

## 2020-04-13 NOTE — TELEPHONE ENCOUNTER
I RETURNED PT'S V/M TO CANCEL PHONE VISIT WITH DR RODRIGUEZ FOR 4/17/2020 DUE TO INSURANCE NOT COVERING THE VISIT. PT STATES SHE WILL CALL BACK TO R/S.

## 2020-05-29 DIAGNOSIS — E11.9 DIABETES MELLITUS WITHOUT COMPLICATION (HCC): ICD-10-CM

## 2020-05-29 DIAGNOSIS — I10 ESSENTIAL HYPERTENSION: ICD-10-CM

## 2020-05-29 RX ORDER — AMLODIPINE BESYLATE 5 MG/1
TABLET ORAL
Qty: 90 TABLET | Refills: 1 | OUTPATIENT
Start: 2020-05-29

## 2020-06-19 ENCOUNTER — OFFICE VISIT (OUTPATIENT)
Dept: FAMILY MEDICINE CLINIC | Facility: CLINIC | Age: 75
End: 2020-06-19

## 2020-06-19 VITALS
HEIGHT: 63 IN | WEIGHT: 219 LBS | BODY MASS INDEX: 38.8 KG/M2 | HEART RATE: 98 BPM | RESPIRATION RATE: 16 BRPM | TEMPERATURE: 97 F | SYSTOLIC BLOOD PRESSURE: 127 MMHG | OXYGEN SATURATION: 97 % | DIASTOLIC BLOOD PRESSURE: 81 MMHG

## 2020-06-19 DIAGNOSIS — E11.9 DIABETES MELLITUS WITHOUT COMPLICATION (HCC): ICD-10-CM

## 2020-06-19 DIAGNOSIS — I10 ESSENTIAL HYPERTENSION: Primary | ICD-10-CM

## 2020-06-19 DIAGNOSIS — F32.A CHRONIC DEPRESSION: ICD-10-CM

## 2020-06-19 DIAGNOSIS — E78.5 HYPERLIPIDEMIA, UNSPECIFIED HYPERLIPIDEMIA TYPE: ICD-10-CM

## 2020-06-19 DIAGNOSIS — E11.9 TYPE 2 DIABETES MELLITUS WITHOUT COMPLICATION, WITHOUT LONG-TERM CURRENT USE OF INSULIN (HCC): ICD-10-CM

## 2020-06-19 PROCEDURE — 99213 OFFICE O/P EST LOW 20 MIN: CPT | Performed by: FAMILY MEDICINE

## 2020-06-19 RX ORDER — FLUTICASONE PROPIONATE 50 MCG
1 SPRAY, SUSPENSION (ML) NASAL AS NEEDED
COMMUNITY

## 2020-06-19 RX ORDER — CLONAZEPAM 2 MG/1
2 TABLET ORAL NIGHTLY
COMMUNITY
Start: 2020-06-02 | End: 2022-06-14 | Stop reason: HOSPADM

## 2020-06-19 RX ORDER — AMLODIPINE BESYLATE 5 MG/1
5 TABLET ORAL DAILY
Qty: 90 TABLET | Refills: 1 | Status: SHIPPED | OUTPATIENT
Start: 2020-06-19 | End: 2020-12-23 | Stop reason: SDUPTHER

## 2020-06-19 NOTE — PROGRESS NOTES
Subjective   Dejah Casey is a 75 y.o. female.     History of Present Illness     75-year-old female presents today for 6-month follow-up on hypertension and hyperlipidemia.    Hypertension: Amlodipine 5 mg a day, Lasix 20 mg a day, metoprolol succinate XL 50 mg a day.  Blood pressure in the office today 127/81.  Denies chest pain shortness of breath or headache.  Reports some blurry vision.  Continues to be worked up with ophthalmology.  Needs refills today.    Hyperlipidemia: Atorvastatin 10 mg a day.  Last cholesterol panel was done in December 2019 and was normal.    Depression: Discussed marital issues at last visit which contribute to depression.  Has been on Effexor for 10 years can only help so much; this is after finding out about her  having an affair still bothers her and causing her difficulty falling asleep at night.  We discussed getting back in with support group to help things.  Anything.  Today patient reports increased stress due to current follow-up appointment pandemic.  Unable to see [employer] support group due to pandemic.  Depression feels worse these days.  Also attributes to her 's affairs in the past.  Also attributes to the current relationship; stress includes needing help around the house sometimes due to limited mobility due to history of wrist fracture and knee replacement surgeries.  Denies suicidal or homicidal ideation or self injury.    Due for A1c recheck.  Fasting blood sugars have been higher these days 110 and 130.  Denies symptoms of hypoglycemia or hyperglycemia..        The following portions of the patient's history were reviewed and updated as appropriate: allergies, current medications, past family history, past medical history, past social history, past surgical history and problem list.    Review of Systems   Constitutional: Positive for fatigue. Negative for unexpected weight loss.   Eyes: Positive for blurred vision.   Cardiovascular: Negative for chest  pain.   Endocrine: Negative for polydipsia, polyphagia and polyuria.   Skin: Negative for pallor.   Neurological: Positive for weakness. Negative for dizziness, tremors, seizures, speech difficulty and confusion.   Psychiatric/Behavioral: Positive for dysphoric mood. The patient is nervous/anxious.        Objective   Physical Exam   Constitutional: She appears well-developed and well-nourished.   HENT:   Head: Normocephalic and atraumatic.   Mouth/Throat: Uvula is midline, oropharynx is clear and moist and mucous membranes are normal.   Eyes: Pupils are equal, round, and reactive to light. EOM are normal.   Cardiovascular: Normal rate and regular rhythm.   No murmur heard.  Pulmonary/Chest: Effort normal and breath sounds normal. No stridor. No respiratory distress. She has no wheezes. She has no rales.   Neurological: She is alert.   Skin: Skin is warm.   Psychiatric: She has a normal mood and affect. Her behavior is normal.               Assessment/Plan     Dejah was seen today for hypertension and hyperlipidemia.    Diagnoses and all orders for this visit:    Essential hypertension  -     Comprehensive Metabolic Panel  -     amLODIPine (NORVASC) 5 MG tablet; Take 1 tablet by mouth Daily.    Hyperlipidemia, unspecified hyperlipidemia type    Chronic depression    Diabetes mellitus without complication (CMS/HCC)  -     metFORMIN (GLUCOPHAGE) 500 MG tablet; Take 1 tablet by mouth 2 (Two) Times a Day With Meals.    Type 2 diabetes mellitus without complication, without long-term current use of insulin (CMS/HCC)  -     Hemoglobin A1c    Follow-up labs refill meds.  Discussed guided meditation techniques.  Follow-up in 2 months to see if these techniques have helped.

## 2020-07-02 ENCOUNTER — OFFICE VISIT (OUTPATIENT)
Dept: CARDIOLOGY | Facility: CLINIC | Age: 75
End: 2020-07-02

## 2020-07-02 VITALS
SYSTOLIC BLOOD PRESSURE: 130 MMHG | BODY MASS INDEX: 38.84 KG/M2 | HEIGHT: 63 IN | WEIGHT: 219.2 LBS | HEART RATE: 98 BPM | DIASTOLIC BLOOD PRESSURE: 76 MMHG

## 2020-07-02 DIAGNOSIS — R06.09 DYSPNEA ON EXERTION: ICD-10-CM

## 2020-07-02 DIAGNOSIS — R60.0 LOWER EXTREMITY EDEMA: ICD-10-CM

## 2020-07-02 DIAGNOSIS — R00.0 TACHYCARDIA: ICD-10-CM

## 2020-07-02 DIAGNOSIS — Z99.89 OBSTRUCTIVE SLEEP APNEA ON CPAP: ICD-10-CM

## 2020-07-02 DIAGNOSIS — I51.89 DIASTOLIC DYSFUNCTION: ICD-10-CM

## 2020-07-02 DIAGNOSIS — I87.2 CHRONIC VENOUS INSUFFICIENCY: ICD-10-CM

## 2020-07-02 DIAGNOSIS — I10 ESSENTIAL HYPERTENSION: ICD-10-CM

## 2020-07-02 DIAGNOSIS — I25.10 NONOCCLUSIVE CORONARY ATHEROSCLEROSIS OF NATIVE CORONARY ARTERY: Primary | ICD-10-CM

## 2020-07-02 DIAGNOSIS — E66.09 CLASS 2 OBESITY DUE TO EXCESS CALORIES WITHOUT SERIOUS COMORBIDITY WITH BODY MASS INDEX (BMI) OF 38.0 TO 38.9 IN ADULT: ICD-10-CM

## 2020-07-02 DIAGNOSIS — G47.33 OBSTRUCTIVE SLEEP APNEA ON CPAP: ICD-10-CM

## 2020-07-02 DIAGNOSIS — E78.2 MIXED HYPERLIPIDEMIA: ICD-10-CM

## 2020-07-02 PROBLEM — E66.812 CLASS 2 OBESITY DUE TO EXCESS CALORIES WITHOUT SERIOUS COMORBIDITY WITH BODY MASS INDEX (BMI) OF 38.0 TO 38.9 IN ADULT: Status: ACTIVE | Noted: 2020-07-02

## 2020-07-02 PROBLEM — Z96.651 H/O TOTAL KNEE REPLACEMENT, RIGHT: Status: RESOLVED | Noted: 2018-12-03 | Resolved: 2020-07-02

## 2020-07-02 PROCEDURE — 99214 OFFICE O/P EST MOD 30 MIN: CPT | Performed by: NURSE PRACTITIONER

## 2020-07-02 PROCEDURE — 93000 ELECTROCARDIOGRAM COMPLETE: CPT | Performed by: NURSE PRACTITIONER

## 2020-07-02 RX ORDER — FUROSEMIDE 40 MG/1
40 TABLET ORAL DAILY
Qty: 30 TABLET | Refills: 0 | Status: SHIPPED | OUTPATIENT
Start: 2020-07-02 | End: 2020-07-28 | Stop reason: SDUPTHER

## 2020-07-02 NOTE — PROGRESS NOTES
Date of Office Visit: 2020  Encounter Provider: RAYSHAWN Romero  Place of Service: [facility] CARDIOLOGY  Patient Name: Dejah Casey  :1945  Primary Cardiologist: Dr. Daniel Leal     Chief Complaint   Patient presents with   • Follow-up   :     Dear Dr. Bansal,     HPI: Dejah Casey is a pleasant 75 y.o. female who presents today for cardiac follow up. She is a new patient to me and her previous records have been reviewed.    In , she had a cardiac cath which showed minimal coronary atherosclerosis with 30% LAD stenosis.      She has a known history of hypertension.  In , her HCTZ was discontinued due to hypercalcemia.    In 2016, she was having palpitations with a Holter monitor showed normal sinus rhythm with rare PVCs and rare APCs with short bursts up to 6 beats in duration.  She was started on metoprolol.    In 2017 she had an echocardiogram that showed normal EF of 55%, grade 1 diastolic dysfunction, mild mitral annular calcification, and mild mitral regurgitation.  In both 2017 and 2018 she had a nuclear stress test completed for dyspnea and both were normal.    In 2020, an echocardiogram was completed which showed normal EF, grade 1A diastolic dysfunction, normal valves, normal RVSP.  In 2020, she followed up in the office with Dr. Daniel Leal and reported worsening dyspnea, chronic leg swelling, rare isolated palpitations, and intermittent chest pain once or twice per month.  She had also fallen the day before her visit.  Dr. Leal felt that her edema was most consistent with venous insufficiency.  She prescribed furosemide 20 mg daily on Monday, , and  and the patient was hesitant because of her overactive bladder.  Dr. Leal recommended evaluation in the ED for her arm after her fall.  She was diagnosed with a left radius fracture and has been following up with orthopedics.    On 2/10/2020,  she had a repeat Lexiscan Myoview stress test that showed no evidence of ischemia and EF of 59%.    She presents today for her cardiac follow-up visit.  She is taking the furosemide 3 days/week and that it has helped her swelling, but wishes that her swelling could be better.  She mostly notices bilateral foot swelling and had to buy shoes a size bigger to fit her feet.  In her review of systems she noted double vision she does suffer from overactive bladder.  She reports chronic shortness of breath, occasional cough, and fatigue.  She denies chest pain, palpitations, dizziness, or syncope.  Her blood pressure and heart rate are both normal today.  On her review of systems she noted blurred and double vision which both have been chronic since her cataract surgery.    I reviewed her last blood work from 1/14/2020: CBC showed normal hemoglobin and hematocrit.  CMP normal except for glucose of 141.  Her last lipid panel was completed on 12/3/2019 with the following results: Total cholesterol 127, triglycerides 124, HDL 60, and LDL 42.    Past Medical History:   Diagnosis Date   • Allergic     Seasonal alleries, Lisinopril, Dexamethasone   • Anxiety    • Cataract     Cararacts in both eyes.   • Contact dermatitis    • DDD (degenerative disc disease), lumbar    • Depression    • Essential hypertension    • Female climacteric state    • Headache    • History of mammogram     8/2016   • History of total right hip replacement 2013   • Hypercalcemia    • Hyperlipidemia    • Low back pain     Lumbar area.   • Neuromuscular disorder (CMS/HCC) March 2016   • Nightmares REM-sleep type    • Nonocclusive coronary atherosclerosis of native coronary artery     cath in 2008 with 30% LAD disease   • Nontoxic multinodular goiter    • Obesity    • Osteoarthritis     both knees, back and hips   • Osteopenia    • Pain, joint, shoulder    • Polycythemia    • Rotator cuff tendinitis    • Sleep apnea     CPAP nightly   • Snoring    •  Superficial phlebitis    • Type 2 diabetes mellitus (CMS/HCC)     Type II   • Vitiligo        Past Surgical History:   Procedure Laterality Date   • BREAST BIOPSY     • CARDIAC CATHETERIZATION     • CATARACT EXTRACTION, BILATERAL     • COLONOSCOPY  08/15/2010   • HYSTERECTOMY     • JOINT REPLACEMENT  July 10, 2013    Total Hip Replacement   • REPLACEMENT TOTAL KNEE Left 08/06/2018    Dr. Bishnu Larson   • REPLACEMENT TOTAL KNEE Right     [facility]    • TONSILLECTOMY     • TOTAL HIP ARTHROPLASTY Right        Social History     Socioeconomic History   • Marital status:      Spouse name: Not on file   • Number of children: Not on file   • Years of education: Not on file   • Highest education level: Not on file   Tobacco Use   • Smoking status: Never Smoker   • Smokeless tobacco: Never Used   Substance and Sexual Activity   • Alcohol use: No     Comment: CAFFEINE: DECAF ONCE PER WK.    • Drug use: No   • Sexual activity: Yes     Partners: Male     Birth control/protection: Post-menopausal     Comment: I had a Hysterectomy in 1982.       Family History   Problem Relation Age of Onset   • Diabetes Mother         Mother   • Thyroid disease Mother    • Hypertension Father         Father   • Heart disease Father         Father   • Arthritis Father         Father   • Hyperlipidemia Father         Father   • Hypertension Brother         Brother   • Diabetes Brother         Brother   • Kidney disease Brother         Brother, Renal Failure   • Vision loss Brother         Brother   • Asthma Sister         Sister   • Hypertension Sister         Sister   • Miscarriages / Stillbirths Sister         Sister   • Cancer Brother         Brother   • Diabetes Sister         Sister   • Hypertension Sister         Sister   • Early death Sister    • Kidney disease Brother         Brother,  Renal Failure   • Stroke Brother         Brother       The following portion of the patient's history were reviewed and updated as appropriate: past  medical history, past surgical history, past social history, past family history, allergies, current medications, and problem list.    Review of Systems   Constitution: Positive for malaise/fatigue. Negative for chills, diaphoresis, fever, night sweats, weight gain and weight loss.   HENT: Negative for hearing loss, nosebleeds, sore throat and tinnitus.    Eyes: Positive for blurred vision and double vision. Negative for pain and visual disturbance.   Cardiovascular: Positive for dyspnea on exertion and leg swelling. Negative for chest pain, claudication, cyanosis, irregular heartbeat, near-syncope, orthopnea, palpitations, paroxysmal nocturnal dyspnea and syncope.   Respiratory: Positive for cough and shortness of breath. Negative for hemoptysis, snoring and wheezing.    Endocrine: Negative for cold intolerance, heat intolerance and polyuria.   Hematologic/Lymphatic: Negative for bleeding problem. Does not bruise/bleed easily.   Skin: Negative for color change, dry skin, flushing and itching.   Musculoskeletal: Negative for falls, joint pain, joint swelling, muscle cramps, muscle weakness and myalgias.   Gastrointestinal: Negative for abdominal pain, constipation, heartburn, melena, nausea and vomiting.   Genitourinary: Negative for dysuria and hematuria.   Neurological: Negative for excessive daytime sleepiness, dizziness, light-headedness, loss of balance, numbness, paresthesias, seizures and vertigo.   Psychiatric/Behavioral: Positive for depression. Negative for altered mental status, memory loss and substance abuse. The patient is nervous/anxious. The patient does not have insomnia.    Allergic/Immunologic: Negative for environmental allergies.       Allergies   Allergen Reactions   • Dexamethasone Dermatitis   • Hctz [Hydrochlorothiazide] Other (See Comments)     In 2015-developed hypercalcemia-HCTZ was discontinued   • Lisinopril Cough         Current Outpatient Medications:   •  Accu-Chek FastClix Lancets  "misc, Use to check blood sugar once daily, Disp: 102 each, Rfl: 5  •  amLODIPine (NORVASC) 5 MG tablet, Take 1 tablet by mouth Daily., Disp: 90 tablet, Rfl: 1  •  aspirin 81 MG tablet, Take 81 mg by mouth daily., Disp: , Rfl:   •  atorvastatin (LIPITOR) 10 MG tablet, TAKE 1 TABLET EVERY NIGHT., Disp: 90 tablet, Rfl: 1  •  Calcium Carb-Cholecalciferol 500-200 MG-UNIT tablet, Take 2 tablets by mouth daily., Disp: , Rfl:   •  Cholecalciferol (VITAMIN D3) 20 MCG (800 UNIT) tablet, Take 1 tablet/day by mouth., Disp: , Rfl:   •  clonazePAM (KlonoPIN) 2 MG tablet, TAKE 1 TABLET BY MOUTH NIGHTLY. MAX DAILY AMOUNT  2 MG, Disp: , Rfl:   •  fluticasone (FLONASE) 50 MCG/ACT nasal spray, 1 spray into the nostril(s) as directed by provider Daily., Disp: , Rfl:   •  furosemide (LASIX) 20 MG tablet, Take 1 tablet by mouth on Monday, Wednesday, Friday., Disp: 30 tablet, Rfl: 0  •  glucose blood (ACCU-CHEK GUMARO PLUS) test strip, Check BS once daily, Disp: 100 each, Rfl: 3  •  Lancets [facility]. (ACCU-CHEK FASTCLIX LANCET) kit, 1 kit Daily., Disp: 1 kit, Rfl: 0  •  metFORMIN (GLUCOPHAGE) 500 MG tablet, Take 1 tablet by mouth 2 (Two) Times a Day With Meals., Disp: 180 tablet, Rfl: 3  •  metoprolol succinate XL (TOPROL-XL) 50 MG 24 hr tablet, TAKE 1 TABLET EVERY DAY, Disp: 90 tablet, Rfl: 3  •  Mirabegron ER (MYRBETRIQ) 50 MG tablet sustained-release 24 hour 24 hr tablet, Take 50 mg by mouth Daily., Disp: , Rfl:   •  STIOLTO RESPIMAT 2.5-2.5 MCG/ACT aerosol solution inhaler, 2 puffs Daily., Disp: , Rfl:   •  venlafaxine (EFFEXOR) 75 MG tablet, Take 2 tablets by mouth 2 (Two) Times a Day., Disp: 360 tablet, Rfl: 1        Objective:     Vitals:    07/02/20 1441 07/02/20 1442   BP: 134/80 130/76   BP Location: Left arm Right arm   Pulse: 98    Weight: 99.4 kg (219 lb 3.2 oz)    Height: 160 cm (63\")      Body mass index is 38.83 kg/m².    PHYSICAL EXAM:    Vitals Reviewed.   General Appearance: No acute distress, well developed and well " nourished. Obese.   Eyes: Conjunctiva and lids: No erythema, swelling, or discharge. Sclera non-icteric.   HENT: Atraumatic, normocephalic. External eyes, ears, and nose normal. No hearing loss noted. Mucous membranes normal. Lips not cyanotic. Neck supple with no tenderness.  Respiratory: No signs of respiratory distress. Respiration rhythm and depth normal.   Clear to auscultation. No rales, crackles, rhonchi, or wheezing auscultated.   Cardiovascular:  Jugular Venous Pressure: Normal  Heart Rate and Rhythm: Normal, Heart Sounds: Normal S1 and S2. No S3 or S4 noted.  Murmurs: No murmurs noted. No rubs, thrills, or gallops.   Lower Extremities: Bilateral lower extremity edema left greater than right.  Gastrointestinal:  Abdomen soft, non-distended, non-tender. Normal bowel sounds. No hepatomegaly.   Musculoskeletal: Normal movement of extremities  Skin and Nails: General appearance normal. No pallor, cyanosis, diaphoresis. Skin temperature normal. No clubbing of fingernails.   Psychiatric: Patient alert and oriented to person, place, and time. Speech and behavior appropriate. Normal mood and affect.       ECG 12 Lead  Date/Time: 7/2/2020 2:45 PM  Performed by: Josey Ernandez APRN  Authorized by: Josey Ernandez APRN   Comparison: compared with previous ECG from 2/5/2020  Similar to previous ECG  Rhythm: sinus rhythm  Rate: normal  BPM: 98  Conduction: conduction normal  ST Segments: ST segments normal  T Waves: T waves normal  QRS axis: normal  Other: no other findings    Clinical impression: normal ECG              Assessment:       Diagnosis Plan   1. Nonocclusive coronary atherosclerosis of native coronary artery     2. Diastolic dysfunction     3. Dyspnea on exertion     4. Lower extremity edema     5. Chronic venous insufficiency     6. Essential hypertension     7. Tachycardia     8. Mixed hyperlipidemia     9. Obstructive sleep apnea on CPAP     10. Class 2 obesity due to excess calories without serious  comorbidity with body mass index (BMI) of 38.0 to 38.9 in adult            Plan:       1.  Nonobstructive Coronary Artery Disease: In 2008, she was diagnosed with nonobstructive CAD with 30% LAD stenosis.  Over the years she has had chronic dyspnea and her last nuclear stress test in February 2020 was normal.  She will continue with aspirin, atorvastatin, and metoprolol.    2.  Diastolic Dysfunction: Noted to be grade 1A on last echocardiogram January 2020.    3.  Dyspnea: Chronic and unchanged.  I would recommend follow-up with her PCP for other etiologies of dyspnea not cardiac related.    4/5.  Lower Extremity Edema: Most likely multifactorial due to her weight, calcium channel blocker, and venous insufficiency.  She is currently taking frusemide 20 mg 3 times per week and this has not made any difference.  She is actually having to buy shoes that are a size bigger to fit her feet.  Her last kidney function and potassium were normal.  I recommended doing a trial of furosemide 40 mg 1 tablet daily to see if this helps.  If it does she will need a follow-up BMP.  In addition, recommended compression stockings, elevation of legs, weight loss, and low-sodium diet less than 2000 mg daily.  I will follow-up with her via telephone in 2 weeks for reassessment.  At one point she was intolerant of lisinopril I am not sure of the specifics.  I think it would be also reasonable to stop her calcium channel blocker in the future and start angiotensin receptor blocker.    6.  Hypertension: Blood pressure well controlled today.  Dr. Leal has noted that due to her calcium and potassium issues in the past, we need to avoid thiazides and spironolactone.    7.  Tachycardia: Heart rate within normal limits today.  Continue beta-blocker.    8.  Hyperlipidemia: Remains on atorvastatin 10 mg daily.  Typically with a history of nonobstructive coronary artery disease we would recommend atorvastatin 20 mg daily, but her LDL was 42.    9.   Obstructive Sleep Apnea: Compliant with CPAP machine.    10.  Obesity: BMI is 38.8.  She would benefit from exercise, weight loss, and heart healthy, low-sodium diet.    11.  I will follow-up with her via telephone in 2 weeks for reassessment of her lower extremity edema.    12.  I recommend follow-up with Dr. Daniel Leal in 6 months, unless otherwise needed sooner.    As always, it has been a pleasure to participate in your patient's care. Thank you.       Sincerely,         RAYSHAWN Carroll        Dictated utilizing Dragon dictation

## 2020-07-16 ENCOUNTER — TELEPHONE (OUTPATIENT)
Dept: CARDIOLOGY | Facility: CLINIC | Age: 75
End: 2020-07-16

## 2020-07-16 DIAGNOSIS — I10 ESSENTIAL HYPERTENSION: Primary | ICD-10-CM

## 2020-07-16 NOTE — TELEPHONE ENCOUNTER
----- Message from RAYSHAWN Aranda sent at 7/2/2020  3:08 PM EDT -----    Follow up on edema on furosemide 40 mg daily     I left a message for patient to return my call.

## 2020-07-20 NOTE — TELEPHONE ENCOUNTER
I tried calling both phone numbers and it says the call cannot be completed at this time.  I will try again.

## 2020-07-28 RX ORDER — FUROSEMIDE 40 MG/1
40 TABLET ORAL DAILY
Qty: 90 TABLET | Refills: 1 | Status: SHIPPED | OUTPATIENT
Start: 2020-07-28 | End: 2021-05-13 | Stop reason: SDUPTHER

## 2020-07-28 NOTE — TELEPHONE ENCOUNTER
I spoke with patient via phone. Her swelling has improved. There is no change in her breathing. BMP ordered at [facility]. She verbalized understanding.

## 2020-07-29 RX ORDER — FUROSEMIDE 40 MG/1
TABLET ORAL
Qty: 30 TABLET | Refills: 0 | OUTPATIENT
Start: 2020-07-29

## 2020-08-12 NOTE — PROGRESS NOTES
Subjective   Dejah Casey is a 75 y.o. female.     F/u for dm 2, hyperlipidemia, hypertension, CAD,nontoxic MNG,osteopenia, OAB/ testing bs 1 x day / last dm eye exam 8/11/20 with dr Cortes/ last dm foot exam 8/20/20 with dr Hernandez      Patient is a 75year-old female who came in for followup. She was found to have a goiter on examination last August 2012. She had thyroid ultrasound done on 08/21/2012 which showed a multinodular goiter with the dominant solid nodule in the left measuring 1.8 cm. She had followup thyroid ultrasound in 4/14 at [facility] showed multinodular goiter with stable nodules.        She denies any voice changes. She denies any pressure symptoms or dysphagia. She denies any bowel changes.  She has no previous history of head or neck radiation therapy.      She has known diabetes mellitus since 2003 and has been on metformin 500 mg twice a day. Blood sugar has ranged . She has no microalbuminuria on urine sample taken 3/19.   Her last eye exam was 7/20.  She had bilateral cataract surgery.  She has no retinopathy but has constant tearing.  She denies tingling in her feet.      She has hyperlipidemia and has been on Lipitor 10 mg once a day.  She denies any myalgia.      She has mild coronary artery disease by cardiac catheterization done in 2008 by Dr. CAMILLE Mcmullen. She denies any chest pain.  She had a normal nuclear stress tests in July 2018.  She is following up with Dr. Leal.      She has hypertension and has been on amlodipine 5 mg once a day and Toprol 50 mg/day. She was taken off hydrochlorothiazide because of hypercalcemia. She had cough with lisinopril in the past. She has not used ARB in the past.       She has overactive bladder diagnosed by Dr. Dean and is on Myrbetriq.  She was taken off Toviaz because of mouth dryness.  She was taken off Vesicare because of cost.     She has osteopenia on bone density done at [facility] in November 2015.  She is on vitamin D 800 units per day  and calcium 500 plus D 1 tab BID..  Bone density done in 2017 showed improvement of the left hip density and a slight decrease in the lumbar spine.      Bone density done in 2020 showed osteopenia with a 6% decrease on the left femoral neck.  FRAX score for major osteoporotic fracture is 6.2% and for hip fracture is 1.6%.     She was treated on Stiolto by Dr. Worley.  She is following with Dr. Worley/Dr. Larson with regards to the abnormal CT of the chest which was read as possible pericardial cyst or lymphadenopathy.   She has sleep apnea and is using her CPAP..  She is no longer is snoring.  She wakes up tired.     She has occasional constipation.  She denies melena or hematochezia.  Her last colonoscopy was in 2010.    Her  recently .  She has stepchildren but no natural children.    The following portions of the patient's history were reviewed and updated as appropriate: allergies, current medications, past family history, past medical history, past social history, past surgical history and problem list.    Review of Systems   Constitutional: Positive for fatigue. Negative for chills and fever.   HENT: Negative.    Eyes: Positive for visual disturbance.   Respiratory: Positive for shortness of breath. Negative for chest tightness and wheezing.    Cardiovascular: Negative for chest pain, palpitations and leg swelling.   Gastrointestinal: Positive for constipation. Negative for abdominal distention, abdominal pain, anal bleeding, blood in stool and diarrhea.   Endocrine: Negative for cold intolerance, heat intolerance, polydipsia and polyuria.   Genitourinary: Negative for difficulty urinating, frequency and urgency.   Musculoskeletal: Negative for back pain, joint swelling and neck pain.   Neurological: Negative for dizziness, tremors, seizures, light-headedness, numbness and headaches.   Hematological: Bruises/bleeds easily.   Psychiatric/Behavioral: Negative for  "agitation and confusion. The patient is nervous/anxious.      Objective      Vitals:    08/20/20 1500   BP: 124/60   BP Location: Right arm   Patient Position: Sitting   Cuff Size: Large Adult   Pulse: 77   SpO2: 97%   Weight: 93 kg (205 lb)   Height: 160 cm (62.99\")     Physical Exam   Constitutional: She is oriented to person, place, and time. She appears well-developed and well-nourished. No distress.   HENT:   Head: Normocephalic.   Right Ear: External ear normal.   Left Ear: External ear normal.   Eyes: Conjunctivae and EOM are normal. Right eye exhibits no discharge. Left eye exhibits no discharge. No scleral icterus.   Neck: Neck supple. No JVD present. No tracheal deviation present. No thyromegaly present.   Cardiovascular: Normal rate, regular rhythm, normal heart sounds and intact distal pulses. Exam reveals no gallop and no friction rub.   No murmur heard.  Pulmonary/Chest: Effort normal and breath sounds normal. No stridor. No respiratory distress. She has no wheezes. She has no rales. She exhibits no tenderness.   Abdominal: Soft. Bowel sounds are normal. She exhibits no distension and no mass. There is no tenderness. There is no guarding.   Musculoskeletal: She exhibits no edema or deformity.   No plantar ulcers   Lymphadenopathy:     She has no cervical adenopathy.   Neurological: She is alert and oriented to person, place, and time.   Intact light touch in lower extremities   Skin: Skin is warm and dry.   Psychiatric: She has a normal mood and affect. Her behavior is normal.     Hospital Outpatient Visit on 02/10/2020   Component Date Value Ref Range Status   • Nuclear Prior Study 02/10/2020 3   Final   •  CV STRESS PROTOCOL 1 02/10/2020 Pharmacologic   Final   • Stage 1 02/10/2020 1   Final   • HR Stage 1 02/10/2020 111   Final   • BP Stage 1 02/10/2020 136/76   Final   • Duration Min Stage 1 02/10/2020 0   Final   • Duration Sec Stage 1 02/10/2020 10   Final   • Stress Dose Regadenoson Stage 1 " 02/10/2020 0.4   Final   • Stress Comments Stage 1 02/10/2020 10 sec bolus injection   Final   • Baseline HR 02/10/2020 79  bpm Final   • Baseline BP 02/10/2020 130/82  mmHg Final   • Peak HR 02/10/2020 111  bpm Final   • Percent Max Pred HR 02/10/2020 77.08  % Final   • Percent Target HR 02/10/2020 91  % Final   • Peak BP 02/10/2020 136/76  mmHg Final   • Recovery HR 02/10/2020 99  bpm Final   • Recovery BP 02/10/2020 132/78  mmHg Final   • Target HR (85%) 02/10/2020 122  bpm Final   • Max. Pred. HR (100%) 02/10/2020 144  bpm Final   • Exercise duration (sec) 02/10/2020 10  sec Final   • Nuc Stress EF 02/10/2020 59  % Final     Assessment/Plan   Dejah was seen today for diabetes, hyperlipidemia, hypertension, oab, coronary artery disease, goiter and osteopenia.    Diagnoses and all orders for this visit:    Type 2 diabetes mellitus without complication, without long-term current use of insulin (CMS/[facility])  -     Comprehensive Metabolic Panel  -     Lipid Panel  -     Hemoglobin A1c  -     Microalbumin / Creatinine Urine Ratio - Urine, Clean Catch    Nontoxic multinodular goiter  -     Comprehensive Metabolic Panel  -     TSH    Osteopenia of multiple sites  -     Comprehensive Metabolic Panel  -     Vitamin D 25 Hydroxy    Essential hypertension  -     Comprehensive Metabolic Panel    Hyperlipidemia, unspecified hyperlipidemia type  -     Comprehensive Metabolic Panel  -     Lipid Panel    Obstructive sleep apnea on CPAP  -     Comprehensive Metabolic Panel  -     TSH  -     CBC & Differential    Vitamin D deficiency  -     Comprehensive Metabolic Panel  -     Vitamin D 25 Hydroxy      Continue metformin 500 mg twice a day.  Check thyroid function tests  Continue vitamin D 800 units/day and calcium plus D 1 tablet twice a day.  Continue amlodipine and Toprol per cardiologist.  Continue Lipitor 10 mg/day  Continue CPAP.    Copy of my note sent to Dr. Bansal and Josey Carbajal, NP    RTC 4 mos.

## 2020-08-20 ENCOUNTER — OFFICE VISIT (OUTPATIENT)
Dept: ENDOCRINOLOGY | Age: 75
End: 2020-08-20

## 2020-08-20 VITALS
HEART RATE: 77 BPM | OXYGEN SATURATION: 97 % | DIASTOLIC BLOOD PRESSURE: 60 MMHG | WEIGHT: 205 LBS | HEIGHT: 63 IN | SYSTOLIC BLOOD PRESSURE: 124 MMHG | BODY MASS INDEX: 36.32 KG/M2

## 2020-08-20 DIAGNOSIS — Z99.89 OBSTRUCTIVE SLEEP APNEA ON CPAP: ICD-10-CM

## 2020-08-20 DIAGNOSIS — G47.33 OBSTRUCTIVE SLEEP APNEA ON CPAP: ICD-10-CM

## 2020-08-20 DIAGNOSIS — M85.89 OSTEOPENIA OF MULTIPLE SITES: ICD-10-CM

## 2020-08-20 DIAGNOSIS — E78.5 HYPERLIPIDEMIA, UNSPECIFIED HYPERLIPIDEMIA TYPE: ICD-10-CM

## 2020-08-20 DIAGNOSIS — E55.9 VITAMIN D DEFICIENCY: ICD-10-CM

## 2020-08-20 DIAGNOSIS — I10 ESSENTIAL HYPERTENSION: ICD-10-CM

## 2020-08-20 DIAGNOSIS — E11.9 TYPE 2 DIABETES MELLITUS WITHOUT COMPLICATION, WITHOUT LONG-TERM CURRENT USE OF INSULIN (HCC): Primary | ICD-10-CM

## 2020-08-20 DIAGNOSIS — E04.2 NONTOXIC MULTINODULAR GOITER: ICD-10-CM

## 2020-08-20 PROCEDURE — 99214 OFFICE O/P EST MOD 30 MIN: CPT | Performed by: INTERNAL MEDICINE

## 2020-08-21 ENCOUNTER — OFFICE VISIT (OUTPATIENT)
Dept: FAMILY MEDICINE CLINIC | Facility: CLINIC | Age: 75
End: 2020-08-21

## 2020-08-21 VITALS
SYSTOLIC BLOOD PRESSURE: 127 MMHG | RESPIRATION RATE: 16 BRPM | DIASTOLIC BLOOD PRESSURE: 78 MMHG | HEIGHT: 63 IN | TEMPERATURE: 96.9 F | WEIGHT: 206 LBS | OXYGEN SATURATION: 95 % | HEART RATE: 92 BPM | BODY MASS INDEX: 36.5 KG/M2

## 2020-08-21 DIAGNOSIS — R53.83 FATIGUE, UNSPECIFIED TYPE: Primary | ICD-10-CM

## 2020-08-21 DIAGNOSIS — E87.6 HYPOKALEMIA: Primary | ICD-10-CM

## 2020-08-21 DIAGNOSIS — Z86.39 HISTORY OF NON ANEMIC VITAMIN B12 DEFICIENCY: ICD-10-CM

## 2020-08-21 LAB
25(OH)D3+25(OH)D2 SERPL-MCNC: 44.3 NG/ML (ref 30–100)
ALBUMIN SERPL-MCNC: 4.2 G/DL (ref 3.5–5.2)
ALBUMIN/CREAT UR: 7 MG/G CREAT (ref 0–29)
ALBUMIN/GLOB SERPL: 2.1 G/DL
ALP SERPL-CCNC: 87 U/L (ref 39–117)
ALT SERPL-CCNC: 11 U/L (ref 1–33)
AST SERPL-CCNC: 10 U/L (ref 1–32)
BASOPHILS # BLD AUTO: 0.05 10*3/MM3 (ref 0–0.2)
BASOPHILS NFR BLD AUTO: 0.7 % (ref 0–1.5)
BILIRUB SERPL-MCNC: 0.3 MG/DL (ref 0–1.2)
BUN SERPL-MCNC: 12 MG/DL (ref 8–23)
BUN/CREAT SERPL: 14.3 (ref 7–25)
CALCIUM SERPL-MCNC: 9.6 MG/DL (ref 8.6–10.5)
CHLORIDE SERPL-SCNC: 101 MMOL/L (ref 98–107)
CHOLEST SERPL-MCNC: 126 MG/DL (ref 0–200)
CO2 SERPL-SCNC: 32.5 MMOL/L (ref 22–29)
CREAT SERPL-MCNC: 0.84 MG/DL (ref 0.57–1)
CREAT UR-MCNC: 148 MG/DL
EOSINOPHIL # BLD AUTO: 0.24 10*3/MM3 (ref 0–0.4)
EOSINOPHIL NFR BLD AUTO: 3.6 % (ref 0.3–6.2)
ERYTHROCYTE [DISTWIDTH] IN BLOOD BY AUTOMATED COUNT: 14.1 % (ref 12.3–15.4)
GLOBULIN SER CALC-MCNC: 2 GM/DL
GLUCOSE SERPL-MCNC: 141 MG/DL (ref 65–99)
HBA1C MFR BLD: 6.4 % (ref 4.8–5.6)
HCT VFR BLD AUTO: 39.7 % (ref 34–46.6)
HDLC SERPL-MCNC: 63 MG/DL (ref 40–60)
HGB BLD-MCNC: 12.7 G/DL (ref 12–15.9)
IMM GRANULOCYTES # BLD AUTO: 0.03 10*3/MM3 (ref 0–0.05)
IMM GRANULOCYTES NFR BLD AUTO: 0.4 % (ref 0–0.5)
INTERPRETATION: NORMAL
LDLC SERPL CALC-MCNC: 50 MG/DL (ref 0–100)
LYMPHOCYTES # BLD AUTO: 0.96 10*3/MM3 (ref 0.7–3.1)
LYMPHOCYTES NFR BLD AUTO: 14.3 % (ref 19.6–45.3)
Lab: NORMAL
MCH RBC QN AUTO: 25.7 PG (ref 26.6–33)
MCHC RBC AUTO-ENTMCNC: 32 G/DL (ref 31.5–35.7)
MCV RBC AUTO: 80.4 FL (ref 79–97)
MICROALBUMIN UR-MCNC: 9.9 UG/ML
MONOCYTES # BLD AUTO: 0.45 10*3/MM3 (ref 0.1–0.9)
MONOCYTES NFR BLD AUTO: 6.7 % (ref 5–12)
NEUTROPHILS # BLD AUTO: 4.97 10*3/MM3 (ref 1.7–7)
NEUTROPHILS NFR BLD AUTO: 74.3 % (ref 42.7–76)
NRBC BLD AUTO-RTO: 0 /100 WBC (ref 0–0.2)
PLATELET # BLD AUTO: 318 10*3/MM3 (ref 140–450)
POTASSIUM SERPL-SCNC: 2.9 MMOL/L (ref 3.5–5.2)
PROT SERPL-MCNC: 6.2 G/DL (ref 6–8.5)
RBC # BLD AUTO: 4.94 10*6/MM3 (ref 3.77–5.28)
SODIUM SERPL-SCNC: 145 MMOL/L (ref 136–145)
TRIGL SERPL-MCNC: 66 MG/DL (ref 0–150)
TSH SERPL DL<=0.005 MIU/L-ACNC: 0.43 UIU/ML (ref 0.27–4.2)
VLDLC SERPL CALC-MCNC: 13.2 MG/DL
WBC # BLD AUTO: 6.7 10*3/MM3 (ref 3.4–10.8)

## 2020-08-21 PROCEDURE — 99213 OFFICE O/P EST LOW 20 MIN: CPT | Performed by: FAMILY MEDICINE

## 2020-08-21 RX ORDER — POTASSIUM CHLORIDE 20 MEQ/1
20 TABLET, EXTENDED RELEASE ORAL DAILY
Qty: 30 TABLET | Refills: 2 | Status: SHIPPED | OUTPATIENT
Start: 2020-08-21 | End: 2020-08-26 | Stop reason: ALTCHOICE

## 2020-08-21 NOTE — PROGRESS NOTES
Subjective   Dejah Casey is a 75 y.o. female.     History of Present Illness     Answers for HPI/ROS submitted by the patient on 2020   What is the primary reason for your visit?: Other  Please describe your symptoms.: Follow-up visit for depression.  Have you had these symptoms before?: Yes  How long have you been having these symptoms?: Greater than 2 weeks  Please list any medications you are currently taking for this condition.: Venlafaxine 75mg twice daily.    Patient reports that her  passed away a little over a month ago.   in his sleep.  Patient feels like she has good support from her stepchildren.  She is currently trying to figure out financial logistics since he has passed.  This can be frustrating.  Denies suicidal homicidal ideation or self injury.    Reports continued fatigue issues.  B12 was in the 400 about 8 months ago.  Will recheck today.  May need supplementation.  Is seeing her sleep doctor next week.    The following portions of the patient's history were reviewed and updated as appropriate: allergies, current medications, past family history, past medical history, past social history, past surgical history and problem list.    Review of Systems   Constitutional: Positive for fatigue. Negative for chills and fever.   HENT: Negative for congestion.    Respiratory: Negative for cough and shortness of breath.    Cardiovascular: Negative for chest pain, palpitations and leg swelling.   Gastrointestinal: Negative for abdominal pain, diarrhea and vomiting.   Psychiatric/Behavioral: Positive for stress. Negative for self-injury and suicidal ideas.       Objective   Physical Exam   Constitutional: She appears well-developed and well-nourished.   HENT:   Head: Normocephalic and atraumatic.   Mouth/Throat: Uvula is midline, oropharynx is clear and moist and mucous membranes are normal.   Eyes: Pupils are equal, round, and reactive to light. EOM are normal.   Cardiovascular: Normal  rate and regular rhythm.   No murmur heard.  Pulmonary/Chest: Effort normal and breath sounds normal. No stridor. No respiratory distress. She has no wheezes. She has no rales.   Neurological: She is alert.   Skin: Skin is warm.   Psychiatric: She has a normal mood and affect. Her behavior is normal.               Assessment/Plan     Dejah was seen today for labs only and fatigue.    Diagnoses and all orders for this visit:    Fatigue, unspecified type  -     Vitamin B12 & Folate    History of non anemic vitamin B12 deficiency  -     Vitamin B12 & Folate

## 2020-08-22 LAB
ALBUMIN/CREAT UR: 9 MG/G CREAT (ref 0–29)
BASOPHILS # BLD AUTO: 0.05 10*3/MM3 (ref 0–0.2)
BASOPHILS NFR BLD AUTO: 0.8 % (ref 0–1.5)
CREAT UR-MCNC: 172.7 MG/DL
EOSINOPHIL # BLD AUTO: 0.28 10*3/MM3 (ref 0–0.4)
EOSINOPHIL NFR BLD AUTO: 4.5 % (ref 0.3–6.2)
ERYTHROCYTE [DISTWIDTH] IN BLOOD BY AUTOMATED COUNT: 14.2 % (ref 12.3–15.4)
FOLATE SERPL-MCNC: 7.05 NG/ML (ref 4.78–24.2)
HCT VFR BLD AUTO: 38.6 % (ref 34–46.6)
HGB BLD-MCNC: 12.1 G/DL (ref 12–15.9)
IMM GRANULOCYTES # BLD AUTO: 0.01 10*3/MM3 (ref 0–0.05)
IMM GRANULOCYTES NFR BLD AUTO: 0.2 % (ref 0–0.5)
LYMPHOCYTES # BLD AUTO: 0.93 10*3/MM3 (ref 0.7–3.1)
LYMPHOCYTES NFR BLD AUTO: 15 % (ref 19.6–45.3)
MCH RBC QN AUTO: 25.8 PG (ref 26.6–33)
MCHC RBC AUTO-ENTMCNC: 31.3 G/DL (ref 31.5–35.7)
MCV RBC AUTO: 82.3 FL (ref 79–97)
MICROALBUMIN UR-MCNC: 14.8 UG/ML
MONOCYTES # BLD AUTO: 0.4 10*3/MM3 (ref 0.1–0.9)
MONOCYTES NFR BLD AUTO: 6.4 % (ref 5–12)
NEUTROPHILS # BLD AUTO: 4.55 10*3/MM3 (ref 1.7–7)
NEUTROPHILS NFR BLD AUTO: 73.1 % (ref 42.7–76)
NRBC BLD AUTO-RTO: 0 /100 WBC (ref 0–0.2)
PLATELET # BLD AUTO: 307 10*3/MM3 (ref 140–450)
RBC # BLD AUTO: 4.69 10*6/MM3 (ref 3.77–5.28)
VIT B12 SERPL-MCNC: 326 PG/ML (ref 211–946)
WBC # BLD AUTO: 6.22 10*3/MM3 (ref 3.4–10.8)

## 2020-08-22 NOTE — PROGRESS NOTES
B12 in normal range however at lower end. Given her fatigue recommend B12 injections 1000 mcg once a month for 3 months with plans to recheck B12 and folate in 3 months. Please place orders.

## 2020-08-24 RX ORDER — VENLAFAXINE 75 MG/1
TABLET ORAL
Qty: 360 TABLET | Refills: 1 | Status: SHIPPED | OUTPATIENT
Start: 2020-08-24 | End: 2021-02-08

## 2020-08-24 RX ORDER — ATORVASTATIN CALCIUM 10 MG/1
TABLET, FILM COATED ORAL
Qty: 90 TABLET | Refills: 1 | Status: SHIPPED | OUTPATIENT
Start: 2020-08-24 | End: 2021-02-08

## 2020-08-25 DIAGNOSIS — E87.6 HYPOKALEMIA: ICD-10-CM

## 2020-08-25 DIAGNOSIS — E55.9 VITAMIN D DEFICIENCY: Primary | ICD-10-CM

## 2020-08-26 RX ORDER — POTASSIUM CHLORIDE 20MEQ/15ML
20 LIQUID (ML) ORAL DAILY
Qty: 3800 ML | Refills: 1 | Status: SHIPPED | OUTPATIENT
Start: 2020-08-26 | End: 2021-10-28

## 2020-08-28 ENCOUNTER — RESULTS ENCOUNTER (OUTPATIENT)
Dept: ENDOCRINOLOGY | Age: 75
End: 2020-08-28

## 2020-08-28 DIAGNOSIS — E87.6 HYPOKALEMIA: ICD-10-CM

## 2020-09-09 ENCOUNTER — CLINICAL SUPPORT (OUTPATIENT)
Dept: FAMILY MEDICINE CLINIC | Facility: CLINIC | Age: 75
End: 2020-09-09

## 2020-09-09 DIAGNOSIS — E53.8 VITAMIN B 12 DEFICIENCY: Primary | ICD-10-CM

## 2020-09-09 PROCEDURE — 96372 THER/PROPH/DIAG INJ SC/IM: CPT | Performed by: FAMILY MEDICINE

## 2020-09-09 RX ORDER — CYANOCOBALAMIN 1000 UG/ML
1000 INJECTION, SOLUTION INTRAMUSCULAR; SUBCUTANEOUS
Status: DISCONTINUED | OUTPATIENT
Start: 2020-09-09 | End: 2021-10-13

## 2020-09-09 RX ADMIN — CYANOCOBALAMIN 1000 MCG: 1000 INJECTION, SOLUTION INTRAMUSCULAR; SUBCUTANEOUS at 15:36

## 2020-09-09 NOTE — PROGRESS NOTES
Injection  Injection performed in left deltoid by Luciana Alcala MA. Patient tolerated the procedure well without complications.  09/09/20   Luciana Alcala MA

## 2020-09-14 ENCOUNTER — LAB (OUTPATIENT)
Dept: ENDOCRINOLOGY | Age: 75
End: 2020-09-14

## 2020-09-16 LAB
GLUCOSE SERPL-MCNC: NORMAL MG/DL
SPECIMEN STATUS: NORMAL

## 2020-09-22 DIAGNOSIS — I10 ESSENTIAL HYPERTENSION: Primary | ICD-10-CM

## 2020-09-23 LAB
BUN SERPL-MCNC: 13 MG/DL (ref 8–27)
BUN/CREAT SERPL: 15 (ref 12–28)
CALCIUM SERPL-MCNC: 9.7 MG/DL (ref 8.7–10.3)
CHLORIDE SERPL-SCNC: 100 MMOL/L (ref 96–106)
CO2 SERPL-SCNC: 24 MMOL/L (ref 20–29)
CREAT SERPL-MCNC: 0.85 MG/DL (ref 0.57–1)
GLUCOSE SERPL-MCNC: 140 MG/DL (ref 65–99)
POTASSIUM SERPL-SCNC: 3.7 MMOL/L (ref 3.5–5.2)
SODIUM SERPL-SCNC: 143 MMOL/L (ref 134–144)

## 2020-10-09 ENCOUNTER — CLINICAL SUPPORT (OUTPATIENT)
Dept: FAMILY MEDICINE CLINIC | Facility: CLINIC | Age: 75
End: 2020-10-09

## 2020-10-09 DIAGNOSIS — E53.8 VITAMIN B 12 DEFICIENCY: Primary | ICD-10-CM

## 2020-10-09 PROCEDURE — 96372 THER/PROPH/DIAG INJ SC/IM: CPT | Performed by: FAMILY MEDICINE

## 2020-10-09 RX ADMIN — CYANOCOBALAMIN 1000 MCG: 1000 INJECTION, SOLUTION INTRAMUSCULAR; SUBCUTANEOUS at 14:24

## 2020-10-09 NOTE — PROGRESS NOTES
Injection  Injection performed in rd by Senait Patterson LPN. Patient tolerated the procedure well without complications.  10/09/20   Senait Patterson LPN    Answers for HPI/ROS submitted by the patient on 10/7/2020   What is the primary reason for your visit?: Other  Please describe your symptoms.: Very, very tired.  Have you had these symptoms before?: Yes  How long have you been having these symptoms?: Greater than 2 weeks  Please list any medications you are currently taking for this condition.: B-12 shot, and potassium.  Please describe any probable cause for these symptoms. : Stress.

## 2020-10-27 DIAGNOSIS — E11.9 TYPE 2 DIABETES MELLITUS WITHOUT COMPLICATION, WITHOUT LONG-TERM CURRENT USE OF INSULIN (HCC): ICD-10-CM

## 2020-10-27 DIAGNOSIS — E11.9 DIABETES MELLITUS TYPE II, CONTROLLED, WITH NO COMPLICATIONS (HCC): ICD-10-CM

## 2020-10-28 DIAGNOSIS — E11.9 DIABETES MELLITUS TYPE II, CONTROLLED, WITH NO COMPLICATIONS (HCC): ICD-10-CM

## 2020-10-28 DIAGNOSIS — E11.9 TYPE 2 DIABETES MELLITUS WITHOUT COMPLICATION, WITHOUT LONG-TERM CURRENT USE OF INSULIN (HCC): ICD-10-CM

## 2020-10-28 RX ORDER — BLOOD SUGAR DIAGNOSTIC
STRIP MISCELLANEOUS
Qty: 100 EACH | Refills: 1 | Status: SHIPPED | OUTPATIENT
Start: 2020-10-28 | End: 2021-09-23

## 2020-10-28 RX ORDER — BLOOD SUGAR DIAGNOSTIC
STRIP MISCELLANEOUS
Qty: 100 EACH | Refills: 1 | Status: SHIPPED | OUTPATIENT
Start: 2020-10-28 | End: 2020-10-28 | Stop reason: SDUPTHER

## 2020-10-28 NOTE — TELEPHONE ENCOUNTER
Patient needs glucose strips filled please; 30 days sent to [facility] and 90 day sent to [facility] mail order

## 2020-11-16 RX ORDER — POTASSIUM CHLORIDE 1500 MG/1
TABLET, FILM COATED, EXTENDED RELEASE ORAL
Qty: 90 TABLET | Refills: 1 | OUTPATIENT
Start: 2020-11-16

## 2020-11-20 ENCOUNTER — CLINICAL SUPPORT (OUTPATIENT)
Dept: FAMILY MEDICINE CLINIC | Facility: CLINIC | Age: 75
End: 2020-11-20

## 2020-11-20 DIAGNOSIS — E53.8 VITAMIN B 12 DEFICIENCY: Primary | ICD-10-CM

## 2020-11-20 PROCEDURE — 96372 THER/PROPH/DIAG INJ SC/IM: CPT | Performed by: FAMILY MEDICINE

## 2020-11-20 RX ADMIN — CYANOCOBALAMIN 1000 MCG: 1000 INJECTION, SOLUTION INTRAMUSCULAR; SUBCUTANEOUS at 14:39

## 2020-11-30 ENCOUNTER — RESULTS ENCOUNTER (OUTPATIENT)
Dept: FAMILY MEDICINE CLINIC | Facility: CLINIC | Age: 75
End: 2020-11-30

## 2020-11-30 ENCOUNTER — OFFICE VISIT (OUTPATIENT)
Dept: FAMILY MEDICINE CLINIC | Facility: CLINIC | Age: 75
End: 2020-11-30

## 2020-11-30 VITALS
HEART RATE: 85 BPM | OXYGEN SATURATION: 98 % | HEIGHT: 63 IN | RESPIRATION RATE: 16 BRPM | TEMPERATURE: 97.2 F | BODY MASS INDEX: 36.32 KG/M2 | WEIGHT: 205 LBS | SYSTOLIC BLOOD PRESSURE: 131 MMHG | DIASTOLIC BLOOD PRESSURE: 75 MMHG

## 2020-11-30 DIAGNOSIS — R53.83 FATIGUE, UNSPECIFIED TYPE: ICD-10-CM

## 2020-11-30 DIAGNOSIS — E53.8 VITAMIN B 12 DEFICIENCY: Primary | ICD-10-CM

## 2020-11-30 DIAGNOSIS — E87.6 HYPOKALEMIA: ICD-10-CM

## 2020-11-30 DIAGNOSIS — H04.123 CHRONIC DRYNESS OF BOTH EYES: ICD-10-CM

## 2020-11-30 DIAGNOSIS — E55.9 VITAMIN D DEFICIENCY: ICD-10-CM

## 2020-11-30 DIAGNOSIS — M35.00 SICCA, UNSPECIFIED TYPE (HCC): ICD-10-CM

## 2020-11-30 DIAGNOSIS — R79.9 ABNORMAL FINDING OF BLOOD CHEMISTRY, UNSPECIFIED: ICD-10-CM

## 2020-11-30 PROCEDURE — 99213 OFFICE O/P EST LOW 20 MIN: CPT | Performed by: FAMILY MEDICINE

## 2020-11-30 NOTE — PROGRESS NOTES
Subjective   Dejah Casey is a 75 y.o. female.     History of Present Illness     Answers for HPI/ROS submitted by the patient on 11/29/2020   What is the primary reason for your visit?: Other  Please describe your symptoms.: Fatigue.  I had three B-12 shots and they have not helped.  Have you had these symptoms before?: Yes  How long have you been having these symptoms?: Greater than 2 weeks  Please list any medications you are currently taking for this condition.: B-12 shots,  Potassium.    75-year-old female presents today for 3-month follow-up on B12 deficiency, fatigue    B12 was low 11 months ago.  Recheck in August was low normal.  Recommended B12 injections once a month with plans to recheck at this visit.  Patient reports she has gotten 3 injections and still very tired.    Dentist and ophthalmologist have concern for Sjogren's syndrome due to continued dry eyes and dry mouth.    The following portions of the patient's history were reviewed and updated as appropriate: allergies, current medications, past family history, past medical history, past social history, past surgical history and problem list.    Review of Systems   Constitutional: Positive for fatigue. Negative for chills and fever.   HENT: Negative for congestion.    Respiratory: Negative for cough and shortness of breath.    Cardiovascular: Negative for chest pain, palpitations and leg swelling.   Gastrointestinal: Negative for abdominal pain, diarrhea and vomiting.       Objective   Physical Exam  Constitutional:       Appearance: She is well-developed.   HENT:      Head: Normocephalic and atraumatic.      Mouth/Throat:      Pharynx: Uvula midline.   Eyes:      Pupils: Pupils are equal, round, and reactive to light.   Cardiovascular:      Rate and Rhythm: Normal rate and regular rhythm.      Heart sounds: No murmur.   Pulmonary:      Effort: Pulmonary effort is normal. No respiratory distress.      Breath sounds: Normal breath sounds. No  stridor. No wheezing or rales.   Skin:     General: Skin is warm.   Neurological:      Mental Status: She is alert.   Psychiatric:         Behavior: Behavior normal.                 Assessment/Plan     Diagnoses and all orders for this visit:    1. Vitamin B 12 deficiency (Primary)  -     Intrinsic Factor Ab  -     Iron  -     Vitamin B12 and Folate  -     Ferritin  -     CBC and Differential  -     Reticulocytes  -     SARS-CoV-2 Antibodies (Roche)  -     Anti-parietal Antibody    2. Fatigue, unspecified type  -     Intrinsic Factor Ab  -     Iron  -     Vitamin B12 and Folate  -     Ferritin  -     CBC and Differential  -     Reticulocytes  -     SARS-CoV-2 Antibodies (Roche)  -     Anti-parietal Antibody    3. Chronic dryness of both eyes  -     SARS-CoV-2 Antibodies (Roche)  -     Rheumatoid Factor, Quant  -     MELIZA  -     Sedimentation Rate  -     Sjogren's Antibody, Anti-SS-A / -SS-B    4. Sicca, unspecified type (CMS/HCC)  -     SARS-CoV-2 Antibodies (Roche)  -     Rheumatoid Factor, Quant  -     MELIZA  -     Sedimentation Rate  -     Sjogren's Antibody, Anti-SS-A / -SS-B    5. Abnormal finding of blood chemistry, unspecified   -     Iron  -     Ferritin    Other orders  -     Cancel: Homocysteine  -     Cancel: Methylmalonic Acid, Serum

## 2020-12-02 LAB
ANA SER QL: NEGATIVE
BASOPHILS # BLD AUTO: 0.07 10*3/MM3 (ref 0–0.2)
BASOPHILS NFR BLD AUTO: 1.1 % (ref 0–1.5)
ENA SS-A AB SER-ACNC: <0.2 AI (ref 0–0.9)
ENA SS-B AB SER-ACNC: <0.2 AI (ref 0–0.9)
EOSINOPHIL # BLD AUTO: 0.24 10*3/MM3 (ref 0–0.4)
EOSINOPHIL NFR BLD AUTO: 3.7 % (ref 0.3–6.2)
ERYTHROCYTE [DISTWIDTH] IN BLOOD BY AUTOMATED COUNT: 13 % (ref 12.3–15.4)
ERYTHROCYTE [SEDIMENTATION RATE] IN BLOOD BY WESTERGREN METHOD: 10 MM/HR (ref 0–30)
FERRITIN SERPL-MCNC: 52.8 NG/ML (ref 13–150)
FOLATE SERPL-MCNC: 5.68 NG/ML (ref 4.78–24.2)
HCT VFR BLD AUTO: 39.8 % (ref 34–46.6)
HGB BLD-MCNC: 12.8 G/DL (ref 12–15.9)
IF BLOCK AB SER-ACNC: 1 AU/ML (ref 0–1.1)
IMM GRANULOCYTES # BLD AUTO: 0.03 10*3/MM3 (ref 0–0.05)
IMM GRANULOCYTES NFR BLD AUTO: 0.5 % (ref 0–0.5)
IRON SERPL-MCNC: 74 MCG/DL (ref 37–145)
LYMPHOCYTES # BLD AUTO: 1 10*3/MM3 (ref 0.7–3.1)
LYMPHOCYTES NFR BLD AUTO: 15.5 % (ref 19.6–45.3)
MCH RBC QN AUTO: 25.3 PG (ref 26.6–33)
MCHC RBC AUTO-ENTMCNC: 32.2 G/DL (ref 31.5–35.7)
MCV RBC AUTO: 78.8 FL (ref 79–97)
MONOCYTES # BLD AUTO: 0.41 10*3/MM3 (ref 0.1–0.9)
MONOCYTES NFR BLD AUTO: 6.4 % (ref 5–12)
NEUTROPHILS # BLD AUTO: 4.7 10*3/MM3 (ref 1.7–7)
NEUTROPHILS NFR BLD AUTO: 72.8 % (ref 42.7–76)
NRBC BLD AUTO-RTO: 0 /100 WBC (ref 0–0.2)
PCA AB SER-ACNC: 33.2 UNITS (ref 0–20)
PLATELET # BLD AUTO: 296 10*3/MM3 (ref 140–450)
RBC # BLD AUTO: 5.05 10*6/MM3 (ref 3.77–5.28)
RETICS/RBC NFR AUTO: 0.86 % (ref 0.7–1.9)
RHEUMATOID FACT SERPL-ACNC: <10 IU/ML (ref 0–13.9)
SARS-COV-2 AB SERPL QL IA: NEGATIVE
VIT B12 SERPL-MCNC: 775 PG/ML (ref 211–946)
WBC # BLD AUTO: 6.45 10*3/MM3 (ref 3.4–10.8)

## 2020-12-11 ENCOUNTER — TELEPHONE (OUTPATIENT)
Dept: FAMILY MEDICINE CLINIC | Facility: CLINIC | Age: 75
End: 2020-12-11

## 2020-12-14 NOTE — PROGRESS NOTES
Subjective   Dejah Casey is a 75 y.o. female.     F/u for dm 2, hyperlipidemia, hypertension, CAD,nontoxic MNG,osteopenia,OAB, sleep apnea / testing bs 1 x day / last dm eye exam 10/27/20 with dr Carter/ last dm foot exam 8/20/20 with dr Hernandez / flu vaccine @ [facility]      Patient is a 75year-old female who came in for followup. She was found to have a goiter on examination last August 2012. She had thyroid ultrasound done on 08/21/2012 which showed a multinodular goiter with the dominant solid nodule in the left measuring 1.8 cm. She had followup thyroid ultrasound in 4/14 at [facility] showed multinodular goiter with stable nodules.        She denies any voice changes. She denies any pressure symptoms or dysphagia. She denies any bowel changes.  She has no previous history of head or neck radiation therapy.      She has known diabetes mellitus since 2003 and has been on metformin 500 mg twice a day. Blood sugar has ranged 69-98. She has no microalbuminuria on urine sample taken 8/20.   Her last eye exam was 7/20.  She had bilateral cataract surgery.  She has no retinopathy but has constant tearing.  She denies tingling in her feet.      She has hyperlipidemia and has been on Lipitor 10 mg once a day.  She denies any myalgia.       She has mild coronary artery disease by cardiac catheterization done in 2008 by Dr. CAMILLE Mcmullen. She denies any chest pain.  She had a normal nuclear stress tests in July 2018.  She is following up with Dr. Leal.      She has hypertension and has been on amlodipine 5 mg once a day, furosemide 40 mg every morning and Toprol 50 mg/day. She was taken off hydrochlorothiazide because of hypercalcemia. She had cough with lisinopril in the past. She has not used ARB in the past.       She has overactive bladder diagnosed by Dr. Dean and is on Myrbetriq.  She was taken off Toviaz because of mouth dryness.  She was taken off Vesicare because of cost.     She has osteopenia on bone density done at  [facility] in November 2015.  She is on vitamin D 800 units per day and calcium 500 plus D 1 tab BID..  Bone density done in December 2017 showed improvement of the left hip density and a slight decrease in the lumbar spine.       Bone density done in January 2020 showed osteopenia with a 6% decrease on the left femoral neck.  FRAX score for major osteoporotic fracture is 6.2% and for hip fracture is 1.6%.     She has sleep apnea and is using her CPAP..  She is no longer is snoring.  She wakes up tired.     She has occasional constipation.  She denies melena or hematochezia.  Her last colonoscopy was in August 2010.    She was  in July 2020.  She has stepchildren but no natural children.    She was recently diagnosed to have vitamin B12 deficiency and was started on IM vitamin B12 by Dr. Bansal.  Parietal cell antibodies were elevated.  Intrinsic factor antibody was normal    The following portions of the patient's history were reviewed and updated as appropriate: allergies, current medications, past family history, past medical history, past social history, past surgical history and problem list.    Review of Systems   Constitutional: Positive for fatigue. Negative for chills and fever.   HENT: Negative.    Eyes: Positive for visual disturbance.   Respiratory: Negative.  Negative for chest tightness, shortness of breath and wheezing.    Cardiovascular: Negative for chest pain, palpitations and leg swelling.   Gastrointestinal: Negative.  Negative for abdominal distention, abdominal pain, constipation and diarrhea.   Endocrine: Negative.    Genitourinary: Negative.  Negative for difficulty urinating, frequency and urgency.   Musculoskeletal: Negative.  Negative for back pain, joint swelling and neck pain.   Neurological: Negative for dizziness, tremors, seizures, light-headedness, numbness and headaches.   Hematological: Bruises/bleeds easily.   Psychiatric/Behavioral: Positive for sleep disturbance (sleep apnea  "using CPAP machine ). Negative for agitation and confusion. The patient is not nervous/anxious.      Objective      Vitals:    12/18/20 1425   BP: 126/62   BP Location: Right arm   Patient Position: Sitting   Cuff Size: Large Adult   Pulse: 93   SpO2: 99%   Weight: 85.7 kg (189 lb)   Height: 160 cm (62.99\")     Physical Exam  Constitutional:       General: She is not in acute distress.     Appearance: Normal appearance. She is obese. She is not ill-appearing, toxic-appearing or diaphoretic.   Eyes:      General: No scleral icterus.        Right eye: No discharge.         Left eye: No discharge.      Extraocular Movements: Extraocular movements intact.      Conjunctiva/sclera: Conjunctivae normal.   Neck:      Musculoskeletal: Neck supple. No neck rigidity or muscular tenderness.      Vascular: No carotid bruit.   Cardiovascular:      Rate and Rhythm: Normal rate and regular rhythm.      Heart sounds: Normal heart sounds. No murmur. No friction rub. No gallop.    Pulmonary:      Effort: No respiratory distress.      Breath sounds: Normal breath sounds. No stridor. No wheezing, rhonchi or rales.   Chest:      Chest wall: No tenderness.   Abdominal:      General: Bowel sounds are normal. There is no distension.      Palpations: Abdomen is soft. There is no mass.      Tenderness: There is no abdominal tenderness. There is no right CVA tenderness or left CVA tenderness.   Musculoskeletal: Normal range of motion.   Lymphadenopathy:      Cervical: No cervical adenopathy.   Skin:     General: Skin is warm and dry.   Neurological:      General: No focal deficit present.      Mental Status: She is alert and oriented to person, place, and time.   Psychiatric:         Mood and Affect: Mood normal.         Behavior: Behavior normal.       Office Visit on 11/30/2020   Component Date Value Ref Range Status   • Intrinsic Factor Antibodies 11/30/2020 1.0  0.0 - 1.1 AU/mL Final   • Iron 11/30/2020 74  37 - 145 mcg/dL Final   • " Vitamin B-12 11/30/2020 775  211 - 946 pg/mL Final    Results may be falsely increased if patient taking Biotin.   • Folate 11/30/2020 5.68  4.78 - 24.20 ng/mL Final    Results may be falsely increased if patient taking Biotin.   • Ferritin 11/30/2020 52.80  13.00 - 150.00 ng/mL Final    Results may be falsely decreased if patient taking Biotin.   • WBC 11/30/2020 6.45  3.40 - 10.80 10*3/mm3 Final   • RBC 11/30/2020 5.05  3.77 - 5.28 10*6/mm3 Final   • Hemoglobin 11/30/2020 12.8  12.0 - 15.9 g/dL Final   • Hematocrit 11/30/2020 39.8  34.0 - 46.6 % Final   • MCV 11/30/2020 78.8* 79.0 - 97.0 fL Final   • MCH 11/30/2020 25.3* 26.6 - 33.0 pg Final   • MCHC 11/30/2020 32.2  31.5 - 35.7 g/dL Final   • RDW 11/30/2020 13.0  12.3 - 15.4 % Final   • Platelets 11/30/2020 296  140 - 450 10*3/mm3 Final   • Neutrophil Rel % 11/30/2020 72.8  42.7 - 76.0 % Final   • Lymphocyte Rel % 11/30/2020 15.5* 19.6 - 45.3 % Final   • Monocyte Rel % 11/30/2020 6.4  5.0 - 12.0 % Final   • Eosinophil Rel % 11/30/2020 3.7  0.3 - 6.2 % Final   • Basophil Rel % 11/30/2020 1.1  0.0 - 1.5 % Final   • Neutrophils Absolute 11/30/2020 4.70  1.70 - 7.00 10*3/mm3 Final   • Lymphocytes Absolute 11/30/2020 1.00  0.70 - 3.10 10*3/mm3 Final   • Monocytes Absolute 11/30/2020 0.41  0.10 - 0.90 10*3/mm3 Final   • Eosinophils Absolute 11/30/2020 0.24  0.00 - 0.40 10*3/mm3 Final   • Basophils Absolute 11/30/2020 0.07  0.00 - 0.20 10*3/mm3 Final   • Immature Granulocyte Rel % 11/30/2020 0.5  0.0 - 0.5 % Final   • Immature Grans Absolute 11/30/2020 0.03  0.00 - 0.05 10*3/mm3 Final   • nRBC 11/30/2020 0.0  0.0 - 0.2 /100 WBC Final   • Reticulocyte Absolute 11/30/2020 0.86  0.70 - 1.90 % Final   • SARS-COV-2 ANTIBODIES 11/30/2020 Negative  Negative Final    Comment: This sample does not contain detectable SARS-CoV-2 antibodies. This  negative result does not rule out SARS-CoV-2 infection. Correlation  with epidemiologic risk factors and other clinical and  laboratory  findings is recommended. Serologic results should not be used as the  sole basis to diagnose or exclude recent SARS-CoV-2 infection.     • RA Latex Turbid 11/30/2020 <10.0  0.0 - 13.9 IU/mL Final   • MELIZA Direct 11/30/2020 Negative  Negative Final   • Sed Rate 11/30/2020 10  0 - 30 mm/hr Final   • JOSE ENRIQUE SSA (RO) Ab 11/30/2020 <0.2  0.0 - 0.9 AI Final   • JOSE ENRIQUE SSB (LA) Ab 11/30/2020 <0.2  0.0 - 0.9 AI Final   • Parietal Cell Ab 11/30/2020 33.2* 0.0 - 20.0 Units Final    Comment:                                 Negative    0.0 - 20.0                                  Equivocal  20.1 - 24.9                                  Positive         >24.9  Parietal Cell Antibodies are found in 90% of patients  with pernicious anemia and 30% of first degree  relatives with pernicious anemia.       Assessment/Plan   Diagnoses and all orders for this visit:    1. Type 2 diabetes mellitus without complication, without long-term current use of insulin (CMS/[facility]) (Primary)  -     Comprehensive Metabolic Panel  -     Hemoglobin A1c    2. Nontoxic multinodular goiter  -     Comprehensive Metabolic Panel  -     TSH  -     T4, Free    3. Hyperlipidemia, unspecified hyperlipidemia type  -     Comprehensive Metabolic Panel  -     Lipid Panel    4. Essential hypertension  -     Comprehensive Metabolic Panel    5. Obstructive sleep apnea on CPAP  -     Comprehensive Metabolic Panel  -     TSH  -     T4, Free    6. Vitamin D deficiency  -     Comprehensive Metabolic Panel  -     Vitamin D 25 Hydroxy    7. Osteopenia of multiple sites  -     Comprehensive Metabolic Panel  -     Vitamin D 25 Hydroxy      Check thyroid function tests.  Continue Metformin 500 mg twice a day.  Continue Lipitor 10 mg/day.  Continue amlodipine and Toprol-XL  Continue vitamin D3 and calcium plus D.    Copy of my note sent to Dr. Bansal    RTC 4 mos

## 2020-12-15 ENCOUNTER — TELEMEDICINE (OUTPATIENT)
Dept: FAMILY MEDICINE CLINIC | Facility: CLINIC | Age: 75
End: 2020-12-15

## 2020-12-15 DIAGNOSIS — Z83.49 FAMILY HISTORY OF B12 DEFICIENCY: ICD-10-CM

## 2020-12-15 DIAGNOSIS — R53.83 FATIGUE, UNSPECIFIED TYPE: ICD-10-CM

## 2020-12-15 DIAGNOSIS — D51.0 PERNICIOUS ANEMIA: Primary | ICD-10-CM

## 2020-12-15 PROCEDURE — 99441 PR PHYS/QHP TELEPHONE EVALUATION 5-10 MIN: CPT | Performed by: FAMILY MEDICINE

## 2020-12-15 NOTE — PROGRESS NOTES
Dejah Casey    1945  [id number]  Time spent reviewing E-Visit Questionnaire-5-10 minutes.  I have reviewed the e-Visit questionnaire and patient's answers, my assessment and plan documented below.    HPI    This visit was completed via telephone due to the restriction of the COVID-19 pandemic.  All issues as below were discussed and addressed but no physical exam was performed.  It was felt that the patient should be evaluated in clinic and they were directed there.  The patient verbally consented to visit.    75-year-old female presents today via video visit to discuss results and concern for pernicious anemia.    Patient has a history of B12 deficiency.  This has been treated with supplements.  Patient continues to feel extremely fatigued and tired.  Falls asleep very easily.  Recent lab work showed positive for anti-parietal antibodies however B12 was normal at 775. Other autoimmune work-up was negative.  CBC showed MCV of 78.8.  Lymphocyte relative percentage 15.5%.    Discussed considering increased B12 supplementation given antiparietal antibodies however given normal B12 level this may not be beneficial.  Recommend referral to CBC clinic.    Diagnoses and all orders for this visit:    1. Pernicious anemia (Primary)  -     Ambulatory Referral to Hematology    2. Fatigue, unspecified type  -     Ambulatory Referral to Hematology    3. Family history of B12 deficiency  -     Ambulatory Referral to Hematology         Any medications prescribed have been sent electronically to   [facility] - [place], [place] - [address] - [phone]  - [phone] FX  [address] [address]  Phone: [phone] Fax: [phone]    [id number] [place] [facility] [address] AT [facility] - [phone]  - [phone] FX  [address]  [facility] [address]  Phone: [phone] Fax: [phone]        Nanci Bansal  MD  12/15/20  11:24 EST

## 2020-12-17 RX ORDER — METOPROLOL SUCCINATE 50 MG/1
50 TABLET, EXTENDED RELEASE ORAL DAILY
Qty: 90 TABLET | Refills: 3 | Status: SHIPPED | OUTPATIENT
Start: 2020-12-17 | End: 2021-10-13 | Stop reason: SDUPTHER

## 2020-12-18 ENCOUNTER — OFFICE VISIT (OUTPATIENT)
Dept: ENDOCRINOLOGY | Age: 75
End: 2020-12-18

## 2020-12-18 VITALS
HEIGHT: 63 IN | OXYGEN SATURATION: 99 % | BODY MASS INDEX: 33.49 KG/M2 | DIASTOLIC BLOOD PRESSURE: 62 MMHG | WEIGHT: 189 LBS | SYSTOLIC BLOOD PRESSURE: 126 MMHG | HEART RATE: 93 BPM

## 2020-12-18 DIAGNOSIS — E11.9 TYPE 2 DIABETES MELLITUS WITHOUT COMPLICATION, WITHOUT LONG-TERM CURRENT USE OF INSULIN (HCC): Primary | ICD-10-CM

## 2020-12-18 DIAGNOSIS — E55.9 VITAMIN D DEFICIENCY: ICD-10-CM

## 2020-12-18 DIAGNOSIS — G47.33 OBSTRUCTIVE SLEEP APNEA ON CPAP: ICD-10-CM

## 2020-12-18 DIAGNOSIS — I10 ESSENTIAL HYPERTENSION: ICD-10-CM

## 2020-12-18 DIAGNOSIS — Z99.89 OBSTRUCTIVE SLEEP APNEA ON CPAP: ICD-10-CM

## 2020-12-18 DIAGNOSIS — E04.2 NONTOXIC MULTINODULAR GOITER: ICD-10-CM

## 2020-12-18 DIAGNOSIS — M85.89 OSTEOPENIA OF MULTIPLE SITES: ICD-10-CM

## 2020-12-18 DIAGNOSIS — E78.5 HYPERLIPIDEMIA, UNSPECIFIED HYPERLIPIDEMIA TYPE: ICD-10-CM

## 2020-12-18 PROCEDURE — 99214 OFFICE O/P EST MOD 30 MIN: CPT | Performed by: INTERNAL MEDICINE

## 2020-12-19 LAB
25(OH)D3+25(OH)D2 SERPL-MCNC: 44.1 NG/ML (ref 30–100)
ALBUMIN SERPL-MCNC: 3.8 G/DL (ref 3.5–5.2)
ALBUMIN/GLOB SERPL: 1.3 G/DL
ALP SERPL-CCNC: 88 U/L (ref 39–117)
ALT SERPL-CCNC: 9 U/L (ref 1–33)
AST SERPL-CCNC: 7 U/L (ref 1–32)
BILIRUB SERPL-MCNC: 0.2 MG/DL (ref 0–1.2)
BUN SERPL-MCNC: 16 MG/DL (ref 8–23)
BUN/CREAT SERPL: 18.2 (ref 7–25)
CALCIUM SERPL-MCNC: 9.6 MG/DL (ref 8.6–10.5)
CHLORIDE SERPL-SCNC: 104 MMOL/L (ref 98–107)
CHOLEST SERPL-MCNC: 135 MG/DL (ref 0–200)
CO2 SERPL-SCNC: 30.4 MMOL/L (ref 22–29)
CREAT SERPL-MCNC: 0.88 MG/DL (ref 0.57–1)
GLOBULIN SER CALC-MCNC: 3 GM/DL
GLUCOSE SERPL-MCNC: 100 MG/DL (ref 65–99)
HBA1C MFR BLD: 5.9 % (ref 4.8–5.6)
HDLC SERPL-MCNC: 68 MG/DL (ref 40–60)
INTERPRETATION: NORMAL
LDLC SERPL CALC-MCNC: 55 MG/DL (ref 0–100)
Lab: NORMAL
POTASSIUM SERPL-SCNC: 4.1 MMOL/L (ref 3.5–5.2)
PROT SERPL-MCNC: 6.8 G/DL (ref 6–8.5)
SODIUM SERPL-SCNC: 144 MMOL/L (ref 136–145)
T4 FREE SERPL-MCNC: 0.88 NG/DL (ref 0.93–1.7)
TRIGL SERPL-MCNC: 58 MG/DL (ref 0–150)
TSH SERPL DL<=0.005 MIU/L-ACNC: 0.55 UIU/ML (ref 0.27–4.2)
VLDLC SERPL CALC-MCNC: 12 MG/DL (ref 5–40)

## 2020-12-23 ENCOUNTER — CLINICAL SUPPORT (OUTPATIENT)
Dept: FAMILY MEDICINE CLINIC | Facility: CLINIC | Age: 75
End: 2020-12-23

## 2020-12-23 DIAGNOSIS — I10 ESSENTIAL HYPERTENSION: ICD-10-CM

## 2020-12-23 DIAGNOSIS — E53.8 VITAMIN B 12 DEFICIENCY: ICD-10-CM

## 2020-12-23 PROCEDURE — 96372 THER/PROPH/DIAG INJ SC/IM: CPT | Performed by: FAMILY MEDICINE

## 2020-12-23 RX ORDER — AMLODIPINE BESYLATE 5 MG/1
5 TABLET ORAL DAILY
Qty: 90 TABLET | Refills: 0 | Status: SHIPPED | OUTPATIENT
Start: 2020-12-23 | End: 2021-03-24

## 2020-12-23 RX ADMIN — CYANOCOBALAMIN 1000 MCG: 1000 INJECTION, SOLUTION INTRAMUSCULAR; SUBCUTANEOUS at 14:45

## 2020-12-23 NOTE — PROGRESS NOTES
Injection  Injection performed in LD by Janine Olivo MA. Patient tolerated the procedure well without complications.  12/23/20   Janine Olivo MA

## 2021-01-18 ENCOUNTER — CONSULT (OUTPATIENT)
Dept: ONCOLOGY | Facility: CLINIC | Age: 76
End: 2021-01-18

## 2021-01-18 ENCOUNTER — LAB (OUTPATIENT)
Dept: LAB | Facility: HOSPITAL | Age: 76
End: 2021-01-18

## 2021-01-18 VITALS
WEIGHT: 187.4 LBS | DIASTOLIC BLOOD PRESSURE: 80 MMHG | BODY MASS INDEX: 34.48 KG/M2 | OXYGEN SATURATION: 97 % | TEMPERATURE: 97.3 F | HEART RATE: 83 BPM | RESPIRATION RATE: 14 BRPM | SYSTOLIC BLOOD PRESSURE: 126 MMHG | HEIGHT: 62 IN

## 2021-01-18 DIAGNOSIS — E61.1 IRON DEFICIENCY: ICD-10-CM

## 2021-01-18 DIAGNOSIS — D51.0 PERNICIOUS ANEMIA: ICD-10-CM

## 2021-01-18 DIAGNOSIS — E53.8 VITAMIN B 12 DEFICIENCY: Primary | ICD-10-CM

## 2021-01-18 DIAGNOSIS — D64.9 ANEMIA, UNSPECIFIED TYPE: Primary | ICD-10-CM

## 2021-01-18 LAB
BASOPHILS # BLD AUTO: 0.07 10*3/MM3 (ref 0–0.2)
BASOPHILS NFR BLD AUTO: 0.9 % (ref 0–1.5)
DEPRECATED RDW RBC AUTO: 41.1 FL (ref 37–54)
EOSINOPHIL # BLD AUTO: 0.41 10*3/MM3 (ref 0–0.4)
EOSINOPHIL NFR BLD AUTO: 5.4 % (ref 0.3–6.2)
ERYTHROCYTE [DISTWIDTH] IN BLOOD BY AUTOMATED COUNT: 14.1 % (ref 12.3–15.4)
FERRITIN SERPL-MCNC: 49 NG/ML (ref 13–150)
FOLATE SERPL-MCNC: 5.94 NG/ML (ref 4.78–24.2)
HCT VFR BLD AUTO: 39 % (ref 34–46.6)
HGB BLD-MCNC: 12.5 G/DL (ref 12–15.9)
IMM GRANULOCYTES # BLD AUTO: 0.02 10*3/MM3 (ref 0–0.05)
IMM GRANULOCYTES NFR BLD AUTO: 0.3 % (ref 0–0.5)
IRON 24H UR-MRATE: 38 MCG/DL (ref 37–145)
IRON SATN MFR SERPL: 12 % (ref 14–48)
LYMPHOCYTES # BLD AUTO: 1.22 10*3/MM3 (ref 0.7–3.1)
LYMPHOCYTES NFR BLD AUTO: 16.2 % (ref 19.6–45.3)
MCH RBC QN AUTO: 25.7 PG (ref 26.6–33)
MCHC RBC AUTO-ENTMCNC: 32.1 G/DL (ref 31.5–35.7)
MCV RBC AUTO: 80.2 FL (ref 79–97)
MONOCYTES # BLD AUTO: 0.56 10*3/MM3 (ref 0.1–0.9)
MONOCYTES NFR BLD AUTO: 7.4 % (ref 5–12)
NEUTROPHILS NFR BLD AUTO: 5.27 10*3/MM3 (ref 1.7–7)
NEUTROPHILS NFR BLD AUTO: 69.8 % (ref 42.7–76)
NRBC BLD AUTO-RTO: 0 /100 WBC (ref 0–0.2)
PLATELET # BLD AUTO: 263 10*3/MM3 (ref 140–450)
PMV BLD AUTO: 10.3 FL (ref 6–12)
RBC # BLD AUTO: 4.86 10*6/MM3 (ref 3.77–5.28)
TIBC SERPL-MCNC: 329 MCG/DL (ref 249–505)
TRANSFERRIN SERPL-MCNC: 235 MG/DL (ref 200–360)
VIT B12 BLD-MCNC: 715 PG/ML (ref 211–946)
WBC # BLD AUTO: 7.55 10*3/MM3 (ref 3.4–10.8)

## 2021-01-18 PROCEDURE — 84466 ASSAY OF TRANSFERRIN: CPT | Performed by: INTERNAL MEDICINE

## 2021-01-18 PROCEDURE — 85025 COMPLETE CBC W/AUTO DIFF WBC: CPT

## 2021-01-18 PROCEDURE — 99204 OFFICE O/P NEW MOD 45 MIN: CPT | Performed by: INTERNAL MEDICINE

## 2021-01-18 PROCEDURE — 36415 COLL VENOUS BLD VENIPUNCTURE: CPT

## 2021-01-18 PROCEDURE — 82746 ASSAY OF FOLIC ACID SERUM: CPT | Performed by: INTERNAL MEDICINE

## 2021-01-18 PROCEDURE — 82607 VITAMIN B-12: CPT | Performed by: INTERNAL MEDICINE

## 2021-01-18 PROCEDURE — 83540 ASSAY OF IRON: CPT | Performed by: INTERNAL MEDICINE

## 2021-01-18 PROCEDURE — 82728 ASSAY OF FERRITIN: CPT | Performed by: INTERNAL MEDICINE

## 2021-01-18 RX ORDER — CYANOCOBALAMIN (VITAMIN B-12) 5000 MCG
5000 TABLET,DISINTEGRATING ORAL DAILY
Qty: 90 TABLET | Refills: 3 | Status: SHIPPED | OUTPATIENT
Start: 2021-01-18 | End: 2022-02-07 | Stop reason: SDUPTHER

## 2021-01-18 RX ORDER — FOLIC ACID 1 MG/1
1 TABLET ORAL DAILY
Qty: 90 TABLET | Refills: 3 | Status: SHIPPED | OUTPATIENT
Start: 2021-01-18 | End: 2021-12-23

## 2021-01-18 RX ORDER — FERROUS SULFATE 325(65) MG
325 TABLET ORAL
Qty: 60 TABLET | Refills: 5 | Status: SHIPPED | OUTPATIENT
Start: 2021-01-18 | End: 2021-03-24

## 2021-01-18 NOTE — PROGRESS NOTES
.     REASON FOR CONSULTATION:     Provide an opinion on any further workup or treatment of pernicious anemia.                             REQUESTING PHYSICIAN: Nanci Bansal MD     RECORDS OBTAINED:  Records of the patient's history including those obtained from the referring provider were reviewed and summarized in detail.    HISTORY OF PRESENT ILLNESS:  The patient is a 76 y.o. year old female with history of type 2 diabetes, hypertension, coronary artery disease, who presented today for initial evaluation because of pernicious anemia.     Patient reports she has been given vitamin B12 injection monthly for the past 4 months. I reviewed her laboratory data in the Epic system and she had laboratory study on 01/14/2020 that reported vitamin B12 level of 437 pg/mL. She had normal hemoglobin 12.2, MCV 80.3, MCHC 32.5 and platelets 325,000, WBC 6400. Subsequently vitamin B12 level was retested on 08/21/2020 at 326 pg/mL with folate 7.05 ng/mL and normal hemoglobin 12.1, MCV 82.3, platelets 307,000 and WBC 6220. Apparently the patient was started on vitamin B12 injection.     Her laboratory studies on 11/30/2020 found that she had vitamin B12 level of 775 pg/mL and folate 5.68 ng/mL. She also had iron study that day reporting ferritin 52.8 ng/mL, free iron 74; however, no iron saturation.  Her laboratory study on 11/30/2020 also reported elevated parietal cell antibody 33.2 units. Has normal intrinsic factor 1.0 AU/mL. Negative for direct MELIZA rheumatoid factor and also negative for SSA antibody and SSB antibody.     Patient continues to have significant fatigue. She denies melena or hematochezia. She reports not taking oral iron nor folic acid. She reports no weight loss. No low fever.     I reviewed her previous CBC results dating back all the way to 04/15/2015 and most of the time she had normal hemoglobin. However, she has frequent microcytosis or marginal MCV value. She reports no family history of sickle cell  disease or sickle cell trait. Suspect this patient of having iron deficiency.     I reviewed her peripheral blood smear. She does have anisocytosis. However, no target cells, no stomatocytes, no schistocytes. Morphology of WBC and platelets unremarkable.             Past Medical History:   Diagnosis Date   • Allergic     Seasonal alleries, Lisinopril, Dexamethasone   • Anxiety    • ASCVD (arteriosclerotic cardiovascular disease)    • CAD (coronary artery disease)    • Cataract     Cararacts in both eyes.   • Contact dermatitis    • DDD (degenerative disc disease), lumbar    • Depression    • Diabetes mellitus (CMS/HCC)    • Essential hypertension    • Female climacteric state    • Headache    • History of mammogram     8/2016   • History of total right hip replacement 2013   • Hypercalcemia    • Hyperlipidemia    • Low back pain     Lumbar area.   • Neuromuscular disorder (CMS/HCC) March 2016   • Nightmares REM-sleep type    • Nonocclusive coronary atherosclerosis of native coronary artery     cath in 2008 with 30% LAD disease   • Nontoxic multinodular goiter    • Obesity    • Osteoarthritis     both knees, back and hips   • Osteopenia    • Pain, joint, shoulder    • Polycythemia    • Rotator cuff tendinitis    • Seasonal allergies    • Sleep apnea     CPAP nightly   • Snoring    • Superficial phlebitis    • Type 2 diabetes mellitus (CMS/HCC)     Type II   • Vitiligo      Past Surgical History:   Procedure Laterality Date   • BREAST BIOPSY     • BREAST LUMPECTOMY  1973    benign   • CARDIAC CATHETERIZATION  2008   • CATARACT EXTRACTION, BILATERAL Bilateral 2019   • COLONOSCOPY  08/15/2010   • HYSTERECTOMY  1982   • JOINT REPLACEMENT  July 10, 2013    Total Hip Replacement   • REPLACEMENT TOTAL KNEE Left 08/06/2018    Dr. Bishnu Larson   • REPLACEMENT TOTAL KNEE Right     [facility]    • TONSILLECTOMY  1967   • TOTAL HIP ARTHROPLASTY Right 2013       MEDICATIONS    Current Outpatient Medications:   •  Accu-Chek FastClix  Lancets misc, Use to check blood sugar once daily, Disp: 102 each, Rfl: 5  •  amLODIPine (NORVASC) 5 MG tablet, Take 1 tablet by mouth Daily., Disp: 90 tablet, Rfl: 0  •  aspirin 81 MG tablet, Take 81 mg by mouth daily., Disp: , Rfl:   •  atorvastatin (LIPITOR) 10 MG tablet, TAKE 1 TABLET EVERY NIGHT., Disp: 90 tablet, Rfl: 1  •  Calcium Carb-Cholecalciferol 500-200 MG-UNIT tablet, Take 2 tablets by mouth daily., Disp: , Rfl:   •  Cholecalciferol (VITAMIN D3) 20 MCG (800 UNIT) tablet, Take 1 tablet/day by mouth., Disp: , Rfl:   •  clonazePAM (KlonoPIN) 2 MG tablet, TAKE 1 TABLET BY MOUTH NIGHTLY. MAX DAILY AMOUNT  2 MG, Disp: , Rfl:   •  fluticasone (FLONASE) 50 MCG/ACT nasal spray, 1 spray into the nostril(s) as directed by provider As Needed., Disp: , Rfl:   •  furosemide (LASIX) 40 MG tablet, Take 1 tablet by mouth Daily., Disp: 90 tablet, Rfl: 1  •  glucose blood (Accu-Chek Catherine Plus) test strip, CHECK BLOOD SUGAR ONE TIME DAILY, Disp: 100 each, Rfl: 1  •  Lancets Misc. (ACCU-CHEK FASTCLIX LANCET) kit, 1 kit Daily., Disp: 1 kit, Rfl: 0  •  metFORMIN (GLUCOPHAGE) 500 MG tablet, Take 1 tablet by mouth 2 (Two) Times a Day With Meals., Disp: 180 tablet, Rfl: 3  •  metoprolol succinate XL (TOPROL-XL) 50 MG 24 hr tablet, Take 1 tablet by mouth Daily., Disp: 90 tablet, Rfl: 3  •  Mirabegron ER (MYRBETRIQ) 50 MG tablet sustained-release 24 hour 24 hr tablet, Take 50 mg by mouth Daily., Disp: , Rfl:   •  potassium chloride (KAYCIEL) 20 MEQ/15ML (10%) solution, Take 15 mL by mouth Daily., Disp: 3800 mL, Rfl: 1  •  STIOLTO RESPIMAT 2.5-2.5 MCG/ACT aerosol solution inhaler, 2 puffs As Needed., Disp: , Rfl:   •  venlafaxine (EFFEXOR) 75 MG tablet, TAKE 2 TABLETS TWICE DAILY, Disp: 360 tablet, Rfl: 1  •  Cyanocobalamin ([employer] Vitamin B-12) 5000 MCG sublingual tablet, Place 5,000 mcg under the tongue Daily., Disp: 90 tablet, Rfl: 3  •  ferrous sulfate 325 (65 FE) MG tablet, Take 1 tablet by mouth Daily With Breakfast., Disp: 60  tablet, Rfl: 5  •  folic acid (FOLVITE) 1 MG tablet, Take 1 tablet by mouth Daily., Disp: 90 tablet, Rfl: 3    Current Facility-Administered Medications:   •  cyanocobalamin injection 1,000 mcg, 1,000 mcg, Intramuscular, Q30 Days, Nanci Bansal MD, 1,000 mcg at 12/23/20 1445    ALLERGIES:     Allergies   Allergen Reactions   • Hctz [Hydrochlorothiazide] Other (See Comments)     In 2015-developed hypercalcemia-HCTZ was discontinued   • Dexamethasone Dermatitis   • Lisinopril Cough       SOCIAL HISTORY:       Social History     Socioeconomic History   • Marital status:      Spouse name: Not on file   • Number of children: Not on file   • Years of education: Not on file   • Highest education level: Not on file   Tobacco Use   • Smoking status: Never Smoker   • Smokeless tobacco: Never Used   Substance and Sexual Activity   • Alcohol use: No     Comment: CAFFEINE: DECAF ONCE PER WK.    • Drug use: No   • Sexual activity: Yes     Partners: Male     Birth control/protection: Post-menopausal     Comment: I had a Hysterectomy in 1982.         FAMILY HISTORY:  Family History   Problem Relation Age of Onset   • Diabetes Mother         Mother   • Thyroid disease Mother    • Hypertension Father         Father   • Heart disease Father         Father   • Arthritis Father         Father   • Hyperlipidemia Father         Father   • Hypertension Brother         Brother   • Diabetes Brother         Brother   • Kidney disease Brother         Brother, Renal Failure   • Vision loss Brother         Brother   • Asthma Sister         Sister   • Hypertension Sister         Sister   • Miscarriages / Stillbirths Sister         Sister   • Cancer Brother         Brother   • Diabetes Sister         Sister   • Hypertension Sister         Sister   • Early death Sister    • Kidney disease Brother         Brother,  Renal Failure   • Stroke Brother         Brother       REVIEW OF SYSTEMS:  Review of Systems   Constitutional: Positive for  "fatigue and unexpected weight change. Negative for diaphoresis.   HENT: Negative for facial swelling, mouth sores and sore throat.         Dry mouth   Eyes: Positive for visual disturbance (Wear corrective lenses, blurry vision.). Negative for photophobia.   Respiratory: Positive for shortness of breath. Negative for cough.    Cardiovascular: Positive for leg swelling.   Gastrointestinal: Negative for anal bleeding and blood in stool.   Endocrine: Negative for cold intolerance.   Genitourinary: Negative for dysuria and hematuria.   Musculoskeletal: Negative for arthralgias and back pain.        Skin pruritus   Skin: Negative for color change and pallor.   Allergic/Immunologic: Negative for immunocompromised state.   Neurological: Positive for weakness (Limping, and she uses a cane at times) and headaches. Negative for dizziness.   Hematological: Negative for adenopathy.   Psychiatric/Behavioral: Positive for dysphoric mood. The patient is nervous/anxious.               Vitals:    01/18/21 1423   BP: 126/80   Pulse: 83   Resp: 14   Temp: 97.3 °F (36.3 °C)   SpO2: 97%   Weight: 85 kg (187 lb 6.4 oz)   Height: 158 cm (62.21\")   PainSc: 0-No pain     No flowsheet data found.   PHYSICAL EXAM:      CONSTITUTIONAL:  Vital signs reviewed.  No distress, looks comfortable.  EYES:  Conjunctiva and lids unremarkable.  PERRLA  EARS,NOSE,MOUTH,THROAT:  Ears and nose appear unremarkable. Patient wears mask due to the pandemic coronavirus infection.   RESPIRATORY:  Normal respiratory effort.  Lungs clear to auscultation bilaterally.  CARDIOVASCULAR: Regular rhythm and the rate.  Normal S1, S2.  No murmurs rubs or gallops.    GASTROINTESTINAL: Abdomen appears unremarkable.  Nontender.  No hepatomegaly.  No splenomegaly.  Bowel sounds normal.  LYMPHATIC:  No cervical, supraclavicular, axillary lymphadenopathy.  MUSCULOSKELETAL:  Unremarkable gait and station.  Unremarkable digits/nails.  No cyanosis or clubbing.  SKIN:  Warm.  No " maddi.  PSYCHIATRIC:  Normal judgment and insight.  Normal mood and affect.      RECENT LABS:        WBC   Date Value Ref Range Status   01/18/2021 7.55 3.40 - 10.80 10*3/mm3 Final   11/30/2020 6.45 3.40 - 10.80 10*3/mm3 Final   08/21/2020 6.22 3.40 - 10.80 10*3/mm3 Final   08/20/2020 6.70 3.40 - 10.80 10*3/mm3 Final   01/14/2020 6.41 3.40 - 10.80 10*3/mm3 Final   12/13/2018 10.52 4.50 - 10.70 10*3/mm3 Final   11/14/2018 9.02 4.5 - 11.0 10*3/uL Final   08/30/2018 7.52 4.5 - 11.0 10*3/uL Final   08/09/2018 6.75 4.5 - 11.0 10*3/uL Final   07/17/2018 7.54 4.5 - 11.0 10*3/uL Final   04/15/2015 6.71 4.50 - 10.70 K/Cumm Final     Hemoglobin   Date Value Ref Range Status   01/18/2021 12.5 12.0 - 15.9 g/dL Final   11/30/2020 12.8 12.0 - 15.9 g/dL Final   08/21/2020 12.1 12.0 - 15.9 g/dL Final   08/20/2020 12.7 12.0 - 15.9 g/dL Final   01/14/2020 12.2 12.0 - 15.9 g/dL Final   12/13/2018 11.0 (L) 11.9 - 15.5 g/dL Final   11/14/2018 12.5 12.0 - 16.0 g/dL Final   08/30/2018 12.0 12.0 - 16.0 g/dL Final   08/09/2018 10.9 (L) 12.0 - 16.0 g/dL Final   08/07/2018 11.9 (L) 12.0 - 16.0 g/dL Final   07/17/2018 12.9 12.0 - 16.0 g/dL Final   04/15/2015 12.5 11.9 - 15.5 g/dL Final     Platelets   Date Value Ref Range Status   01/18/2021 263 140 - 450 10*3/mm3 Final   11/30/2020 296 140 - 450 10*3/mm3 Final   08/21/2020 307 140 - 450 10*3/mm3 Final   08/20/2020 318 140 - 450 10*3/mm3 Final   01/14/2020 325 140 - 450 10*3/mm3 Final   12/13/2018 481 140 - 500 10*3/mm3 Final   11/14/2018 342 140 - 440 10*3/uL Final   08/30/2018 440 140 - 440 10*3/uL Final   08/09/2018 272 140 - 440 10*3/uL Final   07/17/2018 352 140 - 440 10*3/uL Final   04/15/2015 371 140 - 500 K/Cumm Final     I reviewed her peripheral blood smear. She does have anisocytosis. However, no target cells, no stomatocytes, no schistocytes. Morphology of WBC and platelets unremarkable.       Assessment/Plan     ASSESSMENT:   1. Pernicious anemia with vitamin B12 deficiency. She  apparently has some improvement of vitamin B12 level after 3 monthly B12 injections. However, B12 could be further improved to a much higher level. I recommended to try sublingual vitamin B12 supplementation to see if that helps. If it does not then she will need to have vitamin B12 injection twice a month. We will obtain laboratory study to document new baseline today.     I also recommend the patient to start oral folic acid supplementation as she has marginally normal folic acid 5.68 ng/mL on 11/30/2020 and actually was deteriorating compared to the level in 08/2020. I recommended to start folic acid 1 mg daily to help with erythropoiesis.     2. Microcytosis of RBC with low normal B12 level and sometimes anemic. Patient reports no family history for sickle cell disease or sickle cell trait. I am highly suspicious of this patient having iron deficiency which could cause significant fatigue. I recommended to obtain ferritin iron profile today for assessment.     PLAN:    1. Laboratory studies as mentioned.   - Folate  - Vitamin B12  - CBC & Differential  - Ferritin  - Iron Profile  - Folate  - Vitamin B12    2. Start sublingual vitamin B12 at 5000 mcg daily. I E-scribed to her pharmacy.   3. Start oral folic acid 1 mg daily. I E-scribed to her pharmacy.   4. We will arrange the patient to come back for follow-up evaluation in 3 months.       ADDENDUM: Iron study came back reporting ferritin 49 ng/mL, free iron 38, TIBC 329 and iron saturation 12%. Indeed this patient has evidence of iron deficiency.     I called and spoke with the patient today and recommended to start oral ferrous sulfate 325 mg b.i.d. for 3 months. She voiced understanding and agreeable. I E-scribed it to her pharmacy.    PLAN:   5. Ferrous sulfate 325 mg b.i.d. E-scribed to her pharmacy.   6. Keep appointment as scheduled in 3 months. We will repeat laboratory study with ferritin, B12, folate and CBC.        IVANIA HEATH M.D., Ph.D.     1/18/2021        CC:   Nanci Bansal MD

## 2021-01-28 ENCOUNTER — CLINICAL SUPPORT (OUTPATIENT)
Dept: FAMILY MEDICINE CLINIC | Facility: CLINIC | Age: 76
End: 2021-01-28

## 2021-01-28 DIAGNOSIS — E53.8 VITAMIN B 12 DEFICIENCY: Primary | ICD-10-CM

## 2021-01-28 PROCEDURE — 96372 THER/PROPH/DIAG INJ SC/IM: CPT | Performed by: FAMILY MEDICINE

## 2021-01-28 RX ORDER — FOLIC ACID 1 MG/1
1 TABLET ORAL DAILY
COMMUNITY
End: 2021-04-25 | Stop reason: SDUPTHER

## 2021-01-28 RX ORDER — FERROUS SULFATE 325(65) MG
325 TABLET ORAL
COMMUNITY
End: 2021-04-12 | Stop reason: SDUPTHER

## 2021-01-28 RX ADMIN — CYANOCOBALAMIN 1000 MCG: 1000 INJECTION, SOLUTION INTRAMUSCULAR; SUBCUTANEOUS at 13:38

## 2021-01-28 NOTE — PROGRESS NOTES
Injection  Injection performed in rd by Senait Patterson LPN. Patient tolerated the procedure well without complications.  01/28/21   Senait Patterson LPN  Answers for HPI/ROS submitted by the patient on 1/22/2021   What is the primary reason for your visit?: Other  Please describe your symptoms.: Fatigue, no energy.  Have you had these symptoms before?: Yes  How long have you been having these symptoms?: Greater than 2 weeks  Please list any medications you are currently taking for this condition.: B-12 shot monthly,,   Vitamin B-12 5000 MCG sublingual tablet under the tongue Daily,,  Ferrous Sulfate 325 (65 FE) MG tablet Daily with breakfast,,  Folic Acid 1 MG tablet Daily.  Please describe any probable cause for these symptoms. : None

## 2021-02-08 RX ORDER — VENLAFAXINE 75 MG/1
TABLET ORAL
Qty: 360 TABLET | Refills: 1 | Status: SHIPPED | OUTPATIENT
Start: 2021-02-08 | End: 2021-09-23

## 2021-02-08 RX ORDER — ATORVASTATIN CALCIUM 10 MG/1
TABLET, FILM COATED ORAL
Qty: 90 TABLET | Refills: 1 | Status: SHIPPED | OUTPATIENT
Start: 2021-02-08 | End: 2021-09-14

## 2021-02-24 ENCOUNTER — CLINICAL SUPPORT (OUTPATIENT)
Dept: FAMILY MEDICINE CLINIC | Facility: CLINIC | Age: 76
End: 2021-02-24

## 2021-02-24 DIAGNOSIS — E53.8 VITAMIN B 12 DEFICIENCY: ICD-10-CM

## 2021-02-24 PROCEDURE — 96372 THER/PROPH/DIAG INJ SC/IM: CPT | Performed by: FAMILY MEDICINE

## 2021-02-24 RX ADMIN — CYANOCOBALAMIN 1000 MCG: 1000 INJECTION, SOLUTION INTRAMUSCULAR; SUBCUTANEOUS at 14:48

## 2021-02-24 NOTE — PROGRESS NOTES
Injection  Injection performed in LD by Janine Olivo MA. Patient tolerated the procedure well without complications.  02/24/21   Janine Olivo MA

## 2021-03-02 DIAGNOSIS — Z23 IMMUNIZATION DUE: ICD-10-CM

## 2021-03-24 ENCOUNTER — CLINICAL SUPPORT (OUTPATIENT)
Dept: FAMILY MEDICINE CLINIC | Facility: CLINIC | Age: 76
End: 2021-03-24

## 2021-03-24 DIAGNOSIS — E53.8 VITAMIN B 12 DEFICIENCY: ICD-10-CM

## 2021-03-24 DIAGNOSIS — I10 ESSENTIAL HYPERTENSION: ICD-10-CM

## 2021-03-24 PROCEDURE — 96372 THER/PROPH/DIAG INJ SC/IM: CPT | Performed by: FAMILY MEDICINE

## 2021-03-24 RX ORDER — AMLODIPINE BESYLATE 5 MG/1
TABLET ORAL
Qty: 90 TABLET | Refills: 0 | Status: SHIPPED | OUTPATIENT
Start: 2021-03-24 | End: 2021-07-12

## 2021-03-24 RX ORDER — TRIPROLIDINE/PSEUDOEPHEDRINE 2.5MG-60MG
TABLET ORAL
COMMUNITY
Start: 2021-03-22 | End: 2021-11-16

## 2021-03-24 RX ADMIN — CYANOCOBALAMIN 1000 MCG: 1000 INJECTION, SOLUTION INTRAMUSCULAR; SUBCUTANEOUS at 14:54

## 2021-03-24 NOTE — PROGRESS NOTES
Injection  Injection performed in RD by Janine Olivo MA. Patient tolerated the procedure well without complications.  03/24/21   Janine Olivo MA

## 2021-03-28 ENCOUNTER — HOSPITAL ENCOUNTER (EMERGENCY)
Facility: HOSPITAL | Age: 76
Discharge: HOME | End: 2021-03-28
Attending: EMERGENCY MEDICINE | Admitting: EMERGENCY MEDICINE

## 2021-03-28 ENCOUNTER — APPOINTMENT (OUTPATIENT)
Dept: CT IMAGING | Facility: HOSPITAL | Age: 76
End: 2021-03-28

## 2021-03-28 ENCOUNTER — APPOINTMENT (OUTPATIENT)
Dept: GENERAL RADIOLOGY | Facility: HOSPITAL | Age: 76
End: 2021-03-28

## 2021-03-28 VITALS
RESPIRATION RATE: 18 BRPM | HEART RATE: 67 BPM | BODY MASS INDEX: 33.2 KG/M2 | OXYGEN SATURATION: 99 % | SYSTOLIC BLOOD PRESSURE: 152 MMHG | HEIGHT: 63 IN | TEMPERATURE: 96.7 F | DIASTOLIC BLOOD PRESSURE: 88 MMHG

## 2021-03-28 DIAGNOSIS — R41.0 CONFUSION: Primary | ICD-10-CM

## 2021-03-28 DIAGNOSIS — R06.00 DYSPNEA, UNSPECIFIED TYPE: ICD-10-CM

## 2021-03-28 LAB
ALBUMIN SERPL-MCNC: 3.9 G/DL (ref 3.5–5.2)
ALBUMIN/GLOB SERPL: 1.4 G/DL
ALP SERPL-CCNC: 85 U/L (ref 39–117)
ALT SERPL W P-5'-P-CCNC: 9 U/L (ref 1–33)
ANION GAP SERPL CALCULATED.3IONS-SCNC: 9.6 MMOL/L (ref 5–15)
AST SERPL-CCNC: 9 U/L (ref 1–32)
BASOPHILS # BLD AUTO: 0.06 10*3/MM3 (ref 0–0.2)
BASOPHILS NFR BLD AUTO: 1 % (ref 0–1.5)
BILIRUB SERPL-MCNC: 0.4 MG/DL (ref 0–1.2)
BILIRUB UR QL STRIP: NEGATIVE
BUN SERPL-MCNC: 13 MG/DL (ref 8–23)
BUN/CREAT SERPL: 14.6 (ref 7–25)
CALCIUM SPEC-SCNC: 9.6 MG/DL (ref 8.6–10.5)
CHLORIDE SERPL-SCNC: 106 MMOL/L (ref 98–107)
CLARITY UR: CLEAR
CO2 SERPL-SCNC: 27.4 MMOL/L (ref 22–29)
COLOR UR: YELLOW
CREAT SERPL-MCNC: 0.89 MG/DL (ref 0.57–1)
DEPRECATED RDW RBC AUTO: 41.3 FL (ref 37–54)
EOSINOPHIL # BLD AUTO: 0.3 10*3/MM3 (ref 0–0.4)
EOSINOPHIL NFR BLD AUTO: 4.8 % (ref 0.3–6.2)
ERYTHROCYTE [DISTWIDTH] IN BLOOD BY AUTOMATED COUNT: 14 % (ref 12.3–15.4)
GFR SERPL CREATININE-BSD FRML MDRD: 75 ML/MIN/1.73
GLOBULIN UR ELPH-MCNC: 2.7 GM/DL
GLUCOSE BLDC GLUCOMTR-MCNC: 83 MG/DL (ref 70–130)
GLUCOSE SERPL-MCNC: 95 MG/DL (ref 65–99)
GLUCOSE UR STRIP-MCNC: NEGATIVE MG/DL
HCT VFR BLD AUTO: 43.2 % (ref 34–46.6)
HGB BLD-MCNC: 13.1 G/DL (ref 12–15.9)
HGB UR QL STRIP.AUTO: NEGATIVE
IMM GRANULOCYTES # BLD AUTO: 0.01 10*3/MM3 (ref 0–0.05)
IMM GRANULOCYTES NFR BLD AUTO: 0.2 % (ref 0–0.5)
KETONES UR QL STRIP: NEGATIVE
LEUKOCYTE ESTERASE UR QL STRIP.AUTO: NEGATIVE
LIPASE SERPL-CCNC: 30 U/L (ref 13–60)
LYMPHOCYTES # BLD AUTO: 0.98 10*3/MM3 (ref 0.7–3.1)
LYMPHOCYTES NFR BLD AUTO: 15.6 % (ref 19.6–45.3)
MCH RBC QN AUTO: 24.8 PG (ref 26.6–33)
MCHC RBC AUTO-ENTMCNC: 30.3 G/DL (ref 31.5–35.7)
MCV RBC AUTO: 81.7 FL (ref 79–97)
MONOCYTES # BLD AUTO: 0.49 10*3/MM3 (ref 0.1–0.9)
MONOCYTES NFR BLD AUTO: 7.8 % (ref 5–12)
NEUTROPHILS NFR BLD AUTO: 4.45 10*3/MM3 (ref 1.7–7)
NEUTROPHILS NFR BLD AUTO: 70.6 % (ref 42.7–76)
NITRITE UR QL STRIP: NEGATIVE
NRBC BLD AUTO-RTO: 0 /100 WBC (ref 0–0.2)
PH UR STRIP.AUTO: 7 [PH] (ref 5–8)
PLATELET # BLD AUTO: 306 10*3/MM3 (ref 140–450)
PMV BLD AUTO: 10.1 FL (ref 6–12)
POTASSIUM SERPL-SCNC: 3.7 MMOL/L (ref 3.5–5.2)
PROCALCITONIN SERPL-MCNC: 0.04 NG/ML (ref 0–0.25)
PROT SERPL-MCNC: 6.6 G/DL (ref 6–8.5)
PROT UR QL STRIP: NEGATIVE
RBC # BLD AUTO: 5.29 10*6/MM3 (ref 3.77–5.28)
SODIUM SERPL-SCNC: 143 MMOL/L (ref 136–145)
SP GR UR STRIP: 1.01 (ref 1–1.03)
TROPONIN T SERPL-MCNC: <0.01 NG/ML (ref 0–0.03)
UROBILINOGEN UR QL STRIP: NORMAL
WBC # BLD AUTO: 6.29 10*3/MM3 (ref 3.4–10.8)

## 2021-03-28 PROCEDURE — 80053 COMPREHEN METABOLIC PANEL: CPT | Performed by: EMERGENCY MEDICINE

## 2021-03-28 PROCEDURE — 83690 ASSAY OF LIPASE: CPT | Performed by: EMERGENCY MEDICINE

## 2021-03-28 PROCEDURE — P9612 CATHETERIZE FOR URINE SPEC: HCPCS

## 2021-03-28 PROCEDURE — 82962 GLUCOSE BLOOD TEST: CPT

## 2021-03-28 PROCEDURE — 84145 PROCALCITONIN (PCT): CPT | Performed by: EMERGENCY MEDICINE

## 2021-03-28 PROCEDURE — 84484 ASSAY OF TROPONIN QUANT: CPT | Performed by: EMERGENCY MEDICINE

## 2021-03-28 PROCEDURE — 81003 URINALYSIS AUTO W/O SCOPE: CPT | Performed by: EMERGENCY MEDICINE

## 2021-03-28 PROCEDURE — 71045 X-RAY EXAM CHEST 1 VIEW: CPT

## 2021-03-28 PROCEDURE — 70450 CT HEAD/BRAIN W/O DYE: CPT

## 2021-03-28 PROCEDURE — 85025 COMPLETE CBC W/AUTO DIFF WBC: CPT | Performed by: EMERGENCY MEDICINE

## 2021-03-28 PROCEDURE — 99283 EMERGENCY DEPT VISIT LOW MDM: CPT

## 2021-03-28 PROCEDURE — 93010 ELECTROCARDIOGRAM REPORT: CPT | Performed by: INTERNAL MEDICINE

## 2021-03-28 PROCEDURE — 93005 ELECTROCARDIOGRAM TRACING: CPT | Performed by: EMERGENCY MEDICINE

## 2021-03-29 ENCOUNTER — EPISODE CHANGES (OUTPATIENT)
Dept: CASE MANAGEMENT | Facility: OTHER | Age: 76
End: 2021-03-29

## 2021-03-29 ENCOUNTER — PATIENT OUTREACH (OUTPATIENT)
Dept: CASE MANAGEMENT | Facility: OTHER | Age: 76
End: 2021-03-29

## 2021-03-29 LAB — QT INTERVAL: 390 MS

## 2021-03-29 NOTE — OUTREACH NOTE
Care Plan Note      Responses   Lifestyle Goals  Routine follow-up with doctor(s), Routine foot care, Self monitor blood pressure, Self monitor blood sugar, Routine eye exam, Fewer ER/urgent care visits, Eat a healthy diet, Medication management   Barriers  Disease education   Self Management  Medication Adherence, Home BP Monitoring, Home Glucose Monitoring, Daily Journaling   Suggested Appointments  Other (See Comment) [Keep appointment with Dr. Bansal as scheduled on 4/2/21. ]   Annual Wellness Visit:   Patient Has Completed   Specific Disease Process Teaching  Hypertension, Diabetes [return to ED for evaluation for unexplained change in speech or cognition. Check BS and B/P regularly. ]   Does patient have depression diagnosis?  Yes   Advanced Directives:  Patient Has   Ed Visits past 12 months:  1   Hospitalizations past 12 months  None   Discharge destination:  Home   Medication Adherence  N/A   Goal Progress  Making Progress Toward Goal(s)   Readiness Scale  7   Confidence Scale  7   Health Literacy  Good        The main concerns and/or symptoms the patient would like to address are: Spoke with patient regarding recent ED visit due to confusion. Patient states she is feeling better today. Patient states her follow up appointment with Dr. Bansal is scheduled for Friday, 4/2/21. Patient checks her BS regularly and states it was in the 80s today. Patient states her B/P machine is old and asked for recommendations on where to get a new one.    Education/instruction provided by Care Coordinator: Introduced self, explained ACM RN role and provided contact information. Reviewed patient's follow up appointment. Reviewed patient's home monitoring of BS and B/P. Patient reports her BS is usually around 80. Patient is not currently monitoring her B/P at home. ACM recommended using an upper arm style automatic b/p machine that can be obtained from [employer] or a pharmacy. Patient verbalized understanding. ACM also reviewed the  importance of continuing to have change in cognition not explained by low blood sugar evaluated in ED (time is brain) if the inability to speak, unilateral weakness, or facial droop is noticed. Patient verbalized undersanding.    Follow Up Outreach Due: 1 week    Maude Vincent RN  Ambulatory     3/29/2021, 15:34 EDT

## 2021-04-06 ENCOUNTER — PATIENT OUTREACH (OUTPATIENT)
Dept: CASE MANAGEMENT | Facility: OTHER | Age: 76
End: 2021-04-06

## 2021-04-06 ENCOUNTER — LAB (OUTPATIENT)
Dept: LAB | Facility: HOSPITAL | Age: 76
End: 2021-04-06

## 2021-04-06 DIAGNOSIS — E61.1 IRON DEFICIENCY: ICD-10-CM

## 2021-04-06 DIAGNOSIS — E53.8 VITAMIN B 12 DEFICIENCY: ICD-10-CM

## 2021-04-06 LAB
BASOPHILS # BLD AUTO: 0.07 10*3/MM3 (ref 0–0.2)
BASOPHILS NFR BLD AUTO: 1 % (ref 0–1.5)
DEPRECATED RDW RBC AUTO: 42.2 FL (ref 37–54)
EOSINOPHIL # BLD AUTO: 0.3 10*3/MM3 (ref 0–0.4)
EOSINOPHIL NFR BLD AUTO: 4.3 % (ref 0.3–6.2)
ERYTHROCYTE [DISTWIDTH] IN BLOOD BY AUTOMATED COUNT: 14.4 % (ref 12.3–15.4)
FERRITIN SERPL-MCNC: 77.5 NG/ML (ref 13–150)
FOLATE SERPL-MCNC: >20 NG/ML (ref 4.78–24.2)
HCT VFR BLD AUTO: 41.1 % (ref 34–46.6)
HGB BLD-MCNC: 13.5 G/DL (ref 12–15.9)
IMM GRANULOCYTES # BLD AUTO: 0.01 10*3/MM3 (ref 0–0.05)
IMM GRANULOCYTES NFR BLD AUTO: 0.1 % (ref 0–0.5)
IRON 24H UR-MRATE: 64 MCG/DL (ref 37–145)
IRON SATN MFR SERPL: 20 % (ref 14–48)
LYMPHOCYTES # BLD AUTO: 1 10*3/MM3 (ref 0.7–3.1)
LYMPHOCYTES NFR BLD AUTO: 14.2 % (ref 19.6–45.3)
MCH RBC QN AUTO: 26.7 PG (ref 26.6–33)
MCHC RBC AUTO-ENTMCNC: 32.8 G/DL (ref 31.5–35.7)
MCV RBC AUTO: 81.4 FL (ref 79–97)
MONOCYTES # BLD AUTO: 0.38 10*3/MM3 (ref 0.1–0.9)
MONOCYTES NFR BLD AUTO: 5.4 % (ref 5–12)
NEUTROPHILS NFR BLD AUTO: 5.26 10*3/MM3 (ref 1.7–7)
NEUTROPHILS NFR BLD AUTO: 75 % (ref 42.7–76)
NRBC BLD AUTO-RTO: 0 /100 WBC (ref 0–0.2)
PLATELET # BLD AUTO: 288 10*3/MM3 (ref 140–450)
PMV BLD AUTO: 10.2 FL (ref 6–12)
RBC # BLD AUTO: 5.05 10*6/MM3 (ref 3.77–5.28)
TIBC SERPL-MCNC: 316 MCG/DL (ref 249–505)
TRANSFERRIN SERPL-MCNC: 226 MG/DL (ref 200–360)
VIT B12 BLD-MCNC: >2000 PG/ML (ref 211–946)
WBC # BLD AUTO: 7.02 10*3/MM3 (ref 3.4–10.8)

## 2021-04-06 PROCEDURE — 82607 VITAMIN B-12: CPT | Performed by: INTERNAL MEDICINE

## 2021-04-06 PROCEDURE — 82728 ASSAY OF FERRITIN: CPT

## 2021-04-06 PROCEDURE — 36415 COLL VENOUS BLD VENIPUNCTURE: CPT

## 2021-04-06 PROCEDURE — 84466 ASSAY OF TRANSFERRIN: CPT

## 2021-04-06 PROCEDURE — 83540 ASSAY OF IRON: CPT

## 2021-04-06 PROCEDURE — 85025 COMPLETE CBC W/AUTO DIFF WBC: CPT

## 2021-04-06 PROCEDURE — 82746 ASSAY OF FOLIC ACID SERUM: CPT | Performed by: INTERNAL MEDICINE

## 2021-04-06 NOTE — OUTREACH NOTE
Patient Outreach Note    Spoke with patient regarding health and wellness. Patient states she is doing well today. She has an appointment for labs today and didn't have time to review the ACM assessment/SDOH questions. Patient requests a call back later in the week to complete the assessment. Patient scheduled for Friday. Patient also stated she has not purchased a new B/P machine as of yet as well.     Maude Vincent RN  Ambulatory     4/6/2021, 13:41 EDT

## 2021-04-12 ENCOUNTER — OFFICE VISIT (OUTPATIENT)
Dept: ONCOLOGY | Facility: CLINIC | Age: 76
End: 2021-04-12

## 2021-04-12 ENCOUNTER — APPOINTMENT (OUTPATIENT)
Dept: LAB | Facility: HOSPITAL | Age: 76
End: 2021-04-12

## 2021-04-12 VITALS
DIASTOLIC BLOOD PRESSURE: 77 MMHG | OXYGEN SATURATION: 97 % | HEART RATE: 87 BPM | HEIGHT: 63 IN | WEIGHT: 179.5 LBS | SYSTOLIC BLOOD PRESSURE: 134 MMHG | TEMPERATURE: 98.2 F | BODY MASS INDEX: 31.8 KG/M2 | RESPIRATION RATE: 18 BRPM

## 2021-04-12 DIAGNOSIS — E61.1 IRON DEFICIENCY: Primary | ICD-10-CM

## 2021-04-12 DIAGNOSIS — E53.8 VITAMIN B 12 DEFICIENCY: ICD-10-CM

## 2021-04-12 PROCEDURE — 99213 OFFICE O/P EST LOW 20 MIN: CPT | Performed by: INTERNAL MEDICINE

## 2021-04-12 RX ORDER — FERROUS SULFATE 325(65) MG
325 TABLET ORAL
Qty: 180 TABLET | Refills: 3 | Status: SHIPPED | OUTPATIENT
Start: 2021-04-12 | End: 2022-03-03

## 2021-04-12 NOTE — PROGRESS NOTES
.     REASON FOR FOLLOWUP:     *  Provide an opinion on any further workup or treatment of pernicious anemia.  Was on vitamin B12 injection.  · Patient was switched to sublingual vitamin B12 at 5000 mcg daily.  *  Evidence of iron deficiency in January 2021 despite a normal hemoglobin 12.5.  Patient was started on oral iron twice a day.                                 HISTORY OF PRESENT ILLNESS:  The patient is a 76 y.o. year old female with history of type 2 diabetes, hypertension, coronary artery disease, vitamin B12 deficiency and iron deficiency, who presented today for reevaluation, 1 week after laboratory studies obtained 4/6/2021.     Patient reports she is doing well.  Would tolerate oral iron twice a day with no significant side effects.  Does have dark-colored stools since taking oral iron.  Denies melena hematochezia otherwise.    I saw her originally on 1/18/2021 for consultation.  At that time patient had normal hemoglobin 12.5, MCV 80.2, platelets 263,000 and WBC 7550. Iron study came back reporting ferritin 49 ng/mL, free iron 38, TIBC 329 and iron saturation 12%.  Vitamin B12 level was 715 pg/mL and folate 5.94 ng/mL.  Indeed this patient has evidence of iron deficiency.     I recommended to start oral ferrous sulfate 325 mg b.i.d. for 3 months.  We also recommended switching vitamin B12 injection to sublingual B12 at 5000 mcg daily and oral folic acid 1 mg daily.  She voiced understanding and agreeable.    Laboratory study today on 4/6/2021 reported improved hemoglobin 13.5, MCV 81.4, platelets 288,000 and WBC 7020 including ANC 5260.  Iron study reported improved ferritin 77.5 ng/mL, and iron saturation 20%.  Free iron 64 TIBC 316.  Patient also had improved and supratherapeutic B12 level > 2000 pg/mL and a supratherapeutic folate more than 20 ng/mL.,          Past Medical History:   Diagnosis Date   • Allergic     Seasonal alleries, Lisinopril, Dexamethasone   • Anxiety    • ASCVD  (arteriosclerotic cardiovascular disease)    • CAD (coronary artery disease)    • Cataract     Cararacts in both eyes.   • Contact dermatitis    • DDD (degenerative disc disease), lumbar    • Depression    • Diabetes mellitus (CMS/HCC)    • Essential hypertension    • Female climacteric state    • Headache    • History of mammogram     8/2016   • History of total right hip replacement 2013   • Hypercalcemia    • Hyperlipidemia    • Low back pain     Lumbar area.   • Neuromuscular disorder (CMS/HCC) March 2016   • Nightmares REM-sleep type    • Nonocclusive coronary atherosclerosis of native coronary artery     cath in 2008 with 30% LAD disease   • Nontoxic multinodular goiter    • Obesity    • Osteoarthritis     both knees, back and hips   • Osteopenia    • Pain, joint, shoulder    • Polycythemia    • Rotator cuff tendinitis    • Seasonal allergies    • Sleep apnea     CPAP nightly   • Snoring    • Superficial phlebitis    • Type 2 diabetes mellitus (CMS/HCC)     Type II   • Vitiligo      Past Surgical History:   Procedure Laterality Date   • BREAST BIOPSY     • BREAST LUMPECTOMY  1973    benign   • CARDIAC CATHETERIZATION  2008   • CATARACT EXTRACTION, BILATERAL Bilateral 2019   • COLONOSCOPY  08/15/2010   • HYSTERECTOMY  1982   • JOINT REPLACEMENT  July 10, 2013    Total Hip Replacement   • REPLACEMENT TOTAL KNEE Left 08/06/2018    Dr. Bishnu Larson   • REPLACEMENT TOTAL KNEE Right     [facility]    • TONSILLECTOMY  1967   • TOTAL HIP ARTHROPLASTY Right 2013       HEMATOLOGY HISTORY:  The patient is a 76 y.o. year old female with history of type 2 diabetes, hypertension, coronary artery disease, who presented on 1/18/2021 for initial evaluation because of pernicious anemia.     Patient reports she has been given vitamin B12 injection monthly for the past 4 months. I reviewed her laboratory data in the Epic system and she had laboratory study on 01/14/2020 that reported vitamin B12 level of 437 pg/mL. She had normal  hemoglobin 12.2, MCV 80.3, MCHC 32.5 and platelets 325,000, WBC 6400. Subsequently vitamin B12 level was retested on 08/21/2020 at 326 pg/mL with folate 7.05 ng/mL and normal hemoglobin 12.1, MCV 82.3, platelets 307,000 and WBC 6220. Apparently the patient was started on vitamin B12 injection.     Her laboratory studies on 11/30/2020 found that she had vitamin B12 level of 775 pg/mL and folate 5.68 ng/mL. She also had iron study that day reporting ferritin 52.8 ng/mL, free iron 74; however, no iron saturation.  Her laboratory study on 11/30/2020 also reported elevated parietal cell antibody 33.2 units. Has normal intrinsic factor 1.0 AU/mL. Negative for direct MELIZA rheumatoid factor and also negative for SSA antibody and SSB antibody.     Patient continues to have significant fatigue. She denies melena or hematochezia. She reports not taking oral iron nor folic acid. She reports no weight loss. No low fever.     I reviewed her previous CBC results dating back all the way to 04/15/2015 and most of the time she had normal hemoglobin. However, she has frequent microcytosis or marginal MCV value. She reports no family history of sickle cell disease or sickle cell trait. Suspect this patient of having iron deficiency.     I reviewed her peripheral blood smear on 1/18/2021. She does have anisocytosis. However, no target cells, no stomatocytes, no schistocytes. Morphology of WBC and platelets unremarkable.         MEDICATIONS    Current Outpatient Medications:   •  Accu-Chek FastClix Lancets misc, Use to check blood sugar once daily, Disp: 102 each, Rfl: 5  •  amLODIPine (NORVASC) 5 MG tablet, TAKE 1 TABLET EVERY DAY, Disp: 90 tablet, Rfl: 0  •  aspirin 81 MG tablet, Take 81 mg by mouth daily., Disp: , Rfl:   •  atorvastatin (LIPITOR) 10 MG tablet, TAKE 1 TABLET EVERY NIGHT., Disp: 90 tablet, Rfl: 1  •  Calcium Carb-Cholecalciferol 500-200 MG-UNIT tablet, Take 2 tablets by mouth daily., Disp: , Rfl:   •  Cholecalciferol  (VITAMIN D3) 20 MCG (800 UNIT) tablet, Take 1 tablet/day by mouth., Disp: , Rfl:   •  clonazePAM (KlonoPIN) 2 MG tablet, TAKE 1 TABLET BY MOUTH NIGHTLY. MAX DAILY AMOUNT  2 MG, Disp: , Rfl:   •  Cyanocobalamin ([employer] Vitamin B-12) 5000 MCG sublingual tablet, Place 5,000 mcg under the tongue Daily., Disp: 90 tablet, Rfl: 3  •  Durezol 0.05 % ophthalmic emulsion, , Disp: , Rfl:   •  ferrous sulfate 325 (65 FE) MG tablet, Take 325 mg by mouth Daily With Breakfast., Disp: , Rfl:   •  fluticasone (FLONASE) 50 MCG/ACT nasal spray, 1 spray into the nostril(s) as directed by provider As Needed., Disp: , Rfl:   •  folic acid (FOLVITE) 1 MG tablet, Take 1 tablet by mouth Daily., Disp: 90 tablet, Rfl: 3  •  furosemide (LASIX) 40 MG tablet, Take 1 tablet by mouth Daily., Disp: 90 tablet, Rfl: 1  •  glucose blood (Accu-Chek Catherine Plus) test strip, CHECK BLOOD SUGAR ONE TIME DAILY, Disp: 100 each, Rfl: 1  •  Lancets Misc. (ACCU-CHEK FASTCLIX LANCET) kit, 1 kit Daily., Disp: 1 kit, Rfl: 0  •  metFORMIN (GLUCOPHAGE) 500 MG tablet, Take 1 tablet by mouth 2 (Two) Times a Day With Meals., Disp: 180 tablet, Rfl: 3  •  metoprolol succinate XL (TOPROL-XL) 50 MG 24 hr tablet, Take 1 tablet by mouth Daily., Disp: 90 tablet, Rfl: 3  •  Mirabegron ER (MYRBETRIQ) 50 MG tablet sustained-release 24 hour 24 hr tablet, Take 50 mg by mouth Daily., Disp: , Rfl:   •  potassium chloride (KAYCIEL) 20 MEQ/15ML (10%) solution, Take 15 mL by mouth Daily., Disp: 3800 mL, Rfl: 1  •  STIOLTO RESPIMAT 2.5-2.5 MCG/ACT aerosol solution inhaler, 2 puffs As Needed., Disp: , Rfl:   •  venlafaxine (EFFEXOR) 75 MG tablet, TAKE 2 TABLETS TWICE DAILY, Disp: 360 tablet, Rfl: 1  •  folic acid (FOLVITE) 1 MG tablet, Take 1 mg by mouth Daily., Disp: , Rfl:     Current Facility-Administered Medications:   •  cyanocobalamin injection 1,000 mcg, 1,000 mcg, Intramuscular, Q30 Days, Nanci Bansal MD, 1,000 mcg at 03/24/21 8386    ALLERGIES:     Allergies   Allergen Reactions    • Hctz [Hydrochlorothiazide] Other (See Comments)     In 2015-developed hypercalcemia-HCTZ was discontinued   • Dexamethasone Dermatitis   • Lisinopril Cough       SOCIAL HISTORY:       Social History     Socioeconomic History   • Marital status:      Spouse name: Not on file   • Number of children: Not on file   • Years of education: Not on file   • Highest education level: Not on file   Tobacco Use   • Smoking status: Never Smoker   • Smokeless tobacco: Never Used   Substance and Sexual Activity   • Alcohol use: No     Comment: CAFFEINE: DECAF ONCE PER WK.    • Drug use: No   • Sexual activity: Yes     Partners: Male     Birth control/protection: Post-menopausal     Comment: I had a Hysterectomy in 1982.         FAMILY HISTORY:  Family History   Problem Relation Age of Onset   • Diabetes Mother         Mother   • Thyroid disease Mother    • Hypertension Father         Father   • Heart disease Father         Father   • Arthritis Father         Father   • Hyperlipidemia Father         Father   • Hypertension Brother         Brother   • Diabetes Brother         Brother   • Kidney disease Brother         Brother, Renal Failure   • Vision loss Brother         Brother   • Asthma Sister         Sister   • Hypertension Sister         Sister   • Miscarriages / Stillbirths Sister         Sister   • Cancer Brother         Brother   • Lung cancer Brother    • Diabetes Sister         Sister   • Hypertension Sister         Sister   • Early death Sister    • Kidney disease Brother         Brother,  Renal Failure   • Stroke Brother         Brother       REVIEW OF SYSTEMS:  Review of Systems   Constitutional: Positive for fatigue (Somewhat improved energy level) and unexpected weight change (6 pound weight loss in the past 3 months). Negative for appetite change and diaphoresis.   HENT: Negative for facial swelling, mouth sores and sore throat.         Dry mouth   Eyes: Positive for visual disturbance (Wear corrective  "lenses, blurry vision.). Negative for photophobia.   Respiratory: Positive for shortness of breath. Negative for cough.    Cardiovascular: Positive for leg swelling.   Gastrointestinal: Negative for abdominal pain, anal bleeding, blood in stool and diarrhea.   Endocrine: Negative for cold intolerance and heat intolerance.   Genitourinary: Negative for dysuria and hematuria.   Musculoskeletal: Negative for arthralgias, back pain and joint swelling.        Skin pruritus   Skin: Negative for color change and pallor.   Allergic/Immunologic: Negative for immunocompromised state.   Neurological: Positive for weakness (Limping, and she uses a cane at times) and headaches. Negative for dizziness and numbness.   Hematological: Negative for adenopathy.   Psychiatric/Behavioral: Positive for dysphoric mood. The patient is nervous/anxious.               Vitals:    04/12/21 1429   BP: 134/77   Pulse: 87   Resp: 18   Temp: 98.2 °F (36.8 °C)   TempSrc: Temporal   SpO2: 97%   Weight: 81.4 kg (179 lb 8 oz)   Height: 160 cm (62.99\")   PainSc: 0-No pain     Current Status 4/12/2021   ECOG score 0      PHYSICAL EXAM:    GENERAL:  Well-developed, well-nourished elderly female, in no acute distress.   SKIN:  Warm, dry without rashes, purpura or petechiae.  HEAD:  Normocephalic.  EYES:  Conjunctivae normal.  EARS:  Hearing intact.  NOSE:  Patient wears mask due to the pandemic coronavirus infection.   NECK:  Supple; no thyromegaly or masses.  LYMPHATICS:  No cervical, supraclavicular adenopathy.  CHEST: Normal respiratory effort.  Lungs clear to auscultation. Good airflow.  CARDIAC:  Regular rate and rhythm without murmurs. Normal S1,S2.  ABDOMEN:  Soft, nontender with no organomegaly or masses.  Bowel sounds normal.  EXTREMITIES:  No clubbing, cyanosis or edema.  NEUROLOGICAL:  Cranial Nerves II-XII grossly intact.    PSYCHIATRIC:  Normal affect and mood.        RECENT LABS:  Lab Results   Component Value Date    WBC 7.02 04/06/2021    " HGB 13.5 04/06/2021    HCT 41.1 04/06/2021    MCV 81.4 04/06/2021     04/06/2021     Lab Results   Component Value Date    NEUTROABS 5.26 04/06/2021     Lab Results   Component Value Date    IRON 64 04/06/2021    TIBC 316 04/06/2021    FERRITIN 77.50 04/06/2021   Iron saturation 20% on 4/6/2021.     Lab Results   Component Value Date    [id number] >2,000 (H) 04/06/2021     Lab Results   Component Value Date    FOLATE >20.00 04/06/2021       Assessment/Plan     ASSESSMENT:   1. Pernicious anemia with vitamin B12 deficiency.   · She apparently has some improvement of vitamin B12 level after 3 monthly B12 injections. However, B12 could be further improved to a much higher level.   · I recommended on 1/18/2021 to try sublingual vitamin B12 supplementation to see if that helps. If it does not then she will need to have vitamin B12 injection twice a month. We will obtain laboratory study to document new baseline today.   · On 4/6/2021, patient had a supratherapeutic B12 level > 2000 pg/mL.  · She will continue sublingual B12.    *Suboptimal folate level.  · She has marginally normal folic acid 5.68 ng/mL on 11/30/2020 and actually was deteriorating compared to the level in 08/2020.   · Confirmed low normal folate level 5.94 on 1/18/2021.  I recommended to start folic acid 1 mg daily to help with erythropoiesis.   · On 4/6/2021 patient had supratherapeutic folate > 20 ng/mL.    * Microcytosis of RBC with low normal B12 level and sometimes anemic.   · Patient reports no family history for sickle cell disease or sickle cell trait.   · I am highly suspicious of this patient having iron deficiency which could cause significant fatigue.   · Lab study on 1/18/2021 reported iron saturation 12% with free iron 38 TIBC 329, and ferritin 49 ng/mL.    · We started patient on ferrous sulfate 325 mg twice a day.  · Lab study on 4/6/2021 reported improved ferritin 78, and normalized iron saturation 20%.     PLAN:    1. Continue  sublingual vitamin B12 at 5000 mcg daily.   2.  Continue ferrous sulfate 325 mg twice a day.  3. Continue oral folic acid 1 mg daily.    4. We will arrange patient to come back for follow-up evaluation in 6 months, repeating labs CBC, ferritin and iron      IVANIA HEATH M.D., Ph.D.    4/12/2021        CC:   Nanci Bansal MD

## 2021-04-13 ENCOUNTER — OFFICE VISIT (OUTPATIENT)
Dept: FAMILY MEDICINE CLINIC | Facility: CLINIC | Age: 76
End: 2021-04-13

## 2021-04-13 VITALS
RESPIRATION RATE: 16 BRPM | WEIGHT: 181 LBS | OXYGEN SATURATION: 97 % | TEMPERATURE: 97.1 F | DIASTOLIC BLOOD PRESSURE: 75 MMHG | SYSTOLIC BLOOD PRESSURE: 123 MMHG | HEART RATE: 84 BPM | HEIGHT: 63 IN | BODY MASS INDEX: 32.07 KG/M2

## 2021-04-13 DIAGNOSIS — F32.A CHRONIC DEPRESSION: ICD-10-CM

## 2021-04-13 DIAGNOSIS — E53.8 VITAMIN B 12 DEFICIENCY: ICD-10-CM

## 2021-04-13 DIAGNOSIS — E55.9 VITAMIN D DEFICIENCY: ICD-10-CM

## 2021-04-13 DIAGNOSIS — Z09 HOSPITAL DISCHARGE FOLLOW-UP: Primary | ICD-10-CM

## 2021-04-13 DIAGNOSIS — D51.0 PERNICIOUS ANEMIA: Primary | ICD-10-CM

## 2021-04-13 PROCEDURE — 99213 OFFICE O/P EST LOW 20 MIN: CPT | Performed by: FAMILY MEDICINE

## 2021-04-13 NOTE — PATIENT INSTRUCTIONS
[facility]  [email or web address]    Call our Service Desk: [phone].

## 2021-04-13 NOTE — PROGRESS NOTES
Subjective   Dejah Casey is a 76 y.o. female.     History of Present Illness     76-year-old female presents today for ER visit follow-up.    Date of encounter:  3/28/2021  PCP: Nanci Bansal MD     HPI:  Context: Dejah Casey is a 76 y.o. female who presents to the ED c/o chief complaint of fusion.  History supplied by patient and patient's son.  Patient reports she was extremely tired today, took multiple naps.  Patient spoke with family member earlier today and was normal.  Patient woke up from a nap at approximately 6:30 PM.  Family member reports that when patient answer her phone she seemed confused, was stuttering, complained of shortness of breath.  He could hear her CPAP in the background.  Family member reports that by time they arrived at 7:00 patient was no longer confused, did not have any shortness of breath, was still stuttering slightly but otherwise was at her baseline.  Family reports that patient has been at baseline since.  Patient denies any complaints at present.  Patient does not remember feeling short of breath when she woke.  Family member denies any word finding difficulty, no slurred speech, no word salad.  Patient and family member deny any facial droop, no weakness or numbness, no headache, no double vision, no difficulty with coordination, no difficulty with balance, no vertigo.  Patient denies any recent fever or infectious symptoms, no cough or upper respiratory symptoms, no vomiting or diarrhea, no urinary symptoms.     2200 Patient has remained asymptomatic throughout her ED stay.  Symptoms were brief, seem to consist of confusion and difficulty breathing immediately after waking, resolved quickly and patient has been at baseline since.  ED work-up unremarkable.  Symptoms not consistent with TIA.  Patient is well-appearing and appears appropriate for discharge with outpatient follow-up.  Given extensive discussion return precautions, discharge with primary care follow-up.       I last saw patient December 2020; we discussed concern for pernicious anemia and she was referred to hematology.  Visit was yesterday.  Per records advised to discontinue B12 injections for now and continue sublingual B12 at 5000 mcg a day, continue folate 1 mg a day; labs came back positive for iron deficiency anemia she was advised to start ferrous sulfate 325 twice daily for 3 months.  Plans to recheck her labs in 3 months.    Continues to deal with grief and depression following her 's death.  Grief is complicated by her 's infidelity while he was alive.  Patient struggling to grieve the parts of and she loved but also reckon with the pain his infidelity caused her throughout their marriage.  Long discussion about her feelings.  Amenable to discussing this with a therapist/counselor.      The following portions of the patient's history were reviewed and updated as appropriate: allergies, current medications, past family history, past medical history, past social history, past surgical history and problem list.    Review of Systems   Constitutional: Negative for chills and fever.   HENT: Negative for congestion.    Respiratory: Negative for cough and shortness of breath.    Cardiovascular: Negative for chest pain, palpitations and leg swelling.   Gastrointestinal: Negative for abdominal pain, diarrhea and vomiting.   Psychiatric/Behavioral: Positive for dysphoric mood.       Objective   Physical Exam  Constitutional:       Appearance: She is well-developed.   HENT:      Head: Normocephalic and atraumatic.      Mouth/Throat:      Pharynx: Uvula midline.   Eyes:      Pupils: Pupils are equal, round, and reactive to light.   Cardiovascular:      Rate and Rhythm: Normal rate and regular rhythm.      Heart sounds: No murmur heard.     Pulmonary:      Effort: Pulmonary effort is normal. No respiratory distress.      Breath sounds: Normal breath sounds. No stridor. No wheezing or rales.   Skin:      General: Skin is warm.   Neurological:      Mental Status: She is alert.   Psychiatric:         Behavior: Behavior normal.                 Assessment/Plan     Diagnoses and all orders for this visit:    1. Hospital discharge follow-up (Primary)    2. Chronic depression      Continue care per hematology.  Consider setting up care with therapist/counselor.  Continue medications as dosed.  Discussed that her fatigue could be related to her depression however also deficiencies in B12 iron and vitamin D.

## 2021-04-19 ENCOUNTER — TELEPHONE (OUTPATIENT)
Dept: FAMILY MEDICINE CLINIC | Facility: CLINIC | Age: 76
End: 2021-04-19

## 2021-04-21 ENCOUNTER — PATIENT OUTREACH (OUTPATIENT)
Dept: CASE MANAGEMENT | Facility: OTHER | Age: 76
End: 2021-04-21

## 2021-04-21 NOTE — OUTREACH NOTE
Care Plan Note      Responses   Lifestyle Goals  Routine follow-up with doctor(s)   Suggested Appointments  Other (See Comment) [Follow up with Rolf regarding dry mouth and dry eyes. ]   Annual Wellness Visit:   Patient Will Schedule [noted on next PCP appt]   Specific Disease Process Teaching  -- [continue to drink water and keep hard candy/cough drops to stimulate saliva production near. ]   Other Patient Education/Resources   Advanced Care Planning   ACP Education Method  Send Materials   Does patient have depression diagnosis?  Yes   Advanced Directives:  Send Materials [Patient states she is going to see an Elder  in a few weeks. AD documents sent to patient to review prior to visit. ]   Medication Adherence  Medication side effects [Patient states Biotene mouth wash and tooth paste only made her dry mouth worse. She is currently using a tooth paste for gum health. ]   Goal Progress  Making Progress Toward Goal(s)   Readiness Scale  6   Confidence Scale  6   Health Literacy  Good        The main concerns and/or symptoms the patient would like to address are: Spoke with patient regarding health and wellness. Patient states she is still experiencing dry mouth and dry eyes. The symptoms have continued for 1 year. Patient states her dentist recommended Biotene but it did not help and patient stopped taking, because she felt it was making her dry mouth worse. Patient has reached out to Dr. Bansal for a possible prescription. Patient states she is going to visit an Elder  soon. AC to send AD documents to patient to review prior to the visit. Patient was not sure if she had Advanced Directives. Patient expressed no other concerns or needs at this time.    Education/instruction provided by Care Coordinator: Introduced self, explained ACM RN role and provided contact information. Reviewed patient's dry mouth. ACM recommended to drink water frequently and to use cough drops or hard candy to stimulate saliva  production. Patient has reached out to PCP for continued evaluation and medication request. AD documents sent to patient via mail. AWV noted in patient's next PCP appointment. MyChart is active. Follow up scheduled in 1 day to review progress of patient's medication request with Dr. Bansal.    Follow Up Outreach Due: 1day    Maude Vincent RN  Ambulatory     4/21/2021, 14:00 EDT

## 2021-04-21 NOTE — OUTREACH NOTE
Care Coordination Assessment    Documented/Reviewed By: Maude Vincent RN Date/time: 4/21/2021  1:55 PM   Assessment completed with: patient  Enrolled in care management program: Yes  Living arrangement: alone  Support system: family (Comment: step-children call and check on patient)  Type of residence: apartment  Home care services: No  Equipment used at home: cane (Comment: patient uses cane occassionally)  Communication device: No  Bed or wheelchair confined: No  Inadequate nutrition: No  Medication adherence problem: No  Experiencing side effects from current medications: No  History of fall(s) in last 6 months: No  Difficulty keeping appointments: No  Family aware of the patient's advance care planning wishes: No  Scientologist or spiritual beliefs that impact treatment: No  Chronic pain: No

## 2021-04-27 ENCOUNTER — PATIENT OUTREACH (OUTPATIENT)
Dept: CASE MANAGEMENT | Facility: OTHER | Age: 76
End: 2021-04-27

## 2021-04-27 NOTE — OUTREACH NOTE
Patient Outreach Note    Spoke with patient regarding health and wellness. Patient states she did not hear back from Dr. Bansal last week. She followed up with her dentist yesterday and was instructed to continue Biotin. Patient states it doesn't help and is actually making it worse. Dentist recommended continuing salt water washes. Patient plans to continue salt water washes. She states her gums are sensitive and her cheeks are sticking to her teeth and gums. ACM to leave voice message for Dr. Bansal tomorrow regarding patient's concerns. Patient reports no concerns with transportation, food, or finances at this time.     Maude Vincent RN  Ambulatory     4/27/2021, 16:37 EDT

## 2021-04-28 NOTE — TELEPHONE ENCOUNTER
Caller: Dejah Casey    Relationship: Self    Best call back number: [phone]    What medication are you requesting: DRY MOUTH, CHEEKS STICKING TO TEETH, PATIENT WOULD LIKE SOMETHING SENT IN FOR THE DRY MOUTH. PLEASE CALL PATIENT IF ANY QUESTIONS    How long have you been experiencing symptoms: OVER A YEAR    Have you had these symptoms before:    [x] Yes  [] No    Have you been treated for these symptoms before:   [x] Yes  [] No    If a prescription is needed, what is your preferred pharmacy and phone number:      [facility]/pharmacy #[id number] - [facility], [place] - [address] AT [facility] - [phone] [facility] [phone] FX      
Done   
Set up telephone visit to discuss in more detail.  We have discussed this in the past before in terms of symptoms being a possible side effect of medications that she is on from other specialists.  
- - -

## 2021-04-28 NOTE — PROGRESS NOTES
We have discussed this in the past at previous visits in terms of concern regarding her symptoms possibly being a side effect of other medications that she is on such as the Myrbetriq.  Also recommended discussing further with dentist.  Seeing as Biotene recommended by dentist is not helping, have advised staff to set up telephone visit with patient so we can consider referral to ENT to consider further work-up for why she has the dry mouth as well as medications which can help her symptoms.

## 2021-04-29 ENCOUNTER — OFFICE VISIT (OUTPATIENT)
Dept: FAMILY MEDICINE CLINIC | Facility: CLINIC | Age: 76
End: 2021-04-29

## 2021-04-29 DIAGNOSIS — R68.2 DRY MOUTH: ICD-10-CM

## 2021-04-29 DIAGNOSIS — H04.123 CHRONIC DRYNESS OF BOTH EYES: Primary | ICD-10-CM

## 2021-04-29 PROCEDURE — 99442 PR PHYS/QHP TELEPHONE EVALUATION 11-20 MIN: CPT | Performed by: FAMILY MEDICINE

## 2021-04-29 NOTE — PROGRESS NOTES
Dejah Casey    1945  [id number]  Time spent reviewing E-Visit Questionnaire-5-10 minutes.  I have reviewed the e-Visit questionnaire and patient's answers, my assessment and plan documented below.    HPI    This visit was completed via telephone due to the restriction of the COVID-19 pandemic.  All issues as below were discussed and addressed but no physical exam was performed.  It was felt that the patient should be evaluated in clinic and they were directed there.  The patient verbally consented to visit.    76-year-old female presents today via telephone visit to discuss chronic dry eyes and dry mouth.  Sjogren antibodies and other autoimmune labs were negative a few months ago.  Continues to have issues with same symptoms.  Biotene advised by dentist does not help.  She is on Effexor which could be contributing however she has a history of chronic depression who is currently grieving the death of her  which is complicated by his infidelity throughout their marriage.  Discussed likely do not want to change Effexor to a different medication at this time.  Continued concern for Sjogren's.  Need biopsy to confirm diagnosis.     Diagnoses and all orders for this visit:    1. Chronic dryness of both eyes (Primary)  -     Ambulatory Referral to ENT (Otolaryngology)    2. Dry mouth  -     Ambulatory Referral to ENT (Otolaryngology)       Time spent during visit: 15 minutes.    Any medications prescribed have been sent electronically to   [facility] - [place], [place] - [address] - [phone]  - [phone] FX  [address] [address]  Phone: [phone] Fax: [phone]    [id number] [place] [facility] [address] AT [facility] - [phone]  - [phone] FX  [address]  [facility] [address]  Phone: [phone] Fax: [phone]        Nanci Bansal MD  04/29/21  09:08 EDT

## 2021-05-03 ENCOUNTER — TELEPHONE (OUTPATIENT)
Dept: FAMILY MEDICINE CLINIC | Facility: CLINIC | Age: 76
End: 2021-05-03

## 2021-05-03 NOTE — TELEPHONE ENCOUNTER
Caller: Dejah Casey    Relationship: Self    Best call back number: [phone]     What is the best time to reach you: ANY TIME 1:00 PM     Who are you requesting to speak with (clinical staff, provider,  specific staff member): PROVIDER OR NURSE        What was the call regarding: ISSUES THAT PATIENT WAS HAVING WITH THE INSIDE OF HER MOUTH.  INSIDE OF PATIENT'S CHEEKS HAVE BEEN DISCOLORED.  GUMS WERE SO SORE THAT IT HURT TO BRUSH HER TEETH.  STATES THAT SHE HAS DRY MOUTH FOR OVER A YEAR.  STATES THAT SHE IS STILL HAVING ISSUES.     JUST WANTED PROVIDER TO KNOW THAT IT LOOKS A LITTLE BETTER TODAY.      Do you require a callback: YES

## 2021-05-13 ENCOUNTER — TELEPHONE (OUTPATIENT)
Dept: CARDIOLOGY | Facility: CLINIC | Age: 76
End: 2021-05-13

## 2021-05-13 RX ORDER — FUROSEMIDE 40 MG/1
40 TABLET ORAL DAILY
Qty: 90 TABLET | Refills: 3 | Status: SHIPPED | OUTPATIENT
Start: 2021-05-13 | End: 2022-07-14

## 2021-05-13 NOTE — TELEPHONE ENCOUNTER
Pt is calling today and needs refill of furosemide sent to her mail order pharmacy. She is about to run out. The order is pending in our system.

## 2021-05-18 ENCOUNTER — PATIENT OUTREACH (OUTPATIENT)
Dept: CASE MANAGEMENT | Facility: OTHER | Age: 76
End: 2021-05-18

## 2021-05-18 NOTE — OUTREACH NOTE
Patient Outreach Note    Spoke with patient regarding health and wellness. Introduced self, explained ACM RN role and provided contact information. Patient states her mouth is still sore and she has difficulty eating still. She saw the ENT PA due to the ENT having emergency surgery. Patient was not prescribed anything new and was directed to return in 4-6 weeks if still experiencing the dryness. No needs or concerns for the ACM. AD info sent to patient. AWV scheduled. MyChart active. Patient transitioned to monitoring status. Follow up review scheduled in 1 month.     Maude Vincent RN  Ambulatory     5/18/2021, 15:31 EDT

## 2021-05-19 NOTE — TELEPHONE ENCOUNTER
Set up telephone visit with me with patient next week.  I reviewed records and ENT did not discuss doing biopsy.  They recommended considering ophthalmology referral in the future however patient already follows with ophthalmology.

## 2021-05-24 ENCOUNTER — OFFICE VISIT (OUTPATIENT)
Dept: FAMILY MEDICINE CLINIC | Facility: CLINIC | Age: 76
End: 2021-05-24

## 2021-05-24 DIAGNOSIS — R53.82 CHRONIC FATIGUE: ICD-10-CM

## 2021-05-24 DIAGNOSIS — E61.1 IRON DEFICIENCY: Primary | ICD-10-CM

## 2021-05-24 DIAGNOSIS — R63.4 WEIGHT LOSS, UNINTENTIONAL: ICD-10-CM

## 2021-05-24 PROCEDURE — 99442 PR PHYS/QHP TELEPHONE EVALUATION 11-20 MIN: CPT | Performed by: FAMILY MEDICINE

## 2021-05-24 NOTE — PROGRESS NOTES
Dejah Casey    1945  [id number]  Time spent reviewing E-Visit Questionnaire-5-10 minutes.  I have reviewed the e-Visit questionnaire and patient's answers, my assessment and plan documented below.    HPI    This visit was completed via telephone due to the restriction of the COVID-19 pandemic.  All issues as below were discussed and addressed but no physical exam was performed.  It was felt that the patient should be evaluated in clinic and they were directed there.  The patient verbally consented to visit.    76-year-old female who presents today via telephone visit to follow-up on dry mouth issues.  Since ophthalmology started pilocarpine has been a lot better.  ENT did not do biopsy; recommended continuing pilocarpine, trying chinstrap for CPAP and also recommended mouthwash candies to help with saliva stimulation.  Patient reports she is doing a lot better.  Continues to have unintentional weight loss; over the past 11 months has lost 40 pounds.  Since her last visit with me on April 13 has lost 7 pounds.  Decreased appetite however tries to make sure that she eats regularly.  Follows with hematology for pernicious anemia.  On iron supplementation for iron deficiency anemia.    Diagnoses and all orders for this visit:    1. Iron deficiency (Primary)  -     Ambulatory Referral to Gastroenterology    2. Chronic fatigue  -     Ambulatory Referral to Gastroenterology    3. Weight loss, unintentional  -     Ambulatory Referral to Gastroenterology    Urgent referral to GI.  Continue care per ophthalmology and ENT and hematology.    Time spent during visit: 20 minutes.    Any medications prescribed have been sent electronically to   [facility] - [place], [place] - [address] - [phone]  - [phone] FX  [address] [address]  Phone: [phone] Fax: [phone]    [facility]pharm[facility][id number] - [facility] [place] - [address] AT [facility] -  [phone] [facility] [phone] FX  [address]  [place] [address]  Phone: [phone] Fax: [phone]        Nanci Bansal MD  05/24/21  11:50 EDT

## 2021-05-27 ENCOUNTER — TELEPHONE (OUTPATIENT)
Dept: GASTROENTEROLOGY | Facility: CLINIC | Age: 76
End: 2021-05-27

## 2021-05-27 ENCOUNTER — OFFICE VISIT (OUTPATIENT)
Dept: GASTROENTEROLOGY | Facility: CLINIC | Age: 76
End: 2021-05-27

## 2021-05-27 VITALS
BODY MASS INDEX: 31.54 KG/M2 | DIASTOLIC BLOOD PRESSURE: 78 MMHG | SYSTOLIC BLOOD PRESSURE: 125 MMHG | WEIGHT: 178 LBS | HEIGHT: 63 IN

## 2021-05-27 DIAGNOSIS — D50.9 IRON DEFICIENCY ANEMIA, UNSPECIFIED IRON DEFICIENCY ANEMIA TYPE: Primary | Chronic | ICD-10-CM

## 2021-05-27 DIAGNOSIS — E53.8 VITAMIN B12 DEFICIENCY: Chronic | ICD-10-CM

## 2021-05-27 DIAGNOSIS — R63.4 WEIGHT LOSS: ICD-10-CM

## 2021-05-27 DIAGNOSIS — Z12.11 COLON CANCER SCREENING: ICD-10-CM

## 2021-05-27 PROCEDURE — 99204 OFFICE O/P NEW MOD 45 MIN: CPT | Performed by: INTERNAL MEDICINE

## 2021-05-27 RX ORDER — SODIUM CHLORIDE, SODIUM LACTATE, POTASSIUM CHLORIDE, CALCIUM CHLORIDE 600; 310; 30; 20 MG/100ML; MG/100ML; MG/100ML; MG/100ML
30 INJECTION, SOLUTION INTRAVENOUS CONTINUOUS
Status: CANCELLED | OUTPATIENT
Start: 2021-05-27

## 2021-05-27 RX ORDER — PILOCARPINE HYDROCHLORIDE 5 MG/1
5 TABLET, FILM COATED ORAL 3 TIMES DAILY
COMMUNITY
Start: 2021-05-06

## 2021-05-27 NOTE — TELEPHONE ENCOUNTER
jaida Smith for 7/20 [facility]

## 2021-05-27 NOTE — PROGRESS NOTES
Chief Complaint   Patient presents with   • Weight Loss   • Fatigue       Subjective     HPI    Dejah Casey is a 76 y.o. female with a past medical history noted below who presents for iron deficiency anemia, fatigue and weight loss.    She is followed by Dr Nicholson, has b12 def, pernicious anemia. She is on both B12 and oral iron.    No overt bleeding    No nausea, heartburn, or cramping    Last colonoscopy in 2010    Niece with colon cancer, she is in her 50s    Weight loss-- 40#,  diet, eating habits changed, Overall eats less and much better--used to eat out at a lot of restaurants, now eating a lot of salads, lean meats.    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          Today's visit was in the office.  Both the patient and I were wearing face masks and proper hand hygiene was performed before and after the physical exam.           Current Outpatient Medications:   •  Accu-Chek FastClix Lancets misc, Use to check blood sugar once daily, Disp: 102 each, Rfl: 5  •  amLODIPine (NORVASC) 5 MG tablet, TAKE 1 TABLET EVERY DAY, Disp: 90 tablet, Rfl: 0  •  aspirin 81 MG tablet, Take 81 mg by mouth daily., Disp: , Rfl:   •  atorvastatin (LIPITOR) 10 MG tablet, TAKE 1 TABLET EVERY NIGHT., Disp: 90 tablet, Rfl: 1  •  Calcium Carb-Cholecalciferol 500-200 MG-UNIT tablet, Take 2 tablets by mouth daily., Disp: , Rfl:   •  Cholecalciferol (VITAMIN D3) 20 MCG (800 UNIT) tablet, Take 1 tablet/day by mouth., Disp: , Rfl:   •  clonazePAM (KlonoPIN) 2 MG tablet, TAKE 1 TABLET BY MOUTH NIGHTLY. MAX DAILY AMOUNT  2 MG, Disp: , Rfl:   •  Cyanocobalamin ([employer] Vitamin B-12) 5000 MCG sublingual tablet, Place 5,000 mcg under the tongue Daily., Disp: 90 tablet, Rfl: 3  •  Durezol 0.05 % ophthalmic emulsion, , Disp: , Rfl:   •  ferrous sulfate 325 (65 FE) MG tablet, Take 1 tablet by mouth 2 (Two) Times a Day Before Meals., Disp: 180 tablet, Rfl: 3  •  folic acid (FOLVITE) 1 MG tablet, Take 1 tablet by mouth  Daily., Disp: 90 tablet, Rfl: 3  •  furosemide (LASIX) 40 MG tablet, Take 1 tablet by mouth Daily., Disp: 90 tablet, Rfl: 3  •  glucose blood (Accu-Chek Catherine Plus) test strip, CHECK BLOOD SUGAR ONE TIME DAILY, Disp: 100 each, Rfl: 1  •  Lancets Misc. (ACCU-CHEK FASTCLIX LANCET) kit, 1 kit Daily., Disp: 1 kit, Rfl: 0  •  metFORMIN (GLUCOPHAGE) 500 MG tablet, Take 1 tablet by mouth 2 (Two) Times a Day With Meals., Disp: 180 tablet, Rfl: 3  •  metoprolol succinate XL (TOPROL-XL) 50 MG 24 hr tablet, Take 1 tablet by mouth Daily., Disp: 90 tablet, Rfl: 3  •  Mirabegron ER (MYRBETRIQ) 50 MG tablet sustained-release 24 hour 24 hr tablet, Take 50 mg by mouth Daily., Disp: , Rfl:   •  pilocarpine (SALAGEN) 5 MG tablet, , Disp: , Rfl:   •  potassium chloride (KAYCIEL) 20 MEQ/15ML (10%) solution, Take 15 mL by mouth Daily., Disp: 3800 mL, Rfl: 1  •  STIOLTO RESPIMAT 2.5-2.5 MCG/ACT aerosol solution inhaler, 2 puffs As Needed., Disp: , Rfl:   •  venlafaxine (EFFEXOR) 75 MG tablet, TAKE 2 TABLETS TWICE DAILY, Disp: 360 tablet, Rfl: 1  •  fluticasone (FLONASE) 50 MCG/ACT nasal spray, 1 spray into the nostril(s) as directed by provider As Needed., Disp: , Rfl:     Current Facility-Administered Medications:   •  cyanocobalamin injection 1,000 mcg, 1,000 mcg, Intramuscular, Q30 Days, Nanci Bansal MD, 1,000 mcg at 03/24/21 1454      Objective     Vitals:    05/27/21 1106   BP: 125/78         05/27/21  1106   Weight: 80.7 kg (178 lb)     Body mass index is 31.53 kg/m².    Physical Exam  Constitutional:       Appearance: Normal appearance.   Pulmonary:      Effort: Pulmonary effort is normal.   Abdominal:      Palpations: Abdomen is soft.   Neurological:      Mental Status: She is alert and oriented to person, place, and time.   Psychiatric:         Mood and Affect: Mood normal.         Behavior: Behavior normal.             WBC   Date Value Ref Range Status   04/06/2021 7.02 3.40 - 10.80 10*3/mm3 Final   11/30/2020 6.45 3.40 -  10.80 10*3/mm3 Final   11/14/2018 9.02 4.5 - 11.0 10*3/uL Final     RBC   Date Value Ref Range Status   04/06/2021 5.05 3.77 - 5.28 10*6/mm3 Final   11/30/2020 5.05 3.77 - 5.28 10*6/mm3 Final   11/14/2018 4.95 4.0 - 5.2 10*6/uL Final     Hemoglobin   Date Value Ref Range Status   04/06/2021 13.5 12.0 - 15.9 g/dL Final   11/14/2018 12.5 12.0 - 16.0 g/dL Final     Hematocrit   Date Value Ref Range Status   04/06/2021 41.1 34.0 - 46.6 % Final   11/14/2018 39.1 36.0 - 46.0 % Final     MCV   Date Value Ref Range Status   04/06/2021 81.4 79.0 - 97.0 fL Final   11/14/2018 79.0 (L) 80.0 - 100.0 fL Final     MCH   Date Value Ref Range Status   04/06/2021 26.7 26.6 - 33.0 pg Final   11/14/2018 25.3 (L) 26.0 - 34.0 pg Final     MCHC   Date Value Ref Range Status   04/06/2021 32.8 31.5 - 35.7 g/dL Final   11/14/2018 32.0 31.0 - 37.0 g/dL Final     RDW   Date Value Ref Range Status   04/06/2021 14.4 12.3 - 15.4 % Final   11/14/2018 14.5 12.0 - 16.8 % Final     RDW-SD   Date Value Ref Range Status   04/06/2021 42.2 37.0 - 54.0 fl Final     MPV   Date Value Ref Range Status   04/06/2021 10.2 6.0 - 12.0 fL Final   11/14/2018 10.0 6.7 - 10.8 fL Final     Platelets   Date Value Ref Range Status   04/06/2021 288 140 - 450 10*3/mm3 Final   11/14/2018 342 140 - 440 10*3/uL Final     Neutrophil Rel %   Date Value Ref Range Status   11/14/2018 77.1 45 - 80 % Final     Neutrophil %   Date Value Ref Range Status   04/06/2021 75.0 42.7 - 76.0 % Final     Lymphocyte Rel %   Date Value Ref Range Status   11/14/2018 14.7 (L) 15 - 50 % Final     Lymphocyte %   Date Value Ref Range Status   04/06/2021 14.2 (L) 19.6 - 45.3 % Final     Monocyte Rel %   Date Value Ref Range Status   11/14/2018 5.0 0 - 15 % Final     Monocyte %   Date Value Ref Range Status   04/06/2021 5.4 5.0 - 12.0 % Final     Eosinophil %   Date Value Ref Range Status   04/06/2021 4.3 0.3 - 6.2 % Final   11/14/2018 2.3 0 - 7 % Final     Basophil Rel %   Date Value Ref Range  Status   11/14/2018 0.6 0 - 2 % Final     Basophil %   Date Value Ref Range Status   04/06/2021 1.0 0.0 - 1.5 % Final     Immature Grans %   Date Value Ref Range Status   04/06/2021 0.1 0.0 - 0.5 % Final   11/14/2018 0.3 (H) 0 % Final     Neutrophils Absolute   Date Value Ref Range Status   11/14/2018 6.95 2.0 - 8.8 10*3/uL Final     Neutrophils, Absolute   Date Value Ref Range Status   04/06/2021 5.26 1.70 - 7.00 10*3/mm3 Final     Lymphocytes Absolute   Date Value Ref Range Status   11/14/2018 1.33 0.7 - 5.5 10*3/uL Final     Lymphocytes, Absolute   Date Value Ref Range Status   04/06/2021 1.00 0.70 - 3.10 10*3/mm3 Final     Monocytes Absolute   Date Value Ref Range Status   11/14/2018 0.45 0.0 - 1.7 10*3/uL Final     Monocytes, Absolute   Date Value Ref Range Status   04/06/2021 0.38 0.10 - 0.90 10*3/mm3 Final     Eosinophils Absolute   Date Value Ref Range Status   11/14/2018 0.21 0.0 - 0.8 10*3/uL Final     Eosinophils, Absolute   Date Value Ref Range Status   04/06/2021 0.30 0.00 - 0.40 10*3/mm3 Final     Basophils Absolute   Date Value Ref Range Status   11/14/2018 0.05 0.0 - 0.2 10*3/uL Final     Basophils, Absolute   Date Value Ref Range Status   04/06/2021 0.07 0.00 - 0.20 10*3/mm3 Final     Immature Grans, Absolute   Date Value Ref Range Status   04/06/2021 0.01 0.00 - 0.05 10*3/mm3 Final   11/14/2018 0.03 <1 10*3/uL Final     nRBC   Date Value Ref Range Status   04/06/2021 0.0 0.0 - 0.2 /100 WBC Final       Lab Results   Component Value Date    GLUCOSE 95 03/28/2021    BUN 13 03/28/2021    CREATININE 0.89 03/28/2021    EGFRIFNONA 62 12/18/2020    EGFRIFAFRI 75 03/28/2021    BCR 14.6 03/28/2021    CO2 27.4 03/28/2021    CALCIUM 9.6 03/28/2021    PROTENTOTREF 6.8 12/18/2020    ALBUMIN 3.90 03/28/2021    LABIL2 1.3 12/18/2020    AST 9 03/28/2021    ALT 9 03/28/2021     Component   Ref Range & Units 1 mo ago 4 mo ago 5 mo ago   Ferritin   13.00 - 150.00 ng/mL 77.50  49.00  52.80 CM      Component   Ref Range  & Units 1 mo ago 4 mo ago 5 mo ago   Iron   37 - 145 mcg/dL 64  38  74    Iron Saturation   14 - 48 % 20  12Low      Transferrin   200 - 360 mg/dL 226  235     TIBC   249 - 505 mcg/dL 316  329     Resulting Agency  [facility]  [facility]         Imaging Results (Last 7 Days)     ** No results found for the last 168 hours. **          I personally reviewed data as detailed below:     The labs listed above.    Office notes from: 5/24/21 pcp, 4/2021 Dr Bharat burton      No notes on file    Assessment/Plan    1. Iron deficiency anemia: She has been on supplementation and has had some improvement. It has been a long time since she has had a colonoscopy, average risk but I think in this setting it is worth pursuing this    2. B12 deficiency: On supplementation    3. Colon cancer screening: Reports average risk    4. Weight loss: Multifactorial with the death of her  as well as significant healthier dietary alteration    Plan  Continue oral iron, B12  Proceed with EGD and colonoscopy for further evaluation of the anemia, colon cancer screening, weight loss  Other recommendations after her scope    Diagnoses and all orders for this visit:    1. Iron deficiency anemia, unspecified iron deficiency anemia type (Primary)  -     Case Request; Standing  -     Follow Anesthesia Guidelines / Standing Orders; Future  -     Obtain Informed Consent; Future  -     Implement Anesthesia Orders Day of Procedure; Standing  -     Obtain Informed Consent; Standing  -     lactated ringers infusion  -     Case Request    2. Vitamin B12 deficiency  -     Case Request; Standing  -     Follow Anesthesia Guidelines / Standing Orders; Future  -     Obtain Informed Consent; Future  -     Implement Anesthesia Orders Day of Procedure; Standing  -     Obtain Informed Consent; Standing  -     lactated ringers infusion  -     Case Request    3. Colon cancer screening  -     Case Request; Standing  -     Follow Anesthesia Guidelines / Standing  Orders; Future  -     Obtain Informed Consent; Future  -     Implement Anesthesia Orders Day of Procedure; Standing  -     Obtain Informed Consent; Standing  -     lactated ringers infusion  -     Case Request    4. Weight loss        I have discussed the above plan with the patient.  They verbalize understanding and are in agreement with the plan.  They have been advised to contact the office for any questions, concerns, or changes related to their health.    Dictated utilizing Dragon dictation

## 2021-06-02 ENCOUNTER — OFFICE VISIT (OUTPATIENT)
Dept: CARDIOLOGY | Facility: CLINIC | Age: 76
End: 2021-06-02

## 2021-06-02 VITALS
SYSTOLIC BLOOD PRESSURE: 136 MMHG | BODY MASS INDEX: 31.29 KG/M2 | HEART RATE: 77 BPM | DIASTOLIC BLOOD PRESSURE: 80 MMHG | HEIGHT: 63 IN | WEIGHT: 176.6 LBS

## 2021-06-02 DIAGNOSIS — I10 ESSENTIAL HYPERTENSION: Primary | ICD-10-CM

## 2021-06-02 DIAGNOSIS — E66.9 OBESITY (BMI 30.0-34.9): ICD-10-CM

## 2021-06-02 DIAGNOSIS — I51.89 DIASTOLIC DYSFUNCTION: ICD-10-CM

## 2021-06-02 DIAGNOSIS — I87.2 CHRONIC VENOUS INSUFFICIENCY: ICD-10-CM

## 2021-06-02 PROBLEM — E66.812 CLASS 2 OBESITY DUE TO EXCESS CALORIES WITHOUT SERIOUS COMORBIDITY WITH BODY MASS INDEX (BMI) OF 38.0 TO 38.9 IN ADULT: Status: RESOLVED | Noted: 2020-07-02 | Resolved: 2021-06-02

## 2021-06-02 PROBLEM — E87.6 HYPOKALEMIA: Status: RESOLVED | Noted: 2020-08-21 | Resolved: 2021-06-02

## 2021-06-02 PROBLEM — E66.09 CLASS 2 OBESITY DUE TO EXCESS CALORIES WITHOUT SERIOUS COMORBIDITY WITH BODY MASS INDEX (BMI) OF 38.0 TO 38.9 IN ADULT: Status: RESOLVED | Noted: 2020-07-02 | Resolved: 2021-06-02

## 2021-06-02 PROBLEM — R00.0 TACHYCARDIA: Status: RESOLVED | Noted: 2020-07-02 | Resolved: 2021-06-02

## 2021-06-02 PROCEDURE — 99213 OFFICE O/P EST LOW 20 MIN: CPT | Performed by: INTERNAL MEDICINE

## 2021-06-02 PROCEDURE — 93000 ELECTROCARDIOGRAM COMPLETE: CPT | Performed by: INTERNAL MEDICINE

## 2021-06-02 RX ORDER — CYCLOSPORINE 0.5 MG/ML
EMULSION OPHTHALMIC 2 TIMES DAILY
COMMUNITY
Start: 2021-04-14

## 2021-06-02 NOTE — PROGRESS NOTES
Date of Office Visit: 21  Encounter Provider: Daniel Leal MD  Place of Service: [facility] CARDIOLOGY  Patient Name: Dejah Casey  :1945    Chief Complaint   Patient presents with   • Coronary Artery Disease   :     HPI:     Ms. Casey is 76 y.o. and presents today to follow up. I have reviewed prior notes and there are no changes except for any new updates described below. I have also reviewed any information entered into the medical record by the patient or by ancillary staff.     She was followed by [facility] until 2017, and then I first saw her in 2017.  In , she had a cath which showed minimal coronary atherosclerosis (30% LAD).  In 2016, she was having palpitations; a Holter showed rare PVCs and PACs and rare bursts of PACs lasting up to six beats.  Metoprolol was started, which helped. She had an echocardiogram in 2017 that was unremarkable; it showed an LVEF of 55%, grade I diastolic dysfunction, and mild MR.  A proBNP in  was normal (30). In 2017, a perfusion stress test was completely normal.  This was performed due to dyspnea.  Another perfusion stress was performed in 2018 and was normal (it was also performed because of dyspnea).    In early , she again reported dyspnea, leg swelling, and palpitations. An echo revealed normal LVSF, grade 1A diastolic dysfunction, and no significant valvular disease or PHTN. I started furosemide.     Of note, she was previously on HCTZ but it was stopped in  due to hypercalcemia. She has had issues with her potassium in the past as well.     Past Medical History:   Diagnosis Date   • Allergic     Seasonal alleries, Lisinopril, Dexamethasone   • Anxiety    • ASCVD (arteriosclerotic cardiovascular disease)    • CAD (coronary artery disease)    • Cataract     Cararacts in both eyes.   • Contact dermatitis    • DDD (degenerative disc disease), lumbar    •  Depression    • Diabetes mellitus (CMS/HCC)    • Essential hypertension    • Female climacteric state    • Headache    • History of mammogram     8/2016   • History of total right hip replacement 2013   • Hypercalcemia    • Hyperlipidemia    • Low back pain     Lumbar area.   • Neuromuscular disorder (CMS/HCC) March 2016   • Nightmares REM-sleep type    • Nonocclusive coronary atherosclerosis of native coronary artery     cath in 2008 with 30% LAD disease   • Nontoxic multinodular goiter    • Obesity    • Osteoarthritis     both knees, back and hips   • Osteopenia    • Pain, joint, shoulder    • Polycythemia    • Rotator cuff tendinitis    • Seasonal allergies    • Sleep apnea     CPAP nightly   • Snoring    • Superficial phlebitis    • Type 2 diabetes mellitus (CMS/[facility])     Type II   • Vitiligo        Past Surgical History:   Procedure Laterality Date   • BREAST BIOPSY     • BREAST LUMPECTOMY  1973    benign   • CARDIAC CATHETERIZATION  2008   • CATARACT EXTRACTION, BILATERAL Bilateral 2019   • COLONOSCOPY  08/15/2010   • HYSTERECTOMY  1982   • JOINT REPLACEMENT  July 10, 2013    Total Hip Replacement   • REPLACEMENT TOTAL KNEE Left 08/06/2018    Dr. Bishnu Larson   • REPLACEMENT TOTAL KNEE Right     [facility]    • TONSILLECTOMY  1967   • TOTAL HIP ARTHROPLASTY Right 2013       Social History     Socioeconomic History   • Marital status:      Spouse name: Not on file   • Number of children: Not on file   • Years of education: Not on file   • Highest education level: Not on file   Tobacco Use   • Smoking status: Never Smoker   • Smokeless tobacco: Never Used   Vaping Use   • Vaping Use: Never used   Substance and Sexual Activity   • Alcohol use: No     Comment: CAFFEINE: DECAF ONCE PER WK.    • Drug use: No   • Sexual activity: Yes     Partners: Male     Birth control/protection: Post-menopausal     Comment: I had a Hysterectomy in 1982.       Family History   Problem Relation Age of Onset   • Diabetes Mother          Mother   • Thyroid disease Mother    • Hypertension Father         Father   • Heart disease Father         Father   • Arthritis Father         Father   • Hyperlipidemia Father         Father   • Hypertension Brother         Brother   • Diabetes Brother         Brother   • Kidney disease Brother         Brother, Renal Failure   • Vision loss Brother         Brother   • Asthma Sister         Sister   • Hypertension Sister         Sister   • Miscarriages / Stillbirths Sister         Sister   • Cancer Brother         Brother   • Lung cancer Brother    • Diabetes Sister         Sister   • Hypertension Sister         Sister   • Early death Sister    • Kidney disease Brother         Brother,  Renal Failure   • Stroke Brother         Brother       Review of Systems   Cardiovascular: Positive for leg swelling.   Musculoskeletal: Positive for joint pain.   Genitourinary: Positive for frequency.   All other systems reviewed and are negative.      Allergies   Allergen Reactions   • Hctz [Hydrochlorothiazide] Other (See Comments)     In 2015-developed hypercalcemia-HCTZ was discontinued   • Dexamethasone Dermatitis   • Lisinopril Cough         Current Outpatient Medications:   •  Accu-Chek FastClix Lancets misc, Use to check blood sugar once daily, Disp: 102 each, Rfl: 5  •  amLODIPine (NORVASC) 5 MG tablet, TAKE 1 TABLET EVERY DAY, Disp: 90 tablet, Rfl: 0  •  aspirin 81 MG tablet, Take 81 mg by mouth daily., Disp: , Rfl:   •  atorvastatin (LIPITOR) 10 MG tablet, TAKE 1 TABLET EVERY NIGHT., Disp: 90 tablet, Rfl: 1  •  Calcium Carb-Cholecalciferol 500-200 MG-UNIT tablet, Take 2 tablets by mouth daily., Disp: , Rfl:   •  Cholecalciferol (VITAMIN D3) 20 MCG (800 UNIT) tablet, Take 1 tablet/day by mouth., Disp: , Rfl:   •  clonazePAM (KlonoPIN) 2 MG tablet, TAKE 1 TABLET BY MOUTH NIGHTLY. MAX DAILY AMOUNT  2 MG, Disp: , Rfl:   •  Cyanocobalamin ([employer]S Vitamin B-12) 5000 MCG sublingual tablet, Place 5,000 mcg under the tongue  "Daily., Disp: 90 tablet, Rfl: 3  •  Durezol 0.05 % ophthalmic emulsion, , Disp: , Rfl:   •  ferrous sulfate 325 (65 FE) MG tablet, Take 1 tablet by mouth 2 (Two) Times a Day Before Meals., Disp: 180 tablet, Rfl: 3  •  fluticasone (FLONASE) 50 MCG/ACT nasal spray, 1 spray into the nostril(s) as directed by provider As Needed., Disp: , Rfl:   •  folic acid (FOLVITE) 1 MG tablet, Take 1 tablet by mouth Daily., Disp: 90 tablet, Rfl: 3  •  furosemide (LASIX) 40 MG tablet, Take 1 tablet by mouth Daily., Disp: 90 tablet, Rfl: 3  •  glucose blood (Accu-Chek Catherine Plus) test strip, CHECK BLOOD SUGAR ONE TIME DAILY, Disp: 100 each, Rfl: 1  •  Lancets Misc. (ACCU-CHEK FASTCLIX LANCET) kit, 1 kit Daily., Disp: 1 kit, Rfl: 0  •  metFORMIN (GLUCOPHAGE) 500 MG tablet, Take 1 tablet by mouth 2 (Two) Times a Day With Meals., Disp: 180 tablet, Rfl: 3  •  metoprolol succinate XL (TOPROL-XL) 50 MG 24 hr tablet, Take 1 tablet by mouth Daily., Disp: 90 tablet, Rfl: 3  •  Mirabegron ER (MYRBETRIQ) 50 MG tablet sustained-release 24 hour 24 hr tablet, Take 50 mg by mouth Daily., Disp: , Rfl:   •  pilocarpine (SALAGEN) 5 MG tablet, , Disp: , Rfl:   •  potassium chloride (KAYCIEL) 20 MEQ/15ML (10%) solution, Take 15 mL by mouth Daily., Disp: 3800 mL, Rfl: 1  •  Restasis 0.05 % ophthalmic emulsion, 2 (two) times a day., Disp: , Rfl:   •  STIOLTO RESPIMAT 2.5-2.5 MCG/ACT aerosol solution inhaler, 2 puffs As Needed., Disp: , Rfl:   •  venlafaxine (EFFEXOR) 75 MG tablet, TAKE 2 TABLETS TWICE DAILY, Disp: 360 tablet, Rfl: 1    Current Facility-Administered Medications:   •  cyanocobalamin injection 1,000 mcg, 1,000 mcg, Intramuscular, Q30 Days, Nanci Bansal MD, 1,000 mcg at 03/24/21 1454      Objective:     Vitals:    06/02/21 1349   BP: 136/80   BP Location: Left arm   Pulse: 77   Weight: 80.1 kg (176 lb 9.6 oz)   Height: 160 cm (63\")     Body mass index is 31.28 kg/m².    Physical Exam  Vitals reviewed.   Constitutional:       Comments: " overweight   HENT:      Head: Normocephalic.      Nose: Nose normal.      Mouth/Throat:      Comments: Masked  Eyes:      Conjunctiva/sclera: Conjunctivae normal.   Neck:      Vascular: JVD present.   Cardiovascular:      Rate and Rhythm: Normal rate and regular rhythm.      Pulses: Normal pulses and intact distal pulses.      Heart sounds: Normal heart sounds. No murmur heard.     Pulmonary:      Effort: Pulmonary effort is normal.      Breath sounds: Normal breath sounds.   Abdominal:      Palpations: Abdomen is soft.      Tenderness: There is no abdominal tenderness.      Comments: Obesity limits abdominal exam   Musculoskeletal:         General: Normal range of motion.      Cervical back: Normal range of motion.      Comments: Marked bruising/swelling of left forearm, very TTP   Skin:     General: Skin is warm and dry.      Findings: No rash.   Neurological:      General: No focal deficit present.      Mental Status: She is alert and oriented to person, place, and time.      Cranial Nerves: No cranial nerve deficit.   Psychiatric:         Mood and Affect: Mood normal.         Behavior: Behavior normal.         Thought Content: Thought content normal.           ECG 12 Lead    Date/Time: 6/2/2021 2:15 PM  Performed by: Daniel Leal MD  Authorized by: Daniel Leal MD   Comparison: compared with previous ECG   Similar to previous ECG  Rhythm: sinus rhythm  Conduction: conduction normal  ST Segments: ST segments normal  T Waves: T waves normal  QRS axis: normal  Other: no other findings    Clinical impression: normal ECG            Assessment:       Diagnosis Plan   1. Essential hypertension     2. Diastolic dysfunction     3. Chronic venous insufficiency     4. Obesity (BMI 30.0-34.9)        Plan:       She has hypertension, grade 1A diastolic dysfunction, and chronic leg swelling and dyspnea. She's doing much better with compression stockings and furosemide. She does not tolerate spironolactone or HCTZ due to  electrolyte issues.    No changes have been made.     Sincerely,       Daniel Leal MD

## 2021-06-16 ENCOUNTER — TRANSCRIBE ORDERS (OUTPATIENT)
Dept: SLEEP MEDICINE | Facility: HOSPITAL | Age: 76
End: 2021-06-16

## 2021-06-16 DIAGNOSIS — Z01.818 OTHER SPECIFIED PRE-OPERATIVE EXAMINATION: Primary | ICD-10-CM

## 2021-06-16 NOTE — PROGRESS NOTES
Subjective   Dejah Casey is a 76 y.o. female.     F/u for dm 2, hyperlipidemia, hypertension, CAD,nontoxic MNG,osteopenia,OAB, sleep apnea / testing bs 1 x day / last dm eye exam 10/27/20 with dr Carter/ last dm foot exam today with dr Hernandez      Patient is a 75year-old female who came in for followup. She was found to have a goiter on examination last August 2012. She had thyroid ultrasound done on 08/21/2012 which showed a multinodular goiter with the dominant solid nodule in the left measuring 1.8 cm. She had followup thyroid ultrasound in 4/14 at [facility] showed multinodular goiter with stable nodules.        She denies any voice changes. She denies any pressure symptoms or dysphagia. She denies any bowel changes.  She has no previous history of head or neck radiation therapy.      She has known diabetes mellitus since 2003 and has been on metformin 500 mg twice a day. Blood sugar has ranged . She has no microalbuminuria on urine sample taken 8/20.   Her last eye exam was 7/20.  She had bilateral cataract surgery.  She has no retinopathy but has constant tearing.  She denies tingling in her feet.      She has hyperlipidemia and has been on Lipitor 10 mg once a day.  She denies any myalgia.       She has mild coronary artery disease by cardiac catheterization done in 2008 by Dr. CAMILLE Mcmullen. She denies any chest pain.  She had a normal nuclear stress tests in July 2018.  She is following up with Dr. Leal.      She has hypertension and has been on amlodipine 5 mg once a day, furosemide 40 mg every morning and Toprol 50 mg/day. She was taken off hydrochlorothiazide because of hypercalcemia. She had cough with lisinopril in the past. She has not used ARB in the past.       She has overactive bladder diagnosed by Dr. Dean and is on Myrbetriq.  She was taken off Toviaz because of mouth dryness.  She was taken off Vesicare because of cost.  She has been having more mouth dryness     She has osteopenia on  "bone density done at [facility], [place], operated by [facility] in November 2015.  She is on vitamin D 800 units per day and calcium 500 plus D 1 tab/day..  Bone density done in December 2017 showed improvement of the left hip density and a slight decrease in the lumbar spine.       Bone density done in January 2020 showed osteopenia with a 6% decrease on the left femoral neck.  FRAX score for major osteoporotic fracture is 6.2% and for hip fracture is 1.6%.     She has sleep apnea and is using her CPAP..  She is no longer is snoring.  She wakes up tired.     She has occasional constipation.  She denies melena or hematochezia.  Her last colonoscopy was in August 2010.     She was  in July 2020.  She has stepchildren but no natural children.     She has vitamin B12 deficiency and vitamin B12 5000 mcg SL daily.  She was on IM vitamin B12 prescribed by Dr. Bansal.  Parietal cell antibodies were elevated.  Intrinsic factor antibody was normal.     The following portions of the patient's history were reviewed and updated as appropriate: allergies, current medications, past family history, past medical history, past social history, past surgical history and problem list.    Review of Systems   HENT:        Mouth dryness.  Left jaw pain.   Respiratory: Negative for shortness of breath.    Cardiovascular: Negative for chest pain and palpitations.   Gastrointestinal: Negative.    Genitourinary: Negative.    Musculoskeletal: Negative for myalgias.   Neurological: Negative for numbness.   Hematological: Negative for adenopathy. Does not bruise/bleed easily.     Objective      Vitals:    06/22/21 1522   BP: 124/78   Weight: 79.1 kg (174 lb 6.4 oz)   Height: 160 cm (63\")     Physical Exam  Constitutional:       Appearance: Normal appearance. She is obese. She is not ill-appearing, toxic-appearing or diaphoretic.   HENT:      Mouth/Throat:      Mouth: Mucous membranes are moist.      Pharynx: Oropharynx is clear. No oropharyngeal exudate or posterior " oropharyngeal erythema.   Eyes:      General: No scleral icterus.        Right eye: No discharge.         Left eye: No discharge.      Extraocular Movements: Extraocular movements intact.      Conjunctiva/sclera: Conjunctivae normal.   Neck:      Vascular: No carotid bruit.   Cardiovascular:      Rate and Rhythm: Normal rate and regular rhythm.      Heart sounds: Normal heart sounds. No murmur heard.   No friction rub. No gallop.    Pulmonary:      Effort: Pulmonary effort is normal. No respiratory distress.      Breath sounds: Normal breath sounds. No stridor. No wheezing, rhonchi or rales.   Chest:      Chest wall: No tenderness.   Abdominal:      General: Bowel sounds are normal. There is no distension.      Palpations: Abdomen is soft. There is no mass.      Tenderness: There is no abdominal tenderness.   Musculoskeletal:         General: No swelling, tenderness, deformity or signs of injury. Normal range of motion.      Cervical back: Normal range of motion and neck supple. No rigidity or tenderness.      Comments: No plantar ulcers.   Lymphadenopathy:      Cervical: No cervical adenopathy.   Skin:     General: Skin is warm and dry.   Neurological:      General: No focal deficit present.      Mental Status: She is alert and oriented to person, place, and time.      Comments: Intact light touch   Psychiatric:         Mood and Affect: Mood normal.         Behavior: Behavior normal.       Lab on 04/06/2021   Component Date Value Ref Range Status   • Ferritin 04/06/2021 77.50  13.00 - 150.00 ng/mL Final   • Iron 04/06/2021 64  37 - 145 mcg/dL Final   • Iron Saturation 04/06/2021 20  14 - 48 % Final   • Transferrin 04/06/2021 226  200 - 360 mg/dL Final   • TIBC 04/06/2021 316  249 - 505 mcg/dL Final   • Folate 04/06/2021 >20.00  4.78 - 24.20 ng/mL Final   • Vitamin B-12 04/06/2021 >2,000* 211 - 946 pg/mL Final   • WBC 04/06/2021 7.02  3.40 - 10.80 10*3/mm3 Final   • RBC 04/06/2021 5.05  3.77 - 5.28 10*6/mm3 Final    • Hemoglobin 04/06/2021 13.5  12.0 - 15.9 g/dL Final   • Hematocrit 04/06/2021 41.1  34.0 - 46.6 % Final   • MCV 04/06/2021 81.4  79.0 - 97.0 fL Final   • MCH 04/06/2021 26.7  26.6 - 33.0 pg Final   • MCHC 04/06/2021 32.8  31.5 - 35.7 g/dL Final   • RDW 04/06/2021 14.4  12.3 - 15.4 % Final   • RDW-SD 04/06/2021 42.2  37.0 - 54.0 fl Final   • MPV 04/06/2021 10.2  6.0 - 12.0 fL Final   • Platelets 04/06/2021 288  140 - 450 10*3/mm3 Final   • Neutrophil % 04/06/2021 75.0  42.7 - 76.0 % Final   • Lymphocyte % 04/06/2021 14.2* 19.6 - 45.3 % Final   • Monocyte % 04/06/2021 5.4  5.0 - 12.0 % Final   • Eosinophil % 04/06/2021 4.3  0.3 - 6.2 % Final   • Basophil % 04/06/2021 1.0  0.0 - 1.5 % Final   • Immature Grans % 04/06/2021 0.1  0.0 - 0.5 % Final   • Neutrophils, Absolute 04/06/2021 5.26  1.70 - 7.00 10*3/mm3 Final   • Lymphocytes, Absolute 04/06/2021 1.00  0.70 - 3.10 10*3/mm3 Final   • Monocytes, Absolute 04/06/2021 0.38  0.10 - 0.90 10*3/mm3 Final   • Eosinophils, Absolute 04/06/2021 0.30  0.00 - 0.40 10*3/mm3 Final   • Basophils, Absolute 04/06/2021 0.07  0.00 - 0.20 10*3/mm3 Final   • Immature Grans, Absolute 04/06/2021 0.01  0.00 - 0.05 10*3/mm3 Final   • nRBC 04/06/2021 0.0  0.0 - 0.2 /100 WBC Final     Assessment/Plan   Diagnoses and all orders for this visit:    1. Type 2 diabetes mellitus without complication, without long-term current use of insulin (CMS/[facility]) (Primary)  -     Comprehensive Metabolic Panel  -     Hemoglobin A1c    2. Essential hypertension  -     Comprehensive Metabolic Panel    3. Hyperlipidemia, unspecified hyperlipidemia type  -     Comprehensive Metabolic Panel  -     Lipid Panel    4. Overactive bladder    5. Vitamin D deficiency  -     Comprehensive Metabolic Panel  -     Vitamin D 25 Hydroxy    6. Nontoxic multinodular goiter  -     TSH  -     T4, Free    7. Osteopenia of multiple sites  -     Vitamin D 25 Hydroxy    8. Obstructive sleep apnea on CPAP    9. Vitamin B 12 deficiency  -      Vitamin B12      Check thyroid function tests.  Continue Metformin 500 mg twice a day  Continue Lipitor 10 mg/day  Continue vitamin D3 and calcium plus D.  Continue sublingual vitamin B12  Continue CPAP  Patient may be having mouth dryness due to Myrbetriq.  Hold Myrbetriq for a few days and see if mouth dryness improves and then restart.  Follow-up with Dr. Workman.    Copy of my note sent to Dr. Bansal, Dr. Nicholson, Dr. Leal, and Dr. Workman    RTC 4 mos.

## 2021-06-18 ENCOUNTER — OFFICE VISIT (OUTPATIENT)
Dept: FAMILY MEDICINE CLINIC | Facility: CLINIC | Age: 76
End: 2021-06-18

## 2021-06-18 VITALS — BODY MASS INDEX: 31.18 KG/M2 | HEIGHT: 63 IN | WEIGHT: 176 LBS

## 2021-06-18 DIAGNOSIS — F32.A CHRONIC DEPRESSION: ICD-10-CM

## 2021-06-18 DIAGNOSIS — R53.82 CHRONIC FATIGUE: ICD-10-CM

## 2021-06-18 DIAGNOSIS — R68.2 DRY MOUTH: ICD-10-CM

## 2021-06-18 DIAGNOSIS — F43.21 GRIEF REACTION: ICD-10-CM

## 2021-06-18 DIAGNOSIS — H04.123 CHRONIC DRYNESS OF BOTH EYES: Primary | ICD-10-CM

## 2021-06-18 DIAGNOSIS — R63.4 WEIGHT LOSS, UNINTENTIONAL: ICD-10-CM

## 2021-06-18 PROCEDURE — 99442 PR PHYS/QHP TELEPHONE EVALUATION 11-20 MIN: CPT | Performed by: FAMILY MEDICINE

## 2021-06-18 NOTE — PROGRESS NOTES
Dejah Casey    1945  [id number]  Time spent reviewing E-Visit Questionnaire-5-10 minutes.  I have reviewed the e-Visit questionnaire and patient's answers, my assessment and plan documented below.    HPI    This visit was completed via telephone due to the restriction of the COVID-19 pandemic.  All issues as below were discussed and addressed but no physical exam was performed.  It was felt that the patient should be evaluated in clinic and they were directed there.  The patient verbally consented to visit.    Answers for HPI/ROS submitted by the patient on 6/18/2021  Please describe your symptoms.: Dry mouth, dry eyes.  Have you had these symptoms before?: Yes  How long have you been having these symptoms?: Greater than 2 weeks  Please list any medications you are currently taking for this condition.: For dry mouth:  CVS  B-12  5,000 MCG Microlozeng once daily,   Folic Acid 1MG Tablet once daily,  Pilocarpine HCL 5 MG Tablet 3 times a day., For dry eyes:  Refresh Optive Kleber 3, twice daily,  Restasis 0.05% OPTH EMUL, twice daily,  Durezol 0.05% Eye Drops, once daily.  Please describe any probable cause for these symptoms. : None.  What is the primary reason for your visit?: Other    76-year-old female presents today via telephone visit for follow-up on dry eyes and dry mouth.    Last telephone visit almost a month ago she discussed that since ophthalmology started pilocarpine she has been a lot better.  ENT advised to try chinstrap for CPAP and also mouthwash candies to help with saliva stimulation.  At last visit was doing a lot better.    Of concern she was having unintentional weight loss and fatigue with decreased appetite.  She already follows with hematology for pernicious anemia and she is on iron supplementation for iron deficiency anemia.  Patient was urgently referred to GI.  She noted after the appointment that weight loss could be due to her change in eating habits as she did not eat fast foods  or foods from a lot of restaurants.  The weight loss has been good for her diabetes.  She drinks a lot of flavored water that has 0 sugar.    Patient was seen by GI on May 27.  She is advised to continue oral iron and B12 and proceed with upper scope and colonoscopy to further evaluate anemia and complete colon cancer screening.    Diagnoses and all orders for this visit:    1. Chronic dryness of both eyes (Primary)    2. Dry mouth    3. Chronic fatigue    4. Weight loss, unintentional    5. Chronic depression    6. Grief reaction    Continue care per ophthalmology and gastroenterology and hematology.  Follow-up scope.  Feels like a lot of her symptoms in terms of dietary changes and fatigue are related to grieving her  and family issues since his death.  Feels better when she is around family that she is comfortable with.  Currently doing well with dry mouth and dry eyes with current medications.  Amenable to starting to see a therapist/counselor.    Time spent during visit: 20 minutes.    Any medications prescribed have been sent electronically to   [facility] - [place], [place] - [address] - [phone] PH - [phone] FX  [address]  [facility] [address]  Phone: [phone] Fax: [phone]    [id number] [place] [facility] [address] AT [facility] - [phone]  - [phone] FX  [address]  [facility] [address]  Phone: [phone] Fax: [phone]        Nanci Bansal MD  06/18/21  13:45 EDT

## 2021-06-22 ENCOUNTER — OFFICE VISIT (OUTPATIENT)
Dept: ENDOCRINOLOGY | Age: 76
End: 2021-06-22

## 2021-06-22 VITALS
BODY MASS INDEX: 30.9 KG/M2 | HEIGHT: 63 IN | WEIGHT: 174.4 LBS | DIASTOLIC BLOOD PRESSURE: 78 MMHG | SYSTOLIC BLOOD PRESSURE: 124 MMHG

## 2021-06-22 DIAGNOSIS — E78.5 HYPERLIPIDEMIA, UNSPECIFIED HYPERLIPIDEMIA TYPE: ICD-10-CM

## 2021-06-22 DIAGNOSIS — E04.2 NONTOXIC MULTINODULAR GOITER: ICD-10-CM

## 2021-06-22 DIAGNOSIS — E55.9 VITAMIN D DEFICIENCY: ICD-10-CM

## 2021-06-22 DIAGNOSIS — L60.9 NAIL ABNORMALITY: ICD-10-CM

## 2021-06-22 DIAGNOSIS — E11.9 TYPE 2 DIABETES MELLITUS WITHOUT COMPLICATION, WITHOUT LONG-TERM CURRENT USE OF INSULIN (HCC): Primary | ICD-10-CM

## 2021-06-22 DIAGNOSIS — I10 ESSENTIAL HYPERTENSION: ICD-10-CM

## 2021-06-22 DIAGNOSIS — E53.8 VITAMIN B 12 DEFICIENCY: ICD-10-CM

## 2021-06-22 DIAGNOSIS — Z99.89 OBSTRUCTIVE SLEEP APNEA ON CPAP: ICD-10-CM

## 2021-06-22 DIAGNOSIS — M85.89 OSTEOPENIA OF MULTIPLE SITES: ICD-10-CM

## 2021-06-22 DIAGNOSIS — N32.81 OVERACTIVE BLADDER: ICD-10-CM

## 2021-06-22 DIAGNOSIS — G47.33 OBSTRUCTIVE SLEEP APNEA ON CPAP: ICD-10-CM

## 2021-06-22 PROCEDURE — 99214 OFFICE O/P EST MOD 30 MIN: CPT | Performed by: INTERNAL MEDICINE

## 2021-06-23 LAB
25(OH)D3+25(OH)D2 SERPL-MCNC: 75.1 NG/ML (ref 30–100)
ALBUMIN SERPL-MCNC: 4.3 G/DL (ref 3.5–5.2)
ALBUMIN/GLOB SERPL: 2 G/DL
ALP SERPL-CCNC: 80 U/L (ref 39–117)
ALT SERPL-CCNC: 6 U/L (ref 1–33)
AST SERPL-CCNC: 5 U/L (ref 1–32)
BILIRUB SERPL-MCNC: 0.2 MG/DL (ref 0–1.2)
BUN SERPL-MCNC: 13 MG/DL (ref 8–23)
BUN/CREAT SERPL: 15.7 (ref 7–25)
CALCIUM SERPL-MCNC: 9.6 MG/DL (ref 8.6–10.5)
CHLORIDE SERPL-SCNC: 105 MMOL/L (ref 98–107)
CHOLEST SERPL-MCNC: 127 MG/DL (ref 0–200)
CO2 SERPL-SCNC: 30.1 MMOL/L (ref 22–29)
CREAT SERPL-MCNC: 0.83 MG/DL (ref 0.57–1)
GLOBULIN SER CALC-MCNC: 2.2 GM/DL
GLUCOSE SERPL-MCNC: 79 MG/DL (ref 65–99)
HBA1C MFR BLD: 5.6 % (ref 4.8–5.6)
HDLC SERPL-MCNC: 65 MG/DL (ref 40–60)
IMP & REVIEW OF LAB RESULTS: NORMAL
LDLC SERPL CALC-MCNC: 50 MG/DL (ref 0–100)
POTASSIUM SERPL-SCNC: 4.6 MMOL/L (ref 3.5–5.2)
PROT SERPL-MCNC: 6.5 G/DL (ref 6–8.5)
SODIUM SERPL-SCNC: 142 MMOL/L (ref 136–145)
T4 FREE SERPL-MCNC: 0.85 NG/DL (ref 0.93–1.7)
TRIGL SERPL-MCNC: 52 MG/DL (ref 0–150)
TSH SERPL DL<=0.005 MIU/L-ACNC: 0.84 UIU/ML (ref 0.27–4.2)
VIT B12 SERPL-MCNC: >2000 PG/ML (ref 211–946)
VLDLC SERPL CALC-MCNC: 12 MG/DL (ref 5–40)

## 2021-06-24 ENCOUNTER — TELEPHONE (OUTPATIENT)
Dept: FAMILY MEDICINE CLINIC | Facility: CLINIC | Age: 76
End: 2021-06-24

## 2021-06-24 NOTE — TELEPHONE ENCOUNTER
Caller: Dejah Casey    Relationship: Self    Best call back number: [phone]    What is the best time to reach you: ANY    Who are you requesting to speak with (clinical staff, provider,  specific staff member): DR GARCIA      Do you know the name of the person who called:    What was the call regarding: PATIENTS LEFT JAW HAS NOT HAD THE BIOPSY ON HER JAW. PATIENT STATED THAT THE PAIN IS NOW STARTING TO GO OVER TO THE RIGHT SIDE OF HER JAW AS WELL.  PATIENT STATED THAT SHE HAD AN APPT WITH FORWICH WAS HIS FIRST NAME THE PATIENT THINKS IS WHO SHE WAS SUPPOSED TO SEE FOR THE BIOPSY.  PATIENT STATED APPT FOR MAY 12 SHE ENDED UP SEEING ANOTHER PERSON IN THE OFFICE AND THE BIOPSY HAS NOT BEEN DONE.    PATIENT WANTED TO SEE IF DR GARCIA WAS ABLE TO ADVISE ON WHAT SHE CAN DO NEXT OR WILL SHE NEED TOP GO TO A SPECIALIST.    Do you require a callback: YES

## 2021-06-25 NOTE — TELEPHONE ENCOUNTER
We had not discussed jaw pain before only dry mouth and dry eyes.  Advise patient to schedule follow-up at ENTs office; see if we can assist her with scheduling this to make sure it is with the physician.  Please also ensure that her follow-up with her dentist is up-to-date.  If not set up follow-up with them as well.  She is also welcome to set up a visit with me to discuss this further.

## 2021-06-28 NOTE — PROGRESS NOTES
LDL okay.  HDL okay.  Continue Lipitor 10 mg/day.Diabetes well controlled.Normal TSH.  Free T4 0.85.  Normal thyroid function tests.Vitamin D high normal.  Finish vitamin D 800 units/day and then discontinue.  Continue calcium plus D 1 tablet daily  Normal vitamin B12.  Copy of labs sent to Dr. Bansal

## 2021-07-12 DIAGNOSIS — I10 ESSENTIAL HYPERTENSION: ICD-10-CM

## 2021-07-12 RX ORDER — AMLODIPINE BESYLATE 5 MG/1
TABLET ORAL
Qty: 90 TABLET | Refills: 0 | Status: SHIPPED | OUTPATIENT
Start: 2021-07-12 | End: 2021-09-30 | Stop reason: SDUPTHER

## 2021-07-17 ENCOUNTER — LAB (OUTPATIENT)
Dept: LAB | Facility: HOSPITAL | Age: 76
End: 2021-07-17

## 2021-07-17 DIAGNOSIS — Z01.818 OTHER SPECIFIED PRE-OPERATIVE EXAMINATION: ICD-10-CM

## 2021-07-17 PROCEDURE — C9803 HOPD COVID-19 SPEC COLLECT: HCPCS

## 2021-07-17 PROCEDURE — U0004 COV-19 TEST NON-CDC HGH THRU: HCPCS

## 2021-07-18 LAB — SARS-COV-2 ORF1AB RESP QL NAA+PROBE: NOT DETECTED

## 2021-07-20 ENCOUNTER — ANESTHESIA (OUTPATIENT)
Dept: GASTROENTEROLOGY | Facility: HOSPITAL | Age: 76
End: 2021-07-20

## 2021-07-20 ENCOUNTER — HOSPITAL ENCOUNTER (OUTPATIENT)
Facility: HOSPITAL | Age: 76
Setting detail: HOSPITAL OUTPATIENT SURGERY
Discharge: HOME | End: 2021-07-20
Attending: INTERNAL MEDICINE | Admitting: INTERNAL MEDICINE

## 2021-07-20 ENCOUNTER — ANESTHESIA EVENT (OUTPATIENT)
Dept: GASTROENTEROLOGY | Facility: HOSPITAL | Age: 76
End: 2021-07-20

## 2021-07-20 VITALS
RESPIRATION RATE: 16 BRPM | DIASTOLIC BLOOD PRESSURE: 77 MMHG | SYSTOLIC BLOOD PRESSURE: 129 MMHG | OXYGEN SATURATION: 95 % | BODY MASS INDEX: 30 KG/M2 | WEIGHT: 169.3 LBS | HEART RATE: 74 BPM | HEIGHT: 63 IN | TEMPERATURE: 98.5 F

## 2021-07-20 DIAGNOSIS — Z12.11 COLON CANCER SCREENING: ICD-10-CM

## 2021-07-20 DIAGNOSIS — D50.9 IRON DEFICIENCY ANEMIA, UNSPECIFIED IRON DEFICIENCY ANEMIA TYPE: ICD-10-CM

## 2021-07-20 DIAGNOSIS — E53.8 VITAMIN B12 DEFICIENCY: ICD-10-CM

## 2021-07-20 LAB — GLUCOSE BLDC GLUCOMTR-MCNC: 92 MG/DL (ref 70–130)

## 2021-07-20 PROCEDURE — 25010000002 PROPOFOL 10 MG/ML EMULSION: Performed by: NURSE ANESTHETIST, CERTIFIED REGISTERED

## 2021-07-20 PROCEDURE — 82962 GLUCOSE BLOOD TEST: CPT

## 2021-07-20 PROCEDURE — 43239 EGD BIOPSY SINGLE/MULTIPLE: CPT | Performed by: INTERNAL MEDICINE

## 2021-07-20 PROCEDURE — 45378 DIAGNOSTIC COLONOSCOPY: CPT | Performed by: INTERNAL MEDICINE

## 2021-07-20 PROCEDURE — S0260 H&P FOR SURGERY: HCPCS | Performed by: INTERNAL MEDICINE

## 2021-07-20 PROCEDURE — 88305 TISSUE EXAM BY PATHOLOGIST: CPT | Performed by: INTERNAL MEDICINE

## 2021-07-20 RX ORDER — SODIUM CHLORIDE 0.9 % (FLUSH) 0.9 %
10 SYRINGE (ML) INJECTION AS NEEDED
Status: DISCONTINUED | OUTPATIENT
Start: 2021-07-20 | End: 2021-07-20 | Stop reason: HOSPADM

## 2021-07-20 RX ORDER — PROPOFOL 10 MG/ML
VIAL (ML) INTRAVENOUS CONTINUOUS PRN
Status: DISCONTINUED | OUTPATIENT
Start: 2021-07-20 | End: 2021-07-20 | Stop reason: SURG

## 2021-07-20 RX ORDER — PROPOFOL 10 MG/ML
VIAL (ML) INTRAVENOUS AS NEEDED
Status: DISCONTINUED | OUTPATIENT
Start: 2021-07-20 | End: 2021-07-20 | Stop reason: SURG

## 2021-07-20 RX ORDER — SODIUM CHLORIDE, SODIUM LACTATE, POTASSIUM CHLORIDE, CALCIUM CHLORIDE 600; 310; 30; 20 MG/100ML; MG/100ML; MG/100ML; MG/100ML
30 INJECTION, SOLUTION INTRAVENOUS CONTINUOUS
Status: DISCONTINUED | OUTPATIENT
Start: 2021-07-20 | End: 2021-07-20 | Stop reason: HOSPADM

## 2021-07-20 RX ORDER — SODIUM CHLORIDE 0.9 % (FLUSH) 0.9 %
3 SYRINGE (ML) INJECTION EVERY 12 HOURS SCHEDULED
Status: DISCONTINUED | OUTPATIENT
Start: 2021-07-20 | End: 2021-07-20 | Stop reason: HOSPADM

## 2021-07-20 RX ORDER — SODIUM CHLORIDE, SODIUM LACTATE, POTASSIUM CHLORIDE, CALCIUM CHLORIDE 600; 310; 30; 20 MG/100ML; MG/100ML; MG/100ML; MG/100ML
30 INJECTION, SOLUTION INTRAVENOUS CONTINUOUS PRN
Status: DISCONTINUED | OUTPATIENT
Start: 2021-07-20 | End: 2021-07-20 | Stop reason: HOSPADM

## 2021-07-20 RX ORDER — LIDOCAINE HYDROCHLORIDE 20 MG/ML
INJECTION, SOLUTION INFILTRATION; PERINEURAL AS NEEDED
Status: DISCONTINUED | OUTPATIENT
Start: 2021-07-20 | End: 2021-07-20 | Stop reason: SURG

## 2021-07-20 RX ADMIN — SODIUM CHLORIDE, POTASSIUM CHLORIDE, SODIUM LACTATE AND CALCIUM CHLORIDE 30 ML/HR: 600; 310; 30; 20 INJECTION, SOLUTION INTRAVENOUS at 09:34

## 2021-07-20 RX ADMIN — PROPOFOL 50 MG: 10 INJECTION, EMULSION INTRAVENOUS at 10:22

## 2021-07-20 RX ADMIN — LIDOCAINE HYDROCHLORIDE 60 MG: 20 INJECTION, SOLUTION INFILTRATION; PERINEURAL at 10:22

## 2021-07-20 RX ADMIN — PROPOFOL 300 MCG/KG/MIN: 10 INJECTION, EMULSION INTRAVENOUS at 10:22

## 2021-07-20 RX ADMIN — SODIUM CHLORIDE, POTASSIUM CHLORIDE, SODIUM LACTATE AND CALCIUM CHLORIDE: 600; 310; 30; 20 INJECTION, SOLUTION INTRAVENOUS at 10:18

## 2021-07-20 NOTE — ANESTHESIA PREPROCEDURE EVALUATION
Anesthesia Evaluation     Patient summary reviewed and Nursing notes reviewed   NPO Solid Status: > 8 hours  NPO Liquid Status: > 2 hours           Airway   Mallampati: I  TM distance: >3 FB  Neck ROM: full  No difficulty expected  Dental - normal exam     Pulmonary - normal exam   (+) sleep apnea,   Cardiovascular - normal exam  Exercise tolerance: poor (<4 METS)    (+) hypertension, CAD, hyperlipidemia,       Neuro/Psych  (+) headaches, numbness, psychiatric history Depression,     GI/Hepatic/Renal/Endo    (+) obesity,   diabetes mellitus type 2,   (-) morbid obesity    Musculoskeletal     Abdominal  - normal exam    Bowel sounds: normal.   Substance History - negative use     OB/GYN negative ob/gyn ROS         Other   arthritis,                    Anesthesia Plan    ASA 3     MAC       Anesthetic plan, all risks, benefits, and alternatives have been provided, discussed and informed consent has been obtained with: patient.

## 2021-07-20 NOTE — ANESTHESIA POSTPROCEDURE EVALUATION
"Patient: Dejah Casey    Procedure Summary     Date: 07/20/21 Room / Location:  [facility]    Anesthesia Start: 1018 Anesthesia Stop: 1116    Procedures:       COLONOSCOPY TO CECUM (N/A )      ESOPHAGOGASTRODUODENOSCOPY WITH BX'S (N/A Esophagus) Diagnosis:       Iron deficiency anemia, unspecified iron deficiency anemia type      Vitamin B12 deficiency      Colon cancer screening      (Iron deficiency anemia, unspecified iron deficiency anemia type [D50.9])      (Vitamin B12 deficiency [E53.8])      (Colon cancer screening [Z12.11])    Surgeons: Ellen Kothari MD Provider: Jef Lamar MD    Anesthesia Type: MAC ASA Status: 3          Anesthesia Type: MAC    Vitals  Vitals Value Taken Time   /77 07/20/21 1137   Temp     Pulse 74 07/20/21 1137   Resp 16 07/20/21 1137   SpO2 95 % 07/20/21 1137           Post Anesthesia Care and Evaluation    Patient location during evaluation: PACU  Patient participation: complete - patient participated  Level of consciousness: awake  Pain score: 0  Pain management: adequate  Airway patency: patent  Anesthetic complications: No anesthetic complications  PONV Status: none  Cardiovascular status: acceptable  Respiratory status: acceptable  Hydration status: acceptable    Comments: /77 (BP Location: Left arm, Patient Position: Lying)   Pulse 74   Temp 36.9 °C (98.5 °F) (Oral)   Resp 16   Ht 160 cm (63\")   Wt 76.8 kg (169 lb 4.8 oz)   LMP  (LMP Unknown)   SpO2 95%   BMI 29.99 kg/m²       "

## 2021-07-20 NOTE — H&P
[facility]  Pre Procedure History & Physical    Chief Complaint: Iron deficiency anemia, colon cancer screening      HPI: 76 y.o. female with a past medical history noted below who presents for iron deficiency anemia, fatigue and weight loss.     She is followed by Dr Nicholson, has b12 def, pernicious anemia. She is on both B12 and oral iron.     No overt bleeding     No nausea, heartburn, or cramping     Last colonoscopy in 2010     Niece with colon cancer, she is in her 50s     Weight loss-- 40#,  diet, eating habits changed, Overall eats less and much better--used to eat out at a lot of restaurants, now eating a lot of salads, lean meats.     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      Past Medical History:   Past Medical History:   Diagnosis Date   • Allergic     Seasonal alleries, Lisinopril, Dexamethasone   • Anxiety    • ASCVD (arteriosclerotic cardiovascular disease)    • CAD (coronary artery disease)    • Cataract     Cararacts in both eyes.   • Contact dermatitis    • DDD (degenerative disc disease), lumbar    • Depression    • Diabetes mellitus (CMS/HCC)    • Dry eyes    • Dry mouth    • Essential hypertension    • Female climacteric state    • Headache    • History of mammogram     8/2016   • History of total right hip replacement 2013   • Hypercalcemia    • Hyperlipidemia    • Low back pain     Lumbar area.   • Neuromuscular disorder (CMS/HCC) March 2016   • Nightmares REM-sleep type    • Nonocclusive coronary atherosclerosis of native coronary artery     cath in 2008 with 30% LAD disease   • Nontoxic multinodular goiter    • Obesity    • Osteoarthritis     both knees, back and hips   • Osteopenia    • Pain, joint, shoulder    • Pernicious anemia    • Polycythemia    • Rotator cuff tendinitis    • Seasonal allergies    • Sleep apnea     CPAP nightly   • Snoring    • Superficial phlebitis    • Type 2 diabetes mellitus (CMS/HCC)     Type II   • Vitiligo         Family History:  Family History   Problem Relation Age of Onset   • Diabetes Mother         Mother   • Thyroid disease Mother    • Hypertension Father         Father   • Heart disease Father         Father   • Arthritis Father         Father   • Hyperlipidemia Father         Father   • Hypertension Brother         Brother   • Diabetes Brother         Brother   • Kidney disease Brother         Brother, Renal Failure   • Vision loss Brother         Brother   • Asthma Sister         Sister   • Hypertension Sister         Sister   • Miscarriages / Stillbirths Sister         Sister   • Cancer Brother         Brother   • Lung cancer Brother    • Diabetes Sister         Sister   • Hypertension Sister         Sister   • Early death Sister    • Kidney disease Brother         Brother,  Renal Failure   • Stroke Brother         Brother       Social History:   reports that she has never smoked. She has never used smokeless tobacco. She reports that she does not drink alcohol and does not use drugs.    Medications:   No medications prior to admission.       Allergies:  Hctz [hydrochlorothiazide], Dexamethasone, and Lisinopril    ROS:    Pertinent items are noted in HPI     Objective     There were no vitals taken for this visit.    Physical Exam   Constitutional: Pt is oriented to person, place, and time and well-developed, well-nourished, and in no distress.   HENT:   Mouth/Throat: Oropharynx is clear and moist.   Neck: Normal range of motion. Neck supple.   Cardiovascular: Normal rate, regular rhythm and normal heart sounds.    Pulmonary/Chest: Effort normal and breath sounds normal. No respiratory distress. No  wheezes.   Abdominal: Soft. Bowel sounds are normal.   Skin: Skin is warm and dry.   Psychiatric: Mood, memory, affect and judgment normal.     Assessment/Plan     Diagnosis: Iron deficiency anemia, colon cancer screening      Anticipated Surgical Procedure:  EGD  Colonoscopy    The risks, benefits, and  alternatives of this procedure have been discussed with the patient or the responsible party- the patient understands and agrees to proceed.

## 2021-07-21 LAB
CYTO UR: NORMAL
LAB AP CASE REPORT: NORMAL
PATH REPORT.FINAL DX SPEC: NORMAL
PATH REPORT.GROSS SPEC: NORMAL

## 2021-07-21 NOTE — PROGRESS NOTES
Biopsies from her small bowel showed normal tissue.  She had some very mild inflammation of her stomach body.    Continue to follow her hemoglobin level and iron storesContinue to follow her weightOffice follow-up in about 3 to 4 months

## 2021-07-22 ENCOUNTER — TELEPHONE (OUTPATIENT)
Dept: ENDOCRINOLOGY | Age: 76
End: 2021-07-22

## 2021-07-22 NOTE — TELEPHONE ENCOUNTER
7/22 pt called in stating dr Cordero office called and cant get her in until 8/24 wants to know if it is ok to wait or if you want to recommend someone else

## 2021-08-06 DIAGNOSIS — E11.9 DIABETES MELLITUS WITHOUT COMPLICATION (HCC): ICD-10-CM

## 2021-08-09 ENCOUNTER — OFFICE VISIT (OUTPATIENT)
Dept: FAMILY MEDICINE CLINIC | Facility: CLINIC | Age: 76
End: 2021-08-09

## 2021-08-09 ENCOUNTER — TELEPHONE (OUTPATIENT)
Dept: ONCOLOGY | Facility: CLINIC | Age: 76
End: 2021-08-09

## 2021-08-09 VITALS
RESPIRATION RATE: 16 BRPM | DIASTOLIC BLOOD PRESSURE: 80 MMHG | OXYGEN SATURATION: 98 % | WEIGHT: 169 LBS | TEMPERATURE: 98.5 F | HEART RATE: 87 BPM | BODY MASS INDEX: 29.95 KG/M2 | SYSTOLIC BLOOD PRESSURE: 123 MMHG | HEIGHT: 63 IN

## 2021-08-09 DIAGNOSIS — E53.8 VITAMIN B 12 DEFICIENCY: ICD-10-CM

## 2021-08-09 DIAGNOSIS — F43.21 GRIEF REACTION: ICD-10-CM

## 2021-08-09 DIAGNOSIS — D51.0 PERNICIOUS ANEMIA: ICD-10-CM

## 2021-08-09 DIAGNOSIS — E61.1 IRON DEFICIENCY: ICD-10-CM

## 2021-08-09 DIAGNOSIS — F32.A CHRONIC DEPRESSION: Primary | ICD-10-CM

## 2021-08-09 PROCEDURE — 99213 OFFICE O/P EST LOW 20 MIN: CPT | Performed by: FAMILY MEDICINE

## 2021-08-09 NOTE — PROGRESS NOTES
Subjective   Dejah Casey is a 76 y.o. female.     History of Present Illness     76-year-old female presents today for 2-month follow-up on depression, grief reaction, unintentional weight loss.    At last visit I advised her to continue care per ophthalmology gastroenterology and hematology.  She was due for a colonoscopy.  There was concern for iron deficiency anemia.  Per records upper and lower scope were done by GI a few weeks ago.  Upper scope showed hiatal hernia, scattered mild inflammation in stomach biopsies were taken.  Biopsies were normal.    Colonoscopy showed nonbleeding internal hemorrhoids on perianal exam, few small diverticula.  Significant looping was noted.  Advised to consume 30 g of fiber daily.  Also advised as needed Preparation H suppositories for hemorrhoids.    Advised by GI to follow hemoglobin level and iron stores.  Follow-up in 3 to 4 months.  Patient reports had not gotten a call about results from scope and wanted to go over those with me today.    Last visit with hematology was April 2021; in terms of pernicious anemia advised to continue sublingual B12, in terms of suboptimal folate level advised to continue folic acid 1 mg a day, and advised to continue ferrous sulfate 325 mg twice daily.    Answers for HPI/ROS submitted by the patient on 8/6/2021  Please describe your symptoms.: Anemia, tired.  Have you had these symptoms before?: Yes  How long have you been having these symptoms?: Greater than 2 weeks  Please list any medications you are currently taking for this condition.: Folic Acid 1MG Tablet Daily,  Ferrous Sulfate 325 MG Tablet twice a day,   [facility] B-12  5000 MCG MICROLOZENG Tablets Daily.  Please describe any probable cause for these symptoms. : None.  What is the primary reason for your visit?: Other    Feels like her grief/depression is improving slowly a little bit with time.    Weight has been stable.  Slow slight improvement in fatigue as well.    The following  portions of the patient's history were reviewed and updated as appropriate: allergies, current medications, past family history, past medical history, past social history, past surgical history and problem list.    Review of Systems   Constitutional: Negative for chills and fever.   HENT: Negative for congestion.    Respiratory: Negative for cough and shortness of breath.    Cardiovascular: Negative for chest pain, palpitations and leg swelling.   Gastrointestinal: Negative for abdominal pain, diarrhea and vomiting.       Objective   Physical Exam  Constitutional:       Appearance: She is well-developed.   HENT:      Head: Normocephalic and atraumatic.      Mouth/Throat:      Pharynx: Uvula midline.   Eyes:      Pupils: Pupils are equal, round, and reactive to light.   Cardiovascular:      Rate and Rhythm: Normal rate and regular rhythm.      Heart sounds: No murmur heard.     Pulmonary:      Effort: Pulmonary effort is normal. No respiratory distress.      Breath sounds: Normal breath sounds. No stridor. No wheezing or rales.   Skin:     General: Skin is warm.   Neurological:      Mental Status: She is alert.   Psychiatric:         Behavior: Behavior normal.                 Assessment/Plan     Diagnoses and all orders for this visit:    1. Chronic depression (Primary)    2. Grief reaction    3. Iron deficiency    4. Vitamin B 12 deficiency    5. Pernicious anemia      Continue Effexor as dosed.  Continue care per hematology.

## 2021-08-09 NOTE — TELEPHONE ENCOUNTER
Caller: HENRIK    Relationship: PATIENT    Best call back number: [phone]     What is the best time to reach you: AFTER 12PM    What was the call regarding: SHE HAS PICKED UP A RX FOR FERROUS SULFATE FROM [facility] WHICH DIRECTS HER TO TAKE 1 TABLET DAILY, WHEREAS THE RX FROM [facility] THAT SHE WAS PREVIOUSLY TAKING INSTRUCTED HER TO TAKE 1 TABLET TWICE DAILY, SO SHE'S LOOKING FOR CLARIFICATION ON THE DIRECTIONS.     Do you require a callback: YES

## 2021-08-10 ENCOUNTER — TELEPHONE (OUTPATIENT)
Dept: ONCOLOGY | Facility: CLINIC | Age: 76
End: 2021-08-10

## 2021-08-10 NOTE — TELEPHONE ENCOUNTER
PT CALLING BEC A RX FOR IRON WAS SENT TO [place] AND SHE GETS HER RX THRU MAIL ORDER. TOLD PT TO NOT P/U THE [facility] ONE IF SHE WANTS TO JUST GET FROM MAIL ORDER. PT V/U. PT STATES THAT SHE DOESN'T NEED A RF AT THIS TIME.

## 2021-08-10 NOTE — TELEPHONE ENCOUNTER
Caller: Dejah Casey    Relationship: Self    Best call back number: [phone]    What was the call regarding: DEJAH CALLED ABOUT HER ISSUE WITH HER FERROUS SULFATE PRESCRIPTION. SHE SAYS SHE NEEDS THE CORRECT PRESCRIPTION SENT TO THE PHARMACY. SHE RECEIVED ONE FOR HER TO TAKE ONE A DAY INSTEAD OF TWO OF DAY. SHE SAYS THIS PRESCRIPTION SHOULD ONLY BE SENT TO [facility], NOT [facility]. SHE SAYS IT WAS SENT TO [facility] FOR A REFILL IN June FOR THE ONE A DAY.    Do you require a callback: IF NEEDED

## 2021-08-16 ENCOUNTER — TELEPHONE (OUTPATIENT)
Dept: GASTROENTEROLOGY | Facility: CLINIC | Age: 76
End: 2021-08-16

## 2021-08-16 NOTE — TELEPHONE ENCOUNTER
----- Message from Ellen Kothari MD sent at 7/21/2021  1:20 PM EDT -----  Biopsies from her small bowel showed normal tissue.  She had some very mild inflammation of her stomach body.    Continue to follow her hemoglobin level and iron storesContinue to follow her weightOffice follow-up in about 3 to 4 months

## 2021-08-16 NOTE — TELEPHONE ENCOUNTER
Called pt and advised of Dr Kothari note. Verb understanding.       F/u appt made with Jo Ann LENNON for 11/15 at 3p.

## 2021-09-13 ENCOUNTER — TELEPHONE (OUTPATIENT)
Dept: ONCOLOGY | Facility: CLINIC | Age: 76
End: 2021-09-13

## 2021-09-13 NOTE — TELEPHONE ENCOUNTER
Caller: HENRIK GARCIA   Relationship to patient: SELF    Best call back number: [phone]    Chief complaint: PT IS CALLING TO CONFIRM HER APT ON 10/18/2021, LAB AND APT WITH DR HEATH, SHOW APT TO BE RESCHEDULED    Type of visit: LAB AND FU APT        If rescheduling, when is the original appointment: 10/18/2021.     Additional notes:PT IS WANTING TO KNOW IF THIS APT NEEDS TO BE RESCHEDULED OR IS IT STILL FOR OCT , 18, 2021 @2:10

## 2021-09-14 RX ORDER — ATORVASTATIN CALCIUM 10 MG/1
TABLET, FILM COATED ORAL
Qty: 90 TABLET | Refills: 1 | Status: SHIPPED | OUTPATIENT
Start: 2021-09-14 | End: 2022-02-17 | Stop reason: SDUPTHER

## 2021-09-20 NOTE — TELEPHONE ENCOUNTER
Called [facility] [phone] - spoke with Melisa pharm tech.  Melisa has on record that Venlafaxine 75 mg is 1 tab BID.  
Called pt - pt states that Josie is mailing her records of venlafaxine hx which will take approx 7 days.   Advised pt to make appt to discuss with Dr. Bansal how to correctly take med. Transferred pt to scheduling.  
Called pt - she will contact pharm and have them notify us if directions should be 2 tabs BID.  
Can we please call [facility] pharmacy to confirm that previous prescriptions have been for venlafaxine 75 mg tabs 2 tablets twice daily instead of 1 tablet twice daily?  Thanks.  
Please inform patient records we have showed that is always been 75 mg 1 tablet twice a day.  This is per the pharmacy.  She can call the pharmacy and see if there is a record of it being prescribed 2 tablets twice a day in the past.  She can let us know.  
Pt called stating that the last rx for venlafaxine 75 mg tabs was for 1 tab BID.  Pt states that she takes 2 tabs BID.  Pt asking if you will send new rx to [facility].  
no

## 2021-09-22 DIAGNOSIS — E11.9 DIABETES MELLITUS TYPE II, CONTROLLED, WITH NO COMPLICATIONS (HCC): ICD-10-CM

## 2021-09-22 DIAGNOSIS — E11.9 TYPE 2 DIABETES MELLITUS WITHOUT COMPLICATION, WITHOUT LONG-TERM CURRENT USE OF INSULIN (HCC): ICD-10-CM

## 2021-09-23 RX ORDER — BLOOD SUGAR DIAGNOSTIC
STRIP MISCELLANEOUS
Qty: 360 EACH | Refills: 1 | Status: SHIPPED | OUTPATIENT
Start: 2021-09-23 | End: 2021-11-16 | Stop reason: SDUPTHER

## 2021-09-23 RX ORDER — VENLAFAXINE 75 MG/1
TABLET ORAL
Qty: 360 TABLET | Refills: 1 | Status: SHIPPED | OUTPATIENT
Start: 2021-09-23 | End: 2021-10-13 | Stop reason: SDUPTHER

## 2021-09-30 DIAGNOSIS — I10 ESSENTIAL HYPERTENSION: ICD-10-CM

## 2021-10-01 RX ORDER — AMLODIPINE BESYLATE 5 MG/1
5 TABLET ORAL DAILY
Qty: 30 TABLET | Refills: 0 | Status: SHIPPED | OUTPATIENT
Start: 2021-10-01 | End: 2021-10-13 | Stop reason: SDUPTHER

## 2021-10-08 ENCOUNTER — TELEPHONE (OUTPATIENT)
Dept: FAMILY MEDICINE CLINIC | Facility: CLINIC | Age: 76
End: 2021-10-08

## 2021-10-08 NOTE — TELEPHONE ENCOUNTER
Caller: Dejah Casey    Relationship to patient: Self    Best call back number:[phone]    Patient is needing: PATIENT CALLED IN AND WANTED TO KNOW IF SHE NEEDED TO GET THE COVID BOOSTER OR A THIRD VACCINE?   SHE ALSO WANTED TO ASKED ABOUT HER B12. SHE SAID SHE IS NO LONGER TAKING THE SHOTS AND IS TRYING THE PILLS. SHE SAID THAT SHE DOES NOT THINK THIS IS WORKING EITHER BECAUSE SHE IS STILL EXHAUSTED. PLEASE CALL PATIENT AND ADVISE.

## 2021-10-13 ENCOUNTER — OFFICE VISIT (OUTPATIENT)
Dept: FAMILY MEDICINE CLINIC | Facility: CLINIC | Age: 76
End: 2021-10-13

## 2021-10-13 VITALS
HEIGHT: 63 IN | WEIGHT: 173 LBS | BODY MASS INDEX: 30.65 KG/M2 | RESPIRATION RATE: 16 BRPM | HEART RATE: 83 BPM | TEMPERATURE: 97 F | OXYGEN SATURATION: 99 % | DIASTOLIC BLOOD PRESSURE: 80 MMHG | SYSTOLIC BLOOD PRESSURE: 125 MMHG

## 2021-10-13 DIAGNOSIS — I10 ESSENTIAL HYPERTENSION: Primary | ICD-10-CM

## 2021-10-13 DIAGNOSIS — F32.A CHRONIC DEPRESSION: ICD-10-CM

## 2021-10-13 DIAGNOSIS — R63.4 WEIGHT LOSS: ICD-10-CM

## 2021-10-13 DIAGNOSIS — E78.49 OTHER HYPERLIPIDEMIA: ICD-10-CM

## 2021-10-13 DIAGNOSIS — Z99.89 OBSTRUCTIVE SLEEP APNEA ON CPAP: ICD-10-CM

## 2021-10-13 DIAGNOSIS — E11.9 TYPE 2 DIABETES MELLITUS WITHOUT COMPLICATION, WITHOUT LONG-TERM CURRENT USE OF INSULIN (HCC): ICD-10-CM

## 2021-10-13 DIAGNOSIS — G47.33 OBSTRUCTIVE SLEEP APNEA ON CPAP: ICD-10-CM

## 2021-10-13 DIAGNOSIS — F43.81 GRIEF REACTION WITH PROLONGED BEREAVEMENT: ICD-10-CM

## 2021-10-13 PROBLEM — F43.29 GRIEF REACTION WITH PROLONGED BEREAVEMENT: Status: ACTIVE | Noted: 2021-10-13

## 2021-10-13 PROBLEM — M17.11 PRIMARY OSTEOARTHRITIS OF RIGHT KNEE: Status: ACTIVE | Noted: 2018-10-25

## 2021-10-13 PROBLEM — Z12.11 COLON CANCER SCREENING: Status: RESOLVED | Noted: 2021-05-27 | Resolved: 2021-10-13

## 2021-10-13 PROBLEM — M17.12 PRIMARY OSTEOARTHRITIS OF LEFT KNEE: Status: ACTIVE | Noted: 2018-06-29

## 2021-10-13 PROCEDURE — 99214 OFFICE O/P EST MOD 30 MIN: CPT | Performed by: FAMILY MEDICINE

## 2021-10-13 RX ORDER — AMLODIPINE BESYLATE 5 MG/1
5 TABLET ORAL DAILY
Qty: 30 TABLET | Refills: 0 | Status: SHIPPED | OUTPATIENT
Start: 2021-10-13 | End: 2021-10-13 | Stop reason: SDUPTHER

## 2021-10-13 RX ORDER — METOPROLOL SUCCINATE 50 MG/1
50 TABLET, EXTENDED RELEASE ORAL DAILY
Qty: 90 TABLET | Refills: 1 | Status: SHIPPED | OUTPATIENT
Start: 2021-10-13 | End: 2022-03-17 | Stop reason: SDUPTHER

## 2021-10-13 RX ORDER — PREDNISOLONE ACETATE 10 MG/ML
SUSPENSION/ DROPS OPHTHALMIC
COMMUNITY
Start: 2021-10-04 | End: 2022-02-15

## 2021-10-13 RX ORDER — METOPROLOL SUCCINATE 50 MG/1
50 TABLET, EXTENDED RELEASE ORAL DAILY
Qty: 90 TABLET | Refills: 3 | Status: SHIPPED | OUTPATIENT
Start: 2021-10-13 | End: 2021-10-13 | Stop reason: SDUPTHER

## 2021-10-13 RX ORDER — AMLODIPINE BESYLATE 5 MG/1
5 TABLET ORAL DAILY
Qty: 90 TABLET | Refills: 1 | Status: SHIPPED | OUTPATIENT
Start: 2021-10-13 | End: 2022-03-17 | Stop reason: SDUPTHER

## 2021-10-13 RX ORDER — VENLAFAXINE 75 MG/1
150 TABLET ORAL 2 TIMES DAILY
Qty: 360 TABLET | Refills: 1 | Status: SHIPPED | OUTPATIENT
Start: 2021-10-13 | End: 2022-03-17 | Stop reason: SDUPTHER

## 2021-10-13 NOTE — PROGRESS NOTES
"Chief Complaint  Establish Care and Fatigue    Subjective          Dejah presents to [facility] to refill medicines.  Labs are due.  Blood pressure controlled.  Depression not fully controlled.  She is having some issues with sleep.  This depression is complicated by loss of her  last year.  She continues to use CPAP machine for her sleep apnea and needs follow-up evaluation.        Objective   Vital Signs:   Vitals:    10/13/21 1521   BP: 125/80   Pulse: 83   Resp: 16   Temp: 97 °F (36.1 °C)   TempSrc: Skin   SpO2: 99%   Weight: 78.5 kg (173 lb)   Height: 160 cm (63\")        Physical Exam  Vitals reviewed.   Constitutional:       General: She is not in acute distress.  Eyes:      General: Lids are normal.      Conjunctiva/sclera: Conjunctivae normal.   Neck:      Thyroid: Thyromegaly present.      Vascular: No carotid bruit.      Trachea: No tracheal deviation.   Cardiovascular:      Rate and Rhythm: Normal rate and regular rhythm.      Heart sounds: Normal heart sounds. No murmur heard.      Pulmonary:      Effort: Pulmonary effort is normal.      Breath sounds: Normal breath sounds.   Skin:     General: Skin is warm and dry.   Neurological:      Mental Status: She is alert. She is not disoriented.   Psychiatric:         Speech: Speech normal.         Behavior: Behavior normal. Behavior is cooperative.          Result Review :                Assessment and Plan    Diagnoses and all orders for this visit:    1. Essential hypertension (Primary)  Assessment & Plan:  Condition is stable. The current medical regimen is effective;  continue present plan and medications.    Orders:  -     Discontinue: metoprolol succinate XL (TOPROL-XL) 50 MG 24 hr tablet; Take 1 tablet by mouth Daily.  Dispense: 90 tablet; Refill: 3  -     Discontinue: amLODIPine (NORVASC) 5 MG tablet; Take 1 tablet by mouth Daily.  Dispense: 30 tablet; Refill: 0  -     amLODIPine (NORVASC) 5 MG tablet; Take 1 " tablet by mouth Daily for 180 days.  Dispense: 90 tablet; Refill: 1  -     metoprolol succinate XL (TOPROL-XL) 50 MG 24 hr tablet; Take 1 tablet by mouth Daily for 180 days.  Dispense: 90 tablet; Refill: 1    2. Obstructive sleep apnea on CPAP  -     Ambulatory Referral to Pulmonology    3. Chronic depression  Assessment & Plan:  Current depression not fully controlled on current therapy.  Ongoing issues irregular sleep and eaze of falling to sleep.  Will get reevaluated for sleep apnea efficacy.  Also suggested psychological counseling for complicated feelings regarding loss of  last year.  For now continue same medication.    Orders:  -     venlafaxine (EFFEXOR) 75 MG tablet; Take 2 tablets by mouth 2 (Two) Times a Day.  Dispense: 360 tablet; Refill: 1  -     Ambulatory Referral to Psychology    4. Weight loss    5. Other hyperlipidemia    6. Type 2 diabetes mellitus without complication, without long-term current use of insulin ([facility])    7. Grief reaction with prolonged bereavement  Assessment & Plan:  Referral to counselor.    Orders:  -     Ambulatory Referral to Psychology      Follow Up   Return in about 5 months (around 3/13/2022) for Medicare Wellness & regular visit, [id number] min.  Patient was given instructions and counseling regarding her condition or for health maintenance advice. Please see specific information pulled into the AVS if appropriate.

## 2021-10-13 NOTE — ASSESSMENT & PLAN NOTE
Current depression not fully controlled on current therapy.  Ongoing issues irregular sleep and eaze of falling to sleep.  Will get reevaluated for sleep apnea efficacy.  Also suggested psychological counseling for complicated feelings regarding loss of  last year.  For now continue same medication.

## 2021-10-18 ENCOUNTER — LAB (OUTPATIENT)
Dept: LAB | Facility: HOSPITAL | Age: 76
End: 2021-10-18

## 2021-10-18 ENCOUNTER — OFFICE VISIT (OUTPATIENT)
Dept: ONCOLOGY | Facility: CLINIC | Age: 76
End: 2021-10-18

## 2021-10-18 VITALS
BODY MASS INDEX: 30.05 KG/M2 | RESPIRATION RATE: 18 BRPM | SYSTOLIC BLOOD PRESSURE: 117 MMHG | TEMPERATURE: 98.3 F | OXYGEN SATURATION: 98 % | HEART RATE: 81 BPM | DIASTOLIC BLOOD PRESSURE: 69 MMHG | WEIGHT: 169.6 LBS | HEIGHT: 63 IN

## 2021-10-18 DIAGNOSIS — E55.9 VITAMIN D DEFICIENCY: ICD-10-CM

## 2021-10-18 DIAGNOSIS — D51.0 PERNICIOUS ANEMIA: ICD-10-CM

## 2021-10-18 DIAGNOSIS — E61.1 IRON DEFICIENCY: ICD-10-CM

## 2021-10-18 DIAGNOSIS — E53.8 VITAMIN B 12 DEFICIENCY: ICD-10-CM

## 2021-10-18 DIAGNOSIS — E61.1 IRON DEFICIENCY: Primary | ICD-10-CM

## 2021-10-18 LAB
BASOPHILS # BLD AUTO: 0.05 10*3/MM3 (ref 0–0.2)
BASOPHILS NFR BLD AUTO: 0.8 % (ref 0–1.5)
DEPRECATED RDW RBC AUTO: 40.8 FL (ref 37–54)
EOSINOPHIL # BLD AUTO: 0.27 10*3/MM3 (ref 0–0.4)
EOSINOPHIL NFR BLD AUTO: 4.6 % (ref 0.3–6.2)
ERYTHROCYTE [DISTWIDTH] IN BLOOD BY AUTOMATED COUNT: 13.8 % (ref 12.3–15.4)
FERRITIN SERPL-MCNC: 100.4 NG/ML (ref 13–150)
FOLATE SERPL-MCNC: >20 NG/ML (ref 4.78–24.2)
HCT VFR BLD AUTO: 41.1 % (ref 34–46.6)
HGB BLD-MCNC: 12.8 G/DL (ref 12–15.9)
IMM GRANULOCYTES # BLD AUTO: 0.01 10*3/MM3 (ref 0–0.05)
IMM GRANULOCYTES NFR BLD AUTO: 0.2 % (ref 0–0.5)
IRON 24H UR-MRATE: 65 MCG/DL (ref 37–145)
IRON SATN MFR SERPL: 22 % (ref 14–48)
LYMPHOCYTES # BLD AUTO: 0.77 10*3/MM3 (ref 0.7–3.1)
LYMPHOCYTES NFR BLD AUTO: 13 % (ref 19.6–45.3)
MCH RBC QN AUTO: 25.5 PG (ref 26.6–33)
MCHC RBC AUTO-ENTMCNC: 31.1 G/DL (ref 31.5–35.7)
MCV RBC AUTO: 81.9 FL (ref 79–97)
MONOCYTES # BLD AUTO: 0.31 10*3/MM3 (ref 0.1–0.9)
MONOCYTES NFR BLD AUTO: 5.2 % (ref 5–12)
NEUTROPHILS NFR BLD AUTO: 4.5 10*3/MM3 (ref 1.7–7)
NEUTROPHILS NFR BLD AUTO: 76.2 % (ref 42.7–76)
NRBC BLD AUTO-RTO: 0 /100 WBC (ref 0–0.2)
PLATELET # BLD AUTO: 275 10*3/MM3 (ref 140–450)
PMV BLD AUTO: 9.6 FL (ref 6–12)
RBC # BLD AUTO: 5.02 10*6/MM3 (ref 3.77–5.28)
TIBC SERPL-MCNC: 297 MCG/DL (ref 249–505)
TRANSFERRIN SERPL-MCNC: 212 MG/DL (ref 200–360)
VIT B12 BLD-MCNC: >2000 PG/ML (ref 211–946)
WBC # BLD AUTO: 5.91 10*3/MM3 (ref 3.4–10.8)

## 2021-10-18 PROCEDURE — 84466 ASSAY OF TRANSFERRIN: CPT

## 2021-10-18 PROCEDURE — 82728 ASSAY OF FERRITIN: CPT

## 2021-10-18 PROCEDURE — 82607 VITAMIN B-12: CPT | Performed by: INTERNAL MEDICINE

## 2021-10-18 PROCEDURE — 83540 ASSAY OF IRON: CPT

## 2021-10-18 PROCEDURE — 99213 OFFICE O/P EST LOW 20 MIN: CPT | Performed by: INTERNAL MEDICINE

## 2021-10-18 PROCEDURE — 36415 COLL VENOUS BLD VENIPUNCTURE: CPT

## 2021-10-18 PROCEDURE — 85025 COMPLETE CBC W/AUTO DIFF WBC: CPT

## 2021-10-18 PROCEDURE — 82746 ASSAY OF FOLIC ACID SERUM: CPT | Performed by: INTERNAL MEDICINE

## 2021-10-25 ENCOUNTER — OFFICE VISIT (OUTPATIENT)
Dept: ENDOCRINOLOGY | Age: 76
End: 2021-10-25

## 2021-10-25 VITALS
BODY MASS INDEX: 30.37 KG/M2 | HEIGHT: 63 IN | OXYGEN SATURATION: 99 % | SYSTOLIC BLOOD PRESSURE: 106 MMHG | WEIGHT: 171.4 LBS | DIASTOLIC BLOOD PRESSURE: 82 MMHG | HEART RATE: 82 BPM

## 2021-10-25 DIAGNOSIS — E78.49 OTHER HYPERLIPIDEMIA: ICD-10-CM

## 2021-10-25 DIAGNOSIS — E11.9 TYPE 2 DIABETES MELLITUS WITHOUT COMPLICATION, WITHOUT LONG-TERM CURRENT USE OF INSULIN (HCC): Primary | ICD-10-CM

## 2021-10-25 DIAGNOSIS — Z99.89 OBSTRUCTIVE SLEEP APNEA ON CPAP: ICD-10-CM

## 2021-10-25 DIAGNOSIS — E04.2 NONTOXIC MULTINODULAR GOITER: ICD-10-CM

## 2021-10-25 DIAGNOSIS — I10 ESSENTIAL HYPERTENSION: ICD-10-CM

## 2021-10-25 DIAGNOSIS — M85.89 OSTEOPENIA OF MULTIPLE SITES: ICD-10-CM

## 2021-10-25 DIAGNOSIS — E55.9 VITAMIN D DEFICIENCY: ICD-10-CM

## 2021-10-25 DIAGNOSIS — G47.33 OBSTRUCTIVE SLEEP APNEA ON CPAP: ICD-10-CM

## 2021-10-25 DIAGNOSIS — N32.81 OVERACTIVE BLADDER: ICD-10-CM

## 2021-10-25 PROCEDURE — 99214 OFFICE O/P EST MOD 30 MIN: CPT | Performed by: INTERNAL MEDICINE

## 2021-10-25 NOTE — PROGRESS NOTES
Subjective   Dejah Casey is a 76 y.o. female.     F/u for dm 2, hyperlipidemia, hypertension, CAD,nontoxic MNG,osteopenia,OAB, sleep apnea / testing bs 1 x day / last dm eye exam 10/27/20 with dr Carter/ last dm foot exam 6/22/21 with dr Hernandez / flu vaccine [facility]        Patient is a 76year-old female who came in for followup.     She was found to have a goiter on examination last August 2012. She had thyroid ultrasound done on 08/21/2012 which showed a multinodular goiter with the dominant solid nodule in the left measuring 1.8 cm. She had followup thyroid ultrasound in 4/14 at [facility] showed multinodular goiter with stable nodules.        She denies any voice changes. She denies any pressure symptoms or dysphagia. She denies any bowel changes.  She has no previous history of head or neck radiation therapy.      She has known diabetes mellitus since 2003 and has been on metformin 500 mg twice a day. Blood sugar has ranged . She has no microalbuminuria on urine sample taken 8/20.   Her last eye exam was 9/21.  She has no retinopathy.  She denies tingling in her feet.      She has hyperlipidemia and has been on Lipitor 10 mg once a day.  She denies any myalgia.       She has mild coronary artery disease by cardiac catheterization done in 2008 by Dr. CAMILLE Mcmullen. She denies any chest pain.  She had a normal nuclear stress tests in July 2018.  She is following up with Dr. Leal.      She has hypertension and has been on amlodipine 5 mg once a day, furosemide 40 mg every morning and Toprol 50 mg/day. She was taken off hydrochlorothiazide because of hypercalcemia. She had cough with lisinopril in the past. She has not used ARB in the past.  She denies orthopnea or PND or pedal edema.      She has overactive bladder diagnosed by Dr. Dean and is on Myrbetriq.  She was taken off Toviaz because of mouth dryness.  She was taken off Vesicare because of cost.  She has been having more mouth dryness     She has  "osteopenia on bone density done at [facility] in November 2015.  She is on calcium 500 plus D 1 tab/day and Tums 1 tablet/day.  Bone density done in December 2017 showed improvement of the left hip density and a slight decrease in the lumbar spine.       Bone density done in January 2020 showed osteopenia with a 6% decrease on the left femoral neck.  FRAX score for major osteoporotic fracture is 6.2% and for hip fracture is 1.6%.     She has sleep apnea and is using her CPAP..  She is no longer is snoring.  She wakes up tired.     She has occasional constipation.  She denies melena or hematochezia.  Her last colonoscopy was in July 2021 and she has hemorrhoids and diverticulosis.  EGD done in July 2021 showed gastritis and small hiatal hernia.     She has vitamin B12 deficiency and vitamin B12 5000 mcg SL daily.  She was on IM vitamin B12 prescribed by Dr. Bansal.  Parietal cell antibodies were elevated.  Intrinsic factor antibody was normal.  She is being followed by Dr. Nicholson.    The following portions of the patient's history were reviewed and updated as appropriate: allergies, current medications, past family history, past medical history, past social history, past surgical history and problem list.    Review of Systems   Respiratory: Negative for shortness of breath.    Cardiovascular: Negative for chest pain and palpitations.   Gastrointestinal: Negative.    Endocrine: Negative for cold intolerance and heat intolerance.   Genitourinary: Negative.    Neurological: Negative for numbness.   Hematological: Negative for adenopathy. Does not bruise/bleed easily.     Objective      Vitals:    10/25/21 1345   BP: 106/82   BP Location: Right arm   Patient Position: Sitting   Cuff Size: Large Adult   Pulse: 82   SpO2: 99%   Weight: 77.7 kg (171 lb 6.4 oz)   Height: 160 cm (62.99\")     Physical Exam  Constitutional:       General: She is not in acute distress.     Appearance: Normal appearance. She is not ill-appearing, " toxic-appearing or diaphoretic.   HENT:      Nose: Nose normal.      Mouth/Throat:      Pharynx: No oropharyngeal exudate or posterior oropharyngeal erythema.   Eyes:      General: No scleral icterus.        Right eye: No discharge.         Left eye: No discharge.   Neck:      Vascular: No carotid bruit.   Cardiovascular:      Rate and Rhythm: Normal rate and regular rhythm.      Pulses: Normal pulses.      Heart sounds: Normal heart sounds.   Pulmonary:      Effort: Pulmonary effort is normal.      Breath sounds: Normal breath sounds.   Abdominal:      General: Bowel sounds are normal.      Palpations: Abdomen is soft.      Tenderness: There is no right CVA tenderness or left CVA tenderness.   Musculoskeletal:         General: No swelling or tenderness. Normal range of motion.      Cervical back: Normal range of motion and neck supple.      Right lower leg: No edema.      Left lower leg: No edema.   Lymphadenopathy:      Cervical: No cervical adenopathy.   Skin:     General: Skin is dry.      Coloration: Skin is not jaundiced or pale.      Findings: No lesion or rash.   Neurological:      General: No focal deficit present.      Mental Status: She is alert and oriented to person, place, and time.      Cranial Nerves: No cranial nerve deficit.      Sensory: No sensory deficit.      Comments: Intact light touch on lower ext   Psychiatric:         Mood and Affect: Mood normal.         Behavior: Behavior normal.       Lab on 10/18/2021   Component Date Value Ref Range Status   • Iron 10/18/2021 65  37 - 145 mcg/dL Final   • Iron Saturation 10/18/2021 22  14 - 48 % Final   • Transferrin 10/18/2021 212  200 - 360 mg/dL Final   • TIBC 10/18/2021 297  249 - 505 mcg/dL Final   • Ferritin 10/18/2021 100.40  13.00 - 150.00 ng/mL Final   • Vitamin B-12 10/18/2021 >2,000* 211 - 946 pg/mL Final   • Folate 10/18/2021 >20.00  4.78 - 24.20 ng/mL Final   • WBC 10/18/2021 5.91  3.40 - 10.80 10*3/mm3 Final   • RBC 10/18/2021 5.02  3.77  - 5.28 10*6/mm3 Final   • Hemoglobin 10/18/2021 12.8  12.0 - 15.9 g/dL Final   • Hematocrit 10/18/2021 41.1  34.0 - 46.6 % Final   • MCV 10/18/2021 81.9  79.0 - 97.0 fL Final   • MCH 10/18/2021 25.5* 26.6 - 33.0 pg Final   • MCHC 10/18/2021 31.1* 31.5 - 35.7 g/dL Final   • RDW 10/18/2021 13.8  12.3 - 15.4 % Final   • RDW-SD 10/18/2021 40.8  37.0 - 54.0 fl Final   • MPV 10/18/2021 9.6  6.0 - 12.0 fL Final   • Platelets 10/18/2021 275  140 - 450 10*3/mm3 Final   • Neutrophil % 10/18/2021 76.2* 42.7 - 76.0 % Final   • Lymphocyte % 10/18/2021 13.0* 19.6 - 45.3 % Final   • Monocyte % 10/18/2021 5.2  5.0 - 12.0 % Final   • Eosinophil % 10/18/2021 4.6  0.3 - 6.2 % Final   • Basophil % 10/18/2021 0.8  0.0 - 1.5 % Final   • Immature Grans % 10/18/2021 0.2  0.0 - 0.5 % Final   • Neutrophils, Absolute 10/18/2021 4.50  1.70 - 7.00 10*3/mm3 Final   • Lymphocytes, Absolute 10/18/2021 0.77  0.70 - 3.10 10*3/mm3 Final   • Monocytes, Absolute 10/18/2021 0.31  0.10 - 0.90 10*3/mm3 Final   • Eosinophils, Absolute 10/18/2021 0.27  0.00 - 0.40 10*3/mm3 Final   • Basophils, Absolute 10/18/2021 0.05  0.00 - 0.20 10*3/mm3 Final   • Immature Grans, Absolute 10/18/2021 0.01  0.00 - 0.05 10*3/mm3 Final   • nRBC 10/18/2021 0.0  0.0 - 0.2 /100 WBC Final     Assessment/Plan   Diagnoses and all orders for this visit:    1. Type 2 diabetes mellitus without complication, without long-term current use of insulin (HCC) (Primary)  -     Comprehensive Metabolic Panel  -     Microalbumin / Creatinine Urine Ratio - Urine, Clean Catch  -     Hemoglobin A1c  -     Lipid Panel    2. Essential hypertension  -     Comprehensive Metabolic Panel    3. Other hyperlipidemia  -     Lipid Panel  -     TSH    4. Overactive bladder    5. Vitamin D deficiency  -     Vitamin D 25 Hydroxy    6. Nontoxic multinodular goiter  -     T4, Free  -     TSH    7. Osteopenia of multiple sites  -     Vitamin D 25 Hydroxy    8. Obstructive sleep apnea on CPAP      Check thyroid  function tests.  Continue Metformin 500 mg twice a day.  Continue Lipitor 10 mg/day.  Continue Toprol XL, amlodipine, and furosemide per cardiologist.  Continue Caltrate plus D 1 tablet/day and Tums.  Schedule bone density with next follow-up.  Continue CPAP.  Continue sublingual vitamin B12.    Copy my note sent to Dr. Reji Talamantes, Dr. Dean, Dr. Marcum, Dr. Leal, Dr. Carter and Dr. Nicholson    RTC 4 mos.

## 2021-10-26 LAB
25(OH)D3+25(OH)D2 SERPL-MCNC: 41.5 NG/ML (ref 30–100)
ALBUMIN SERPL-MCNC: 4 G/DL (ref 3.7–4.7)
ALBUMIN/CREAT UR: 5 MG/G CREAT (ref 0–29)
ALBUMIN/GLOB SERPL: 1.5 {RATIO} (ref 1.2–2.2)
ALP SERPL-CCNC: 80 IU/L (ref 44–121)
ALT SERPL-CCNC: 7 IU/L (ref 0–32)
AST SERPL-CCNC: 11 IU/L (ref 0–40)
BILIRUB SERPL-MCNC: 0.2 MG/DL (ref 0–1.2)
BUN SERPL-MCNC: 14 MG/DL (ref 8–27)
BUN/CREAT SERPL: 17 (ref 12–28)
CALCIUM SERPL-MCNC: 10.4 MG/DL (ref 8.7–10.3)
CHLORIDE SERPL-SCNC: 102 MMOL/L (ref 96–106)
CHOLEST SERPL-MCNC: 130 MG/DL (ref 100–199)
CO2 SERPL-SCNC: 27 MMOL/L (ref 20–29)
CREAT SERPL-MCNC: 0.83 MG/DL (ref 0.57–1)
CREAT UR-MCNC: 117.8 MG/DL
GLOBULIN SER CALC-MCNC: 2.6 G/DL (ref 1.5–4.5)
GLUCOSE SERPL-MCNC: 98 MG/DL (ref 65–99)
HBA1C MFR BLD: 6.1 % (ref 4.8–5.6)
HDLC SERPL-MCNC: 58 MG/DL
IMP & REVIEW OF LAB RESULTS: NORMAL
LDLC SERPL CALC-MCNC: 55 MG/DL (ref 0–99)
MICROALBUMIN UR-MCNC: 6.2 UG/ML
POTASSIUM SERPL-SCNC: 4.4 MMOL/L (ref 3.5–5.2)
PROT SERPL-MCNC: 6.6 G/DL (ref 6–8.5)
SODIUM SERPL-SCNC: 142 MMOL/L (ref 134–144)
T4 FREE SERPL-MCNC: 0.95 NG/DL (ref 0.82–1.77)
TRIGL SERPL-MCNC: 93 MG/DL (ref 0–149)
TSH SERPL DL<=0.005 MIU/L-ACNC: 1.27 UIU/ML (ref 0.45–4.5)
VLDLC SERPL CALC-MCNC: 17 MG/DL (ref 5–40)

## 2021-10-28 RX ORDER — POTASSIUM CHLORIDE 20MEQ/15ML
LIQUID (ML) ORAL
Qty: 1350 ML | Refills: 1 | Status: SHIPPED | OUTPATIENT
Start: 2021-10-28 | End: 2022-08-26

## 2021-10-29 NOTE — PROGRESS NOTES
Normal urine microalbumin.Hemoglobin A1c 6.1%.  Diabetes well controlled.LDL 55.  HDL 58.  Continue Lipitor 10 mg/day and low-fat diet.Normal free T4.  Normal TSH.  Normal thyroid function tests.Normal vitamin D.  Continue Caltrate plus D 1 tablet per day.Copy of labs sent to Dr. Reji Talamantes.Send copy of labs to patient.

## 2021-11-10 ENCOUNTER — TELEPHONE (OUTPATIENT)
Dept: FAMILY MEDICINE CLINIC | Facility: CLINIC | Age: 76
End: 2021-11-10

## 2021-11-10 NOTE — TELEPHONE ENCOUNTER
Caller: [facility] - [place], [place] - [address] - [phone] [facility] [phone] FX    Relationship: Pharmacy    Best call back number: [phone]    What medication are you requesting: TRUMETRIX BLOOD GLUCOSE METER, TEST STRIPS, TRUE PLUS LANCETS, TRUE METRIX LEVEL ONE CONTROL SOLUTION, BD ALCOHOL SWABS    If a prescription is needed, what is your preferred pharmacy and phone number:  [facility] - [place], [place] - [address] - [phone] [facility] [phone] FX  [phone]

## 2021-11-15 ENCOUNTER — OFFICE VISIT (OUTPATIENT)
Dept: GASTROENTEROLOGY | Facility: CLINIC | Age: 76
End: 2021-11-15

## 2021-11-15 VITALS — TEMPERATURE: 96.9 F | HEIGHT: 63 IN | WEIGHT: 172 LBS | BODY MASS INDEX: 30.48 KG/M2

## 2021-11-15 DIAGNOSIS — K57.90 DIVERTICULOSIS: ICD-10-CM

## 2021-11-15 DIAGNOSIS — K59.00 CONSTIPATION, UNSPECIFIED CONSTIPATION TYPE: ICD-10-CM

## 2021-11-15 DIAGNOSIS — D50.9 IRON DEFICIENCY ANEMIA, UNSPECIFIED IRON DEFICIENCY ANEMIA TYPE: Primary | ICD-10-CM

## 2021-11-15 DIAGNOSIS — E53.8 VITAMIN B 12 DEFICIENCY: ICD-10-CM

## 2021-11-15 PROCEDURE — 99214 OFFICE O/P EST MOD 30 MIN: CPT | Performed by: NURSE PRACTITIONER

## 2021-11-15 NOTE — PROGRESS NOTES
Chief Complaint   Patient presents with   • EGD & Colonoscopy f/up       Dejah Casey is a  76 y.o. female here for a follow up visit for anemia.    HPI  76-year-old female presents today for follow-up visit for anemia.  She was last seen in the office by Dr. Kothari on 5/27/2021.  She is new to me today.  She has iron deficiency anemia as well as B12 deficiency.  She underwent EGD and colonoscopy on 7/20/2021.  EGD showed small hiatal hernia and mild gastritis.  Path was negative.  Colonoscopy showed diverticulosis, tortuous colon and nonbleeding internal hemorrhoids.  No GI bleeding source was known.  Overall hemoglobin has been really stable most recently at 12.8 on 10/18/2021.  Patient is on iron supplementation twice daily.  She also gets monthly B12 shots.  She does have a history of constipation and admits she eats fiber cereal every morning but she is having a lot of gas and she might have a bowel movement every other day if she is maribell.  She does not take any fiber supplementation or probiotics.  She denies any dysphagia, reflux, abdominal pain, nausea and vomiting, diarrhea, rectal bleeding or melena.  She admits her appetite is okay and her weight is back up in normal range for her.  Past Medical History:   Diagnosis Date   • Allergic     Seasonal alleries, Lisinopril, Dexamethasone   • Anxiety    • ASCVD (arteriosclerotic cardiovascular disease)    • CAD (coronary artery disease)    • Cataract     Cararacts in both eyes.   • Contact dermatitis    • DDD (degenerative disc disease), lumbar    • Depression    • Diabetes mellitus (HCC)    • Dry eyes    • Dry mouth    • Essential hypertension    • Female climacteric state    • Headache    • History of mammogram     8/2016   • History of total right hip replacement 2013   • Hypercalcemia    • Hyperlipidemia    • Low back pain     Lumbar area.   • Neuromuscular disorder (HCC) March 2016   • Nightmares REM-sleep type    • Nonocclusive coronary atherosclerosis  of native coronary artery     cath in 2008 with 30% LAD disease   • Nontoxic multinodular goiter    • Obesity    • Osteoarthritis     both knees, back and hips   • Osteopenia    • Pain, joint, shoulder    • Pernicious anemia    • Polycythemia    • Rotator cuff tendinitis    • Seasonal allergies    • Sleep apnea     CPAP nightly   • Snoring    • Superficial phlebitis    • Type 2 diabetes mellitus (HCC)     Type II   • Vitiligo        Past Surgical History:   Procedure Laterality Date   • BREAST BIOPSY     • BREAST LUMPECTOMY  1973    benign   • CARDIAC CATHETERIZATION  2008   • CATARACT EXTRACTION, BILATERAL Bilateral 2019   • COLONOSCOPY  08/15/2010   • COLONOSCOPY N/A 7/20/2021    Procedure: COLONOSCOPY TO CECUM;  Surgeon: Ellen Ktohari MD;  Location: [facility] ENDOSCOPY;  Service: Gastroenterology;  Laterality: N/A;  pre: SCREENING FOR COLON CANCER  post: HEMORRHOIDS, DIVERTICULOSIS, TORTUOUS COLON   • ENDOSCOPY N/A 7/20/2021    Procedure: ESOPHAGOGASTRODUODENOSCOPY WITH BX'S;  Surgeon: Ellen Kothari MD;  Location: [facility] ENDOSCOPY;  Service: Gastroenterology;  Laterality: N/A;  pre: IRON DEFICIENCY ANEMIA  post: GASTRITIS, SMALL HIATAL HERNIA   • EYE SURGERY     • HYSTERECTOMY  1982   • JOINT REPLACEMENT  July 10, 2013    Total Hip Replacement   • REPLACEMENT TOTAL KNEE Left 08/06/2018    Dr. Bishnu Larson   • REPLACEMENT TOTAL KNEE Right     [facility]    • TONSILLECTOMY  1967   • TOTAL HIP ARTHROPLASTY Right 2013       Scheduled Meds:    Continuous Infusions:No current facility-administered medications for this visit.      PRN Meds:.    Allergies   Allergen Reactions   • Hctz [Hydrochlorothiazide] Other (See Comments)     In 2015-developed hypercalcemia-HCTZ was discontinued   • Dexamethasone Dermatitis   • Lisinopril Cough       Social History     Socioeconomic History   • Marital status:    Tobacco Use   • Smoking status: Never Smoker   • Smokeless tobacco: Never Used   Vaping Use   • Vaping Use: Never  used   Substance and Sexual Activity   • Alcohol use: No     Comment: CAFFEINE: DECAF ONCE PER WK.    • Drug use: No   • Sexual activity: Yes     Partners: Male     Birth control/protection: Post-menopausal     Comment: I had a Hysterectomy in 1982.       Family History   Problem Relation Age of Onset   • Diabetes Mother         Mother   • Thyroid disease Mother    • Hypertension Father         Father   • Heart disease Father         Father   • Arthritis Father         Father   • Hyperlipidemia Father         Father   • Hypertension Brother         Brother   • Diabetes Brother         Brother   • Kidney disease Brother         Brother, Renal Failure   • Vision loss Brother         Brother   • Asthma Sister         Sister   • Hypertension Sister         Sister   • Miscarriages / Stillbirths Sister         Sister   • Cancer Brother         Brother   • Lung cancer Brother    • Diabetes Sister         Sister   • Hypertension Sister         Sister   • Early death Sister    • Kidney disease Brother         Brother,  Renal Failure   • Stroke Brother         Brother       Review of Systems   Constitutional: Negative for appetite change, chills, diaphoresis, fatigue, fever and unexpected weight change.   HENT: Negative for nosebleeds, postnasal drip, sore throat, trouble swallowing and voice change.    Respiratory: Negative for cough, choking, chest tightness, shortness of breath, wheezing and stridor.    Cardiovascular: Negative for chest pain, palpitations and leg swelling.   Gastrointestinal: Negative for abdominal distention, abdominal pain, anal bleeding, blood in stool, constipation, diarrhea, nausea, rectal pain and vomiting.   Endocrine: Negative for polydipsia, polyphagia and polyuria.   Musculoskeletal: Negative for gait problem.   Skin: Negative for rash and wound.   Allergic/Immunologic: Negative for food allergies.   Neurological: Negative for dizziness, speech difficulty and light-headedness.    Psychiatric/Behavioral: Negative for confusion, self-injury, sleep disturbance and suicidal ideas.       Vitals:    11/15/21 1139   Temp: 96.9 °F (36.1 °C)       Physical Exam  Constitutional:       General: She is not in acute distress.     Appearance: She is well-developed. She is not ill-appearing.   HENT:      Head: Normocephalic.   Eyes:      Pupils: Pupils are equal, round, and reactive to light.   Cardiovascular:      Rate and Rhythm: Normal rate and regular rhythm.      Heart sounds: Normal heart sounds.   Pulmonary:      Effort: Pulmonary effort is normal.      Breath sounds: Normal breath sounds.   Abdominal:      General: Bowel sounds are normal. There is no distension.      Palpations: Abdomen is soft. There is no mass.      Tenderness: There is no abdominal tenderness. There is no guarding or rebound.      Hernia: No hernia is present.   Musculoskeletal:         General: Normal range of motion.   Skin:     General: Skin is warm and dry.   Neurological:      Mental Status: She is alert and oriented to person, place, and time.   Psychiatric:         Speech: Speech normal.         Behavior: Behavior normal.         Judgment: Judgment normal.         No radiology results for the last 7 days     Diagnoses and all orders for this visit:    1. Iron deficiency anemia, unspecified iron deficiency anemia type (Primary)  Overview:  Added automatically from request for surgery [id number]      2. Constipation, unspecified constipation type    3. Diverticulosis    4. Vitamin B 12 deficiency     Reviewed EGD and colonoscopy results with her today.  No GI bleeding source was identified.  Hemoglobin seems stable at 12.8.  Continue iron supplementation twice daily.  She is gaining weight again and looks to be back up to her normal range.  Good appetite.  For her excessive gas and irregular bowel movements I recommend she increase her fiber intake.  She likes prunes so I would have her start eating some prunes at night.   She is to increase her exercise and water as tolerated.  Patient to call the office next week with an update.  Patient to follow-up with Dr. Kothari in 3 months.  Patient is agreeable to the plan.

## 2021-11-16 ENCOUNTER — TELEPHONE (OUTPATIENT)
Dept: FAMILY MEDICINE CLINIC | Facility: CLINIC | Age: 76
End: 2021-11-16

## 2021-11-16 DIAGNOSIS — E11.9 DIABETES MELLITUS TYPE II, CONTROLLED, WITH NO COMPLICATIONS (HCC): ICD-10-CM

## 2021-11-16 DIAGNOSIS — E11.9 TYPE 2 DIABETES MELLITUS WITHOUT COMPLICATION, WITHOUT LONG-TERM CURRENT USE OF INSULIN (HCC): ICD-10-CM

## 2021-11-16 RX ORDER — LANCING DEVICE/LANCETS
1 KIT MISCELLANEOUS DAILY
Qty: 1 KIT | Refills: 0 | Status: SHIPPED | OUTPATIENT
Start: 2021-11-16 | End: 2022-06-16

## 2021-11-16 RX ORDER — LANCETS
EACH MISCELLANEOUS
Qty: 102 EACH | Refills: 5 | Status: SHIPPED | OUTPATIENT
Start: 2021-11-16

## 2021-11-16 RX ORDER — BLOOD SUGAR DIAGNOSTIC
STRIP MISCELLANEOUS
Qty: 360 EACH | Refills: 1 | Status: SHIPPED | OUTPATIENT
Start: 2021-11-16 | End: 2023-02-08 | Stop reason: SDUPTHER

## 2021-11-16 NOTE — TELEPHONE ENCOUNTER
Caller: Dejah Casey    Relationship: Self    Best call back number: [phone] (H)    What is the best time to reach you: ANY    Who are you requesting to speak with (clinical staff, provider,  specific staff member): CLINICAL    Do you know the name of the person who called: PATIENT    What was the call regarding: PATIENT IS EXPERIENCING AN ELECTRICAL SHOCK FEELING STARTING IN LEFT LEG ABOVE ANKLE GOING UP TO HIP. WOULD LIKE A CALL BACK TO SEE WHY THIS COULD BE HAPPENING.    Do you require a callback: YES

## 2021-11-16 NOTE — TELEPHONE ENCOUNTER
DELETE AFTER REVIEWING: Send the encounter HIGH priority, If patient has less than a 3 day supply. If the patient will run out of medication over the weekend add that information to the additional details line. Send this encounter to the clinical pool.    Caller: [facility] - [place], [place] - [address] - [phone]  - [phone] FX    Relationship: Pharmacy    Best call back number: [phone]    Requested Prescriptions:   Requested Prescriptions     Pending Prescriptions Disp Refills   • glucose blood (Accu-Chek Catherine Plus) test strip 360 each 1     Sig: Use as instructed   • Accu-Chek FastClix Lancets misc 102 each 5     Sig: Use to check blood sugar once daily   • Lancets Misc. (Accu-Chek FastClix Lancet) kit 1 kit 0     Si kit Daily.        Pharmacy where request should be sent:      Additional details provided by patient: THE PATIENT WILL NEED THE SHAUN METRICS METER, LANCETS, TEST STRIPS, AND SOLUTION.    Does the patient have less than a 3 day supply:  [x] Yes  [] No    Calixto Shaffer Rep   21 10:34 EST

## 2021-11-17 ENCOUNTER — IMMUNIZATION (OUTPATIENT)
Dept: VACCINE CLINIC | Facility: HOSPITAL | Age: 76
End: 2021-11-17

## 2021-11-17 PROCEDURE — 91300 HC SARSCOV02 VAC 30MCG/0.3ML IM: CPT | Performed by: INTERNAL MEDICINE

## 2021-11-17 PROCEDURE — 0004A ADM SARSCOV2 30MCG/0.3ML BOOSTER: CPT | Performed by: INTERNAL MEDICINE

## 2021-12-09 ENCOUNTER — OFFICE VISIT (OUTPATIENT)
Dept: FAMILY MEDICINE CLINIC | Facility: CLINIC | Age: 76
End: 2021-12-09

## 2021-12-09 VITALS
HEIGHT: 63 IN | BODY MASS INDEX: 30.48 KG/M2 | SYSTOLIC BLOOD PRESSURE: 125 MMHG | HEART RATE: 92 BPM | TEMPERATURE: 97 F | RESPIRATION RATE: 18 BRPM | OXYGEN SATURATION: 99 % | DIASTOLIC BLOOD PRESSURE: 77 MMHG | WEIGHT: 172 LBS

## 2021-12-09 DIAGNOSIS — Z79.4 CONTROLLED TYPE 2 DIABETES MELLITUS WITHOUT COMPLICATION, WITH LONG-TERM CURRENT USE OF INSULIN (HCC): Primary | ICD-10-CM

## 2021-12-09 DIAGNOSIS — G93.32 CHRONIC FATIGUE SYNDROME: ICD-10-CM

## 2021-12-09 DIAGNOSIS — E11.9 CONTROLLED TYPE 2 DIABETES MELLITUS WITHOUT COMPLICATION, WITH LONG-TERM CURRENT USE OF INSULIN (HCC): Primary | ICD-10-CM

## 2021-12-09 LAB — GLUCOSE BLDC GLUCOMTR-MCNC: 122 MG/DL (ref 70–130)

## 2021-12-09 PROCEDURE — 99213 OFFICE O/P EST LOW 20 MIN: CPT | Performed by: NURSE PRACTITIONER

## 2021-12-09 PROCEDURE — 82962 GLUCOSE BLOOD TEST: CPT | Performed by: NURSE PRACTITIONER

## 2021-12-09 NOTE — PATIENT INSTRUCTIONS
Fatigue  If you have fatigue, you feel tired all the time and have a lack of energy or a lack of motivation. Fatigue may make it difficult to start or complete tasks because of exhaustion. In general, occasional or mild fatigue is often a normal response to activity or life. However, long-lasting (chronic) or extreme fatigue may be a symptom of a medical condition.  Follow these instructions at home:  General instructions  · Watch your fatigue for any changes.  · Go to bed and get up at the same time every day.  · Avoid fatigue by pacing yourself during the day and getting enough sleep at night.  · Maintain a healthy weight.  Medicines  · Take over-the-counter and prescription medicines only as told by your health care provider.  · Take a multivitamin, if told by your health care provider.   · Do not use herbal or dietary supplements unless they are approved by your health care provider.  Activity    · Exercise regularly, as told by your health care provider.  · Use or practice techniques to help you relax, such as yoga, andrea chi, meditation, or massage therapy.    Eating and drinking    · Avoid heavy meals in the evening.  · Eat a well-balanced diet, which includes lean proteins, whole grains, plenty of fruits and vegetables, and low-fat dairy products.  · Avoid consuming too much caffeine.  · Avoid the use of alcohol.  · Drink enough fluid to keep your urine pale yellow.    Lifestyle  · Change situations that cause you stress. Try to keep your work and personal schedule in balance.  · Do not use any products that contain nicotine or tobacco, such as cigarettes and e-cigarettes. If you need help quitting, ask your health care provider.  · Do not use drugs.  Contact a health care provider if:  · Your fatigue does not get better.  · You have a fever.  · You suddenly lose or gain weight.  · You have headaches.  · You have trouble falling asleep or sleeping through the night.  · You feel angry, guilty, anxious, or  sad.  · You are unable to have a bowel movement (constipation).  · Your skin is dry.  · You have swelling in your legs or another part of your body.  Get help right away if:  · You feel confused.  · Your vision is blurry.  · You feel faint or you pass out.  · You have a severe headache.  · You have severe pain in your abdomen, your back, or the area between your waist and hips (pelvis).  · You have chest pain, shortness of breath, or an irregular or fast heartbeat.  · You are unable to urinate, or you urinate less than normal.  · You have abnormal bleeding, such as bleeding from the rectum, vagina, nose, lungs, or nipples.  · You vomit blood.  · You have thoughts about hurting yourself or others.  If you ever feel like you may hurt yourself or others, or have thoughts about taking your own life, get help right away. You can go to your nearest emergency department or call:  · Your local emergency services (911 in the [place]  · A suicide crisis helpline, such as the National Suicide Prevention Lifeline at 1-293.583.6910. This is open 24 hours a day.  Summary  · If you have fatigue, you feel tired all the time and have a lack of energy or a lack of motivation.  · Fatigue may make it difficult to start or complete tasks because of exhaustion.  · Long-lasting (chronic) or extreme fatigue may be a symptom of a medical condition.  · Exercise regularly, as told by your health care provider.  · Change situations that cause you stress. Try to keep your work and personal schedule in balance.  This information is not intended to replace advice given to you by your health care provider. Make sure you discuss any questions you have with your health care provider.  Document Revised: 07/08/2020 Document Reviewed: 09/12/2018  Elsevier Patient Education © 2021 [employer]

## 2021-12-09 NOTE — PROGRESS NOTES
"Chief Complaint  Fatigue (has fallen asleep twice while trying to eat, while at a basketball game x several months  She has brought it up to Dr Bansal and Dr Talamantes )    Subjective          Dejah presents to [facility]    Review of Systems   Constitutional: Positive for fatigue. Negative for chills and fever.   HENT: Negative.    Respiratory: Negative.  Negative for cough and shortness of breath.    Cardiovascular: Negative.  Negative for chest pain, palpitations and leg swelling.   Gastrointestinal: Negative.  Negative for abdominal distention, abdominal pain, anal bleeding, blood in stool, constipation, diarrhea, nausea and vomiting.   Endocrine: Negative.    Genitourinary: Negative.    Neurological: Negative.  Negative for dizziness and light-headedness.   Psychiatric/Behavioral: Negative for dysphoric mood, self-injury, sleep disturbance and suicidal ideas. The patient is not nervous/anxious.    76 year old female presented to the clinic today complaining of on going fatigue and \"falling alsep during activities.  She states she falls asleep after she is cooking or while at her grandsons basketball games.   She has had a sleep study done several times.  Has SISSY and sleeps with a CPAP.    She is followed by cardiology, endocrinology, sleep medicine, hematology and pulmonary.    She has had this problem for the last year.  Her diabetes has been undercontrol.  Last HA1c was 6.1.  She checks her blood sugar regularly and they have been running 110-150 fasting.       She has been on several different blood pressure medications in the past.  She states that she is controlled on what she is currently taking and would not like to change anything.    She does not take any otc sleep medications or aids.  No melatonin, benadryl.  No other Herbs.      She has been on Venlafaxine 75 mg 2 po BID for 6-7 years.  Feels that it has controlled her anxiety and depression, however she did loose her "  in the last year.  Denies any SI/sp.  But may be contributing to her symptoms.  She declines any medication changes at this time.        Her glucometer is giving her an error this am, and she has changed batteries.  Requesting a new glucometer.    She has an active problem list of the following.  Patient Active Problem List   Diagnosis   • Essential hypertension   • Other hyperlipidemia   • Overactive bladder   • Osteoarthritis   • Type 2 diabetes mellitus without complication, without long-term current use of insulin (HCC)   • Vitamin D deficiency   • Nontoxic multinodular goiter   • Osteopenia   • Chronic depression   • Obstructive sleep apnea on CPAP   • Osteoarthritis of spine with radiculopathy, lumbar region   • Chronic venous insufficiency   • Tracheal cyst   • Nonocclusive coronary atherosclerosis of native coronary artery   • Vitamin B 12 deficiency   • Iron deficiency   • Pernicious anemia   • Iron deficiency anemia   • Weight loss   • Diastolic dysfunction   • Primary osteoarthritis of right knee   • Primary osteoarthritis of left knee   • Grief reaction with prolonged bereavement       Current Outpatient Medications on File Prior to Visit   Medication Sig Dispense Refill   • Accu-Chek FastClix Lancets misc Use to check blood sugar once daily 102 each 5   • amLODIPine (NORVASC) 5 MG tablet Take 1 tablet by mouth Daily for 180 days. 90 tablet 1   • aspirin 81 MG tablet Take 81 mg by mouth daily.     • atorvastatin (LIPITOR) 10 MG tablet TAKE 1 TABLET EVERY NIGHT. 90 tablet 1   • Calcium Carb-Cholecalciferol 500-200 MG-UNIT tablet Take 2 tablets by mouth daily.     • Cyanocobalamin ([facility] Vitamin B-12) 5000 MCG sublingual tablet Place 5,000 mcg under the tongue Daily. 90 tablet 3   • ferrous sulfate 325 (65 FE) MG tablet Take 1 tablet by mouth 2 (Two) Times a Day Before Meals. 180 tablet 3   • fluticasone (FLONASE) 50 MCG/ACT nasal spray 1 spray into the nostril(s) as directed by provider As Needed.    "  • folic acid (FOLVITE) 1 MG tablet Take 1 tablet by mouth Daily. 90 tablet 3   • furosemide (LASIX) 40 MG tablet Take 1 tablet by mouth Daily. 90 tablet 3   • glucose blood (Accu-Chek Catherine Plus) test strip Use as instructed 360 each 1   • Lancets Misc. (Accu-Chek FastClix Lancet) kit 1 kit Daily. 1 kit 0   • metFORMIN (GLUCOPHAGE) 500 MG tablet TAKE 1 TABLET BY MOUTH TWO TIMES A DAY WITH MEALS. 180 tablet 2   • metoprolol succinate XL (TOPROL-XL) 50 MG 24 hr tablet Take 1 tablet by mouth Daily for 180 days. 90 tablet 1   • Mirabegron ER (MYRBETRIQ) 50 MG tablet sustained-release 24 hour 24 hr tablet Take 50 mg by mouth Daily.     • pilocarpine (SALAGEN) 5 MG tablet 5 mg 3 (Three) Times a Day.     • potassium chloride (KAYCIEL) 20 mEq/15 mL solution MIX 15 ML IN 4 OUNCES OF LIQUID AND DRINK DAILY 1350 mL 1   • prednisoLONE acetate (PRED FORTE) 1 % ophthalmic suspension      • Restasis 0.05 % ophthalmic emulsion 2 (two) times a day.     • STIOLTO RESPIMAT 2.5-2.5 MCG/ACT aerosol solution inhaler 2 puffs As Needed.     • venlafaxine (EFFEXOR) 75 MG tablet Take 2 tablets by mouth 2 (Two) Times a Day. 360 tablet 1   • clonazePAM (KlonoPIN) 2 MG tablet TAKE 1 TABLET BY MOUTH NIGHTLY. MAX DAILY AMOUNT  2 MG       No current facility-administered medications on file prior to visit.     She has been complaint with the following medications.      The following portions of the patient's history were reviewed and updated as appropriate: allergies, current medications, past family history, past medical history, past social history, past surgical history, and problem list.     Objective   Vital Signs:   Vitals:    12/09/21 1505   BP: 125/77   BP Location: Left arm   Patient Position: Sitting   Cuff Size: Adult   Pulse: 92   Resp: 18   Temp: 97 °F (36.1 °C)   TempSrc: Skin   SpO2: 99%   Weight: 78 kg (172 lb)   Height: 160 cm (62.99\")        Physical Exam  Constitutional:       General: She is not in acute distress.     " Appearance: Normal appearance. She is normal weight. She is not ill-appearing.   HENT:      Head: Normocephalic.      Nose: Nose normal. No congestion.      Mouth/Throat:      Mouth: Mucous membranes are moist.   Eyes:      Conjunctiva/sclera: Conjunctivae normal.      Pupils: Pupils are equal, round, and reactive to light.   Cardiovascular:      Rate and Rhythm: Normal rate and regular rhythm.      Pulses: Normal pulses.      Heart sounds: Normal heart sounds. No murmur heard.      Pulmonary:      Effort: Pulmonary effort is normal.      Breath sounds: Normal breath sounds.   Abdominal:      General: Abdomen is flat. There is no distension.      Palpations: Abdomen is soft. There is no mass.      Tenderness: There is no abdominal tenderness. There is no right CVA tenderness, left CVA tenderness, guarding or rebound.      Hernia: No hernia is present.   Musculoskeletal:      Cervical back: Normal range of motion and neck supple.   Skin:     General: Skin is warm and dry.      Capillary Refill: Capillary refill takes less than 2 seconds.      Findings: No rash.   Neurological:      Mental Status: She is alert and oriented to person, place, and time.   Psychiatric:         Mood and Affect: Mood normal.         Behavior: Behavior normal.         Thought Content: Thought content normal.         Judgment: Judgment normal.          Result Review :     {The following data was reviewed by Litzy TABARES    Component      Latest Ref Rng & Units 10/18/2021 10/25/2021   WBC      3.40 - 10.80 10*3/mm3 5.91    RBC      3.77 - 5.28 10*6/mm3 5.02    Hemoglobin      12.0 - 15.9 g/dL 12.8    Hematocrit      34.0 - 46.6 % 41.1    MCV      79.0 - 97.0 fL 81.9    MCH      26.6 - 33.0 pg 25.5 (L)    MCHC      31.5 - 35.7 g/dL 31.1 (L)    RDW      12.3 - 15.4 % 13.8    RDW-SD      37.0 - 54.0 fl 40.8    MPV      6.0 - 12.0 fL 9.6    Platelets      140 - 450 10*3/mm3 275    Neutrophil Rel %      42.7 - 76.0 % 76.2 (H)    Lymphocyte  Rel %      19.6 - 45.3 % 13.0 (L)    Monocyte Rel %      5.0 - 12.0 % 5.2    Eosinophil Rel %      0.3 - 6.2 % 4.6    Basophil Rel %      0.0 - 1.5 % 0.8    Immature Granulocyte Rel %      0.0 - 0.5 % 0.2    Neutrophils Absolute      1.70 - 7.00 10*3/mm3 4.50    Lymphocytes Absolute      0.70 - 3.10 10*3/mm3 0.77    Monocytes Absolute      0.10 - 0.90 10*3/mm3 0.31    Eosinophils Absolute      0.00 - 0.40 10*3/mm3 0.27    Basophils Absolute      0.00 - 0.20 10*3/mm3 0.05    Immature Grans, Absolute      0.00 - 0.05 10*3/mm3 0.01    nRBC      0.0 - 0.2 /100 WBC 0.0    Glucose      65 - 99 mg/dL  98   BUN      8 - 27 mg/dL  14   Creatinine      0.57 - 1.00 mg/dL  0.83   eGFR Non African Am      >59 mL/min/1.73  68   eGFR African Am      >59 mL/min/1.73  79   BUN/Creatinine Ratio      12 - 28  17   Sodium      134 - 144 mmol/L  142   Potassium      3.5 - 5.2 mmol/L  4.4   Chloride      96 - 106 mmol/L  102   CO2      20 - 29 mmol/L  27   Calcium      8.7 - 10.3 mg/dL  10.4 (H)   Total Protein      6.0 - 8.5 g/dL  6.6   Albumin      3.7 - 4.7 g/dL  4.0   Globulin      1.5 - 4.5 g/dL  2.6   A/G Ratio      1.2 - 2.2  1.5   Total Bilirubin      0.0 - 1.2 mg/dL  0.2   Alkaline Phosphatase      44 - 121 IU/L  80   AST (SGOT)      0 - 40 IU/L  11   ALT (SGPT)      0 - 32 IU/L  7   Total Cholesterol      100 - 199 mg/dL  130   Triglycerides      0 - 149 mg/dL  93   HDL Cholesterol      >39 mg/dL  58   VLDL Cholesterol Neto      5 - 40 mg/dL  17   LDL Cholesterol       0 - 99 mg/dL  55   Iron      37 - 145 mcg/dL 65    Iron Saturation      14 - 48 % 22    Transferrin      200 - 360 mg/dL 212    TIBC      249 - 505 mcg/dL 297    Creatinine, Urine      Not Estab. mg/dL  117.8   Microalbumin, Urine      Not Estab. ug/mL  6.2   Microalbumin/Creatinine Ratio      0 - 29 mg/g creat  5   Ferritin      13.00 - 150.00 ng/mL 100.40    Vitamin B-12      211 - 946 pg/mL >2,000 (H)    Folate      4.78 - 24.20 ng/mL >20.00    Hemoglobin A1C       4.8 - 5.6 %  6.1 (H)   Free T4      0.82 - 1.77 ng/dL  0.95   25 Hydroxy, Vitamin D      30.0 - 100.0 ng/mL  41.5   TSH Baseline      0.450 - 4.500 uIU/mL  1.270   Interpretation        Note            Assessment and Plan    Diagnoses and all orders for this visit:    1. Controlled type 2 diabetes mellitus without complication, with long-term current use of insulin (HCC) (Primary)  -     POCT Glucose    2. Chronic fatigue syndrome  -     Methylmalonic Acid, Serum  -     CBC w AUTO Differential  -     Comprehensive metabolic panel  -     Diabetic Supplies      Plan  Labs ordered today  POCT glucose checked r/t failed glucometer- 122  Order new glucometer  Keep all scheduled followup with specialties  Pt does not want to adjust any blood pressure mediation-  Will discuss with cardiology if bb could be causing any fatigue.  States her anxiety and depression are well controlled and has been on Effexor for many years and does not want to adjust that medication-  Recent grief.  Is going to grief counseling -  Will discuss with therapist.  States she is compliant with CPAP use.      Follow Up   Return in about 3 months (around 3/9/2022) for Next scheduled follow up.  Patient was given instructions and counseling regarding her condition or for health maintenance advice. Please see specific information pulled into the AVS if appropriate.

## 2021-12-14 ENCOUNTER — TELEPHONE (OUTPATIENT)
Dept: FAMILY MEDICINE CLINIC | Facility: CLINIC | Age: 76
End: 2021-12-14

## 2021-12-14 NOTE — TELEPHONE ENCOUNTER
I see that. I called [facility] and they said that test takes about 6-10 days and we are still in that window.   I advised patient of the current results and told her we would call her back when the last result comes in.   If you send this back to me then I will keep it in my patient calls to check on Friday.     Thanks  Ellen

## 2021-12-14 NOTE — TELEPHONE ENCOUNTER
Caller: Dejah Casey    Relationship: Self    Best call back number:[phone] (H)    Caller requesting test results: DEJAH CASEY    What test was performed: BLOOD WORK    When was the test performed: 12/09/2021    Where was the test performed: IN OFFICE LAB    Additional notes: PATIENT WOULD LIKE A CALL BACK ABOUT TEST RESULTS ASAP

## 2021-12-14 NOTE — TELEPHONE ENCOUNTER
I have no idea why they didn't come over. I printed them from [facility] and put them on your desk. I will be happy to call the patient as well. I scanned them to you to sign off on too.     Ellen

## 2021-12-14 NOTE — TELEPHONE ENCOUNTER
"Jing can't see MMA results to see if absorbing Vit b 12   Says \"will follow?'    All other labs are unchanged  Blood sugar normal  Hemoglobin and Hematocrit normal     No infection concerns    Keep taking  Medications as prescribed    "

## 2021-12-15 LAB
ALBUMIN SERPL-MCNC: 4.1 G/DL (ref 3.7–4.7)
ALBUMIN/GLOB SERPL: 1.6 {RATIO} (ref 1.2–2.2)
ALP SERPL-CCNC: 85 IU/L (ref 44–121)
ALT SERPL-CCNC: 10 IU/L (ref 0–32)
AST SERPL-CCNC: 9 IU/L (ref 0–40)
BASOPHILS # BLD AUTO: 0 X10E3/UL (ref 0–0.2)
BASOPHILS NFR BLD AUTO: 1 %
BILIRUB SERPL-MCNC: 0.2 MG/DL (ref 0–1.2)
BUN SERPL-MCNC: 18 MG/DL (ref 8–27)
BUN/CREAT SERPL: 20 (ref 12–28)
CALCIUM SERPL-MCNC: 10 MG/DL (ref 8.7–10.3)
CHLORIDE SERPL-SCNC: 102 MMOL/L (ref 96–106)
CO2 SERPL-SCNC: 26 MMOL/L (ref 20–29)
CREAT SERPL-MCNC: 0.89 MG/DL (ref 0.57–1)
EOSINOPHIL # BLD AUTO: 0.2 X10E3/UL (ref 0–0.4)
EOSINOPHIL NFR BLD AUTO: 5 %
ERYTHROCYTE [DISTWIDTH] IN BLOOD BY AUTOMATED COUNT: 13.1 % (ref 11.7–15.4)
GLOBULIN SER CALC-MCNC: 2.5 G/DL (ref 1.5–4.5)
GLUCOSE SERPL-MCNC: 83 MG/DL (ref 65–99)
HCT VFR BLD AUTO: 40.2 % (ref 34–46.6)
HGB BLD-MCNC: 12.6 G/DL (ref 11.1–15.9)
IMM GRANULOCYTES # BLD AUTO: 0 X10E3/UL (ref 0–0.1)
IMM GRANULOCYTES NFR BLD AUTO: 0 %
LYMPHOCYTES # BLD AUTO: 0.9 X10E3/UL (ref 0.7–3.1)
LYMPHOCYTES NFR BLD AUTO: 19 %
Lab: NORMAL
MCH RBC QN AUTO: 25.4 PG (ref 26.6–33)
MCHC RBC AUTO-ENTMCNC: 31.3 G/DL (ref 31.5–35.7)
MCV RBC AUTO: 81 FL (ref 79–97)
METHYLMALONATE SERPL-SCNC: 181 NMOL/L (ref 0–378)
MONOCYTES # BLD AUTO: 0.4 X10E3/UL (ref 0.1–0.9)
MONOCYTES NFR BLD AUTO: 8 %
NEUTROPHILS # BLD AUTO: 3.5 X10E3/UL (ref 1.4–7)
NEUTROPHILS NFR BLD AUTO: 67 %
PLATELET # BLD AUTO: 289 X10E3/UL (ref 150–450)
POTASSIUM SERPL-SCNC: 4.5 MMOL/L (ref 3.5–5.2)
PROT SERPL-MCNC: 6.6 G/DL (ref 6–8.5)
RBC # BLD AUTO: 4.96 X10E6/UL (ref 3.77–5.28)
SODIUM SERPL-SCNC: 142 MMOL/L (ref 134–144)
WBC # BLD AUTO: 5.1 X10E3/UL (ref 3.4–10.8)

## 2021-12-23 RX ORDER — FOLIC ACID 1 MG/1
TABLET ORAL
Qty: 90 TABLET | Refills: 3 | Status: SHIPPED | OUTPATIENT
Start: 2021-12-23 | End: 2023-01-03

## 2022-01-07 ENCOUNTER — DOCUMENTATION (OUTPATIENT)
Dept: FAMILY MEDICINE CLINIC | Facility: CLINIC | Age: 77
End: 2022-01-07

## 2022-01-07 DIAGNOSIS — R05.8 NON-PRODUCTIVE COUGH: Primary | ICD-10-CM

## 2022-01-07 RX ORDER — BENZONATATE 100 MG/1
100 CAPSULE ORAL 3 TIMES DAILY PRN
Qty: 30 CAPSULE | Refills: 1 | Status: SHIPPED | OUTPATIENT
Start: 2022-01-07 | End: 2022-02-15

## 2022-01-07 NOTE — PROGRESS NOTES
The patient called in reporting non-productive cough, congestion, rhinorrhea, and chest tightness when coughing. No shortness of breath or chest pain. No known sick contacts and no known Covid exposure. She said her symptoms are mild. I sent a prescription to her local pharmacy for Tessalon perles as needed for the cough. I recommended the patient be evaluated at [facility], [place], operated by [facility] due to chest tightness. I instructed her to call 911 for shortness of breath, chest pain, or worsening signs or symptoms.

## 2022-02-07 RX ORDER — CYANOCOBALAMIN (VITAMIN B-12) 5000 MCG
5000 TABLET,DISINTEGRATING ORAL DAILY
Qty: 90 TABLET | Refills: 3 | Status: SHIPPED | OUTPATIENT
Start: 2022-02-07

## 2022-02-07 NOTE — TELEPHONE ENCOUNTER
Caller: Jacinto Dejah L    Relationship: Self    Best call back number: [phone]    Requested Prescriptions:   Requested Prescriptions     Pending Prescriptions Disp Refills   • Cyanocobalamin ([facility] Vitamin B-12) 5000 MCG sublingual tablet 90 tablet 3     Sig: Place 5,000 mcg under the tongue Daily.        Pharmacy where request should be sent: [facility] #[id number] - [place], [place] - [address] AT [facility] [phone] [facility] [phone] FX     Additional details provided by patient:     Does the patient have less than a 3 day supply:  [x] Yes  [] No    [employer] Michelle Rep   02/07/22 16:26 EST

## 2022-02-11 ENCOUNTER — TELEPHONE (OUTPATIENT)
Dept: ONCOLOGY | Facility: CLINIC | Age: 77
End: 2022-02-11

## 2022-02-11 NOTE — TELEPHONE ENCOUNTER
Caller: Dejah Casey    Relationship: Self    Best call back number: [phone]      What was the call regarding: PATIENT CALLED [facility] TOLD HER DR HEATH DENIED HER REFILL ON HER B 12. PATIENT WOULD LIKE TO KNOW WHY    Do you require a callback: YES

## 2022-02-11 NOTE — TELEPHONE ENCOUNTER
Returned call to patient to let her know that her Vitamin B-12 was approved and e-scribed to her [facility] pharmacy on 2/7/2022, for 90 tablets with additional refills.  She stated that the pharmacy did not contact her, so she thought it had been denied.  I assured her that the medication should be ready for her.

## 2022-02-15 ENCOUNTER — OFFICE VISIT (OUTPATIENT)
Dept: GASTROENTEROLOGY | Facility: CLINIC | Age: 77
End: 2022-02-15

## 2022-02-15 VITALS
HEIGHT: 63 IN | SYSTOLIC BLOOD PRESSURE: 132 MMHG | BODY MASS INDEX: 31.18 KG/M2 | WEIGHT: 176 LBS | DIASTOLIC BLOOD PRESSURE: 76 MMHG

## 2022-02-15 DIAGNOSIS — K59.00 CONSTIPATION, UNSPECIFIED CONSTIPATION TYPE: ICD-10-CM

## 2022-02-15 DIAGNOSIS — E53.8 VITAMIN B12 DEFICIENCY: Chronic | ICD-10-CM

## 2022-02-15 DIAGNOSIS — D50.9 IRON DEFICIENCY ANEMIA, UNSPECIFIED IRON DEFICIENCY ANEMIA TYPE: Primary | ICD-10-CM

## 2022-02-15 DIAGNOSIS — R63.4 WEIGHT LOSS: ICD-10-CM

## 2022-02-15 PROCEDURE — 99213 OFFICE O/P EST LOW 20 MIN: CPT | Performed by: INTERNAL MEDICINE

## 2022-02-15 NOTE — PROGRESS NOTES
Chief Complaint   Patient presents with   • Anemia   • Constipation   • Diverticulosis       Subjective     HPI  Dejah Casey is a 77 y.o. female with a past medical history noted below who presents for follow up of iron deficiency, B12 deficiency/pernicious anemia and weight loss.    Recent labs from 12/9/21 show a stable hemoglobin.  Stable iron stores.  Her weight is stable to October.  BMI greater than 30.    Feels fatigued.  Wants to sleep a lot.  With her stable hemoglobin, replete B12 and iron stores, this is not related to anemia.  She is taking oral iron and B12.    Doing better with BMs.  Eating prunes, high fiber cereal, apples and BMs are doing well.      Otherwise feels pretty good    Sees her endocrinologist later this week.  Sees her primary care in 1 month.    Today's visit was in the office.  Both the patient and I were wearing face masks and proper hand hygiene was performed before and after the physical exam.           Current Outpatient Medications:   •  Accu-Chek FastClix Lancets misc, Use to check blood sugar once daily, Disp: 102 each, Rfl: 5  •  amLODIPine (NORVASC) 5 MG tablet, Take 1 tablet by mouth Daily for 180 days., Disp: 90 tablet, Rfl: 1  •  aspirin 81 MG tablet, Take 81 mg by mouth daily., Disp: , Rfl:   •  atorvastatin (LIPITOR) 10 MG tablet, TAKE 1 TABLET EVERY NIGHT., Disp: 90 tablet, Rfl: 1  •  Calcium Carb-Cholecalciferol 500-200 MG-UNIT tablet, Take 2 tablets by mouth daily., Disp: , Rfl:   •  clonazePAM (KlonoPIN) 2 MG tablet, TAKE 1 TABLET BY MOUTH NIGHTLY. MAX DAILY AMOUNT  2 MG, Disp: , Rfl:   •  Cyanocobalamin ([facility] Vitamin B-12) 5000 MCG sublingual tablet, Place 5,000 mcg under the tongue Daily., Disp: 90 tablet, Rfl: 3  •  ferrous sulfate 325 (65 FE) MG tablet, Take 1 tablet by mouth 2 (Two) Times a Day Before Meals., Disp: 180 tablet, Rfl: 3  •  fluticasone (FLONASE) 50 MCG/ACT nasal spray, 1 spray into the nostril(s) as directed by provider As Needed., Disp: , Rfl:    •  folic acid (FOLVITE) 1 MG tablet, TAKE 1 TABLET BY MOUTH EVERY DAY, Disp: 90 tablet, Rfl: 3  •  furosemide (LASIX) 40 MG tablet, Take 1 tablet by mouth Daily., Disp: 90 tablet, Rfl: 3  •  glucose blood (Accu-Chek Catherine Plus) test strip, Use as instructed, Disp: 360 each, Rfl: 1  •  Lancets Misc. (Accu-Chek FastClix Lancet) kit, 1 kit Daily., Disp: 1 kit, Rfl: 0  •  metFORMIN (GLUCOPHAGE) 500 MG tablet, TAKE 1 TABLET BY MOUTH TWO TIMES A DAY WITH MEALS., Disp: 180 tablet, Rfl: 2  •  metoprolol succinate XL (TOPROL-XL) 50 MG 24 hr tablet, Take 1 tablet by mouth Daily for 180 days., Disp: 90 tablet, Rfl: 1  •  Mirabegron ER (MYRBETRIQ) 50 MG tablet sustained-release 24 hour 24 hr tablet, Take 50 mg by mouth Daily., Disp: , Rfl:   •  pilocarpine (SALAGEN) 5 MG tablet, 5 mg 3 (Three) Times a Day., Disp: , Rfl:   •  potassium chloride (KAYCIEL) 20 mEq/15 mL solution, MIX 15 ML IN 4 OUNCES OF LIQUID AND DRINK DAILY, Disp: 1350 mL, Rfl: 1  •  Restasis 0.05 % ophthalmic emulsion, 2 (two) times a day., Disp: , Rfl:   •  STIOLTO RESPIMAT 2.5-2.5 MCG/ACT aerosol solution inhaler, 2 puffs As Needed., Disp: , Rfl:   •  venlafaxine (EFFEXOR) 75 MG tablet, Take 2 tablets by mouth 2 (Two) Times a Day., Disp: 360 tablet, Rfl: 1      Objective     Vitals:    02/15/22 1257   BP: 132/76         02/15/22  1257   Weight: 79.8 kg (176 lb)     Body mass index is 31.18 kg/m².    Physical Exam  Constitutional:       General: She is not in acute distress.  Pulmonary:      Effort: Pulmonary effort is normal.   Neurological:      Mental Status: She is alert and oriented to person, place, and time.   Psychiatric:         Mood and Affect: Mood normal.         Behavior: Behavior normal.         Thought Content: Thought content normal.         Judgment: Judgment normal.         WBC   Date Value Ref Range Status   12/09/2021 5.1 3.4 - 10.8 x10E3/uL Final   11/14/2018 9.02 4.5 - 11.0 10*3/uL Final     RBC   Date Value Ref Range Status    12/09/2021 4.96 3.77 - 5.28 x10E6/uL Final   11/14/2018 4.95 4.0 - 5.2 10*6/uL Final     Hemoglobin   Date Value Ref Range Status   12/09/2021 12.6 11.1 - 15.9 g/dL Final   10/18/2021 12.8 12.0 - 15.9 g/dL Final   11/14/2018 12.5 12.0 - 16.0 g/dL Final     Hematocrit   Date Value Ref Range Status   12/09/2021 40.2 34.0 - 46.6 % Final   10/18/2021 41.1 34.0 - 46.6 % Final   11/14/2018 39.1 36.0 - 46.0 % Final     MCV   Date Value Ref Range Status   12/09/2021 81 79 - 97 fL Final   10/18/2021 81.9 79.0 - 97.0 fL Final   11/14/2018 79.0 (L) 80.0 - 100.0 fL Final     MCH   Date Value Ref Range Status   12/09/2021 25.4 (L) 26.6 - 33.0 pg Final   10/18/2021 25.5 (L) 26.6 - 33.0 pg Final   11/14/2018 25.3 (L) 26.0 - 34.0 pg Final     MCHC   Date Value Ref Range Status   12/09/2021 31.3 (L) 31.5 - 35.7 g/dL Final   10/18/2021 31.1 (L) 31.5 - 35.7 g/dL Final   11/14/2018 32.0 31.0 - 37.0 g/dL Final     RDW   Date Value Ref Range Status   12/09/2021 13.1 11.7 - 15.4 % Final   10/18/2021 13.8 12.3 - 15.4 % Final   11/14/2018 14.5 12.0 - 16.8 % Final     RDW-SD   Date Value Ref Range Status   10/18/2021 40.8 37.0 - 54.0 fl Final     MPV   Date Value Ref Range Status   10/18/2021 9.6 6.0 - 12.0 fL Final   11/14/2018 10.0 6.7 - 10.8 fL Final     Platelets   Date Value Ref Range Status   12/09/2021 289 150 - 450 x10E3/uL Final   10/18/2021 275 140 - 450 10*3/mm3 Final   11/14/2018 342 140 - 440 10*3/uL Final     Neutrophil Rel %   Date Value Ref Range Status   12/09/2021 67 Not Estab. % Final   11/14/2018 77.1 45 - 80 % Final     Neutrophil %   Date Value Ref Range Status   10/18/2021 76.2 (H) 42.7 - 76.0 % Final     Lymphocyte Rel %   Date Value Ref Range Status   12/09/2021 19 Not Estab. % Final   11/14/2018 14.7 (L) 15 - 50 % Final     Lymphocyte %   Date Value Ref Range Status   10/18/2021 13.0 (L) 19.6 - 45.3 % Final     Monocyte Rel %   Date Value Ref Range Status   12/09/2021 8 Not Estab. % Final   11/14/2018 5.0 0 - 15 %  Final     Monocyte %   Date Value Ref Range Status   10/18/2021 5.2 5.0 - 12.0 % Final     Eosinophil Rel %   Date Value Ref Range Status   12/09/2021 5 Not Estab. % Final     Eosinophil %   Date Value Ref Range Status   10/18/2021 4.6 0.3 - 6.2 % Final   11/14/2018 2.3 0 - 7 % Final     Basophil Rel %   Date Value Ref Range Status   12/09/2021 1 Not Estab. % Final   11/14/2018 0.6 0 - 2 % Final     Basophil %   Date Value Ref Range Status   10/18/2021 0.8 0.0 - 1.5 % Final     Immature Grans %   Date Value Ref Range Status   10/18/2021 0.2 0.0 - 0.5 % Final   11/14/2018 0.3 (H) 0 % Final     Neutrophils Absolute   Date Value Ref Range Status   12/09/2021 3.5 1.4 - 7.0 x10E3/uL Final   11/14/2018 6.95 2.0 - 8.8 10*3/uL Final     Neutrophils, Absolute   Date Value Ref Range Status   10/18/2021 4.50 1.70 - 7.00 10*3/mm3 Final     Lymphocytes Absolute   Date Value Ref Range Status   12/09/2021 0.9 0.7 - 3.1 x10E3/uL Final   11/14/2018 1.33 0.7 - 5.5 10*3/uL Final     Lymphocytes, Absolute   Date Value Ref Range Status   10/18/2021 0.77 0.70 - 3.10 10*3/mm3 Final     Monocytes Absolute   Date Value Ref Range Status   12/09/2021 0.4 0.1 - 0.9 x10E3/uL Final   11/14/2018 0.45 0.0 - 1.7 10*3/uL Final     Monocytes, Absolute   Date Value Ref Range Status   10/18/2021 0.31 0.10 - 0.90 10*3/mm3 Final     Eosinophils Absolute   Date Value Ref Range Status   12/09/2021 0.2 0.0 - 0.4 x10E3/uL Final   11/14/2018 0.21 0.0 - 0.8 10*3/uL Final     Eosinophils, Absolute   Date Value Ref Range Status   10/18/2021 0.27 0.00 - 0.40 10*3/mm3 Final     Basophils Absolute   Date Value Ref Range Status   12/09/2021 0.0 0.0 - 0.2 x10E3/uL Final   11/14/2018 0.05 0.0 - 0.2 10*3/uL Final     Basophils, Absolute   Date Value Ref Range Status   10/18/2021 0.05 0.00 - 0.20 10*3/mm3 Final     Immature Grans, Absolute   Date Value Ref Range Status   10/18/2021 0.01 0.00 - 0.05 10*3/mm3 Final   11/14/2018 0.03 <1 10*3/uL Final     nRBC   Date Value  Ref Range Status   10/18/2021 0.0 0.0 - 0.2 /100 WBC Final       Lab Results   Component Value Date    GLUCOSE 83 12/09/2021    BUN 18 12/09/2021    CREATININE 0.89 12/09/2021    EGFRIFNONA 63 12/09/2021    EGFRIFAFRI 72 12/09/2021    BCR 20 12/09/2021    CO2 26 12/09/2021    CALCIUM 10.0 12/09/2021    PROTENTOTREF 6.6 12/09/2021    ALBUMIN 4.1 12/09/2021    LABIL2 1.6 12/09/2021    AST 9 12/09/2021    ALT 10 12/09/2021         Imaging Results (Last 7 Days)     ** No results found for the last 168 hours. **          I personally reviewed data as detailed below:     The labs listed above.    The radiology studies listed above.    Office notes from: 12/9/21 pcp    Endoscopy procedures and pathology from: 7/20/21 EGD and colonoscopy      No notes on file    Assessment/Plan    1. Iron deficiency anemia: Plan findings on EGD and colonoscopy.  She has been stable with iron supplementation    2. Vitamin B12 deficiency: She pernicious anemia, on supplementation.  Recent labs are stable    3. Weight loss: This is stabilized    4. Constipation: Doing better with high-fiber cereal, high-fiber foods    Plan  Continue high-fiber diet  Continue B12 and iron  Advised to discuss her symptoms of fatigue with her endocrinologist that her PCP as this does not appear to be related to anemia  Follow-up in 1 year, sooner if needed    Diagnoses and all orders for this visit:    1. Iron deficiency anemia, unspecified iron deficiency anemia type (Primary)    2. Vitamin B12 deficiency    3. Weight loss    4. Constipation, unspecified constipation type        I have discussed the above plan with the patient.  They verbalize understanding and are in agreement with the plan.  They have been advised to contact the office for any questions, concerns, or changes related to their health.    Dictated utilizing Dragon dictation

## 2022-02-17 ENCOUNTER — OFFICE VISIT (OUTPATIENT)
Dept: ENDOCRINOLOGY | Age: 77
End: 2022-02-17

## 2022-02-17 VITALS
BODY MASS INDEX: 31.33 KG/M2 | HEIGHT: 63 IN | HEART RATE: 90 BPM | SYSTOLIC BLOOD PRESSURE: 128 MMHG | OXYGEN SATURATION: 98 % | DIASTOLIC BLOOD PRESSURE: 83 MMHG | WEIGHT: 176.8 LBS

## 2022-02-17 DIAGNOSIS — G47.33 OBSTRUCTIVE SLEEP APNEA ON CPAP: ICD-10-CM

## 2022-02-17 DIAGNOSIS — E53.8 VITAMIN B 12 DEFICIENCY: ICD-10-CM

## 2022-02-17 DIAGNOSIS — Z99.89 OBSTRUCTIVE SLEEP APNEA ON CPAP: ICD-10-CM

## 2022-02-17 DIAGNOSIS — I10 ESSENTIAL HYPERTENSION: ICD-10-CM

## 2022-02-17 DIAGNOSIS — E04.2 NONTOXIC MULTINODULAR GOITER: Primary | ICD-10-CM

## 2022-02-17 DIAGNOSIS — I25.10 NONOCCLUSIVE CORONARY ATHEROSCLEROSIS OF NATIVE CORONARY ARTERY: ICD-10-CM

## 2022-02-17 DIAGNOSIS — E55.9 VITAMIN D DEFICIENCY: ICD-10-CM

## 2022-02-17 DIAGNOSIS — E78.49 OTHER HYPERLIPIDEMIA: ICD-10-CM

## 2022-02-17 DIAGNOSIS — E11.9 TYPE 2 DIABETES MELLITUS WITHOUT COMPLICATION, WITHOUT LONG-TERM CURRENT USE OF INSULIN: ICD-10-CM

## 2022-02-17 DIAGNOSIS — M85.89 OSTEOPENIA OF MULTIPLE SITES: ICD-10-CM

## 2022-02-17 DIAGNOSIS — D51.0 PERNICIOUS ANEMIA: ICD-10-CM

## 2022-02-17 PROCEDURE — 99214 OFFICE O/P EST MOD 30 MIN: CPT | Performed by: INTERNAL MEDICINE

## 2022-02-17 RX ORDER — TETRAHYDROZOLINE HCL 0.05 %
1 DROPS OPHTHALMIC (EYE)
COMMUNITY
End: 2022-03-17

## 2022-02-17 RX ORDER — ATORVASTATIN CALCIUM 10 MG/1
10 TABLET, FILM COATED ORAL NIGHTLY
Qty: 90 TABLET | Refills: 2 | Status: SHIPPED | OUTPATIENT
Start: 2022-02-17 | End: 2022-06-14 | Stop reason: HOSPADM

## 2022-02-17 NOTE — PROGRESS NOTES
Subjective   Dejah Casey is a 77 y.o. female.     History of Present Illness       Patient is a 77year-old female who came in for followup.      She was found to have a goiter on examination last August 2012. She had thyroid ultrasound done on 08/21/2012 which showed a multinodular goiter with the dominant solid nodule in the left measuring 1.8 cm. She had followup thyroid ultrasound in 4/14 at [facility] showed multinodular goiter with stable nodules.        She denies any pressure symptoms or dysphagia. She denies any bowel changes.  She has no previous history of head or neck radiation therapy.      She has known diabetes mellitus since 2003 and has been on metformin 500 mg twice a day. Blood sugar has ranged 80's 90's. She has no microalbuminuria on urine sample taken 10/21.   Her last eye exam was 9/21.  She has no retinopathy.  She denies tingling in her feet.      She has hyperlipidemia and has been on Lipitor 10 mg once a day.  She denies any myalgia.  Her last meal was at 12:30 PM.     She has mild coronary artery disease by cardiac catheterization done in 2008 by Dr. CAMILLE Mcmullen. She denies any chest pain.  She had a normal nuclear stress tests in July 2018.  She is following up with Dr. Leal.      She has hypertension and has been on amlodipine 5 mg once a day, furosemide 40 mg every morning and Toprol 50 mg/day. She was taken off hydrochlorothiazide because of hypercalcemia. She had cough with lisinopril in the past. She has not used ARB in the past.  She denies orthopnea or PND or pedal edema.      She has overactive bladder diagnosed by Dr. Dean and is on Myrbetriq.  She was taken off Toviaz because of mouth dryness.  She was taken off Vesicare because of cost.  She has been having more mouth dryness     She has osteopenia on bone density done at [facility] in November 2015.  She is on calcium 500 plus D 1 tab/day and Tums 1 tablet/day.  Bone density done in December 2017 showed improvement of the left  "hip density and a slight decrease in the lumbar spine.       Bone density done in January 2020 showed osteopenia with a 6% decrease on the left femoral neck.  FRAX score for major osteoporotic fracture is 6.2% and for hip fracture is 1.6%.     She has sleep apnea and is using her CPAP regularly.  She is no longer snoring.  She wakes up tired.  She is following with Dr. Marcum.     She has occasional constipation.  She denies melena or hematochezia.  Her last colonoscopy was in July 2021 and she has hemorrhoids and diverticulosis.  EGD done in July 2021 showed gastritis and small hiatal hernia.     She has vitamin B12 deficiency and vitamin B12 5000 mcg SL daily.  She was on SL vitamin B12 prescribed by Dr. Nicholson.  Parietal cell antibodies were elevated.  Intrinsic factor antibody was normal.  She is being followed by Dr. Nicholson.       The following portions of the patient's history were reviewed and updated as appropriate: allergies, current medications, past family history, past medical history, past social history, past surgical history and problem list.    Review of Systems   Eyes: Positive for visual disturbance.   Respiratory: Negative for shortness of breath.    Cardiovascular: Negative.  Negative for chest pain and palpitations.   Gastrointestinal: Negative.    Endocrine: Negative.    Genitourinary: Negative.    Musculoskeletal: Negative for myalgias.   Neurological: Negative for numbness.     Objective      Vitals:    02/17/22 1443   BP: 128/83   Pulse: 90   SpO2: 98%   Weight: 80.2 kg (176 lb 12.8 oz)   Height: 160 cm (63\")     Physical Exam  Constitutional:       General: She is not in acute distress.     Appearance: Normal appearance. She is not ill-appearing or toxic-appearing.   Eyes:      General: No scleral icterus.        Right eye: No discharge.         Left eye: No discharge.      Extraocular Movements: Extraocular movements intact.      Conjunctiva/sclera: Conjunctivae normal.   Neck:      " Vascular: No carotid bruit.   Cardiovascular:      Rate and Rhythm: Normal rate and regular rhythm.      Heart sounds: Normal heart sounds. No murmur heard.  No friction rub.   Pulmonary:      Effort: Pulmonary effort is normal. No respiratory distress.      Breath sounds: Normal breath sounds. No stridor. No rales.   Chest:      Chest wall: No tenderness.   Abdominal:      General: Bowel sounds are normal. There is no distension.      Palpations: Abdomen is soft. There is no mass.      Tenderness: There is no right CVA tenderness or left CVA tenderness.   Musculoskeletal:         General: Normal range of motion.      Cervical back: Normal range of motion and neck supple. No rigidity or tenderness.   Lymphadenopathy:      Cervical: No cervical adenopathy.   Skin:     General: Skin is warm and dry.   Neurological:      General: No focal deficit present.      Mental Status: She is alert and oriented to person, place, and time.   Psychiatric:         Mood and Affect: Mood normal.         Behavior: Behavior normal.       Admission on 01/08/2022, Discharged on 01/08/2022   Component Date Value Ref Range Status   • SARS-CoV-2, CHAY 01/08/2022 Not Detected  Not Detected Final    Comment: Testing was performed using the [employer] SARS-CoV-2 assay.  This nucleic acid amplification test was developed and its performance  characteristics determined by [facility]. Nucleic acid  amplification tests include RT-PCR and TMA. This test has not been  FDA cleared or approved. This test has been authorized by FDA under  an Emergency Use Authorization (EUA). This test is only authorized  for the duration of time the declaration that circumstances exist  justifying the authorization of the emergency use of in vitro  diagnostic tests for detection of SARS-CoV-2 virus and/or diagnosis  of COVID-19 infection under section 564(b)(1) of the Act, 21 U.S.C.  360bbb-3(b) (1), unless the authorization is terminated or revoked  sooner.  When  diagnostic testing is negative, the possibility of a false  negative result should be considered in the context of a patient's  recent exposures and the presence of clinical signs and symptoms  consistent with COVID-19. An individual without                            symptoms of COVID-19  and who is not shedding SARS-CoV-2 virus would expect to have a  negative (not detected) result in this assay.   • [facility] SARS-COV-2, CHAY 2 DAY TAT 01/08/2022 Performed   Final     Assessment/Plan   Diagnoses and all orders for this visit:    1. Nontoxic multinodular goiter (Primary)  -     T4, Free  -     TSH    2. Essential hypertension    3. Other hyperlipidemia  -     Lipid Panel    4. Type 2 diabetes mellitus without complication, without long-term current use of insulin (HCC)  -     Comprehensive Metabolic Panel  -     Hemoglobin A1c  -     Vitamin B12    5. Vitamin D deficiency  -     Vitamin D 25 Hydroxy    6. Osteopenia of multiple sites  -     Vitamin D 25 Hydroxy  -     DEXA Bone Density Axial    7. Obstructive sleep apnea on CPAP    8. Vitamin B 12 deficiency  -     Iron Profile  -     Ferritin  -     Vitamin B12    9. Nonocclusive coronary atherosclerosis of native coronary artery  -     Lipid Panel    10. Pernicious anemia  -     Iron Profile  -     Ferritin    Other orders  -     atorvastatin (LIPITOR) 10 MG tablet; Take 1 tablet by mouth Every Night.  Dispense: 90 tablet; Refill: 2      Check thyroid function tests.  Continue Metformin 500 mg twice a day. Check hemoglobin A1c.  Continue amlodipine, furosemide, and Toprol per cardiologist.  Continue Lipitor 10 mg/day.  Continue calcium plus D 1 tablet daily and Tums 1 tablet daily.  Schedule bone density.  Continue vitamin B12 and iron supplements per Dr. Nicholson.  Continue CPAP.    Copy my note sent to Dr. Talamantes, Dr. Nicholson, Dr. Leal and Dr. Marcum.    RTC 4 mos.

## 2022-02-18 LAB
25(OH)D3+25(OH)D2 SERPL-MCNC: 50 NG/ML (ref 30–100)
ALBUMIN SERPL-MCNC: 4.2 G/DL (ref 3.7–4.7)
ALBUMIN/GLOB SERPL: 1.7 {RATIO} (ref 1.2–2.2)
ALP SERPL-CCNC: 81 IU/L (ref 44–121)
ALT SERPL-CCNC: 8 IU/L (ref 0–32)
AST SERPL-CCNC: 9 IU/L (ref 0–40)
BILIRUB SERPL-MCNC: 0.3 MG/DL (ref 0–1.2)
BUN SERPL-MCNC: 19 MG/DL (ref 8–27)
BUN/CREAT SERPL: 21 (ref 12–28)
CALCIUM SERPL-MCNC: 10.3 MG/DL (ref 8.7–10.3)
CHLORIDE SERPL-SCNC: 103 MMOL/L (ref 96–106)
CHOLEST SERPL-MCNC: 141 MG/DL (ref 100–199)
CO2 SERPL-SCNC: 26 MMOL/L (ref 20–29)
CREAT SERPL-MCNC: 0.89 MG/DL (ref 0.57–1)
FERRITIN SERPL-MCNC: 101 NG/ML (ref 15–150)
GLOBULIN SER CALC-MCNC: 2.5 G/DL (ref 1.5–4.5)
GLUCOSE SERPL-MCNC: 95 MG/DL (ref 65–99)
HBA1C MFR BLD: 6 % (ref 4.8–5.6)
HDLC SERPL-MCNC: 66 MG/DL
IMP & REVIEW OF LAB RESULTS: NORMAL
IRON SATN MFR SERPL: 30 % (ref 15–55)
IRON SERPL-MCNC: 84 UG/DL (ref 27–139)
LDLC SERPL CALC-MCNC: 62 MG/DL (ref 0–99)
POTASSIUM SERPL-SCNC: 4.3 MMOL/L (ref 3.5–5.2)
PROT SERPL-MCNC: 6.7 G/DL (ref 6–8.5)
SODIUM SERPL-SCNC: 142 MMOL/L (ref 134–144)
T4 FREE SERPL-MCNC: 0.88 NG/DL (ref 0.82–1.77)
TIBC SERPL-MCNC: 276 UG/DL (ref 250–450)
TRIGL SERPL-MCNC: 62 MG/DL (ref 0–149)
TSH SERPL DL<=0.005 MIU/L-ACNC: 0.63 UIU/ML (ref 0.45–4.5)
UIBC SERPL-MCNC: 192 UG/DL (ref 118–369)
VIT B12 SERPL-MCNC: >2000 PG/ML (ref 232–1245)
VLDLC SERPL CALC-MCNC: 13 MG/DL (ref 5–40)

## 2022-02-24 NOTE — PROGRESS NOTES
Normal thyroid function tests.LDL 62.  HDL 66.  Continue Lipitor 10 mg/day.Hemoglobin A1c 6.0%.  Diabetes is well controlled.Normal vitamin D.  Continue calcium 500+ D 1 tablet per day and Tums 1 tablet/day.Normal iron and ferritin.Normal vitamin B12.  Continue vitamin B12 supplementation per hematologist.Copy of our sent to Dr. Talamantes and Dr. Nicholson.  Send copy of labs to patient.

## 2022-03-03 RX ORDER — FERROUS SULFATE 325(65) MG
TABLET ORAL
Qty: 90 TABLET | Refills: 3 | Status: SHIPPED | OUTPATIENT
Start: 2022-03-03 | End: 2023-04-03

## 2022-03-17 ENCOUNTER — OFFICE VISIT (OUTPATIENT)
Dept: FAMILY MEDICINE CLINIC | Facility: CLINIC | Age: 77
End: 2022-03-17

## 2022-03-17 VITALS
SYSTOLIC BLOOD PRESSURE: 114 MMHG | HEART RATE: 81 BPM | RESPIRATION RATE: 16 BRPM | WEIGHT: 176 LBS | DIASTOLIC BLOOD PRESSURE: 75 MMHG | OXYGEN SATURATION: 100 % | TEMPERATURE: 96.7 F | BODY MASS INDEX: 31.18 KG/M2 | HEIGHT: 63 IN

## 2022-03-17 DIAGNOSIS — I10 ESSENTIAL HYPERTENSION: ICD-10-CM

## 2022-03-17 DIAGNOSIS — Z11.59 NEED FOR HEPATITIS C SCREENING TEST: ICD-10-CM

## 2022-03-17 DIAGNOSIS — Z12.31 ENCOUNTER FOR SCREENING MAMMOGRAM FOR BREAST CANCER: ICD-10-CM

## 2022-03-17 DIAGNOSIS — E55.9 VITAMIN D DEFICIENCY: ICD-10-CM

## 2022-03-17 DIAGNOSIS — Z91.81 AT MODERATE RISK FOR FALL: ICD-10-CM

## 2022-03-17 DIAGNOSIS — Z00.00 MEDICARE ANNUAL WELLNESS VISIT, SUBSEQUENT: Primary | ICD-10-CM

## 2022-03-17 DIAGNOSIS — E78.49 OTHER HYPERLIPIDEMIA: ICD-10-CM

## 2022-03-17 DIAGNOSIS — F32.A CHRONIC DEPRESSION: ICD-10-CM

## 2022-03-17 DIAGNOSIS — E11.9 DIABETES MELLITUS WITHOUT COMPLICATION: ICD-10-CM

## 2022-03-17 PROBLEM — D51.0 PERNICIOUS ANEMIA: Status: RESOLVED | Noted: 2021-01-18 | Resolved: 2022-03-17

## 2022-03-17 PROBLEM — E53.8 VITAMIN B 12 DEFICIENCY: Status: RESOLVED | Noted: 2020-09-09 | Resolved: 2022-03-17

## 2022-03-17 PROBLEM — D50.9 IRON DEFICIENCY ANEMIA: Status: RESOLVED | Noted: 2021-05-27 | Resolved: 2022-03-17

## 2022-03-17 PROCEDURE — 96160 PT-FOCUSED HLTH RISK ASSMT: CPT | Performed by: FAMILY MEDICINE

## 2022-03-17 PROCEDURE — 1170F FXNL STATUS ASSESSED: CPT | Performed by: FAMILY MEDICINE

## 2022-03-17 PROCEDURE — G0439 PPPS, SUBSEQ VISIT: HCPCS | Performed by: FAMILY MEDICINE

## 2022-03-17 PROCEDURE — 99214 OFFICE O/P EST MOD 30 MIN: CPT | Performed by: FAMILY MEDICINE

## 2022-03-17 PROCEDURE — 1126F AMNT PAIN NOTED NONE PRSNT: CPT | Performed by: FAMILY MEDICINE

## 2022-03-17 PROCEDURE — 1159F MED LIST DOCD IN RCRD: CPT | Performed by: FAMILY MEDICINE

## 2022-03-17 RX ORDER — VENLAFAXINE 75 MG/1
150 TABLET ORAL 2 TIMES DAILY
Qty: 360 TABLET | Refills: 1 | Status: SHIPPED | OUTPATIENT
Start: 2022-03-17 | End: 2022-09-22

## 2022-03-17 RX ORDER — AMLODIPINE BESYLATE 5 MG/1
5 TABLET ORAL DAILY
Qty: 90 TABLET | Refills: 1 | Status: SHIPPED | OUTPATIENT
Start: 2022-03-17 | End: 2022-09-22 | Stop reason: SDUPTHER

## 2022-03-17 RX ORDER — METOPROLOL SUCCINATE 50 MG/1
50 TABLET, EXTENDED RELEASE ORAL DAILY
Qty: 90 TABLET | Refills: 1 | Status: SHIPPED | OUTPATIENT
Start: 2022-03-17 | End: 2022-10-17

## 2022-03-17 NOTE — PATIENT INSTRUCTIONS
Advance Care Planning and Advance Directives     You make decisions on a daily basis - decisions about where you want to live, your career, your home, your life. Perhaps one of the most important decisions you face is your choice for future medical care. Take time to talk with your family and your healthcare team and start planning today.  Advance Care Planning is a process that can help you:  Understand possible future healthcare decisions in light of your own experiences  Reflect on those decision in light of your goals and values  Discuss your decisions with those closest to you and the healthcare professionals that care for you  Make a plan by creating a document that reflects your wishes    Surrogate Decision Maker  In the event of a medical emergency, which has left you unable to communicate or to make your own decisions, you would need someone to make decisions for you.  It is important to discuss your preferences for medical treatment with this person while you are in good health.     Qualities of a surrogate decision maker:  Willing to take on this role and responsibility  Knows what you want for future medical care  Willing to follow your wishes even if they don't agree with them  Able to make difficult medical decisions under stressful circumstances    Advance Directives  These are legal documents you can create that will guide your healthcare team and decision maker(s) when needed. These documents can be stored in the electronic medical record.    Living Will - a legal document to guide your care if you have a terminal condition or a serious illness and are unable to communicate. States vary by statute in document names/types, but most forms may include one or more of the following:        -  Directions regarding life-prolonging treatments        -  Directions regarding artificially provided nutrition/hydration        -  Choosing a healthcare decision maker        -  Direction regarding organ/tissue  donation    Durable Power of  for Healthcare - this document names an -in-fact to make medical decisions for you, but it may also allow this person to make personal and financial decisions for you. Please seek the advice of an  if you need this type of document.    **Advance Directives are not required and no one may discriminate against you if you do not sign one.    Medical Orders  Many states allow specific forms/orders signed by your physician to record your wishes for medical treatment in your current state of health. This form, signed in personal communication with your physician, addresses resuscitation and other medical interventions that you may or may not want.      For more information or to schedule a time with a [facility] Advance Care Planning Facilitator contact: [facility] or call [phone] and someone will contact you directly.    Mammogram  A mammogram is an X-ray of the breasts that is done to check for changes that are not normal. This test can screen for and find any changes that may suggest breast cancer. Mammograms are regularly done on women. A man may have a mammogram if he has a lump or swelling in his breast. This test can also help to find other changes and variations in the breast.  Tell a doctor:  About any allergies you have.  If you have breast implants.  If you have had previous breast disease, biopsy, or surgery.  If you are breastfeeding.  If you are younger than age 25.  If you have a family history of breast cancer.  Whether you are pregnant or may be pregnant.  What are the risks?  Generally, this is a safe procedure. However, problems may occur, including:  Exposure to radiation. Radiation levels are very low with this test.  The results being misinterpreted.  The need for further tests.  The inability of the mammogram to detect certain cancers.  What happens before the procedure?  Have this test done about 1-2 weeks after your period.  This is usually when your breasts are the least tender.  If you are visiting a new doctor or clinic, send any past mammogram images to your new doctor's office.  Wash your breasts and under your arms the day of the test.  Do not use deodorants, perfumes, lotions, or powders on the day of the test.  Take off any jewelry from your neck.  Wear clothes that you can change into and out of easily.  What happens during the procedure?    You will undress from the waist up. You will put on a gown.  You will  front of the X-ray machine.  Each breast will be placed between two plastic or glass plates. The plates will press down on your breast for a few seconds. Try to stay as relaxed as possible. This does not cause any harm to your breasts. Any discomfort you feel will be very brief.  X-rays will be taken from different angles of each breast.  The procedure may vary among doctors and hospitals.  What happens after the procedure?  The mammogram will be read by a specialist (radiologist).  You may need to do certain parts of the test again. This depends on the quality of the images.  Ask when your test results will be ready. Make sure you get your test results.  You may go back to your normal activities.  Summary  A mammogram is a low energy X-ray of the breasts that is done to check for abnormal changes. A man may have this test if he has a lump or swelling in his breast.  Before the procedure, tell your doctor about any breast problems that you have had in the past.  Have this test done about 1-2 weeks after your period.  For the test, each breast will be placed between two plastic or glass plates. The plates will press down on your breast for a few seconds.  The mammogram will be read by a specialist (radiologist). Ask when your test results will be ready. Make sure you get your test results.  This information is not intended to replace advice given to you by your health care provider. Make sure you discuss any  questions you have with your health care provider.  Document Revised: 08/08/2019 Document Reviewed: 08/08/2019  [employer] Patient Education © 2021 [employer]      Major Depressive Disorder, Adult  Major depressive disorder is a mental health condition. This disorder affects feelings. It can also affect the body. Symptoms of this condition last most of the day, almost every day, for 2 weeks. This disorder can affect:  Relationships.  Daily activities, such as work and school.  Activities that you normally like to do.  What are the causes?  The cause of this condition is not known. The disorder is likely caused by a mix of things, including:  Your personality, such as being a shy person.  Your behavior, or how you act toward others.  Your thoughts and feelings.  Too much alcohol or drugs.  How you react to stress.  Health and mental problems that you have had for a long time.  Things that hurt you in the past (trauma).  Big changes in your life, such as divorce.  What increases the risk?  The following factors may make you more likely to develop this condition:  Having family members with depression.  Being a woman.  Problems in the family.  Low levels of some brain chemicals.  Things that caused you pain as a child, especially if you lost a parent or were abused.  A lot of stress in your life, such as from:  Living without basic needs of life, such as food and shelter.  Being treated poorly because of race, sex, or Worship (discrimination).  Health and mental problems that you have had for a long time.  What are the signs or symptoms?  The main symptoms of this condition are:  Being sad all the time.  Being grouchy all the time.  Loss of interest in things and activities.  Other symptoms include:  Sleeping too much or too little.  Eating too much or too little.  Gaining or losing weight, without knowing why.  Feeling tired or having low energy.  Being restless and weak.  Feeling hopeless, worthless, or  guilty.  Trouble thinking clearly or making decisions.  Thoughts of hurting yourself or others, or thoughts of ending your life.  Spending a lot of time alone.  Inability to complete common tasks of daily life.  If you have very bad MDD, you may:  Believe things that are not true.  Hear, see, taste, or feel things that are not there.  Have mild depression that lasts for at least 2 years.  Feel very sad and hopeless.  Have trouble speaking or moving.  How is this treated?  This condition may be treated with:  Talk therapy. This teaches you to know bad thoughts, feelings, and actions and how to change them.  This can also help you to communicate with others.  This can be done with members of your family.  Medicines. These can be used to treat worry (anxiety), depression, or low levels of chemicals in the brain.  Lifestyle changes. You may need to:  Limit alcohol use.  Limit drug use.  Get regular exercise.  Get plenty of sleep.  Make healthy eating choices.  Spend more time outdoors.  Brain stimulation. This treatment excites the brain. This is done when symptoms are very bad or have not gotten better with other treatments.  Follow these instructions at home:  Activity  Get regular exercise as told.  Spend time outdoors as told.  Make time to do the things you enjoy.  Find ways to deal with stress. Try to:  Meditate.  Do deep breathing.  Spend time in nature.  Keep a journal.  Return to your normal activities as told by your doctor. Ask your doctor what activities are safe for you.  Alcohol and drug use  If you drink alcohol:  Limit how much you use to:  0-1 drink a day for women.  0-2 drinks a day for men.  Be aware of how much alcohol is in your drink. In the [place] one drink equals one 12 oz bottle of beer (355 mL), one 5 oz glass of wine (148 mL), or one 1½ oz glass of hard liquor (44 mL).  Talk to your doctor about:  Alcohol use. Alcohol can affect some medicines.  Any drug use.  General instructions    Take  over-the-counter and prescription medicines and herbal preparations only as told by your doctor.  Eat a healthy diet.  Get a lot of sleep.  Think about joining a support group. Your doctor may be able to suggest one.  Keep all follow-up visits as told by your doctor. This is important.    Where to find more information:  National [place] on Mental Illness: www.fredy.org  [place] National [place] of Mental Health: www.nimh.nih.gov  American Psychiatric Association: www.psychiatry.org/patients-families/  Contact a doctor if:  Your symptoms get worse.  You get new symptoms.  Get help right away if:  You hurt yourself.  You have serious thoughts about hurting yourself or others.  You see, hear, taste, smell, or feel things that are not there.  If you ever feel like you may hurt yourself or others, or have thoughts about taking your own life, get help right away. Go to your nearest emergency department or:  Call your local emergency services (574 in the [place]  Call a suicide crisis helpline, such as the National Suicide Prevention Lifeline at 1-217.782.3985. This is open 24 hours a day in the [place]  Text the Crisis Text Line at 284668 (in the [place]  Summary  Major depressive disorder is a mental health condition. This disorder affects feelings. Symptoms of this condition last most of the day, almost every day, for 2 weeks.  The symptoms of this disorder can cause problems with relationships and with daily activities.  There are treatments and support for people who get this disorder. You may need more than one type of treatment.  Get help right away if you have serious thoughts about hurting yourself or others.  This information is not intended to replace advice given to you by your health care provider. Make sure you discuss any questions you have with your health care provider.  Document Revised: 11/28/2020 Document Reviewed: 11/28/2020  Elsevier Patient Education © 2021 [employer] Inc.      Complicated Grief  Grief is a  normal response to the death of someone close to you. Feelings of fear, anger, and guilt can affect almost everyone who loses a loved one. It is also common to have symptoms of depression while you are grieving. These include problems with sleep, loss of appetite, and lack of energy. They may last for weeks or months after a loss.  Complicated grief is different from normal grief or depression. Normal grieving involves sadness and feelings of loss, but those feelings get better and heal over time. Complicated grief is a severe type of grief that lasts for a long time, usually for several months to a year or longer. It interferes with your ability to function normally. Complicated grief may require treatment from a mental health care provider.  What are the causes?  The cause of this condition is not known. It is not clear why some people continue to struggle with grief and others do not.  What increases the risk?  You are more likely to develop this condition if:  The death of your loved one was sudden or unexpected.  The death of your loved one was due to a violent event.  Your loved one  from suicide.  Your loved one was a child or a young person.  You were very close to your loved one, or you were dependent on him or her.  You have a history of depression or anxiety.  What are the signs or symptoms?  Symptoms of this condition include:  Feeling disbelief or having a lack of emotion (numbness).  Being unable to enjoy good memories of your loved one.  Needing to avoid anything or anyone that reminds you of your loved one.  Being unable to stop thinking about the death.  Feeling intense anger or guilt.  Feeling alone and hopeless.  Feeling that your life is meaningless and empty.  Losing the desire to move on with your life.  How is this diagnosed?  This condition may be diagnosed based on:  Your symptoms. Complicated grief will be diagnosed if you have ongoing symptoms of grief for 6-12 months or longer.  The  effect of symptoms on your life. You may be diagnosed with this condition if your symptoms are interfering with your ability to live your life.  Your health care provider may recommend that you see a mental health care provider. Many symptoms of depression are similar to the symptoms of complicated grief. It is important to be evaluated for complicated grief along with other mental health conditions.  How is this treated?  This condition is most commonly treated with talk therapy. This therapy is offered by a mental health specialist (psychiatrist). During therapy:  You will learn healthy ways to cope with the loss of your loved one.  Your mental health care provider may recommend antidepressant medicines.  Follow these instructions at home:  Lifestyle    Take care of yourself.  Eat on a regular basis, and maintain a healthy diet. Eat plenty of fruits, vegetables, lean protein, and whole grains.  Try to get some exercise each day. Aim for 30 minutes of exercise on most days of the week.  Keep a consistent sleep schedule. Try to get 8 or more hours of sleep each night.  Start doing the things that you used to enjoy.  Do not use drugs or alcohol to ease your symptoms.  Spend time with friends and loved ones.    General instructions  Take over-the-counter and prescription medicines only as told by your health care provider.  Consider joining a grief (bereavement) support group to help you deal with your loss.  Keep all follow-up visits as told by your health care provider. This is important.  Contact a health care provider if:  Your symptoms prevent you from functioning normally.  Your symptoms do not get better with treatment.  Get help right away if:  You have serious thoughts about hurting yourself or someone else.  You have suicidal feelings.  If you ever feel like you may hurt yourself or others, or have thoughts about taking your own life, get help right away. You can go to your nearest emergency department or  call:  Your local emergency services (911 in the [place]  A suicide crisis helpline, such as the National Suicide Prevention Lifeline at 1-263.749.4352. This is open 24 hours a day.  Summary  Complicated grief is a severe type of grief that lasts for a long time. This grief is not likely to go away on its own. Get the help you need.  Some griefs are more difficult than others and can cause this condition. You may need a certain type of treatment to help you recover if the loss of your loved one was sudden, violent, or due to suicide.  You may feel guilty about moving on with your life. Getting help does not mean that you are forgetting your loved one. It means that you are taking care of yourself.  Complicated grief is best treated with talk therapy. Medicines may also be prescribed.  Seek the help you need, and find support that will help you recover.  This information is not intended to replace advice given to you by your health care provider. Make sure you discuss any questions you have with your health care provider.  Document Revised: 06/10/2021 Document Reviewed: 06/10/2021  [employer] Patient Education © 2021 [employer]      Fall Prevention in the Home, Adult  Falls can cause injuries and can affect people from all age groups. There are many simple things that you can do to make your home safe and to help prevent falls. Ask for help when making these changes, if needed.  What actions can I take to prevent falls?  General instructions  Use good lighting in all rooms. Replace any light bulbs that burn out.  Turn on lights if it is dark. Use night-lights.  Place frequently used items in easy-to-reach places. Lower the shelves around your home if necessary.  Set up furniture so that there are clear paths around it. Avoid moving your furniture around.  Remove throw rugs and other tripping hazards from the floor.  Avoid walking on wet floors.  Fix any uneven floor surfaces.  Add color or contrast paint or tape to  grab bars and handrails in your home. Place contrasting color strips on the first and last steps of stairways.  When you use a stepladder, make sure that it is completely opened and that the sides are firmly locked. Have someone hold the ladder while you are using it. Do not climb a closed stepladder.  Be aware of any and all pets.  What can I do in the bathroom?         Keep the floor dry. Immediately clean up any water that spills onto the floor.  Remove soap buildup in the tub or shower on a regular basis.  Use non-skid mats or decals on the floor of the tub or shower.  Attach bath mats securely with double-sided, non-slip rug tape.  If you need to sit down while you are in the shower, use a plastic, non-slip stool.  Install grab bars by the toilet and in the tub and shower. Do not use towel bars as grab bars.  What can I do in the bedroom?  Make sure that a bedside light is easy to reach.  Do not use oversized bedding that drapes onto the floor.  Have a firm chair that has side arms to use for getting dressed.  What can I do in the kitchen?  Clean up any spills right away.  If you need to reach for something above you, use a sturdy step stool that has a grab bar.  Keep electrical cables out of the way.  Do not use floor polish or wax that makes floors slippery. If you must use wax, make sure that it is non-skid floor wax.  What can I do in the stairways?  Do not leave any items on the stairs.  Make sure that you have a light switch at the top of the stairs and the bottom of the stairs. Have them installed if you do not have them.  Make sure that there are handrails on both sides of the stairs. Fix handrails that are broken or loose. Make sure that handrails are as long as the stairways.  Install non-slip stair treads on all stairs in your home.  Avoid having throw rugs at the top or bottom of stairways, or secure the rugs with carpet tape to prevent them from moving.  Choose a carpet design that does not hide  the edge of steps on the stairway.  Check any carpeting to make sure that it is firmly attached to the stairs. Fix any carpet that is loose or worn.  What can I do on the outside of my home?  Use bright outdoor lighting.  Regularly repair the edges of walkways and driveways and fix any cracks.  Remove high doorway thresholds.  Trim any shrubbery on the main path into your home.  Regularly check that handrails are securely fastened and in good repair. Both sides of any steps should have handrails.  Install guardrails along the edges of any raised decks or porches.  Clear walkways of debris and clutter, including tools and rocks.  Have leaves, snow, and ice cleared regularly.  Use sand or salt on walkways during winter months.  In the garage, clean up any spills right away, including grease or oil spills.  What other actions can I take?  Wear closed-toe shoes that fit well and support your feet. Wear shoes that have rubber soles or low heels.  Use mobility aids as needed, such as canes, walkers, scooters, and crutches.  Review your medicines with your health care provider. Some medicines can cause dizziness or changes in blood pressure, which increase your risk of falling.  Talk with your health care provider about other ways that you can decrease your risk of falls. This may include working with a physical therapist or  to improve your strength, balance, and endurance.  Where to find more information  Centers for Disease Control and Prevention[place]IRAE: https://www.cdc.gov  National [place] on Aging: https://vq8osrc.jefe.nih.gov  Contact a health care provider if:  You are afraid of falling at home.  You feel weak, drowsy, or dizzy at home.  You fall at home.  Summary  There are many simple things that you can do to make your home safe and to help prevent falls.  Ways to make your home safe include removing tripping hazards and installing grab bars in the bathroom.  Ask for help when making these changes in  your home.  This information is not intended to replace advice given to you by your health care provider. Make sure you discuss any questions you have with your health care provider.  Document Revised: 11/30/2018 Document Reviewed: 08/02/2018  Elsevier Patient Education © 2021 [employer]      Sit-to-Stand Exercise    The sit-to-stand exercise (also known as the chair stand or chair rise exercise) strengthens your lower body and helps you maintain or improve your mobility and independence. The goal is to do the sit-to-stand exercise without using your hands. This will be easier as you become stronger. You should always talk with your health care provider before starting any exercise program, especially if you have had recent surgery.  Do the exercise exactly as told by your health care provider and adjust it as directed. It is normal to feel mild stretching, pulling, tightness, or discomfort as you do this exercise, but you should stop right away if you feel sudden pain or your pain gets worse. Do not begin doing this exercise until told by your health care provider.  What the sit-to-stand exercise does  The sit-to-stand exercise helps to strengthen the muscles in your thighs and the muscles in the center of your body that give you stability (core muscles). This exercise is especially helpful if:  You have had knee or hip surgery.  You have trouble getting up from a chair, out of a car, or off the toilet.  How to do the sit-to-stand exercise  Sit toward the front edge of a sturdy chair without armrests. Your knees should be bent and your feet should be flat on the floor and shoulder-width apart.  Place your hands lightly on each side of the seat. Keep your back and neck as straight as possible, with your chest slightly forward.  Breathe in slowly. Lean forward and slightly shift your weight to the front of your feet.  Breathe out as you slowly stand up. Use your hands as little as possible.  Stand and pause for a  full breath in and out.  Breathe in as you sit down slowly. Tighten your core and abdominal muscles to control your lowering as much as possible.  Breathe out slowly.  Do this exercise 10-15 times. If needed, do it fewer times until you build up strength.  Rest for 1 minute, then do another set of 10-15 repetitions.  To change the difficulty of the sit-to-stand exercise  If the exercise is too difficult, use a chair with sturdy armrests, and push off the armrests to help you come to the standing position. You can also use the armrests to help slowly lower yourself back to sitting. As this gets easier, try to use your arms less. You can also place a firm cushion or pillow on the chair to make the surface higher.  If this exercise is too easy, do not use your arms to help raise or lower yourself. You can also wear a weighted vest, use hand weights, increase your repetitions, or try a lower chair.  General tips  You may feel tired when starting an exercise routine. This is normal.  You may have muscle soreness that lasts a few days. This is normal. As you get stronger, you may not feel muscle soreness.  Use smooth, steady movements.  Do not  hold your breath during strength exercises. This can cause unsafe changes in your blood pressure.  Breathe in slowly through your nose, and breathe out slowly through your mouth.  Summary  Strengthening your lower body is an important step to help you move safely and independently.  The sit-to-stand exercise helps strengthen the muscles in your thighs and core.  You should always talk with your health care provider before starting any exercise program, especially if you have had recent surgery.  This information is not intended to replace advice given to you by your health care provider. Make sure you discuss any questions you have with your health care provider.  Document Revised: 10/16/2019 Document Reviewed: 02/08/2018  Elsevier Patient Education © 2021 Elsevier  Inc.      Hepatitis C Testing  Why am I having this test?  Hepatitis C testing is done to check for a liver infection caused by the hepatitis C virus (HCV). You may have one or more hepatitis C tests done:  To help your health care provider diagnose HCV infection, if you have possible signs or symptoms of infection.  To check for infection, if you may have been exposed to HCV.  To find the cause of long-term (chronic) liver disease or abnormal liver function test results.  To see if you have had hepatitis C in the past.  Hepatitis C is usually diagnosed with three blood tests:  Anti-HCV test, also called the HCV antibody test.  HCV RNA test.  HCV genotype test.  If you are diagnosed with a current (active) HCV infection, you may have another test done to help monitor your condition during treatment. This test is called the quantitative HCV RNA test.  What is being tested?  Each HCV test measures the amounts of different substances in your blood.  The anti-HCV test checks for proteins that your body makes to fight HCV (antibodies). If you have antibodies to HCV, it means you have been infected with hepatitis C. It does not necessarily mean that you have an active infection.  The HCV RNA test checks for genetic material from HCV. This test is done if your HCV antibody test is positive and your health care provider wants to find out if you have an active infection.  The HCV genotype test. This test identifies the type (genotype) of virus you have.  The quantitative HCV RNA test measures the amount of virus in your blood (viral load).  What kind of sample is taken?    A blood sample is required for HCV tests. It is usually collected by inserting a needle into a blood vessel.  How are the results reported?  Anti-HCV test results are reported as either positive or negative for HCV antibodies.  HCV RNA test results are reported as either positive or negative for HCV genetic material.  HCV genotype test results are reported  as which genotype of the virus you have. Genotypes are numbered 1 through 6.  Quantitative HCV RNA test results are reported as a number that indicates your viral load. This is given as international units of virus per milliliter of blood (IU/mL).  A result of 800,000 IU/mL or greater is considered a high viral load.  A result of less than 800,000 IU/mL is considered a low viral load.  Sometimes, results from the anti-HCV test or the HCV RNA test may report that:  HCV antibodies or genetic material are present when they are not present (false-positive result).  HCV antibodies or genetic material are not present when they are present (false-negative result).  What do the results mean?  For the anti-HCV test:  A negative result may mean that you have not been infected with HCV. You may need to have this test done again to confirm this result.  A positive result may mean that you have an active HCV infection, or that you have been infected with HCV in the past. An HCV infection may not cause any symptoms, and your body may get rid of the virus without treatment.  For the HCV RNA test:  A negative result means that you do not have an active HCV infection.  A positive result means that you have an active HCV infection.  For the HCV genotype test, knowing the specific genotype you have will help your health care provider recommend the treatment that will work best for you.  The quantitative HCV RNA test gives your health care provider an idea of how well your treatment is working.  If your viral load is high, you may need different treatment.  If your viral load is low, your treatment may be working effectively.  You may have this test repeated to continue to monitor your treatment.  Talk with your health care provider about what your results mean.  Questions to ask your health care provider  Ask your health care provider, or the department that is doing the test:  When will my results be ready?  How will I get my  results?  What are my treatment options?  What other tests do I need?  What are my next steps?  Summary  The hepatitis C testing is done to check for a liver infection caused by the hepatitis C virus (HCV).  Hepatitis C is usually diagnosed with three blood tests and monitored with one test.  A blood sample is required for these tests. It is usually collected by inserting a needle into a blood vessel.  Your test results for both the anti-HCV test and the HCV RNA test will be reported as either positive or negative.  This information is not intended to replace advice given to you by your health care provider. Make sure you discuss any questions you have with your health care provider.  Document Revised: 2018 Document Reviewed: 2018  [employer] Patient Education ©  [employer]    You are due for adacel Tdap vaccination. (provides protection against tetanus, diptheria and whooping cough) Please  get the immunization at your local pharmacy at your earliest convenience. This immunization will next be due in 10 years. Please click on the link for more information about this vaccine.    [facility] Tdap Vaccine Information    You are due for Shingrix vaccination series ( the newest shingles vaccine).  It is a two shot series spaced 2-6 months apart. Please get this vaccine series started at your earliest convenience at your local pharmacy to help avoid shingles outbreak. It is more effective than the old Zostavax vaccine and is recommended even if you have had the Zostavax vaccine in the past.  Once the Shingrix series is completed, it does not need to be repeated.   For more information, please look at the website below:  [facility] Shingrix Vaccine Information      Medicare Wellness  Personal Prevention Plan of Service     Date of Office Visit:    Encounter Provider:  Reji Talamantes MD  Place of Service:  [facility]  Patient Name: Dejah Casey  :  1945    As part of the Medicare  Wellness portion of your visit today, we are providing you with this personalized preventive plan of services (PPPS). This plan is based upon recommendations of the [place] Preventive Services Task Force (USPSTF) and the Advisory Committee on Immunization Practices (ACIP).    This lists the preventive care services that should be considered, and provides dates of when you are due. Items listed as completed are up-to-date and do not require any further intervention.    Health Maintenance   Topic Date Due    ZOSTER VACCINE (1 of 2) Never done    HEPATITIS C SCREENING  Never done    TDAP/TD VACCINES (2 - Td or Tdap) 08/16/2017    MAMMOGRAM  10/29/2021    DXA SCAN  01/21/2022    HEMOGLOBIN A1C  08/17/2022    DIABETIC EYE EXAM  10/11/2022    URINE MICROALBUMIN  10/25/2022    LIPID PANEL  02/17/2023    ANNUAL WELLNESS VISIT  03/17/2023    COLORECTAL CANCER SCREENING  07/20/2031    COVID-19 Vaccine  Completed    INFLUENZA VACCINE  Completed    Pneumococcal Vaccine 65+  Completed       Orders Placed This Encounter   Procedures    Mammo Screening, Bilateral with CAD (Annually)     Use HCPCS/CPT Code 90833     Standing Status:   Future     Standing Expiration Date:   3/17/2023     Order Specific Question:   Reason for Exam:     Answer:   screen for breast cancer     Order Specific Question:   Release to patient     Answer:   Immediate    Hepatitis C Antibody-[facility] or Reference Lab, or [facility]     Order Specific Question:   Release to patient     Answer:   Immediate       Return in about 6 months (around 9/17/2022) for recheck of current condition.
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
## 2022-03-17 NOTE — ASSESSMENT & PLAN NOTE
Patient's depression is single episode and is moderate without psychosis. Their depression is currently in partial remission and the condition is worsening. This will be reassessed at the next regular appointment. F/U as described:patient will continue current medication therapy and patient referred to Mental Health Specialist.

## 2022-03-23 ENCOUNTER — TRANSCRIBE ORDERS (OUTPATIENT)
Dept: ADMINISTRATIVE | Facility: HOSPITAL | Age: 77
End: 2022-03-23

## 2022-03-23 DIAGNOSIS — Z12.31 SCREENING MAMMOGRAM FOR BREAST CANCER: Primary | ICD-10-CM

## 2022-05-02 ENCOUNTER — TELEPHONE (OUTPATIENT)
Dept: FAMILY MEDICINE CLINIC | Facility: CLINIC | Age: 77
End: 2022-05-02

## 2022-05-02 NOTE — TELEPHONE ENCOUNTER
Caller: Dejah Casey    Relationship: Self    Best call back number:[phone]    What is the best time to reach you: ANY    Who are you requesting to speak with (clinical staff, provider,  specific staff member): DR PARADA     What was the call regarding: PATIENT CALLED TO DISCUSS OXYGEN AND HEART RATE FLUCTUATION. PATIENT STATED SHE HAS BEEN SWEATING A LOT AT NIGHT.     Do you require a callback: YES

## 2022-05-04 ENCOUNTER — OFFICE VISIT (OUTPATIENT)
Dept: FAMILY MEDICINE CLINIC | Facility: CLINIC | Age: 77
End: 2022-05-04

## 2022-05-04 VITALS
TEMPERATURE: 98.4 F | WEIGHT: 179 LBS | HEART RATE: 87 BPM | BODY MASS INDEX: 31.71 KG/M2 | DIASTOLIC BLOOD PRESSURE: 80 MMHG | OXYGEN SATURATION: 96 % | HEIGHT: 63 IN | SYSTOLIC BLOOD PRESSURE: 110 MMHG

## 2022-05-04 DIAGNOSIS — R53.82 CHRONIC FATIGUE: ICD-10-CM

## 2022-05-04 DIAGNOSIS — F41.9 ANXIETY: ICD-10-CM

## 2022-05-04 DIAGNOSIS — R61 DIAPHORESIS: Primary | ICD-10-CM

## 2022-05-04 DIAGNOSIS — F32.A CHRONIC DEPRESSION: ICD-10-CM

## 2022-05-04 PROCEDURE — 99214 OFFICE O/P EST MOD 30 MIN: CPT | Performed by: NURSE PRACTITIONER

## 2022-05-04 RX ORDER — TRIPROLIDINE/PSEUDOEPHEDRINE 2.5MG-60MG
1 TABLET ORAL DAILY PRN
COMMUNITY
Start: 2022-03-24 | End: 2022-12-13

## 2022-05-04 NOTE — PROGRESS NOTES
Chief Complaint  Night Sweats (4-30-22 pt states she woke up very sweaty with low oxygen )    Sherron Pond presents to [facility]    77 year old female presented to the office c/o an episode on Saturday 4/30/2022 that she woke up in the middle of the night and had 2 episodes of sweating.  She states that she checked her oxygen sat and it was not picking up on her monitor and then she got very anxious and her heart rate started to increase.  She has a cardiologist and a follow up appointment is scheduled next month.  She denied this happening to her in several years.  She states she has not had any cough or flu like symptoms, did not have a fever that she is aware of.  She did not have any chest pain or pressure.  She did not check her blood sugar or blood pressure at that time.       She continues to deny any current c/o other than on going fatigue.  She has not had any more episodes of sweating, no chest pain or pressure, or any other concern since that episode on Saturday.      She has a history of anxiety and depression and is seeing a counselor r/t grief after loosing her  in 2020.  She has not changed to any new medications.  Denies any suicidal or homicidal ideations.      B/p in office today is 110/80.  She denies any ha, no blurred vision, no dizziness, no flushing , no chest pain or pressure.    Recent labs 2/2022    She has no other c/o today    The following portions of the patient's history were reviewed and updated as appropriate: allergies, current medications, past family history, past medical history, past social history, past surgical history, and problem list          Review of Systems   Constitutional: Positive for diaphoresis (none since Saturday x 2 episodes) and fatigue. Negative for chills and fever.   HENT: Negative for congestion and sinus pressure.    Eyes: Negative for visual disturbance.   Respiratory: Negative for cough, shortness of  "breath and wheezing.    Cardiovascular: Negative for chest pain, palpitations and leg swelling.   Gastrointestinal: Negative for abdominal pain, diarrhea, nausea and vomiting.   Skin: Negative for rash.   Neurological: Negative for dizziness and light-headedness.   Psychiatric/Behavioral: Positive for dysphoric mood. Negative for self-injury, sleep disturbance and suicidal ideas. The patient is nervous/anxious.         Objective   Vital Signs:   Vitals:    05/04/22 1355   BP: 110/80   Pulse: 87   Temp: 98.4 °F (36.9 °C)   SpO2: 96%   Weight: 81.2 kg (179 lb)   Height: 160 cm (62.99\")        BMI is >= 30 and <= 34.9 (Class 1 obesity). The following options were offered after discussion: weight loss educational material (shared in after visit summary)       Physical Exam  Constitutional:       General: She is not in acute distress.     Appearance: Normal appearance. She is normal weight. She is not ill-appearing.   HENT:      Head: Normocephalic.      Nose: Nose normal. No congestion.   Eyes:      Conjunctiva/sclera: Conjunctivae normal.      Pupils: Pupils are equal, round, and reactive to light.   Neck:      Vascular: No carotid bruit.   Cardiovascular:      Rate and Rhythm: Normal rate and regular rhythm.      Pulses: Normal pulses.      Heart sounds: Normal heart sounds. No murmur heard.  Pulmonary:      Effort: Pulmonary effort is normal.      Breath sounds: Normal breath sounds.   Abdominal:      General: Abdomen is flat. Bowel sounds are normal. There is no distension.      Palpations: Abdomen is soft. There is no mass.      Tenderness: There is no abdominal tenderness. There is no right CVA tenderness, left CVA tenderness, guarding or rebound.      Hernia: No hernia is present.   Musculoskeletal:      Cervical back: Normal range of motion and neck supple. No tenderness.   Lymphadenopathy:      Cervical: No cervical adenopathy.   Skin:     General: Skin is warm.      Findings: No rash.   Neurological:      " Mental Status: She is alert and oriented to person, place, and time.   Psychiatric:         Mood and Affect: Mood normal.         Behavior: Behavior normal.         Thought Content: Thought content normal.         Judgment: Judgment normal.          Result Review :     The following data was reviewed by: RAYSHAWN Stevenson on 05/04/2022:  Cardiovascular Risk Assessment (02/17/2022 15:53)  Vitamin B12 (02/17/2022 15:53)  Ferritin (02/17/2022 15:53)  Iron Profile (02/17/2022 15:53)  Vitamin D 25 Hydroxy (02/17/2022 15:53)  Hemoglobin A1c (02/17/2022 15:53)  Lipid Panel (02/17/2022 15:53)  TSH (02/17/2022 15:53)  T4, Free (02/17/2022 15:53)  Comprehensive Metabolic Panel (02/17/2022 15:53)      Normal thyroid function tests.  LDL 62.  HDL 66.  Continue Lipitor 10 mg/day.  Hemoglobin A1c 6.0%.  Diabetes is well controlled.  Normal vitamin D.  Continue calcium 500+ D 1 tablet per day and Tums 1 tablet/day.  Normal iron and ferritin.  Normal vitamin B12.  Continue vitamin B12 supplementation per hematologist.     Assessment and Plan    Diagnoses and all orders for this visit:    1. Diaphoresis (Primary)  Comments:  2 episodes on 4/30/2022-  none since that time    Orders:  -     Comprehensive Metabolic Panel  -     CBC & Differential    2. Chronic depression  Comments:  Continue current managment   follow up with Psych/counselor  denies any SI/HI    3. Chronic fatigue  Comments:  Chronic no change  recent labs  follows endo, and hematology    4. Anxiety  Comments:  Continue current managment   follow up with Psych/counselor    Check CBC and CMP  Follow up immediately with any changes or repeat symptoms  Follow up with cardiology and psychiatry/counselor    Follow Up   Return in about 2 weeks (around 5/18/2022), or if symptoms worsen or fail to improve, for Next scheduled follow up.  Patient was given instructions and counseling regarding her condition or for health maintenance advice. Please see specific information  pulled into the AVS if appropriate.

## 2022-05-05 LAB
ALBUMIN SERPL-MCNC: 4.2 G/DL (ref 3.7–4.7)
ALBUMIN/GLOB SERPL: 2 {RATIO} (ref 1.2–2.2)
ALP SERPL-CCNC: 72 IU/L (ref 44–121)
ALT SERPL-CCNC: 8 IU/L (ref 0–32)
AST SERPL-CCNC: 10 IU/L (ref 0–40)
BASOPHILS # BLD AUTO: 0.1 X10E3/UL (ref 0–0.2)
BASOPHILS NFR BLD AUTO: 1 %
BILIRUB SERPL-MCNC: <0.2 MG/DL (ref 0–1.2)
BUN SERPL-MCNC: 15 MG/DL (ref 8–27)
BUN/CREAT SERPL: 19 (ref 12–28)
CALCIUM SERPL-MCNC: 10 MG/DL (ref 8.7–10.3)
CHLORIDE SERPL-SCNC: 104 MMOL/L (ref 96–106)
CO2 SERPL-SCNC: 22 MMOL/L (ref 20–29)
CREAT SERPL-MCNC: 0.79 MG/DL (ref 0.57–1)
EGFRCR SERPLBLD CKD-EPI 2021: 77 ML/MIN/1.73
EOSINOPHIL # BLD AUTO: 0.2 X10E3/UL (ref 0–0.4)
EOSINOPHIL NFR BLD AUTO: 4 %
ERYTHROCYTE [DISTWIDTH] IN BLOOD BY AUTOMATED COUNT: 13.8 % (ref 11.7–15.4)
GLOBULIN SER CALC-MCNC: 2.1 G/DL (ref 1.5–4.5)
GLUCOSE SERPL-MCNC: 117 MG/DL (ref 65–99)
HCT VFR BLD AUTO: 37 % (ref 34–46.6)
HGB BLD-MCNC: 11.9 G/DL (ref 11.1–15.9)
IMM GRANULOCYTES # BLD AUTO: 0 X10E3/UL (ref 0–0.1)
IMM GRANULOCYTES NFR BLD AUTO: 0 %
LYMPHOCYTES # BLD AUTO: 1 X10E3/UL (ref 0.7–3.1)
LYMPHOCYTES NFR BLD AUTO: 17 %
MCH RBC QN AUTO: 25.6 PG (ref 26.6–33)
MCHC RBC AUTO-ENTMCNC: 32.2 G/DL (ref 31.5–35.7)
MCV RBC AUTO: 80 FL (ref 79–97)
MONOCYTES # BLD AUTO: 0.4 X10E3/UL (ref 0.1–0.9)
MONOCYTES NFR BLD AUTO: 7 %
NEUTROPHILS # BLD AUTO: 4.1 X10E3/UL (ref 1.4–7)
NEUTROPHILS NFR BLD AUTO: 71 %
PLATELET # BLD AUTO: 259 X10E3/UL (ref 150–450)
POTASSIUM SERPL-SCNC: 4.3 MMOL/L (ref 3.5–5.2)
PROT SERPL-MCNC: 6.3 G/DL (ref 6–8.5)
RBC # BLD AUTO: 4.65 X10E6/UL (ref 3.77–5.28)
SODIUM SERPL-SCNC: 141 MMOL/L (ref 134–144)
WBC # BLD AUTO: 5.7 X10E3/UL (ref 3.4–10.8)

## 2022-06-06 ENCOUNTER — TELEPHONE (OUTPATIENT)
Dept: CARDIOLOGY | Facility: CLINIC | Age: 77
End: 2022-06-06

## 2022-06-06 NOTE — TELEPHONE ENCOUNTER
Patient is scheduled with me tomorrow at 2:30 PM and I am off at noon.  Please call to reschedule patient.

## 2022-06-11 ENCOUNTER — APPOINTMENT (OUTPATIENT)
Dept: GENERAL RADIOLOGY | Facility: HOSPITAL | Age: 77
End: 2022-06-11

## 2022-06-11 ENCOUNTER — HOSPITAL ENCOUNTER (OUTPATIENT)
Facility: HOSPITAL | Age: 77
Setting detail: OBSERVATION
LOS: 2 days | Discharge: HOME HEALTH | End: 2022-06-14
Attending: EMERGENCY MEDICINE | Admitting: HOSPITALIST

## 2022-06-11 DIAGNOSIS — G47.00 INSOMNIA, UNSPECIFIED TYPE: ICD-10-CM

## 2022-06-11 DIAGNOSIS — I63.433 CEREBROVASCULAR ACCIDENT (CVA) DUE TO BILATERAL EMBOLISM OF POSTERIOR CEREBRAL ARTERIES: ICD-10-CM

## 2022-06-11 DIAGNOSIS — E87.3 METABOLIC ALKALOSIS: ICD-10-CM

## 2022-06-11 DIAGNOSIS — R41.0 CONFUSION: ICD-10-CM

## 2022-06-11 DIAGNOSIS — J96.01 ACUTE HYPOXEMIC RESPIRATORY FAILURE: Primary | ICD-10-CM

## 2022-06-11 DIAGNOSIS — E87.3 ACUTE RESPIRATORY ALKALOSIS: ICD-10-CM

## 2022-06-11 LAB
ALBUMIN SERPL-MCNC: 4 G/DL (ref 3.5–5.2)
ALBUMIN/GLOB SERPL: 1.1 G/DL
ALP SERPL-CCNC: 76 U/L (ref 39–117)
ALT SERPL W P-5'-P-CCNC: 9 U/L (ref 1–33)
ANION GAP SERPL CALCULATED.3IONS-SCNC: 18.5 MMOL/L (ref 5–15)
AST SERPL-CCNC: 16 U/L (ref 1–32)
B PARAPERT DNA SPEC QL NAA+PROBE: NOT DETECTED
B PERT DNA SPEC QL NAA+PROBE: NOT DETECTED
BASOPHILS # BLD AUTO: 0.04 10*3/MM3 (ref 0–0.2)
BASOPHILS NFR BLD AUTO: 0.6 % (ref 0–1.5)
BILIRUB SERPL-MCNC: 0.4 MG/DL (ref 0–1.2)
BUN SERPL-MCNC: 14 MG/DL (ref 8–23)
BUN/CREAT SERPL: 14.1 (ref 7–25)
C PNEUM DNA NPH QL NAA+NON-PROBE: NOT DETECTED
CALCIUM SPEC-SCNC: 10.1 MG/DL (ref 8.6–10.5)
CHLORIDE SERPL-SCNC: 105 MMOL/L (ref 98–107)
CO2 SERPL-SCNC: 20.5 MMOL/L (ref 22–29)
CREAT SERPL-MCNC: 0.99 MG/DL (ref 0.57–1)
DEPRECATED RDW RBC AUTO: 40 FL (ref 37–54)
EGFRCR SERPLBLD CKD-EPI 2021: 58.5 ML/MIN/1.73
EOSINOPHIL # BLD AUTO: 0.18 10*3/MM3 (ref 0–0.4)
EOSINOPHIL NFR BLD AUTO: 2.7 % (ref 0.3–6.2)
ERYTHROCYTE [DISTWIDTH] IN BLOOD BY AUTOMATED COUNT: 13.6 % (ref 12.3–15.4)
FLUAV SUBTYP SPEC NAA+PROBE: NOT DETECTED
FLUBV RNA ISLT QL NAA+PROBE: NOT DETECTED
GLOBULIN UR ELPH-MCNC: 3.6 GM/DL
GLUCOSE SERPL-MCNC: 182 MG/DL (ref 65–99)
HADV DNA SPEC NAA+PROBE: NOT DETECTED
HCOV 229E RNA SPEC QL NAA+PROBE: NOT DETECTED
HCOV HKU1 RNA SPEC QL NAA+PROBE: NOT DETECTED
HCOV NL63 RNA SPEC QL NAA+PROBE: NOT DETECTED
HCOV OC43 RNA SPEC QL NAA+PROBE: NOT DETECTED
HCT VFR BLD AUTO: 43.7 % (ref 34–46.6)
HGB BLD-MCNC: 13.9 G/DL (ref 12–15.9)
HMPV RNA NPH QL NAA+NON-PROBE: NOT DETECTED
HPIV1 RNA ISLT QL NAA+PROBE: NOT DETECTED
HPIV2 RNA SPEC QL NAA+PROBE: NOT DETECTED
HPIV3 RNA NPH QL NAA+PROBE: NOT DETECTED
HPIV4 P GENE NPH QL NAA+PROBE: NOT DETECTED
IMM GRANULOCYTES # BLD AUTO: 0.03 10*3/MM3 (ref 0–0.05)
IMM GRANULOCYTES NFR BLD AUTO: 0.5 % (ref 0–0.5)
LYMPHOCYTES # BLD AUTO: 1.11 10*3/MM3 (ref 0.7–3.1)
LYMPHOCYTES NFR BLD AUTO: 16.8 % (ref 19.6–45.3)
M PNEUMO IGG SER IA-ACNC: NOT DETECTED
MCH RBC QN AUTO: 25.8 PG (ref 26.6–33)
MCHC RBC AUTO-ENTMCNC: 31.8 G/DL (ref 31.5–35.7)
MCV RBC AUTO: 81.2 FL (ref 79–97)
MONOCYTES # BLD AUTO: 0.43 10*3/MM3 (ref 0.1–0.9)
MONOCYTES NFR BLD AUTO: 6.5 % (ref 5–12)
NEUTROPHILS NFR BLD AUTO: 4.83 10*3/MM3 (ref 1.7–7)
NEUTROPHILS NFR BLD AUTO: 72.9 % (ref 42.7–76)
NRBC BLD AUTO-RTO: 0.2 /100 WBC (ref 0–0.2)
NT-PROBNP SERPL-MCNC: 275 PG/ML (ref 0–1800)
PLATELET # BLD AUTO: 239 10*3/MM3 (ref 140–450)
PMV BLD AUTO: 11.2 FL (ref 6–12)
POTASSIUM SERPL-SCNC: 4.1 MMOL/L (ref 3.5–5.2)
PROT SERPL-MCNC: 7.6 G/DL (ref 6–8.5)
RBC # BLD AUTO: 5.38 10*6/MM3 (ref 3.77–5.28)
RHINOVIRUS RNA SPEC NAA+PROBE: NOT DETECTED
RSV RNA NPH QL NAA+NON-PROBE: NOT DETECTED
SARS-COV-2 RNA NPH QL NAA+NON-PROBE: NOT DETECTED
SODIUM SERPL-SCNC: 144 MMOL/L (ref 136–145)
TROPONIN T SERPL-MCNC: <0.01 NG/ML (ref 0–0.03)
WBC NRBC COR # BLD: 6.62 10*3/MM3 (ref 3.4–10.8)

## 2022-06-11 PROCEDURE — 80179 DRUG ASSAY SALICYLATE: CPT | Performed by: EMERGENCY MEDICINE

## 2022-06-11 PROCEDURE — 80053 COMPREHEN METABOLIC PANEL: CPT | Performed by: EMERGENCY MEDICINE

## 2022-06-11 PROCEDURE — 93010 ELECTROCARDIOGRAM REPORT: CPT | Performed by: INTERNAL MEDICINE

## 2022-06-11 PROCEDURE — 84484 ASSAY OF TROPONIN QUANT: CPT | Performed by: EMERGENCY MEDICINE

## 2022-06-11 PROCEDURE — 83880 ASSAY OF NATRIURETIC PEPTIDE: CPT | Performed by: EMERGENCY MEDICINE

## 2022-06-11 PROCEDURE — 93005 ELECTROCARDIOGRAM TRACING: CPT | Performed by: EMERGENCY MEDICINE

## 2022-06-11 PROCEDURE — 85025 COMPLETE CBC W/AUTO DIFF WBC: CPT | Performed by: EMERGENCY MEDICINE

## 2022-06-11 PROCEDURE — 0202U NFCT DS 22 TRGT SARS-COV-2: CPT | Performed by: EMERGENCY MEDICINE

## 2022-06-11 PROCEDURE — 71045 X-RAY EXAM CHEST 1 VIEW: CPT

## 2022-06-11 PROCEDURE — 80143 DRUG ASSAY ACETAMINOPHEN: CPT | Performed by: EMERGENCY MEDICINE

## 2022-06-11 PROCEDURE — 99285 EMERGENCY DEPT VISIT HI MDM: CPT

## 2022-06-11 RX ORDER — SODIUM CHLORIDE 0.9 % (FLUSH) 0.9 %
10 SYRINGE (ML) INJECTION AS NEEDED
Status: DISCONTINUED | OUTPATIENT
Start: 2022-06-11 | End: 2022-06-14 | Stop reason: HOSPADM

## 2022-06-12 ENCOUNTER — APPOINTMENT (OUTPATIENT)
Dept: MRI IMAGING | Facility: HOSPITAL | Age: 77
End: 2022-06-12

## 2022-06-12 ENCOUNTER — APPOINTMENT (OUTPATIENT)
Dept: CARDIOLOGY | Facility: HOSPITAL | Age: 77
End: 2022-06-12

## 2022-06-12 ENCOUNTER — APPOINTMENT (OUTPATIENT)
Dept: CT IMAGING | Facility: HOSPITAL | Age: 77
End: 2022-06-12

## 2022-06-12 PROBLEM — R13.10 DYSPHAGIA: Status: ACTIVE | Noted: 2022-06-12

## 2022-06-12 PROBLEM — J96.01 ACUTE HYPOXEMIC RESPIRATORY FAILURE: Status: ACTIVE | Noted: 2022-06-12

## 2022-06-12 LAB
ANION GAP SERPL CALCULATED.3IONS-SCNC: 12.7 MMOL/L (ref 5–15)
APAP SERPL-MCNC: <5 MCG/ML (ref 0–30)
ARTERIAL PATENCY WRIST A: POSITIVE
ATMOSPHERIC PRESS: 748 MMHG
BASE EXCESS BLDA CALC-SCNC: 1.3 MMOL/L (ref 0–2)
BDY SITE: ABNORMAL
BH CV XLRA MEAS LEFT DIST CCA EDV: -16.5 CM/SEC
BH CV XLRA MEAS LEFT DIST CCA PSV: -77.8 CM/SEC
BH CV XLRA MEAS LEFT DIST ICA EDV: 15.5 CM/SEC
BH CV XLRA MEAS LEFT DIST ICA PSV: 68.9 CM/SEC
BH CV XLRA MEAS LEFT ICA/CCA RATIO: 1.04
BH CV XLRA MEAS LEFT MID ICA EDV: -22.5 CM/SEC
BH CV XLRA MEAS LEFT MID ICA PSV: -81.3 CM/SEC
BH CV XLRA MEAS LEFT PROX CCA EDV: 20.7 CM/SEC
BH CV XLRA MEAS LEFT PROX CCA PSV: 99.6 CM/SEC
BH CV XLRA MEAS LEFT PROX ECA EDV: -8.2 CM/SEC
BH CV XLRA MEAS LEFT PROX ECA PSV: -108 CM/SEC
BH CV XLRA MEAS LEFT PROX ICA EDV: -17.9 CM/SEC
BH CV XLRA MEAS LEFT PROX ICA PSV: -63.9 CM/SEC
BH CV XLRA MEAS LEFT PROX SCLA PSV: 160 CM/SEC
BH CV XLRA MEAS LEFT VERTEBRAL A EDV: 15.2 CM/SEC
BH CV XLRA MEAS LEFT VERTEBRAL A PSV: 82.1 CM/SEC
BH CV XLRA MEAS RIGHT DIST CCA EDV: -14.7 CM/SEC
BH CV XLRA MEAS RIGHT DIST CCA PSV: -82.7 CM/SEC
BH CV XLRA MEAS RIGHT DIST ICA EDV: -14.1 CM/SEC
BH CV XLRA MEAS RIGHT DIST ICA PSV: -56.6 CM/SEC
BH CV XLRA MEAS RIGHT ICA/CCA RATIO: 0.92
BH CV XLRA MEAS RIGHT MID ICA EDV: -16.5 CM/SEC
BH CV XLRA MEAS RIGHT MID ICA PSV: -73.1 CM/SEC
BH CV XLRA MEAS RIGHT PROX CCA EDV: -8.8 CM/SEC
BH CV XLRA MEAS RIGHT PROX CCA PSV: -79.7 CM/SEC
BH CV XLRA MEAS RIGHT PROX ECA EDV: -10 CM/SEC
BH CV XLRA MEAS RIGHT PROX ECA PSV: -90.3 CM/SEC
BH CV XLRA MEAS RIGHT PROX ICA EDV: -12.9 CM/SEC
BH CV XLRA MEAS RIGHT PROX ICA PSV: -76.2 CM/SEC
BH CV XLRA MEAS RIGHT PROX SCLA PSV: 229 CM/SEC
BH CV XLRA MEAS RIGHT VERTEBRAL A EDV: -7.1 CM/SEC
BH CV XLRA MEAS RIGHT VERTEBRAL A PSV: -44.8 CM/SEC
BUN SERPL-MCNC: 12 MG/DL (ref 8–23)
BUN/CREAT SERPL: 15 (ref 7–25)
CALCIUM SPEC-SCNC: 9.2 MG/DL (ref 8.6–10.5)
CHLORIDE SERPL-SCNC: 109 MMOL/L (ref 98–107)
CO2 SERPL-SCNC: 20.3 MMOL/L (ref 22–29)
CREAT SERPL-MCNC: 0.8 MG/DL (ref 0.57–1)
D DIMER PPP FEU-MCNC: 0.54 MCGFEU/ML (ref 0–0.49)
DEPRECATED RDW RBC AUTO: 37.6 FL (ref 37–54)
EGFRCR SERPLBLD CKD-EPI 2021: 75.5 ML/MIN/1.73
ERYTHROCYTE [DISTWIDTH] IN BLOOD BY AUTOMATED COUNT: 13.4 % (ref 12.3–15.4)
GAS FLOW AIRWAY: 3 LPM
GLUCOSE BLDC GLUCOMTR-MCNC: 100 MG/DL (ref 70–130)
GLUCOSE BLDC GLUCOMTR-MCNC: 124 MG/DL (ref 70–130)
GLUCOSE BLDC GLUCOMTR-MCNC: 126 MG/DL (ref 70–130)
GLUCOSE BLDC GLUCOMTR-MCNC: 146 MG/DL (ref 70–130)
GLUCOSE SERPL-MCNC: 126 MG/DL (ref 65–99)
HBA1C MFR BLD: 5.9 % (ref 4.8–5.6)
HCO3 BLDA-SCNC: 19.3 MMOL/L (ref 22–28)
HCT VFR BLD AUTO: 39.6 % (ref 34–46.6)
HGB BLD-MCNC: 13 G/DL (ref 12–15.9)
MAGNESIUM SERPL-MCNC: 2 MG/DL (ref 1.6–2.4)
MAXIMAL PREDICTED HEART RATE: 142 BPM
MCH RBC QN AUTO: 25.7 PG (ref 26.6–33)
MCHC RBC AUTO-ENTMCNC: 32.8 G/DL (ref 31.5–35.7)
MCV RBC AUTO: 78.3 FL (ref 79–97)
MODALITY: ABNORMAL
PCO2 BLDA: 16.8 MM HG (ref 35–45)
PH BLDA: 7.67 PH UNITS (ref 7.35–7.45)
PLATELET # BLD AUTO: 274 10*3/MM3 (ref 140–450)
PMV BLD AUTO: 9.9 FL (ref 6–12)
PO2 BLDA: 131.7 MM HG (ref 80–100)
POTASSIUM SERPL-SCNC: 2.9 MMOL/L (ref 3.5–5.2)
QT INTERVAL: 383 MS
RBC # BLD AUTO: 5.06 10*6/MM3 (ref 3.77–5.28)
SALICYLATES SERPL-MCNC: 0.5 MG/DL
SAO2 % BLDCOA: 99.6 % (ref 92–99)
SODIUM SERPL-SCNC: 142 MMOL/L (ref 136–145)
STRESS TARGET HR: 121 BPM
TOTAL RATE: 28 BREATHS/MINUTE
TSH SERPL DL<=0.05 MIU/L-ACNC: 2.09 UIU/ML (ref 0.27–4.2)
WBC NRBC COR # BLD: 9.29 10*3/MM3 (ref 3.4–10.8)

## 2022-06-12 PROCEDURE — G0378 HOSPITAL OBSERVATION PER HR: HCPCS

## 2022-06-12 PROCEDURE — 25010000002 LORAZEPAM PER 2 MG: Performed by: EMERGENCY MEDICINE

## 2022-06-12 PROCEDURE — 0 GADOBENATE DIMEGLUMINE 529 MG/ML SOLUTION: Performed by: HOSPITALIST

## 2022-06-12 PROCEDURE — 70553 MRI BRAIN STEM W/O & W/DYE: CPT

## 2022-06-12 PROCEDURE — 84443 ASSAY THYROID STIM HORMONE: CPT | Performed by: PSYCHIATRY & NEUROLOGY

## 2022-06-12 PROCEDURE — 83735 ASSAY OF MAGNESIUM: CPT | Performed by: HOSPITALIST

## 2022-06-12 PROCEDURE — 82962 GLUCOSE BLOOD TEST: CPT

## 2022-06-12 PROCEDURE — 82803 BLOOD GASES ANY COMBINATION: CPT

## 2022-06-12 PROCEDURE — 0 IOPAMIDOL PER 1 ML: Performed by: EMERGENCY MEDICINE

## 2022-06-12 PROCEDURE — 70450 CT HEAD/BRAIN W/O DYE: CPT

## 2022-06-12 PROCEDURE — 96374 THER/PROPH/DIAG INJ IV PUSH: CPT

## 2022-06-12 PROCEDURE — 36600 WITHDRAWAL OF ARTERIAL BLOOD: CPT

## 2022-06-12 PROCEDURE — 70544 MR ANGIOGRAPHY HEAD W/O DYE: CPT

## 2022-06-12 PROCEDURE — 85379 FIBRIN DEGRADATION QUANT: CPT | Performed by: EMERGENCY MEDICINE

## 2022-06-12 PROCEDURE — 85027 COMPLETE CBC AUTOMATED: CPT | Performed by: NURSE PRACTITIONER

## 2022-06-12 PROCEDURE — 83036 HEMOGLOBIN GLYCOSYLATED A1C: CPT | Performed by: NURSE PRACTITIONER

## 2022-06-12 PROCEDURE — 99204 OFFICE O/P NEW MOD 45 MIN: CPT | Performed by: PSYCHIATRY & NEUROLOGY

## 2022-06-12 PROCEDURE — 71275 CT ANGIOGRAPHY CHEST: CPT

## 2022-06-12 PROCEDURE — A9577 INJ MULTIHANCE: HCPCS | Performed by: HOSPITALIST

## 2022-06-12 PROCEDURE — 80048 BASIC METABOLIC PNL TOTAL CA: CPT | Performed by: NURSE PRACTITIONER

## 2022-06-12 PROCEDURE — 93880 EXTRACRANIAL BILAT STUDY: CPT

## 2022-06-12 RX ORDER — CYCLOSPORINE 0.5 MG/ML
1 EMULSION OPHTHALMIC 2 TIMES DAILY
Status: DISCONTINUED | OUTPATIENT
Start: 2022-06-12 | End: 2022-06-14 | Stop reason: HOSPADM

## 2022-06-12 RX ORDER — ONDANSETRON 2 MG/ML
4 INJECTION INTRAMUSCULAR; INTRAVENOUS EVERY 6 HOURS PRN
Status: DISCONTINUED | OUTPATIENT
Start: 2022-06-12 | End: 2022-06-14 | Stop reason: HOSPADM

## 2022-06-12 RX ORDER — POTASSIUM CHLORIDE 1.5 G/1.77G
40 POWDER, FOR SOLUTION ORAL AS NEEDED
Status: DISCONTINUED | OUTPATIENT
Start: 2022-06-12 | End: 2022-06-14 | Stop reason: HOSPADM

## 2022-06-12 RX ORDER — PILOCARPINE HYDROCHLORIDE 5 MG/1
5 TABLET, FILM COATED ORAL 3 TIMES DAILY
Status: DISCONTINUED | OUTPATIENT
Start: 2022-06-12 | End: 2022-06-14 | Stop reason: HOSPADM

## 2022-06-12 RX ORDER — DEXTROSE MONOHYDRATE 25 G/50ML
25 INJECTION, SOLUTION INTRAVENOUS
Status: DISCONTINUED | OUTPATIENT
Start: 2022-06-12 | End: 2022-06-14 | Stop reason: HOSPADM

## 2022-06-12 RX ORDER — ACETAMINOPHEN 160 MG/5ML
650 SOLUTION ORAL EVERY 4 HOURS PRN
Status: DISCONTINUED | OUTPATIENT
Start: 2022-06-12 | End: 2022-06-14 | Stop reason: HOSPADM

## 2022-06-12 RX ORDER — NICOTINE POLACRILEX 4 MG
15 LOZENGE BUCCAL
Status: DISCONTINUED | OUTPATIENT
Start: 2022-06-12 | End: 2022-06-14 | Stop reason: HOSPADM

## 2022-06-12 RX ORDER — SODIUM CHLORIDE 0.9 % (FLUSH) 0.9 %
10 SYRINGE (ML) INJECTION AS NEEDED
Status: DISCONTINUED | OUTPATIENT
Start: 2022-06-12 | End: 2022-06-14 | Stop reason: HOSPADM

## 2022-06-12 RX ORDER — LORAZEPAM 2 MG/ML
1 INJECTION INTRAMUSCULAR ONCE
Status: COMPLETED | OUTPATIENT
Start: 2022-06-12 | End: 2022-06-12

## 2022-06-12 RX ORDER — INSULIN LISPRO 100 [IU]/ML
0-9 INJECTION, SOLUTION INTRAVENOUS; SUBCUTANEOUS
Status: DISCONTINUED | OUTPATIENT
Start: 2022-06-12 | End: 2022-06-14 | Stop reason: HOSPADM

## 2022-06-12 RX ORDER — ATORVASTATIN CALCIUM 20 MG/1
10 TABLET, FILM COATED ORAL NIGHTLY
Status: DISCONTINUED | OUTPATIENT
Start: 2022-06-12 | End: 2022-06-13

## 2022-06-12 RX ORDER — CLONAZEPAM 0.5 MG/1
0.5 TABLET ORAL NIGHTLY
Status: DISCONTINUED | OUTPATIENT
Start: 2022-06-12 | End: 2022-06-14 | Stop reason: HOSPADM

## 2022-06-12 RX ORDER — POTASSIUM CHLORIDE 750 MG/1
40 TABLET, FILM COATED, EXTENDED RELEASE ORAL AS NEEDED
Status: DISCONTINUED | OUTPATIENT
Start: 2022-06-12 | End: 2022-06-14 | Stop reason: HOSPADM

## 2022-06-12 RX ORDER — NITROGLYCERIN 0.4 MG/1
0.4 TABLET SUBLINGUAL
Status: DISCONTINUED | OUTPATIENT
Start: 2022-06-12 | End: 2022-06-14 | Stop reason: HOSPADM

## 2022-06-12 RX ORDER — ALUMINA, MAGNESIA, AND SIMETHICONE 2400; 2400; 240 MG/30ML; MG/30ML; MG/30ML
15 SUSPENSION ORAL EVERY 6 HOURS PRN
Status: DISCONTINUED | OUTPATIENT
Start: 2022-06-12 | End: 2022-06-14 | Stop reason: HOSPADM

## 2022-06-12 RX ORDER — FOLIC ACID 1 MG/1
1000 TABLET ORAL DAILY
Status: DISCONTINUED | OUTPATIENT
Start: 2022-06-12 | End: 2022-06-14 | Stop reason: HOSPADM

## 2022-06-12 RX ORDER — AMLODIPINE BESYLATE 5 MG/1
5 TABLET ORAL DAILY
Status: DISCONTINUED | OUTPATIENT
Start: 2022-06-12 | End: 2022-06-14 | Stop reason: HOSPADM

## 2022-06-12 RX ORDER — ASPIRIN 81 MG/1
81 TABLET ORAL DAILY
Status: DISCONTINUED | OUTPATIENT
Start: 2022-06-12 | End: 2022-06-13

## 2022-06-12 RX ORDER — ACETAMINOPHEN 650 MG/1
650 SUPPOSITORY RECTAL EVERY 4 HOURS PRN
Status: DISCONTINUED | OUTPATIENT
Start: 2022-06-12 | End: 2022-06-14 | Stop reason: HOSPADM

## 2022-06-12 RX ORDER — ONDANSETRON 4 MG/1
4 TABLET, FILM COATED ORAL EVERY 6 HOURS PRN
Status: DISCONTINUED | OUTPATIENT
Start: 2022-06-12 | End: 2022-06-14 | Stop reason: HOSPADM

## 2022-06-12 RX ORDER — FLUTICASONE PROPIONATE 50 MCG
1 SPRAY, SUSPENSION (ML) NASAL DAILY PRN
Status: DISCONTINUED | OUTPATIENT
Start: 2022-06-12 | End: 2022-06-14 | Stop reason: HOSPADM

## 2022-06-12 RX ORDER — ACETAMINOPHEN 325 MG/1
650 TABLET ORAL EVERY 4 HOURS PRN
Status: DISCONTINUED | OUTPATIENT
Start: 2022-06-12 | End: 2022-06-14 | Stop reason: HOSPADM

## 2022-06-12 RX ORDER — METOPROLOL SUCCINATE 50 MG/1
50 TABLET, EXTENDED RELEASE ORAL DAILY
Status: DISCONTINUED | OUTPATIENT
Start: 2022-06-12 | End: 2022-06-14 | Stop reason: HOSPADM

## 2022-06-12 RX ORDER — CLONAZEPAM 1 MG/1
2 TABLET ORAL NIGHTLY
Status: DISCONTINUED | OUTPATIENT
Start: 2022-06-12 | End: 2022-06-12

## 2022-06-12 RX ADMIN — ASPIRIN 81 MG: 81 TABLET, COATED ORAL at 10:49

## 2022-06-12 RX ADMIN — GADOBENATE DIMEGLUMINE 16 ML: 529 INJECTION, SOLUTION INTRAVENOUS at 15:34

## 2022-06-12 RX ADMIN — ATORVASTATIN CALCIUM 10 MG: 20 TABLET, FILM COATED ORAL at 21:26

## 2022-06-12 RX ADMIN — FOLIC ACID 1000 MCG: 1 TABLET ORAL at 10:48

## 2022-06-12 RX ADMIN — METOPROLOL SUCCINATE 50 MG: 50 TABLET, EXTENDED RELEASE ORAL at 10:48

## 2022-06-12 RX ADMIN — SODIUM CHLORIDE 1000 ML: 9 INJECTION, SOLUTION INTRAVENOUS at 02:37

## 2022-06-12 RX ADMIN — PILOCARPINE HYDRCHLORIDE 5 MG: 5 TABLET, FILM COATED ORAL at 10:48

## 2022-06-12 RX ADMIN — CYCLOSPORINE 1 DROP: 0.5 EMULSION OPHTHALMIC at 21:28

## 2022-06-12 RX ADMIN — CYCLOSPORINE 1 DROP: 0.5 EMULSION OPHTHALMIC at 10:49

## 2022-06-12 RX ADMIN — CLONAZEPAM 0.5 MG: 0.5 TABLET ORAL at 21:26

## 2022-06-12 RX ADMIN — IOPAMIDOL 85 ML: 755 INJECTION, SOLUTION INTRAVENOUS at 01:54

## 2022-06-12 RX ADMIN — SODIUM CHLORIDE, POTASSIUM CHLORIDE, SODIUM LACTATE AND CALCIUM CHLORIDE 500 ML: 600; 310; 30; 20 INJECTION, SOLUTION INTRAVENOUS at 02:23

## 2022-06-12 RX ADMIN — LORAZEPAM 1 MG: 2 INJECTION, SOLUTION INTRAMUSCULAR; INTRAVENOUS at 02:30

## 2022-06-12 RX ADMIN — AMLODIPINE BESYLATE 5 MG: 5 TABLET ORAL at 10:48

## 2022-06-12 RX ADMIN — PILOCARPINE HYDRCHLORIDE 5 MG: 5 TABLET, FILM COATED ORAL at 21:27

## 2022-06-12 RX ADMIN — MIRABEGRON 50 MG: 25 TABLET, FILM COATED, EXTENDED RELEASE ORAL at 10:48

## 2022-06-12 NOTE — ED NOTES
Pt to ED via [facility] from home c/o shortness of breath. EMS states patient was 65% on room air, but variable. Patient in 90's on 3L NC. Pt has reported increased weakness recently.       Pt wearing mask while RN in room. RN wearing N95 mask, face shield during care.

## 2022-06-12 NOTE — H&P
Patient Name:  Dejah Casey  YOB: 1945  MRN:  [id number]  Admit Date:  6/11/2022  Patient Care Team:  Reji Talamantes MD as PCP - General (Family Medicine)  Edin Dean Jr., MD as Consulting Physician (Urology)  Paige Ortiz MD (Dermatology)  Burt Sen MD as Surgeon (Orthopedic Surgery)  Yan Marcum MD as Consulting Physician (Pulmonary Disease)  Tyrel Hernandez MD as Consulting Physician (Endocrinology)  Daniel Leal MD as Consulting Physician (Cardiology)  Fede Sales MD (Sports Medicine)  Bishnu Larson MD as Surgeon (Orthopedic Surgery)  Gibson Carter OD (Optometry)  Miguel A Nicholson MD PhD as Consulting Physician (Hematology and Oncology)  Jefry Weeks DPM as Consulting Physician (Podiatry)  Judith Dutta as Counselor      Subjective   History Present Illness     Chief Complaint   Patient presents with   • Shortness of Breath       Ms. Casey is a 77 y.o. female with a history of diabetes, COPD, diastolic CHF, SISSY, thyroid goiter and hypertension that presents to [facility] complaining of shortness of breath apparently although she cannot give a very good history about what happened.  She really did not seem to have any clue as far as why she was brought into the hospital.  When I saw her this morning she was mostly focused on swallowing issues although she cannot really describe those in detail.  She denied getting choked or coughing when she tried to eat and denied food sticking in her throat or chest.  She just said it did not feel right when she tried to swallow something.  She denies shortness of breath currently and denies any chest pain.  I spoke with her son and daughter-in-law by phone and they told me that she had been very fatigued and confused yesterday which is the reason they brought her in.  They state that they had her check her sugars and they were normal.  They did  acknowledge that she seemed short of breath and in fact when EMS got there she was hypoxic with sats in the 60s according to the ED notes.  She was in the 90s on 3 L on arrival.  She was still on 2 L when I saw her this morning but I turned it off completely and she stayed at 96%.  She was very alkalotic last night on blood gases and appeared to be hyperventilating based on those numbers but again patient does not recall any history of this.  Family was quite adamant that she does not previously have any history of dementia.  They state that she is usually fairly independent and is able to pump her own gas, take care of simple tasks etc.  She seemed to have difficulty even opening her Styrofoam food container when I saw her       Review of Systems   Unable to perform ROS: Mental status change        Personal History     Past Medical History:   Diagnosis Date   • Allergic     Seasonal alleries, Lisinopril, Dexamethasone   • Anxiety    • ASCVD (arteriosclerotic cardiovascular disease)    • CAD (coronary artery disease)    • Cataract     Cararacts in both eyes.   • Contact dermatitis    • DDD (degenerative disc disease), lumbar    • Depression    • Diabetes mellitus (HCC)    • Dry eyes    • Dry mouth    • Essential hypertension    • Female climacteric state    • Headache    • History of mammogram     8/2016   • History of total right hip replacement 2013   • Hypercalcemia    • Hyperlipidemia    • Iron deficiency anemia 5/27/2021    Added automatically from request for surgery [id number]   • Low back pain     Lumbar area.   • Neuromuscular disorder (HCC) March 2016   • Nightmares REM-sleep type    • Nonocclusive coronary atherosclerosis of native coronary artery     cath in 2008 with 30% LAD disease   • Nontoxic multinodular goiter    • Obesity    • Osteoarthritis     both knees, back and hips   • Osteopenia    • Pain, joint, shoulder    • Pernicious anemia    • Polycythemia    • Rotator cuff tendinitis    • Seasonal  allergies    • Sleep apnea     CPAP nightly   • Snoring    • Superficial phlebitis    • Type 2 diabetes mellitus (HCC)     Type II   • Vitiligo      Past Surgical History:   Procedure Laterality Date   • BREAST BIOPSY     • BREAST LUMPECTOMY  1973    benign   • CARDIAC CATHETERIZATION  2008   • CATARACT EXTRACTION, BILATERAL Bilateral 2019   • COLONOSCOPY  08/15/2010   • COLONOSCOPY N/A 7/20/2021    Procedure: COLONOSCOPY TO CECUM;  Surgeon: Ellen Kothari MD;  Location:  [facility];  Service: Gastroenterology;  Laterality: N/A;  pre: SCREENING FOR COLON CANCER  post: HEMORRHOIDS, DIVERTICULOSIS, TORTUOUS COLON   • ENDOSCOPY N/A 7/20/2021    Procedure: ESOPHAGOGASTRODUODENOSCOPY WITH BX'S;  Surgeon: Ellen Kothari MD;  Location: [facility];  Service: Gastroenterology;  Laterality: N/A;  pre: IRON DEFICIENCY ANEMIA  post: GASTRITIS, SMALL HIATAL HERNIA   • EYE SURGERY     • HYSTERECTOMY  1982   • JOINT REPLACEMENT  July 10, 2013    Total Hip Replacement   • REPLACEMENT TOTAL KNEE Left 08/06/2018    Dr. Bishnu Larson   • REPLACEMENT TOTAL KNEE Right     [place]    • TONSILLECTOMY  1967   • TOTAL HIP ARTHROPLASTY Right 2013   • UPPER GASTROINTESTINAL ENDOSCOPY  07/20/2021     Family History   Problem Relation Age of Onset   • Diabetes Mother         Mother   • Thyroid disease Mother    • Hypertension Father         Father   • Heart disease Father         Father   • Arthritis Father         Father   • Hyperlipidemia Father         Father   • Hypertension Brother         Brother   • Diabetes Brother         Brother   • Kidney disease Brother         Brother, Renal Failure   • Vision loss Brother         Brother   • Asthma Sister         Sister   • Hypertension Sister         Sister   • Miscarriages / Stillbirths Sister         Sister   • Cancer Brother         Brother   • Lung cancer Brother    • Diabetes Sister         Sister   • Hypertension Sister         Sister   • Early death Sister    • Kidney  disease Brother         Brother,  Renal Failure   • Stroke Brother         Brother     Social History     Tobacco Use   • Smoking status: Never Smoker   • Smokeless tobacco: Never Used   • Tobacco comment: I have never smoked.   Vaping Use   • Vaping Use: Never used   Substance Use Topics   • Alcohol use: No     Comment: CAFFEINE: DECAF ONCE PER WK.    • Drug use: No     No current facility-administered medications on file prior to encounter.     Current Outpatient Medications on File Prior to Encounter   Medication Sig Dispense Refill   • amLODIPine (NORVASC) 5 MG tablet Take 1 tablet by mouth Daily for 180 days. 90 tablet 1   • aspirin 81 MG tablet Take 81 mg by mouth daily.     • atorvastatin (LIPITOR) 10 MG tablet Take 1 tablet by mouth Every Night. 90 tablet 2   • clonazePAM (KlonoPIN) 2 MG tablet Take 2 mg by mouth Every Night.     • Durezol 0.05 % ophthalmic emulsion Administer 1 drop to both eyes Daily As Needed.     • fluticasone (FLONASE) 50 MCG/ACT nasal spray 1 spray into the nostril(s) as directed by provider As Needed.     • folic acid (FOLVITE) 1 MG tablet TAKE 1 TABLET BY MOUTH EVERY DAY 90 tablet 3   • metFORMIN (GLUCOPHAGE) 500 MG tablet Take 1 tablet by mouth 2 (Two) Times a Day With Meals for 180 days. 180 tablet 1   • metoprolol succinate XL (TOPROL-XL) 50 MG 24 hr tablet Take 1 tablet by mouth Daily for 180 days. 90 tablet 1   • Mirabegron ER (MYRBETRIQ) 50 MG tablet sustained-release 24 hour 24 hr tablet Take 50 mg by mouth Daily.     • pilocarpine (SALAGEN) 5 MG tablet 5 mg 3 (Three) Times a Day.     • potassium chloride (KAYCIEL) 20 mEq/15 mL solution MIX 15 ML IN 4 OUNCES OF LIQUID AND DRINK DAILY 1350 mL 1   • Restasis 0.05 % ophthalmic emulsion 2 (two) times a day.     • STIOLTO RESPIMAT 2.5-2.5 MCG/ACT aerosol solution inhaler 2 puffs As Needed.     • Accu-Chek FastClix Lancets misc Use to check blood sugar once daily 102 each 5   • Calcium Carb-Cholecalciferol 500-200 MG-UNIT tablet  Take 2 tablets by mouth daily.     • Cyanocobalamin ([employer] Vitamin B-12) 5000 MCG sublingual tablet Place 5,000 mcg under the tongue Daily. 90 tablet 3   • ferrous sulfate 325 (65 FE) MG tablet TAKE 1 TABLET BY MOUTH EVERY DAY WITH BREAKFAST 90 tablet 3   • furosemide (LASIX) 40 MG tablet Take 1 tablet by mouth Daily. 90 tablet 3   • glucose blood (Accu-Chek Catherine Plus) test strip Use as instructed 360 each 1   • Lancets Misc. (Accu-Chek FastClix Lancet) kit 1 kit Daily. 1 kit 0   • venlafaxine (EFFEXOR) 75 MG tablet Take 2 tablets by mouth 2 (Two) Times a Day for 180 days. (Patient taking differently: Take 150 mg by mouth 2 (Two) Times a Day. 1 in am 2 after supper) 360 tablet 1     Allergies   Allergen Reactions   • Hydrochlorothiazide Other (See Comments)     In 2015-developed hypercalcemia-HCTZ was discontinued  Other reaction(s): Other (See Comments)  In 2015-developed hypercalcemia-HCTZ was discontinued  In 2015-developed hypercalcemia-HCTZ was discontinued   • Dexamethasone Dermatitis     Other reaction(s): Other (See Comments)  CONTACT DERMATITIS   • Lisinopril Cough     Other reaction(s): Other (See Comments)  COUGH       Objective    Objective     Vital Signs  Temp:  [98.8 °F (37.1 °C)-99 °F (37.2 °C)] 99 °F (37.2 °C)  Heart Rate:  [] 72  Resp:  [20-30] 20  BP: (110-166)/(61-99) 119/71  SpO2:  [93 %-100 %] 100 %  on  Flow (L/min):  [3] 3;   Device (Oxygen Therapy): nasal cannula  Body mass index is 32.53 kg/m².    Physical Exam  Vitals and nursing note reviewed.   Constitutional:       General: She is not in acute distress.     Appearance: Normal appearance.   HENT:      Head: Normocephalic and atraumatic.      Mouth/Throat:      Mouth: Mucous membranes are moist.      Pharynx: No posterior oropharyngeal erythema.   Eyes:      General: No scleral icterus.  Neck:      Vascular: No JVD.   Cardiovascular:      Rate and Rhythm: Normal rate and regular rhythm.      Pulses: Normal pulses.      Heart sounds:  Normal heart sounds. No murmur heard.  Pulmonary:      Effort: Pulmonary effort is normal. No respiratory distress.      Breath sounds: Normal breath sounds.   Abdominal:      General: Bowel sounds are normal. There is no distension.      Palpations: Abdomen is soft.      Tenderness: There is no abdominal tenderness.   Musculoskeletal:         General: No tenderness.      Cervical back: Neck supple.      Right lower leg: No edema.      Left lower leg: No edema.   Skin:     General: Skin is warm and dry.      Coloration: Skin is not jaundiced.      Findings: No rash.   Neurological:      Mental Status: She is alert. She is disoriented.      Cranial Nerves: No cranial nerve deficit or facial asymmetry.      Motor: No weakness.      Comments: Word finding trouble   Psychiatric:         Mood and Affect: Mood normal.      Comments: Seems to have difficulty completing tasks         Results Review:  I reviewed the patient's new clinical results.  I reviewed the patient's new imaging results and agree with the interpretation.  I reviewed the patient's other test results and agree with the interpretation  I personally viewed and interpreted the patient's EKG/Telemetry data  Discussed with ED provider.    Lab Results (last 24 hours)     Procedure Component Value Units Date/Time    Respiratory Panel PCR w/COVID-19(SARS-CoV-2) [facility] In-House, NP Swab in UTM/VTM, 3-4 HR TAT - Swab, Nasopharynx [[id number]]  (Normal) Collected: 06/11/22 2145    Specimen: Swab from Nasopharynx Updated: 06/11/22 2251     ADENOVIRUS, PCR Not Detected     Coronavirus 229E Not Detected     Coronavirus HKU1 Not Detected     Coronavirus NL63 Not Detected     Coronavirus OC43 Not Detected     COVID19 Not Detected     Human Metapneumovirus Not Detected     Human Rhinovirus/Enterovirus Not Detected     Influenza A PCR Not Detected     Influenza B PCR Not Detected     Parainfluenza Virus 1 Not Detected     Parainfluenza Virus 2 Not  Detected     Parainfluenza Virus 3 Not Detected     Parainfluenza Virus 4 Not Detected     RSV, PCR Not Detected     Bordetella pertussis pcr Not Detected     Bordetella parapertussis PCR Not Detected     Chlamydophila pneumoniae PCR Not Detected     Mycoplasma pneumo by PCR Not Detected    Narrative:      In the setting of a positive respiratory panel with a viral infection PLUS a negative procalcitonin without other underlying concern for bacterial infection, consider observing off antibiotics or discontinuation of antibiotics and continue supportive care. If the respiratory panel is positive for atypical bacterial infection (Bordetella pertussis, Chlamydophila pneumoniae, or Mycoplasma pneumoniae), consider antibiotic de-escalation to target atypical bacterial infection.    CBC & Differential [[id number]]  (Abnormal) Collected: 06/11/22 2213    Specimen: Blood Updated: 06/11/22 2227    Narrative:      The following orders were created for panel order CBC & Differential.  Procedure                               Abnormality         Status                     ---------                               -----------         ------                     CBC Auto Differential[[id number]]        Abnormal            Final result                 Please view results for these tests on the individual orders.    Comprehensive Metabolic Panel [[id number]]  (Abnormal) Collected: 06/11/22 2213    Specimen: Blood Updated: 06/11/22 2254     Glucose 182 mg/dL      BUN 14 mg/dL      Creatinine 0.99 mg/dL      Sodium 144 mmol/L      Potassium 4.1 mmol/L      Comment: Specimen hemolyzed.  Results may be affected.        Chloride 105 mmol/L      CO2 20.5 mmol/L      Calcium 10.1 mg/dL      Total Protein 7.6 g/dL      Albumin 4.00 g/dL      ALT (SGPT) 9 U/L      Comment: Specimen hemolyzed.  Results may be affected.        AST (SGOT) 16 U/L      Comment: Specimen hemolyzed.  Results may be affected.        Alkaline Phosphatase 76 U/L      Total  Bilirubin 0.4 mg/dL      Globulin 3.6 gm/dL      A/G Ratio 1.1 g/dL      BUN/Creatinine Ratio 14.1     Anion Gap 18.5 mmol/L      eGFR 58.5 mL/min/1.73      Comment: National Kidney Foundation and American Society of Nephrology (ASN) Task Force recommended calculation based on the Chronic Kidney Disease Epidemiology Collaboration (CKD-EPI) equation refit without adjustment for race.       Narrative:      GFR Normal >60  Chronic Kidney Disease <60  Kidney Failure <15      BNP [[id number]]  (Normal) Collected: 06/11/22 2213    Specimen: Blood Updated: 06/11/22 2242     proBNP 275.0 pg/mL     Narrative:      Among patients with dyspnea, NT-proBNP is highly sensitive for the detection of acute congestive heart failure. In addition NT-proBNP of <300 pg/ml effectively rules out acute congestive heart failure with 99% negative predictive value.    Results may be falsely decreased if patient taking Biotin.      Troponin [[id number]]  (Normal) Collected: 06/11/22 2213    Specimen: Blood Updated: 06/11/22 2254     Troponin T <0.010 ng/mL      Comment: Specimen hemolyzed.  Results may be affected.       Narrative:      Troponin T Reference Range:  <= 0.03 ng/mL-   Negative for AMI  >0.03 ng/mL-     Abnormal for myocardial necrosis.  Clinicians would have to utilize clinical acumen, EKG, Troponin and serial changes to determine if it is an Acute Myocardial Infarction or myocardial injury due to an underlying chronic condition.       Results may be falsely decreased if patient taking Biotin.      CBC Auto Differential [[id number]]  (Abnormal) Collected: 06/11/22 2213    Specimen: Blood Updated: 06/11/22 2227     WBC 6.62 10*3/mm3      RBC 5.38 10*6/mm3      Hemoglobin 13.9 g/dL      Hematocrit 43.7 %      MCV 81.2 fL      MCH 25.8 pg      MCHC 31.8 g/dL      RDW 13.6 %      RDW-SD 40.0 fl      MPV 11.2 fL      Platelets 239 10*3/mm3      Neutrophil % 72.9 %      Lymphocyte % 16.8 %      Monocyte % 6.5 %      Eosinophil % 2.7 %       Basophil % 0.6 %      Immature Grans % 0.5 %      Neutrophils, Absolute 4.83 10*3/mm3      Lymphocytes, Absolute 1.11 10*3/mm3      Monocytes, Absolute 0.43 10*3/mm3      Eosinophils, Absolute 0.18 10*3/mm3      Basophils, Absolute 0.04 10*3/mm3      Immature Grans, Absolute 0.03 10*3/mm3      nRBC 0.2 /100 WBC     Acetaminophen Level [[id number]]  (Normal) Collected: 06/11/22 2213    Specimen: Blood Updated: 06/12/22 0428     Acetaminophen <5.0 mcg/mL     Salicylate Level [[id number]]  (Normal) Collected: 06/11/22 2213    Specimen: Blood Updated: 06/12/22 0404     Salicylate 0.5 mg/dL     Narrative:      Therapeutic range for Salicylates:  3.0 - 10.0 mg/dL for antipyretic/analgesic conditions  15.0 - 30.0 mg/dL for anti-inflammatory conditions    D-dimer, Quantitative [[id number]]  (Abnormal) Collected: 06/12/22 0005    Specimen: Blood Updated: 06/12/22 0031     D-Dimer, Quantitative 0.54 MCGFEU/mL     Narrative:      The Stago D-Dimer test used in conjunction with a clinical pretest probability (PTP) assessment model, has been approved by the FDA to rule out the presence of venous thromboembolism (VTE) in outpatients suspected of deep venous thrombosis (DVT) or pulmonary embolism (PE). The cut-off for negative predictive value is <0.50 MCGFEU/mL.    Blood Gas, Arterial - [[id number]]  (Abnormal) Collected: 06/12/22 0206    Specimen: Arterial Blood Updated: 06/12/22 0211     Site Arterial: right radial     José Luis's Test Positive     pH, Arterial 7.669 pH units      Comment: 100% Meter: [id number] : [id number] Clare TeresaCritical:Notify Dr EDMOND ANG M.D (12-Jun-22 02:10:06)Read back ok        pCO2, Arterial 16.8 mm Hg      Comment: Critical:Notify Dr EDMOND ANG M.D (12-Jun-22 02:10:06)Read back ok        pO2, Arterial 131.7 mm Hg      HCO3, Arterial 19.3 mmol/L      Base Excess, Arterial 1.3 mmol/L      O2 Saturation Calculated 99.6 %      Barometric Pressure for Blood Gas 748.0 mmHg      Modality  Cannula     Flow Rate 3 lpm      Rate 28 Breaths/minute     POC Glucose Once [[id number]]  (Normal) Collected: 06/12/22 0618    Specimen: Blood Updated: 06/12/22 0619     Glucose 100 mg/dL      Comment: Meter: [id number] : [id number] Franklin CNA             Imaging Results (Last 24 Hours)     Procedure Component Value Units Date/Time    CT Head Without Contrast [[id number]] Collected: 06/12/22 0330     Updated: 06/12/22 0506    Narrative:      CT HEAD WITHOUT CONTRAST     HISTORY: Confusion     COMPARISON: 03/28/2022     TECHNIQUE: Axial CT imaging was obtained the brain. No IV contrast was  administered.     FINDINGS:  The images are degraded by the presence of residual contrast material  within the patient's intracranial vasculature from earlier CT angiogram  of the chest. This limits the assessment for acute hemorrhage. No  obvious acute intracranial hemorrhage is seen on the current study.  There is atrophy. There is periventricular and deep white matter  microangiopathic change. There is no midline shift or mass effect.  Mucous retention cyst is seen within the left maxillary sinus. Mastoid  air cells are clear.       Impression:      No definite acute intracranial findings identified on this technically  limited study.     Radiation dose reduction techniques were utilized, including automated  exposure control and exposure modulation based on body size.     This report was finalized on 6/12/2022 5:03 AM by Dr. Michelle Liu M.D.       CT Angiogram Chest [[id number]] Collected: 06/12/22 0215     Updated: 06/12/22 0226    Narrative:      CT ANGIOGRAM OF THE CHEST     HISTORY: Shortness of air     COMPARISON: 12/13/2018     TECHNIQUE: Axial CT imaging was obtained through the thorax. IV contrast  was administered. Three-D reformatted images were obtained.     FINDINGS:  No acute pulmonary thromboembolus is seen. Thoracic aorta is normal in  caliber. There is no evidence of dissection. There is  calcification of  the aorta and coronary arteries. The thyroid gland is enlarged and  heterogeneous. This was also present in December 2018. It can be better  evaluated with dedicated thyroid ultrasound. There is some mild mass  effect upon the trachea. Esophagus is within normal limits. There is no  pleural or pericardial effusion. There is some atelectasis and scarring  within both lungs. No definite acute infiltrates are seen. As was  previously discussed, there is a low-attenuation lesion seen within the  right paratracheal region. It measures 1.8 cm in diameter. It is stable  to smaller when compared to prior exam. It measures higher in density  than simple fluid. Given its stability, it is felt to be benign. There  is a common origin of the brachiocephalic artery and left common carotid  artery. Images through the upper abdomen are degraded by motion  artifact. No acute abnormalities are seen within the upper abdomen. No  acute osseous abnormalities are seen.       Impression:      No acute intrathoracic findings.     Radiation dose reduction techniques were utilized, including automated  exposure control and exposure modulation based on body size.     This report was finalized on 6/12/2022 2:23 AM by Dr. Michelle Liu M.D.       XR Chest 1 View [[id number]] Collected: 06/11/22 2155     Updated: 06/11/22 2200    Narrative:      SINGLE VIEW OF THE CHEST     HISTORY: Shortness of air     COMPARISON: 03/28/2021     FINDINGS:  Heart size is within normal limits for technique. No pneumothorax or  pleural effusion is seen. There is calcification of the aorta. There are  extensive degenerative changes involving AC joints bilaterally. No acute  infiltrates are seen.       Impression:      No acute findings.     This report was finalized on 6/11/2022 9:57 PM by Dr. Michelle Liu M.D.             ECG 12 Lead   Final Result   HEART RATE= 86  bpm   RR Interval= 700  ms   WA Interval= 135  ms   P Horizontal Axis=  -1  deg   P Front Axis= 49  deg   QRSD Interval= 84  ms   QT Interval= 383  ms   QRS Axis= 33  deg   T Wave Axis= 31  deg   - NORMAL ECG -   Sinus rhythm   NO SIGNIFICANT CHANGE FROM PREVIOUS ECG   Electronically Signed By: Daphne Monroe ([facility]) 12-Jun-2022 01:20:43   Date and Time of Study: 2022-06-11 21:43:19           Assessment/Plan     Active Hospital Problems    Diagnosis  POA   • Acute hypoxemic respiratory failure (HCC) [J96.01]  Yes   • Dysphagia [R13.10]  Unknown   • Obstructive sleep apnea on CPAP [G47.33, Z99.89]  Not Applicable   • Chronic depression [F32.A]  Yes   • Essential hypertension [I10]  Yes      Resolved Hospital Problems   No resolved problems to display.       Ms. Casey is a 77 y.o. female with several chronic medical issues including diabetes, SISSY, hypertension presenting with altered mental status and apparently some hypoxia and respiratory alkalosis which have already resolved.    Unusual presentation.  Not sure of the reason for the confusion, certainly could be encephalopathy related to metabolic issues though doubt relates to hypoxia since it is ongoing with normal sats.  Neuro exam is fairly nonfocal but she almost had some word finding difficulty so I think we need to at least rule out stroke  - We will asked neurology to see.  Discussed personally with Dr. High  - MRI of the brain pending.  Further work-up including potential LP based on results of above  - Reviewed meds and she is on a pretty high dose of Klonopin at night but otherwise nothing which should be the culprit.  The Klonopin dose is unchanged for quite some time but will decrease it now.  -Could have emerging dementia but seems relatively acute in onset    Dysphagia- again unclear symptomatology.  We will asked speech therapy to evaluate at least    Hyperventilation/respiratory alkalosis/hypoxia-resolved  - CTA and chest x-ray negative  - Continue CPAP.  Family needs to bring it in      VTE Prophylaxis -  SCDs.  Code Status - Full code.       Jaret Stephen MD  [facility]  06/12/22  09:12 EDT

## 2022-06-12 NOTE — ED PROVIDER NOTES
EMERGENCY DEPARTMENT ENCOUNTER    Room Number:  20/20  Date of encounter:  6/11/2022  PCP: Reji Talamantes MD  Historian: Patient, EMS      HPI:  Chief Complaint: Shortness of breath  A complete HPI/ROS/PMH/PSH/SH/FH are unobtainable due to: poor historian, acuity of condition    Context: Dejah Casey is a 77 y.o. female who presents to the ED c/o shortness of breath.  Onset earlier today presumably just prior to arrival.  Patient told EMS that it was sudden onset.  However, she tells me that it was gradual in onset.  Family told EMS that the patient has been increasingly fatigued and weak over an unspecified period of time.  Patient denies having any fever, cough, chest pain, vomiting, abdominal pain.    EMS states that upon arrival her oxygen saturation was between 65 and 80%.  They put her on 3 L nasal cannula.  Patient is not normally on oxygen.      PAST MEDICAL HISTORY  Active Ambulatory Problems     Diagnosis Date Noted   • Essential hypertension 03/17/2016   • Other hyperlipidemia 03/17/2016   • Overactive bladder 03/17/2016   • Osteoarthritis 03/17/2016   • Type 2 diabetes mellitus without complication, without long-term current use of insulin ([facility]) 03/17/2016   • Vitamin D deficiency 03/17/2016   • Nontoxic multinodular goiter 06/06/2016   • Osteopenia 06/06/2016   • Chronic depression 08/25/2016   • Obstructive sleep apnea on CPAP 08/25/2016   • Osteoarthritis of spine with radiculopathy, lumbar region 08/25/2016   • Chronic venous insufficiency 05/01/2018   • 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   • Nonocclusive coronary atherosclerosis of native coronary artery    • Iron deficiency 01/18/2021   • Vitamin B12 deficiency 05/27/2021   • Weight loss 05/27/2021   • Diastolic dysfunction 06/02/2021   • Primary osteoarthritis of right knee 10/25/2018   • Primary osteoarthritis of left knee 06/29/2018   • Grief reaction with prolonged bereavement 10/13/2021   • Constipation 02/15/2022     Resolved Ambulatory Problems      Diagnosis Date Noted   • Anxiety 03/17/2016   • Erythrocytosis 03/17/2016   • Menopausal syndrome 03/17/2016   • Headache 03/17/2016   • Hypercalcemia 03/17/2016   • Hypertension 03/17/2016   • Adiposity 03/17/2016   • Knee pain, left 05/20/2016   • Coronary artery disease involving native coronary artery of native heart without angina pectoris 06/06/2016   • Routine health maintenance 07/28/2016   • Breast cancer screening 07/28/2016   • Arthritis of left hip 07/28/2016   • Sleep disorder 08/25/2016   • Left foot pain 11/22/2016   • Paroxysmal tachycardia (HCC) 04/11/2017   • Right flank pain 08/01/2017   • Chronic bronchitis (HCC) 06/12/2018   • H/O total knee replacement, right 12/03/2018   • Class 2 obesity due to excess calories without serious comorbidity with body mass index (BMI) of 38.0 to 38.9 in adult 07/02/2020   • Tachycardia 07/02/2020   • Hypokalemia 08/21/2020   • Vitamin B 12 deficiency 09/09/2020   • Pernicious anemia 01/18/2021   • Iron deficiency anemia 05/27/2021   • Colon cancer screening 05/27/2021     Past Medical History:   Diagnosis Date   • Allergic    • ASCVD (arteriosclerotic cardiovascular disease)    • CAD (coronary artery disease)    • Cataract    • Contact dermatitis    • DDD (degenerative disc disease), lumbar    • Depression    • Diabetes mellitus (HCC)    • Dry eyes    • Dry mouth    • Female climacteric state    • History of mammogram    • History of total right hip replacement 2013   • Hyperlipidemia    • Low back pain    • Neuromuscular disorder (HCC) March 2016   • Nightmares REM-sleep type    • Obesity    • Pain, joint, shoulder    • Polycythemia    • Rotator cuff tendinitis    • Seasonal allergies    • Sleep apnea    • Snoring    • Superficial phlebitis    • Type 2 diabetes mellitus (HCC)    • Vitiligo          PAST SURGICAL HISTORY  Past Surgical History:   Procedure Laterality Date   • BREAST BIOPSY     • BREAST LUMPECTOMY  1973    benign   • CARDIAC CATHETERIZATION  2008    • CATARACT EXTRACTION, BILATERAL Bilateral 2019   • COLONOSCOPY  08/15/2010   • COLONOSCOPY N/A 7/20/2021    Procedure: COLONOSCOPY TO CECUM;  Surgeon: Ellen Kothari MD;  Location: [facility] ENDOSCOPY;  Service: Gastroenterology;  Laterality: N/A;  pre: SCREENING FOR COLON CANCER  post: HEMORRHOIDS, DIVERTICULOSIS, TORTUOUS COLON   • ENDOSCOPY N/A 7/20/2021    Procedure: ESOPHAGOGASTRODUODENOSCOPY WITH BX'S;  Surgeon: Ellen Kothari MD;  Location: [facility] ENDOSCOPY;  Service: Gastroenterology;  Laterality: N/A;  pre: IRON DEFICIENCY ANEMIA  post: GASTRITIS, SMALL HIATAL HERNIA   • EYE SURGERY     • HYSTERECTOMY  1982   • JOINT REPLACEMENT  July 10, 2013    Total Hip Replacement   • REPLACEMENT TOTAL KNEE Left 08/06/2018    Dr. Bishnu Larson   • REPLACEMENT TOTAL KNEE Right     [facility]    • TONSILLECTOMY  1967   • TOTAL HIP ARTHROPLASTY Right 2013   • UPPER GASTROINTESTINAL ENDOSCOPY  07/20/2021         FAMILY HISTORY  Family History   Problem Relation Age of Onset   • Diabetes Mother         Mother   • Thyroid disease Mother    • Hypertension Father         Father   • Heart disease Father         Father   • Arthritis Father         Father   • Hyperlipidemia Father         Father   • Hypertension Brother         Brother   • Diabetes Brother         Brother   • Kidney disease Brother         Brother, Renal Failure   • Vision loss Brother         Brother   • Asthma Sister         Sister   • Hypertension Sister         Sister   • Miscarriages / Stillbirths Sister         Sister   • Cancer Brother         Brother   • Lung cancer Brother    • Diabetes Sister         Sister   • Hypertension Sister         Sister   • Early death Sister    • Kidney disease Brother         Brother,  Renal Failure   • Stroke Brother         Brother         SOCIAL HISTORY  Social History     Socioeconomic History   • Marital status:    Tobacco Use   • Smoking status: Never Smoker   • Smokeless tobacco: Never Used   • Tobacco comment:  I have never smoked.   Vaping Use   • Vaping Use: Never used   Substance and Sexual Activity   • Alcohol use: No     Comment: CAFFEINE: DECAF ONCE PER WK.    • Drug use: No   • Sexual activity: Not Currently     Partners: Male     Birth control/protection: Post-menopausal     Comment: I had a Hysterectomy in 1982.         ALLERGIES  Hydrochlorothiazide, Dexamethasone, and Lisinopril        REVIEW OF SYSTEMS  Review of Systems     All systems reviewed and negative except for those discussed in HPI.       PHYSICAL EXAM    I have reviewed the triage vital signs and nursing notes.    ED Triage Vitals [06/11/22 2119]   Temp Heart Rate Resp BP SpO2   -- 104 -- 129/81 95 %      Temp src Heart Rate Source Patient Position BP Location FiO2 (%)   -- -- -- -- --       Physical Exam  GENERAL: Uncomfortable, nontoxic  HENT: nares patent  EYES: no scleral icterus  CV: regular rhythm, regular rate  RESPIRATORY: normal effort, diminished breath sounds bilaterally  ABDOMEN: soft, nontender  MUSCULOSKELETAL: no deformity, 1+ lower extremity edema bilaterally.  NEURO: alert, tired but eyes are spontaneously open. oriented to name, hospital, and family. However, she has some confused speech as well especially about historical details. Appears fatigued with slowed response times to questions. moves all extremities with 4/5 strength to all extremities, follows simple commands, no facial asymmetry, EOMI, PERRL.   SKIN: warm, dry        LAB RESULTS  No results found for this or any previous visit (from the past 24 hour(s)).    Ordered the above labs and independently reviewed the results.        RADIOLOGY  No Radiology Exams Resulted Within Past 24 Hours    I ordered the above noted radiological studies. Reviewed by me and discussed with radiologist.  See dictation for official radiology interpretation.      PROCEDURES    Procedures      MEDICATIONS GIVEN IN ER    Medications   sodium chloride 0.9 % flush 10 mL (has no administration in  time range)         PROGRESS, DATA ANALYSIS, CONSULTS, AND MEDICAL DECISION MAKING    All labs have been independently reviewed by me.  All radiology studies have been reviewed by me and discussed with radiologist dictating the report.   EKG's independently viewed and interpreted by me.  Discussion below represents my analysis of pertinent findings related to patient's condition, differential diagnosis, treatment plan and final disposition.    Differential diagnosis includes but not limited to CHF, acute coronary syndrome, pulmonary embolism, pneumothorax, pneumonia, asthma/COPD, pulmonary hypertension, deconditioning, anemia, other hematologic etiologies such as CO poisoning, anxiety.         ED Course as of 06/12/22 0411   Sat Jun 11, 2022   2155 EKG interpreted by myself.  Time 2143.  Sinus rhythm.  Heart rate 86.  Normal intervals and axis.  No acute ST abnormality. [TD]   2313 Troponin T: <0.010 [TD]   2313 proBNP: 275.0 [TD]   2313 COVID19: Not Detected [TD]   2313 Chest x-ray interpreted by myself.  No evidence of pneumonia. [TD]   Sun Jun 12, 2022   0109 D-Dimer, Quant(!): 0.54 [TD]   0233 Patient has a mild anion gap metabolic acidosis.  She has respiratory alkalosis with concurrent metabolic alkalosis. [TD]   0236 I obtain additional information from the patient's family.  They state that she is more confused than normal.  She normally will drive herself and is very lucid.  They feel that she appears to be very fatigued.  Therefore, I have ordered a CT scan of her head.  Unfortunately, this will be limited as the patient recently had CT angiogram of the chest.  Nonetheless, will be reasonable screening test to look for more significant tracheal pathology that could be explaining the patient's respiratory alkalosis. [TD]   0237 I discussed the ABG with Dr. Mueller.  He agrees from a pulmonology standpoint there is no need for ICU admission. [TD]   0308 I discussed the case with RAYSHAWN Deluca for  [facility].  We reviewed the patient's labs, history, imaging.  She will admit for Dr. Ma. [TD]      ED Course User Index  [TD] Jero Carver II, MD         PPE: The patient wore a surgical mask throughout the entire patient encounter. I wore an N95.    AS OF 21:20 EDT VITALS:    BP - 129/81  HR - 104  TEMP -    O2 SATS - 95%        DIAGNOSIS  Final diagnoses:   Acute hypoxemic respiratory failure (HCC)   Acute respiratory alkalosis   Metabolic alkalosis   Confusion         DISPOSITION  Admit           Jero Carver II, MD  06/12/22 9224

## 2022-06-12 NOTE — CONSULTS
Neurology Consult Note    Consult Date: 6/12/2022    Referring MD: Perico Ma MD    Reason for Consult I have been asked to see the patient in neurological consultation to render advice and opinion regarding altered mental status    Dejah Casey is a 77 y.o. female with CAD, diabetes, hypertension, obstructive sleep apnea on CPAP, admitted yesterday for hypoxia and altered mental status.  Her family reported to the emergency department provider that recently she has been more confused than normal but has become increasingly fatigued with impaired cognition.  When seen by the hospitalist earlier today she was having difficulty figuring out how to eat her breakfast and use her fork.  When I evaluated later her mental status seemed a little better but her memory was obviously impaired.  The overall report is of a subacute cognitive decline over the last week.    Past Medical History:   Diagnosis Date   • Allergic     Seasonal alleries, Lisinopril, Dexamethasone   • Anxiety    • ASCVD (arteriosclerotic cardiovascular disease)    • CAD (coronary artery disease)    • Cataract     Cararacts in both eyes.   • Contact dermatitis    • DDD (degenerative disc disease), lumbar    • Depression    • Diabetes mellitus (HCC)    • Dry eyes    • Dry mouth    • Essential hypertension    • Female climacteric state    • Headache    • History of mammogram     8/2016   • History of total right hip replacement 2013   • Hypercalcemia    • Hyperlipidemia    • Iron deficiency anemia 5/27/2021    Added automatically from request for surgery [id number]   • Low back pain     Lumbar area.   • Neuromuscular disorder (HCC) March 2016   • Nightmares REM-sleep type    • Nonocclusive coronary atherosclerosis of native coronary artery     cath in 2008 with 30% LAD disease   • Nontoxic multinodular goiter    • Obesity    • Osteoarthritis     both knees, back and hips   • Osteopenia    • Pain, joint, shoulder    • Pernicious anemia    • Polycythemia  "   • Rotator cuff tendinitis    • Seasonal allergies    • Sleep apnea     CPAP nightly   • Snoring    • Superficial phlebitis    • Type 2 diabetes mellitus (HCC)     Type II   • Vitiligo        ROS:  No fevers, chills  No weakness, numbness, + memory loss    Exam  /71 (BP Location: Right arm, Patient Position: Lying)   Pulse 72   Temp 99 °F (37.2 °C) (Oral)   Resp 20   Ht 154.9 cm (61\")   Wt 78.1 kg (172 lb 2.9 oz)   LMP  (LMP Unknown)   SpO2 100%   BMI 32.53 kg/m²   Gen: NAD, vitals reviewed  MS: oriented x2, recent/remote memory impaired, normal attention/concentration, language intact, no neglect.  CN: visual acuity grossly normal, PERRL, EOMI, no facial droop, no dysarthria  Motor: 5/5 throughout upper and lower extremities, normal tone  Sensory: Intact to cold temperature and vibration throughout    DATA:    Lab Results   Component Value Date    GLUCOSE 126 (H) 06/12/2022    CALCIUM 9.2 06/12/2022     06/12/2022    K 2.9 (L) 06/12/2022    CO2 20.3 (L) 06/12/2022     (H) 06/12/2022    BUN 12 06/12/2022    CREATININE 0.80 06/12/2022    EGFRIFAFRI 72 02/17/2022    EGFRIFNONA 62 02/17/2022    BCR 15.0 06/12/2022    ANIONGAP 12.7 06/12/2022     Lab Results   Component Value Date    WBC 9.29 06/12/2022    HGB 13.0 06/12/2022    HCT 39.6 06/12/2022    MCV 78.3 (L) 06/12/2022     06/12/2022       Lab review: Glucose 126, potassium 2.9, platelets 274    Imaging review: MRI brain, MRA head, bilateral carotid duplex ordered    Diagnoses:  Memory loss    Comment: 77-year-old female with concern for rapidly progressive dementia over the last week.  Apparently she was previously independent and driving and performing all ADLs with no issues.  On my exam she has no focal motor deficits but appears to have markedly impaired memory, however no metabolic, infectious or toxic cause of her encephalopathy was found.  We will start today with a stroke evaluation given the acuity of her presentation.  " If this is negative we will follow with serum and CSF testing for any causes of RPD.    PLAN:  MRI/MRA, bilateral carotid duplex  B12, TSH, ESR, RPR, MELIZA  EEG  Thiamine trial 300mg IV q8 x 3 doses  CSF testing tomorrow depending on above    Discussed with Dr. Stephen

## 2022-06-12 NOTE — PLAN OF CARE
Goal Outcome Evaluation:  Plan of Care Reviewed With: patient           Outcome Evaluation: Patient admitted to unit for c/o sob. Called EMS and was found to be at 68% on RA. Patient is now on 2L of oxygen via nasal cannula at 100%. Patient is confused. Disoriented to place and situation. Follows commands. Lives alone per ER report. Uses cane/walker if feeling too weak. Voicemail left for son to call back. Will continue to monitor for changes and adjust treatment plan as needed.

## 2022-06-13 ENCOUNTER — TELEPHONE (OUTPATIENT)
Dept: FAMILY MEDICINE CLINIC | Facility: CLINIC | Age: 77
End: 2022-06-13

## 2022-06-13 ENCOUNTER — APPOINTMENT (OUTPATIENT)
Dept: CARDIOLOGY | Facility: HOSPITAL | Age: 77
End: 2022-06-13

## 2022-06-13 ENCOUNTER — APPOINTMENT (OUTPATIENT)
Dept: NEUROLOGY | Facility: HOSPITAL | Age: 77
End: 2022-06-13

## 2022-06-13 DIAGNOSIS — E11.9 DIABETES MELLITUS WITHOUT COMPLICATION: Primary | ICD-10-CM

## 2022-06-13 LAB
ANION GAP SERPL CALCULATED.3IONS-SCNC: 13 MMOL/L (ref 5–15)
AORTIC DIMENSIONLESS INDEX: 0.7 (DI)
ASCENDING AORTA: 3.2 CM
BH CV ECHO MEAS - ACS: 1.81 CM
BH CV ECHO MEAS - AO MAX PG: 6.7 MMHG
BH CV ECHO MEAS - AO MEAN PG: 3.5 MMHG
BH CV ECHO MEAS - AO ROOT DIAM: 3.1 CM
BH CV ECHO MEAS - AO V2 MAX: 129 CM/SEC
BH CV ECHO MEAS - AO V2 VTI: 28.5 CM
BH CV ECHO MEAS - AVA(I,D): 2.33 CM2
BH CV ECHO MEAS - EDV(CUBED): 81.1 ML
BH CV ECHO MEAS - EDV(MOD-SP2): 68 ML
BH CV ECHO MEAS - EDV(MOD-SP4): 72 ML
BH CV ECHO MEAS - EF(MOD-BP): 58.2 %
BH CV ECHO MEAS - EF(MOD-SP2): 54.4 %
BH CV ECHO MEAS - EF(MOD-SP4): 59.7 %
BH CV ECHO MEAS - ESV(CUBED): 28.8 ML
BH CV ECHO MEAS - ESV(MOD-SP2): 31 ML
BH CV ECHO MEAS - ESV(MOD-SP4): 29 ML
BH CV ECHO MEAS - FS: 29.2 %
BH CV ECHO MEAS - IVS/LVPW: 0.97 CM
BH CV ECHO MEAS - IVSD: 0.68 CM
BH CV ECHO MEAS - LAT PEAK E' VEL: 6.6 CM/SEC
BH CV ECHO MEAS - LV MASS(C)D: 87.6 GRAMS
BH CV ECHO MEAS - LV MAX PG: 3.9 MMHG
BH CV ECHO MEAS - LV MEAN PG: 1.83 MMHG
BH CV ECHO MEAS - LV V1 MAX: 98.5 CM/SEC
BH CV ECHO MEAS - LV V1 VTI: 20.1 CM
BH CV ECHO MEAS - LVIDD: 4.3 CM
BH CV ECHO MEAS - LVIDS: 3.1 CM
BH CV ECHO MEAS - LVOT AREA: 3.3 CM2
BH CV ECHO MEAS - LVOT DIAM: 2.05 CM
BH CV ECHO MEAS - LVPWD: 0.7 CM
BH CV ECHO MEAS - MED PEAK E' VEL: 6.5 CM/SEC
BH CV ECHO MEAS - MV A DUR: 0.13 SEC
BH CV ECHO MEAS - MV A MAX VEL: 84.4 CM/SEC
BH CV ECHO MEAS - MV DEC SLOPE: 409.4 CM/SEC2
BH CV ECHO MEAS - MV DEC TIME: 211 MSEC
BH CV ECHO MEAS - MV E MAX VEL: 81 CM/SEC
BH CV ECHO MEAS - MV E/A: 0.96
BH CV ECHO MEAS - MV MAX PG: 4.2 MMHG
BH CV ECHO MEAS - MV MEAN PG: 1.93 MMHG
BH CV ECHO MEAS - MV P1/2T: 73.3 MSEC
BH CV ECHO MEAS - MV V2 VTI: 31.3 CM
BH CV ECHO MEAS - MVA(P1/2T): 3 CM2
BH CV ECHO MEAS - MVA(VTI): 2.12 CM2
BH CV ECHO MEAS - PA ACC TIME: 0.11 SEC
BH CV ECHO MEAS - PA PR(ACCEL): 27.9 MMHG
BH CV ECHO MEAS - PA V2 MAX: 66.5 CM/SEC
BH CV ECHO MEAS - PULM A REVS DUR: 0.16 SEC
BH CV ECHO MEAS - PULM A REVS VEL: 32.4 CM/SEC
BH CV ECHO MEAS - PULM DIAS VEL: 35.7 CM/SEC
BH CV ECHO MEAS - PULM S/D: 1.63
BH CV ECHO MEAS - PULM SYS VEL: 58.1 CM/SEC
BH CV ECHO MEAS - QP/QS: 0.59
BH CV ECHO MEAS - RAP SYSTOLE: 3 MMHG
BH CV ECHO MEAS - RV MAX PG: 1.36 MMHG
BH CV ECHO MEAS - RV V1 MAX: 58.3 CM/SEC
BH CV ECHO MEAS - RV V1 VTI: 15 CM
BH CV ECHO MEAS - RVOT DIAM: 1.83 CM
BH CV ECHO MEAS - RVSP: 29 MMHG
BH CV ECHO MEAS - SV(LVOT): 66.4 ML
BH CV ECHO MEAS - SV(MOD-SP2): 37 ML
BH CV ECHO MEAS - SV(MOD-SP4): 43 ML
BH CV ECHO MEAS - SV(RVOT): 39.4 ML
BH CV ECHO MEAS - TAPSE (>1.6): 2.6 CM
BH CV ECHO MEAS - TR MAX PG: 25.5 MMHG
BH CV ECHO MEAS - TR MAX VEL: 252.5 CM/SEC
BH CV ECHO MEASUREMENTS AVERAGE E/E' RATIO: 12.37
BH CV VAS BP RIGHT ARM: NORMAL MMHG
BH CV XLRA - RV BASE: 3.7 CM
BH CV XLRA - RV LENGTH: 7 CM
BH CV XLRA - RV MID: 1.81 CM
BH CV XLRA - TDI S': 12.4 CM/SEC
BUN SERPL-MCNC: 8 MG/DL (ref 8–23)
BUN/CREAT SERPL: 11.8 (ref 7–25)
CALCIUM SPEC-SCNC: 9.3 MG/DL (ref 8.6–10.5)
CHLORIDE SERPL-SCNC: 107 MMOL/L (ref 98–107)
CO2 SERPL-SCNC: 24 MMOL/L (ref 22–29)
CREAT SERPL-MCNC: 0.68 MG/DL (ref 0.57–1)
EGFRCR SERPLBLD CKD-EPI 2021: 89.3 ML/MIN/1.73
ERYTHROCYTE [SEDIMENTATION RATE] IN BLOOD: 18 MM/HR (ref 0–30)
GLUCOSE BLDC GLUCOMTR-MCNC: 102 MG/DL (ref 70–130)
GLUCOSE BLDC GLUCOMTR-MCNC: 111 MG/DL (ref 70–130)
GLUCOSE BLDC GLUCOMTR-MCNC: 111 MG/DL (ref 70–130)
GLUCOSE BLDC GLUCOMTR-MCNC: 94 MG/DL (ref 70–130)
GLUCOSE SERPL-MCNC: 106 MG/DL (ref 65–99)
LEFT ATRIUM VOLUME INDEX: 48.5 ML/M2
MAXIMAL PREDICTED HEART RATE: 142 BPM
POTASSIUM SERPL-SCNC: 3.1 MMOL/L (ref 3.5–5.2)
SINUS: 2.6 CM
SODIUM SERPL-SCNC: 144 MMOL/L (ref 136–145)
STJ: 2.5 CM
STRESS TARGET HR: 121 BPM
VIT B12 BLD-MCNC: >2000 PG/ML (ref 211–946)

## 2022-06-13 PROCEDURE — 86235 NUCLEAR ANTIGEN ANTIBODY: CPT | Performed by: PSYCHIATRY & NEUROLOGY

## 2022-06-13 PROCEDURE — G0378 HOSPITAL OBSERVATION PER HR: HCPCS

## 2022-06-13 PROCEDURE — 85652 RBC SED RATE AUTOMATED: CPT | Performed by: PSYCHIATRY & NEUROLOGY

## 2022-06-13 PROCEDURE — 86225 DNA ANTIBODY NATIVE: CPT | Performed by: PSYCHIATRY & NEUROLOGY

## 2022-06-13 PROCEDURE — 82607 VITAMIN B-12: CPT | Performed by: PSYCHIATRY & NEUROLOGY

## 2022-06-13 PROCEDURE — 95813 EEG EXTND MNTR 61-119 MIN: CPT | Performed by: STUDENT IN AN ORGANIZED HEALTH CARE EDUCATION/TRAINING PROGRAM

## 2022-06-13 PROCEDURE — 95813 EEG EXTND MNTR 61-119 MIN: CPT

## 2022-06-13 PROCEDURE — 82962 GLUCOSE BLOOD TEST: CPT

## 2022-06-13 PROCEDURE — 80048 BASIC METABOLIC PNL TOTAL CA: CPT | Performed by: HOSPITALIST

## 2022-06-13 PROCEDURE — 99213 OFFICE O/P EST LOW 20 MIN: CPT | Performed by: PSYCHIATRY & NEUROLOGY

## 2022-06-13 PROCEDURE — 93306 TTE W/DOPPLER COMPLETE: CPT

## 2022-06-13 PROCEDURE — 86592 SYPHILIS TEST NON-TREP QUAL: CPT | Performed by: PSYCHIATRY & NEUROLOGY

## 2022-06-13 PROCEDURE — 92610 EVALUATE SWALLOWING FUNCTION: CPT

## 2022-06-13 PROCEDURE — 93306 TTE W/DOPPLER COMPLETE: CPT | Performed by: INTERNAL MEDICINE

## 2022-06-13 RX ORDER — ATORVASTATIN CALCIUM 20 MG/1
40 TABLET, FILM COATED ORAL NIGHTLY
Status: DISCONTINUED | OUTPATIENT
Start: 2022-06-13 | End: 2022-06-14 | Stop reason: HOSPADM

## 2022-06-13 RX ADMIN — PILOCARPINE HYDRCHLORIDE 5 MG: 5 TABLET, FILM COATED ORAL at 20:42

## 2022-06-13 RX ADMIN — AMLODIPINE BESYLATE 5 MG: 5 TABLET ORAL at 10:14

## 2022-06-13 RX ADMIN — CLONAZEPAM 0.5 MG: 0.5 TABLET ORAL at 20:42

## 2022-06-13 RX ADMIN — APIXABAN 5 MG: 5 TABLET, FILM COATED ORAL at 20:42

## 2022-06-13 RX ADMIN — FOLIC ACID 1000 MCG: 1 TABLET ORAL at 10:14

## 2022-06-13 RX ADMIN — ACETAMINOPHEN 650 MG: 325 TABLET ORAL at 20:42

## 2022-06-13 RX ADMIN — METOPROLOL SUCCINATE 50 MG: 50 TABLET, EXTENDED RELEASE ORAL at 10:14

## 2022-06-13 RX ADMIN — PILOCARPINE HYDRCHLORIDE 5 MG: 5 TABLET, FILM COATED ORAL at 10:14

## 2022-06-13 RX ADMIN — CYCLOSPORINE 1 DROP: 0.5 EMULSION OPHTHALMIC at 20:42

## 2022-06-13 RX ADMIN — MIRABEGRON 50 MG: 25 TABLET, FILM COATED, EXTENDED RELEASE ORAL at 10:14

## 2022-06-13 RX ADMIN — ASPIRIN 81 MG: 81 TABLET, COATED ORAL at 10:14

## 2022-06-13 RX ADMIN — CYCLOSPORINE 1 DROP: 0.5 EMULSION OPHTHALMIC at 10:14

## 2022-06-13 RX ADMIN — ATORVASTATIN CALCIUM 40 MG: 20 TABLET, FILM COATED ORAL at 20:42

## 2022-06-13 NOTE — PROGRESS NOTES
Name: Dejah Casey ADMIT: 2022   : 1945  PCP: Reji Talamantes MD    MRN: [id number] LOS: 1 days   AGE/SEX: 77 y.o. female  ROOM: [facility]     Subjective   Subjective   Looks slightly better, no new complaints    Review of Systems     Objective   Objective   Vital Signs  Temp:  [98 °F (36.7 °C)-98.6 °F (37 °C)] 98.1 °F (36.7 °C)  Heart Rate:  [75-95] 76  Resp:  [20] 20  BP: (114-145)/(67-87) 114/67  SpO2:  [95 %-100 %] 100 %  on   ;   Device (Oxygen Therapy): room air  Body mass index is 32.53 kg/m².  Physical Exam  Vitals and nursing note reviewed.   Constitutional:       General: She is not in acute distress.     Appearance: Normal appearance.   HENT:      Head: Normocephalic and atraumatic.      Mouth/Throat:      Mouth: Mucous membranes are moist.      Pharynx: No posterior oropharyngeal erythema.   Eyes:      General: No scleral icterus.  Neck:      Vascular: No JVD.   Cardiovascular:      Rate and Rhythm: Normal rate and regular rhythm.      Pulses: Normal pulses.      Heart sounds: Normal heart sounds. No murmur heard.  Pulmonary:      Effort: Pulmonary effort is normal. No respiratory distress.      Breath sounds: Normal breath sounds.   Abdominal:      General: Bowel sounds are normal. There is no distension.      Palpations: Abdomen is soft.      Tenderness: There is no abdominal tenderness.   Musculoskeletal:         General: No swelling or tenderness.      Cervical back: Neck supple.   Skin:     General: Skin is warm and dry.      Coloration: Skin is not jaundiced.      Findings: No rash.   Neurological:      Mental Status: She is alert.      Comments: Strength is equal bilaterally.  No facial droop.   Psychiatric:         Mood and Affect: Mood normal.         Behavior: Behavior normal.         Results Review     I reviewed the patient's new clinical results.  Results from last 7 days   Lab Units 22  0908 22  2213   WBC 10*3/mm3 9.29 6.62   HEMOGLOBIN g/dL 13.0 13.9   PLATELETS  10*3/mm3 274 239     Results from last 7 days   Lab Units 06/13/22  1121 06/12/22  0908 06/11/22  2213   SODIUM mmol/L 144 142 144   POTASSIUM mmol/L 3.1* 2.9* 4.1   CHLORIDE mmol/L 107 109* 105   CO2 mmol/L 24.0 20.3* 20.5*   BUN mg/dL 8 12 14   CREATININE mg/dL 0.68 0.80 0.99   GLUCOSE mg/dL 106* 126* 182*   EGFR mL/min/1.73 89.3 75.5 58.5*     Results from last 7 days   Lab Units 06/11/22  2213   ALBUMIN g/dL 4.00   BILIRUBIN mg/dL 0.4   ALK PHOS U/L 76   AST (SGOT) U/L 16   ALT (SGPT) U/L 9     Results from last 7 days   Lab Units 06/13/22  1121 06/12/22  0908 06/11/22  2213   CALCIUM mg/dL 9.3 9.2 10.1   ALBUMIN g/dL  --   --  4.00   MAGNESIUM mg/dL  --  2.0  --        Hemoglobin A1C   Date/Time Value Ref Range Status   06/12/2022 0908 5.90 (H) 4.80 - 5.60 % Final     Glucose   Date/Time Value Ref Range Status   06/13/2022 1117 94 70 - 130 mg/dL Final     Comment:     Meter: [id number] : [id number] Opal Jorge Nerisa NA   06/13/2022 0635 111 70 - 130 mg/dL Final     Comment:     Meter: [id number] : [id number] George Wyatt CNA   06/12/2022 2055 124 70 - 130 mg/dL Final     Comment:     Meter: [id number] : [id number] George Wyatt CNA   06/12/2022 1602 126 70 - 130 mg/dL Final     Comment:     Meter: [id number] : [id number] Opal Jorge Nerisa NA   06/12/2022 1112 146 (H) 70 - 130 mg/dL Final     Comment:     Meter: [id number] : [id number] Opal Jorge Nerisa NA   06/12/2022 0618 100 70 - 130 mg/dL Final     Comment:     Meter: [id number] : [id number] Mcgraw Noemi CNA       CT Head Without Contrast    Result Date: 6/12/2022  No definite acute intracranial findings identified on this technically limited study.  Radiation dose reduction techniques were utilized, including automated exposure control and exposure modulation based on body size.  This report was finalized on 6/12/2022 5:03 AM by Dr. Michelle Liu M.D.      MRI Angiogram Head Without Contrast    Result Date: 6/12/2022  1.   No findings of appreciable stenosis, aneurysm formation or occlusion within the bilateral anterior and posterior intracranial arterial circulations. 2.  No findings of acute intracranial infarct. There are a few scattered foci of diffusion-weighted hyperintensity within the right high parietal, left high frontal and right thalamus which are isointense on ADC and T2 hyperintense on FLAIR suggestive of evolving subacute infarcts. More chronic appearing foci of infarction within the left centrum semiovale, corona radiata and left temporal lobe are present as well.  This report was finalized on 6/12/2022 8:39 PM by Dr. Colt Guardado M.D.      MRI Brain With & Without Contrast    Result Date: 6/12/2022  1.  No findings of appreciable stenosis, aneurysm formation or occlusion within the bilateral anterior and posterior intracranial arterial circulations. 2.  No findings of acute intracranial infarct. There are a few scattered foci of diffusion-weighted hyperintensity within the right high parietal, left high frontal and right thalamus which are isointense on ADC and T2 hyperintense on FLAIR suggestive of evolving subacute infarcts. More chronic appearing foci of infarction within the left centrum semiovale, corona radiata and left temporal lobe are present as well.  This report was finalized on 6/12/2022 8:39 PM by Dr. Colt Guardado M.D.      XR Chest 1 View    Result Date: 6/11/2022  No acute findings.  This report was finalized on 6/11/2022 9:57 PM by Dr. Michelle Liu M.D.      CT Angiogram Chest    Result Date: 6/12/2022  No acute intrathoracic findings.  Radiation dose reduction techniques were utilized, including automated exposure control and exposure modulation based on body size.  This report was finalized on 6/12/2022 2:23 AM by Dr. Michelle Liu M.D.      Scheduled Medications  amLODIPine, 5 mg, Oral, Daily  apixaban, 5 mg, Oral, Q12H  atorvastatin, 40 mg, Oral, Nightly  clonazePAM, 0.5 mg, Oral,  Nightly  cycloSPORINE, 1 drop, Both Eyes, BID  folic acid, 1,000 mcg, Oral, Daily  insulin lispro, 0-9 Units, Subcutaneous, 4x Daily With Meals & Nightly  metoprolol succinate XL, 50 mg, Oral, Daily  Mirabegron ER, 50 mg, Oral, Daily  pilocarpine, 5 mg, Oral, TID    Infusions   Diet  Diet Regular; Cardiac       Assessment/Plan     Active Hospital Problems    Diagnosis  POA   • Acute hypoxemic respiratory failure (HCC) [J96.01]  Yes   • Dysphagia [R13.10]  Unknown   • Obstructive sleep apnea on CPAP [G47.33, Z99.89]  Not Applicable   • Chronic depression [F32.A]  Yes   • Essential hypertension [I10]  Yes      Resolved Hospital Problems   No resolved problems to display.     Ms. Casey is a 77 y.o. female presenting with altered mental status and apparently some hypoxia and respiratory alkalosis which have already resolved, found to have subacute stroke on MRI    Subacute bilateral strokes probably explaining her odd presentation with confusion  - Patient has been on aspirin.  Concern for embolic phenomenon so being started on Eliquis by neurology  - Increasing statin.  Adding lipid profile  - Hemoglobin A1c 5.9  - PT/OT to see.  Speech therapy consulted as below  - Check echocardiogram.  No indication for LUAN per neurology  - Monitor mental status.  Seems marginally better  - Zio patch at discharge  - Discussed with Dr. Elizalde     Dysphagia-  may be related to above.  Speech therapy eval noted, no modifications to diet     Hyperventilation/respiratory alkalosis/hypoxia-resolved  - CTA and chest x-ray negative  - Continue CPAP.  Family needs to bring it in       · Eliquis (home med) for DVT prophylaxis.  · Disposition: Probably will be ready for discharge tomorrow, pending above evaluation      Jarte Stephen MD  [facility]  06/13/22  16:18 EDT

## 2022-06-13 NOTE — PROGRESS NOTES
Patient Identification:  NAME:  Dejah Casey  Age:  77 y.o.   Sex:  female   :  1945   MRN:  [id number]       Chief complaint: Bihemispheric stroke subacute    History of present illness: She is awake alert a little confused speech.  But no headache paresthesias or paralysis and is moving all extremities well.  The MRI scan shows what appears to be bihemispheric subacute stroke      Past medical history:  Past Medical History:   Diagnosis Date   • Allergic     Seasonal alleries, Lisinopril, Dexamethasone   • Anxiety    • ASCVD (arteriosclerotic cardiovascular disease)    • CAD (coronary artery disease)    • Cataract     Cararacts in both eyes.   • Contact dermatitis    • DDD (degenerative disc disease), lumbar    • Depression    • Diabetes mellitus (HCC)    • Dry eyes    • Dry mouth    • Essential hypertension    • Female climacteric state    • Headache    • History of mammogram     2016   • History of total right hip replacement    • Hypercalcemia    • Hyperlipidemia    • Iron deficiency anemia 2021    Added automatically from request for surgery [id number]   • Low back pain     Lumbar area.   • Neuromuscular disorder (HCC) 2016   • Nightmares REM-sleep type    • Nonocclusive coronary atherosclerosis of native coronary artery     cath in  with 30% LAD disease   • Nontoxic multinodular goiter    • Obesity    • Osteoarthritis     both knees, back and hips   • Osteopenia    • Pain, joint, shoulder    • Pernicious anemia    • Polycythemia    • Rotator cuff tendinitis    • Seasonal allergies    • Sleep apnea     CPAP nightly   • Snoring    • Superficial phlebitis    • Type 2 diabetes mellitus (HCC)     Type II   • Vitiligo        Allergies:  Hydrochlorothiazide, Dexamethasone, and Lisinopril    Home medications:  Medications Prior to Admission   Medication Sig Dispense Refill Last Dose   • amLODIPine (NORVASC) 5 MG tablet Take 1 tablet by mouth Daily for 180 days. 90 tablet 1 2022 at  Unknown time   • aspirin 81 MG tablet Take 81 mg by mouth daily.   6/11/2022 at Unknown time   • atorvastatin (LIPITOR) 10 MG tablet Take 1 tablet by mouth Every Night. 90 tablet 2    • clonazePAM (KlonoPIN) 2 MG tablet Take 2 mg by mouth Every Night.      • Durezol 0.05 % ophthalmic emulsion Administer 1 drop to both eyes Daily As Needed.      • fluticasone (FLONASE) 50 MCG/ACT nasal spray 1 spray into the nostril(s) as directed by provider As Needed.      • folic acid (FOLVITE) 1 MG tablet TAKE 1 TABLET BY MOUTH EVERY DAY 90 tablet 3    • metFORMIN (GLUCOPHAGE) 500 MG tablet Take 1 tablet by mouth 2 (Two) Times a Day With Meals for 180 days. 180 tablet 1    • metoprolol succinate XL (TOPROL-XL) 50 MG 24 hr tablet Take 1 tablet by mouth Daily for 180 days. 90 tablet 1    • Mirabegron ER (MYRBETRIQ) 50 MG tablet sustained-release 24 hour 24 hr tablet Take 50 mg by mouth Daily.      • pilocarpine (SALAGEN) 5 MG tablet 5 mg 3 (Three) Times a Day.      • potassium chloride (KAYCIEL) 20 mEq/15 mL solution MIX 15 ML IN 4 OUNCES OF LIQUID AND DRINK DAILY 1350 mL 1    • Restasis 0.05 % ophthalmic emulsion 2 (two) times a day.      • STIOLTO RESPIMAT 2.5-2.5 MCG/ACT aerosol solution inhaler 2 puffs As Needed.      • Accu-Chek FastClix Lancets misc Use to check blood sugar once daily 102 each 5    • Calcium Carb-Cholecalciferol 500-200 MG-UNIT tablet Take 2 tablets by mouth daily.      • Cyanocobalamin ([employer] Vitamin B-12) 5000 MCG sublingual tablet Place 5,000 mcg under the tongue Daily. 90 tablet 3    • ferrous sulfate 325 (65 FE) MG tablet TAKE 1 TABLET BY MOUTH EVERY DAY WITH BREAKFAST 90 tablet 3    • furosemide (LASIX) 40 MG tablet Take 1 tablet by mouth Daily. 90 tablet 3    • glucose blood (Accu-Chek Catherine Plus) test strip Use as instructed 360 each 1    • Lancets Misc. (Accu-Chek FastClix Lancet) kit 1 kit Daily. 1 kit 0    • venlafaxine (EFFEXOR) 75 MG tablet Take 2 tablets by mouth 2 (Two) Times a Day for 180 days.  (Patient taking differently: Take 150 mg by mouth 2 (Two) Times a Day. 1 in am 2 after supper) 360 tablet 1         Hospital medications:  amLODIPine, 5 mg, Oral, Daily  aspirin, 81 mg, Oral, Daily  atorvastatin, 10 mg, Oral, Nightly  clonazePAM, 0.5 mg, Oral, Nightly  cycloSPORINE, 1 drop, Both Eyes, BID  folic acid, 1,000 mcg, Oral, Daily  insulin lispro, 0-9 Units, Subcutaneous, 4x Daily With Meals & Nightly  metoprolol succinate XL, 50 mg, Oral, Daily  Mirabegron ER, 50 mg, Oral, Daily  pilocarpine, 5 mg, Oral, TID         •  acetaminophen **OR** acetaminophen **OR** acetaminophen  •  aluminum-magnesium hydroxide-simethicone  •  dextrose  •  dextrose  •  fluticasone  •  glucagon (human recombinant)  •  nitroglycerin  •  ondansetron **OR** ondansetron  •  potassium chloride  •  potassium chloride  •  [COMPLETED] Insert peripheral IV **AND** sodium chloride  •  sodium chloride      Objective:  Vitals Ranges:   Temp:  [98 °F (36.7 °C)-98.6 °F (37 °C)] 98 °F (36.7 °C)  Heart Rate:  [] 75  Resp:  [20] 20  BP: (136-145)/(69-87) 137/69      Physical Exam:  Awake, alert, spontaneous speech has some difficulty normal cranial nerves II through VII good  strength no pronator drift.  Reflexes trace throughout symmetrical toes downgoing    Results review:   I reviewed the patient's new clinical results.    Data review:  Lab Results (last 24 hours)     Procedure Component Value Units Date/Time    POC Glucose Once [[id number]]  (Normal) Collected: 06/13/22 0635    Specimen: Blood Updated: 06/13/22 0635     Glucose 111 mg/dL      Comment: Meter: [id number] : [id number] George Yoselin CNA       POC Glucose Once [[id number]]  (Normal) Collected: 06/12/22 2055    Specimen: Blood Updated: 06/12/22 2057     Glucose 124 mg/dL      Comment: Meter: [id number] : [id number] George Yoselin CNA       POC Glucose Once [[id number]]  (Normal) Collected: 06/12/22 1602    Specimen: Blood Updated: 06/12/22 1618     Glucose 126 mg/dL       Comment: Meter: [id number] : [id number] Opal Patel Nerisa NA       Magnesium [[id number]]  (Normal) Collected: 06/12/22 0908    Specimen: Blood Updated: 06/12/22 1612     Magnesium 2.0 mg/dL     TSH Rfx On Abnormal To Free T4 [[id number]]  (Normal) Collected: 06/12/22 0908    Specimen: Blood Updated: 06/12/22 1257     TSH 2.090 uIU/mL     POC Glucose Once [[id number]]  (Abnormal) Collected: 06/12/22 1112    Specimen: Blood Updated: 06/12/22 1114     Glucose 146 mg/dL      Comment: Meter: [id number] : [id number] Opal Patel Nerisa NA              Imaging:  Imaging Results (Last 24 Hours)     Procedure Component Value Units Date/Time    MRI Brain With & Without Contrast [[id number]] Collected: 06/12/22 2036     Updated: 06/12/22 2042    Narrative:      MRI BRAIN WITH AND WITHOUT INTRAVENOUS CONTRAST; MRA HEAD WITHOUT  INTRAVENOUS CONTRAST     HISTORY: Stroke, follow-up     COMPARISON: CT head 03/20/2021     TECHNIQUE: MRI was performed of the brain with and without intravenous  contrast. 3-D time-of-flight MR angiography was performed of the head  with axial as well as 3-D MIP images.      FINDINGS:     MRI brain:      Scattered foci of hyperdensity on diffusion-weighted imaging within the  left centrum semiovale, left temporal lobe and left corona radiata  correspond with hyperdensity on ADC and likely representing T2 shine  through. A few cortical areas of diffusion-weighted hyperintensity  within the high left and right parietal and right thalamus are  isointense on ADC and hyperintense on FLAIR. No true restricted  diffusion is visualized. White matter demonstrates normal signal  characteristics. There is no extra-axial collection. There is no finding  of parenchymal hemorrhage. There is no hydrocephalus. Midline structures  are unremarkable.     No abnormal intracranial enhancement is seen.     MR angiography head:      The anterior and posterior circulations are without appreciable  stenosis, aneurysm  formation or occlusion.       Impression:      1.  No findings of appreciable stenosis, aneurysm formation or occlusion  within the bilateral anterior and posterior intracranial arterial  circulations.  2.  No findings of acute intracranial infarct. There are a few scattered  foci of diffusion-weighted hyperintensity within the right high  parietal, left high frontal and right thalamus which are isointense on  ADC and T2 hyperintense on FLAIR suggestive of evolving subacute  infarcts. More chronic appearing foci of infarction within the left  centrum semiovale, corona radiata and left temporal lobe are present as  well.     This report was finalized on 6/12/2022 8:39 PM by Dr. Colt Guardado M.D.       MRI Angiogram Head Without Contrast [[id number]] Collected: 06/12/22 2036     Updated: 06/12/22 2042    Narrative:      MRI BRAIN WITH AND WITHOUT INTRAVENOUS CONTRAST; MRA HEAD WITHOUT  INTRAVENOUS CONTRAST     HISTORY: Stroke, follow-up     COMPARISON: CT head 03/20/2021     TECHNIQUE: MRI was performed of the brain with and without intravenous  contrast. 3-D time-of-flight MR angiography was performed of the head  with axial as well as 3-D MIP images.      FINDINGS:     MRI brain:      Scattered foci of hyperdensity on diffusion-weighted imaging within the  left centrum semiovale, left temporal lobe and left corona radiata  correspond with hyperdensity on ADC and likely representing T2 shine  through. A few cortical areas of diffusion-weighted hyperintensity  within the high left and right parietal and right thalamus are  isointense on ADC and hyperintense on FLAIR. No true restricted  diffusion is visualized. White matter demonstrates normal signal  characteristics. There is no extra-axial collection. There is no finding  of parenchymal hemorrhage. There is no hydrocephalus. Midline structures  are unremarkable.     No abnormal intracranial enhancement is seen.     MR angiography head:      The anterior and  posterior circulations are without appreciable  stenosis, aneurysm formation or occlusion.       Impression:      1.  No findings of appreciable stenosis, aneurysm formation or occlusion  within the bilateral anterior and posterior intracranial arterial  circulations.  2.  No findings of acute intracranial infarct. There are a few scattered  foci of diffusion-weighted hyperintensity within the right high  parietal, left high frontal and right thalamus which are isointense on  ADC and T2 hyperintense on FLAIR suggestive of evolving subacute  infarcts. More chronic appearing foci of infarction within the left  centrum semiovale, corona radiata and left temporal lobe are present as  well.     This report was finalized on 6/12/2022 8:39 PM by Dr. Colt Guaraddo M.D.            PPE worn at all times washed before washed afterwards disposed of everything properly is now within 6 feet of them for more than few minutes during exam no aerosols used at any point    Assessment and Plan:     The patient's MRI scan shows evidence of subacute stroke involving both hemispheres.  There is no evidence of significant edema or any hemorrhage.  This is bihemispheric in location.  I believe that this type of stroke syndrome would explain her change in mental status rather abruptly over the last week or so.  History does seem consistent with a stroke syndrome.  She is awake alert but confused and otherwise has a nonfocal examination.  I am not in favor of performing a lumbar puncture on this patient at this time given the MRI findings.  Note that she has supposedly been on 1 baby aspirin per day but that would not be enough to prevent bihemispheric stroke.  Therefore I am going to increase the Lipitor to 40 mg p.o. nightly DC the aspirin and begin Eliquis 5 mg p.o. twice daily I have ordered a repeat echocardiogram and when she is discharged.  A Zio patch might be beneficial.  I am not in favor of a LUAN at this time.  Simply because it  is not going to change our treatment.  This patient has suffered subacute bihemispheric stroke and the appropriate treatment is going to be anticoagulation.  The benefits outweigh any risks      Albert Elizalde MD  06/13/22  11:07 EDT

## 2022-06-13 NOTE — THERAPY EVALUATION
Acute Care - Speech Language Pathology   Swallow Initial Evaluation [facility]     Patient Name: Dejah Casey  : 1945  MRN: [id number]  Today's Date: 2022               Admit Date: 2022    Visit Dx:     ICD-10-CM ICD-9-CM   1. Acute hypoxemic respiratory failure (HCC)  J96.01 518.81   2. Acute respiratory alkalosis  E87.3 276.3   3. Metabolic alkalosis  E87.3 276.3   4. Confusion  R41.0 298.9     Patient Active Problem List   Diagnosis   • Essential hypertension   • Other hyperlipidemia   • Overactive bladder   • Osteoarthritis   • Type 2 diabetes mellitus without complication, without long-term current use of insulin (HCC)   • Vitamin D deficiency   • Nontoxic multinodular goiter   • Osteopenia   • Chronic depression   • Obstructive sleep apnea on CPAP   • Osteoarthritis of spine with radiculopathy, lumbar region   • Chronic venous insufficiency   • Tracheal cyst   • Nonocclusive coronary atherosclerosis of native coronary artery   • Iron deficiency   • Vitamin B12 deficiency   • Weight loss   • Diastolic dysfunction   • Primary osteoarthritis of right knee   • Primary osteoarthritis of left knee   • Grief reaction with prolonged bereavement   • Constipation   • Acute hypoxemic respiratory failure (HCC)   • Dysphagia     Past Medical History:   Diagnosis Date   • Allergic     Seasonal alleries, Lisinopril, Dexamethasone   • Anxiety    • ASCVD (arteriosclerotic cardiovascular disease)    • CAD (coronary artery disease)    • Cataract     Cararacts in both eyes.   • Contact dermatitis    • DDD (degenerative disc disease), lumbar    • Depression    • Diabetes mellitus (HCC)    • Dry eyes    • Dry mouth    • Essential hypertension    • Female climacteric state    • Headache    • History of mammogram     2016   • History of total right hip replacement    • Hypercalcemia    • Hyperlipidemia    • Iron deficiency anemia 2021    Added automatically from request for surgery [id number]   • Low  back pain     Lumbar area.   • Neuromuscular disorder (HCC) March 2016   • Nightmares REM-sleep type    • Nonocclusive coronary atherosclerosis of native coronary artery     cath in 2008 with 30% LAD disease   • Nontoxic multinodular goiter    • Obesity    • Osteoarthritis     both knees, back and hips   • Osteopenia    • Pain, joint, shoulder    • Pernicious anemia    • Polycythemia    • Rotator cuff tendinitis    • Seasonal allergies    • Sleep apnea     CPAP nightly   • Snoring    • Superficial phlebitis    • Type 2 diabetes mellitus (HCC)     Type II   • Vitiligo      Past Surgical History:   Procedure Laterality Date   • BREAST BIOPSY     • BREAST LUMPECTOMY  1973    benign   • CARDIAC CATHETERIZATION  2008   • CATARACT EXTRACTION, BILATERAL Bilateral 2019   • COLONOSCOPY  08/15/2010   • COLONOSCOPY N/A 7/20/2021    Procedure: COLONOSCOPY TO CECUM;  Surgeon: Ellen Kothari MD;  Location: [facility] ENDOSCOPY;  Service: Gastroenterology;  Laterality: N/A;  pre: SCREENING FOR COLON CANCER  post: HEMORRHOIDS, DIVERTICULOSIS, TORTUOUS COLON   • ENDOSCOPY N/A 7/20/2021    Procedure: ESOPHAGOGASTRODUODENOSCOPY WITH BX'S;  Surgeon: Ellen Kothari MD;  Location: [facility] ENDOSCOPY;  Service: Gastroenterology;  Laterality: N/A;  pre: IRON DEFICIENCY ANEMIA  post: GASTRITIS, SMALL HIATAL HERNIA   • EYE SURGERY     • HYSTERECTOMY  1982   • JOINT REPLACEMENT  July 10, 2013    Total Hip Replacement   • REPLACEMENT TOTAL KNEE Left 08/06/2018    Dr. Bishnu Larson   • REPLACEMENT TOTAL KNEE Right     [facility]    • TONSILLECTOMY  1967   • TOTAL HIP ARTHROPLASTY Right 2013   • UPPER GASTROINTESTINAL ENDOSCOPY  07/20/2021       SLP Recommendation and Plan  SLP Swallowing Diagnosis: (P) swallow WFL (06/13/22 1130)  SLP Diet Recommendation: (P) regular textures, thin liquids (06/13/22 1130)  Recommended Precautions and Strategies: (P) upright posture during/after eating, small bites of food and sips of liquid (06/13/22 1130)  SLP Rec.  "for Method of Medication Administration: (P) as tolerated (06/13/22 1130)     Monitor for Signs of Aspiration: (P) notify SLP if any concerns (06/13/22 1130)  Recommended Diagnostics: (P) No further SLP services recommended (06/13/22 1130)  Swallow Criteria for Skilled Therapeutic Interventions Met: (P) no problems identified which require skilled intervention (06/13/22 1130)  Anticipated Discharge Disposition (SLP): (P) unknown (06/13/22 1130)     Therapy Frequency (Swallow): (P) evaluation only (06/13/22 1130)  Predicted Duration Therapy Intervention (Days): (P) until discharge (06/13/22 1130)                                  Plan of Care Reviewed With: (P) patient  Outcome Evaluation: (P) Pt was seen for CSE d/t complaints of swallow not \"feeling right\". Piecemeal deglutition noted with puree, mech soft, mixed consistency, and regular. No oral residue observed. No overt s/s of aspiration with any consistency tested. Laryngeal elevation appeared timely and with adequate strength. Slight facial asymmetry noted. REC regular diet, thin liquids. Take medication as tolerated. Upright with all po.      SWALLOW EVALUATION (last 72 hours)     SLP Adult Swallow Evaluation     Row Name 06/13/22 1130                   Rehab Evaluation    Document Type evaluation (P)   -        Subjective Information no complaints (P)   -        Patient Observations alert;cooperative (P)   -        Patient Effort excellent (P)   -        Symptoms Noted During/After Treatment none (P)   -                  General Information    Patient Profile Reviewed yes (P)   -        Pertinent History Of Current Problem SISSY, DM2, HTN, CHF, COPD, thyroid goiter, mass effect on trachea (P)   -HM        Current Method of Nutrition regular textures;thin liquids (P)   -        Precautions/Limitations, Vision WFL;for purposes of eval (P)   -HM        Precautions/Limitations, Hearing WFL;for purposes of eval (P)   -        Prior Level of " "Function-Communication unknown (P)   -        Prior Level of Function-Swallowing no diet consistency restrictions (P)   -        Plans/Goals Discussed with patient;agreed upon (P)   -        Barriers to Rehab none identified (P)   -        Patient's Goals for Discharge patient did not state (P)   -HM                  Oral Motor Structure and Function    Dentition Assessment natural, present and adequate (P)   -        Secretion Management WNL/WFL (P)   -        Mucosal Quality moist, healthy (P)   -                  General Eating/Swallowing Observations    Eating/Swallowing Skills self-fed;fed by SLP (P)   -        Positioning During Eating upright in bed (P)   -        Utensils Used spoon;cup;straw (P)   -        Consistencies Trialed ice chips;thin liquids;pureed;mixed consistency;regular textures;soft textures (P)   -                  Respiratory    Respiratory Status room air (P)   -                  Clinical Swallow Eval    Oral Prep Phase WFL (P)   -HM        Oral Transit WFL (P)   -        Oral Residue WFL (P)   -        Pharyngeal Phase no overt signs/symptoms of pharyngeal impairment (P)   -HM        Esophageal Phase unremarkable (P)   -HM        Clinical Swallow Evaluation Summary Pt was seen for CSE d/t complaints of swallow not \"feeling right\". Piecemeal deglutition noted with puree, mech soft, mixed consistency, and regular. No oral residue observed. No overt s/s of aspiration with any consistency tested. Laryngeal elevation appeared timely and with adequate strength. Slight facial asymmetry noted. (P)   -                  SLP Evaluation Clinical Impression    SLP Swallowing Diagnosis swallow WFL (P)   -        Functional Impact no impact on function (P)   -        Swallow Criteria for Skilled Therapeutic Interventions Met no problems identified which require skilled intervention (P)   -                  Recommendations    Therapy Frequency (Swallow) evaluation only " (P)   -        Predicted Duration Therapy Intervention (Days) until discharge (P)   -        SLP Diet Recommendation regular textures;thin liquids (P)   -        Recommended Diagnostics No further SLP services recommended (P)   -        Recommended Precautions and Strategies upright posture during/after eating;small bites of food and sips of liquid (P)   -        Oral Care Recommendations Oral Care BID/PRN (P)   -        SLP Rec. for Method of Medication Administration as tolerated (P)   -        Monitor for Signs of Aspiration notify SLP if any concerns (P)   -        Anticipated Discharge Disposition (SLP) unknown (P)   -              User Key  (r) = Recorded By, (t) = Taken By, (c) = Cosigned By    Initials Name Effective Dates     Candido Vasquez Speech Therapy Student 06/06/22 -                 EDUCATION  The patient has been educated in the following areas:   Dysphagia (Swallowing Impairment).              Time Calculation:    Time Calculation- SLP     Row Name 06/13/22 1451             Time Calculation- SLP    SLP Start Time 1130 (P)   -      SLP Received On 06/13/22 (P)   -              Untimed Charges    [id number] Eval Oral Pharyng Swallow Minutes 60 (P)   -              Total Minutes    Untimed Charges Total Minutes 60 (P)   -HM       Total Minutes 60 (P)   -            User Key  (r) = Recorded By, (t) = Taken By, (c) = Cosigned By    Initials Name Provider Type     Candido Vasquez Speech Therapy Student SLP Student                Therapy Charges for Today     Code Description Service Date Service Provider Modifiers Qty    [id number] HC ST EVAL ORAL PHARYNG SWALLOW 4 6/13/2022 Candido Vasquez Speech Therapy Student GN 1               CANDIDO VASQUEZ Speech Therapy Student  6/13/2022

## 2022-06-13 NOTE — CASE MANAGEMENT/SOCIAL WORK
Discharge Planning Assessment  [facility]     Patient Name: Dejah Casey  MRN: [id number]  Today's Date: 6/13/2022    Admit Date: 6/11/2022     Discharge Needs Assessment     Row Name 06/13/22 1603       Living Environment    People in Home alone    Current Living Arrangements home    Potentially Unsafe Housing Conditions other (see comments)  no concerns    Primary Care Provided by self    Provides Primary Care For no one    Family Caregiver if Needed child(anthony), adult    Quality of Family Relationships helpful;involved;supportive    Able to Return to Prior Arrangements yes       Resource/Environmental Concerns    Resource/Environmental Concerns none       Transition Planning    Patient/Family Anticipates Transition to home with family    Patient/Family Anticipated Services at Transition none    Transportation Anticipated family or friend will provide       Discharge Needs Assessment    Readmission Within the Last 30 Days no previous admission in last 30 days    Current Outpatient/Agency/Support Group homecare agency;skilled nursing facility    Equipment Currently Used at Home cane, straight;walker, rolling    Concerns to be Addressed discharge planning    Anticipated Changes Related to Illness none    Equipment Needed After Discharge none    Outpatient/Agency/Support Group Needs homecare agency;skilled nursing facility    Discharge Facility/Level of Care Needs home with home health;nursing facility, skilled               Discharge Plan     Row Name 06/13/22 1604       Plan    Plan Follow PT/OT evals to assess discharge needs    Plan Comments CCP spoke with patient’s son, Galileo Casey. Patient’s son placed CCP on speaker phone with his wife, Ramona present. CCP role explained and discharge planning discussed. Face sheet verified and IMM noted. Patient’s PCP is Dr. Talamantes. Patient lives alone, with elevator access. Patient has grab bars and shower chair present in the bathroom. Patient has a walker and cane but  is typically independent with her ADLs at home. Patient has not used home health. Patient’s son believes she has been to rehab at [facility] in the past. Patient’s son hopes for patient to return home at discharge. CCP discussed PT eval pending and will discuss recommendations with patient and patient’s family. Brooklynn PATEL              Continued Care and Services - Admitted Since 6/11/2022    Coordination has not been started for this encounter.          Demographic Summary     Row Name 06/13/22 1602       General Information    Admission Type inpatient    Arrived From emergency department    Required Notices Provided Important Message from Medicare    Referral Source admission list    Reason for Consult discharge planning    Preferred Language English               Functional Status     Row Name 06/13/22 1602       Functional Status    Usual Activity Tolerance good       Functional Status, IADL    Medications independent    Meal Preparation independent    Housekeeping independent    Laundry independent    Shopping independent       Mental Status    General Appearance WDL WDL               Psychosocial    No documentation.                Abuse/Neglect    No documentation.                Legal    No documentation.                Substance Abuse    No documentation.                Patient Forms    No documentation.                   JORGE Swan

## 2022-06-13 NOTE — PLAN OF CARE
"Goal Outcome Evaluation:  Plan of Care Reviewed With: (P) patient           Outcome Evaluation: (P) Pt was seen for CSE d/t complaints of swallow not \"feeling right\". Piecemeal deglutition noted with puree, mech soft, mixed consistency, and regular. No oral residue observed. No overt s/s of aspiration with any consistency tested. Laryngeal elevation appeared timely and with adequate strength. Slight facial asymmetry noted. REC regular diet, thin liquids. Take medication as tolerated. Upright with all po.  "

## 2022-06-14 ENCOUNTER — READMISSION MANAGEMENT (OUTPATIENT)
Dept: CALL CENTER | Facility: HOSPITAL | Age: 77
End: 2022-06-14

## 2022-06-14 ENCOUNTER — APPOINTMENT (OUTPATIENT)
Dept: CARDIOLOGY | Facility: HOSPITAL | Age: 77
End: 2022-06-14

## 2022-06-14 VITALS
WEIGHT: 172 LBS | RESPIRATION RATE: 20 BRPM | HEART RATE: 79 BPM | OXYGEN SATURATION: 100 % | DIASTOLIC BLOOD PRESSURE: 71 MMHG | TEMPERATURE: 98.4 F | HEIGHT: 61 IN | SYSTOLIC BLOOD PRESSURE: 124 MMHG | BODY MASS INDEX: 32.47 KG/M2

## 2022-06-14 PROBLEM — R13.10 DYSPHAGIA: Status: RESOLVED | Noted: 2022-06-12 | Resolved: 2022-06-14

## 2022-06-14 PROBLEM — J96.01 ACUTE HYPOXEMIC RESPIRATORY FAILURE: Status: ACTIVE | Noted: 2022-06-14

## 2022-06-14 PROBLEM — I63.9 EMBOLIC STROKE: Status: ACTIVE | Noted: 2022-06-14

## 2022-06-14 PROBLEM — J96.01 ACUTE HYPOXEMIC RESPIRATORY FAILURE (HCC): Status: RESOLVED | Noted: 2022-06-12 | Resolved: 2022-06-14

## 2022-06-14 PROBLEM — E87.3 ACUTE RESPIRATORY ALKALOSIS: Status: RESOLVED | Noted: 2022-06-14 | Resolved: 2022-06-14

## 2022-06-14 LAB
CENTROMERE B AB SER-ACNC: <0.2 AI (ref 0–0.9)
CHOLEST SERPL-MCNC: 136 MG/DL (ref 0–200)
CHROMATIN AB SERPL-ACNC: <0.2 AI (ref 0–0.9)
DSDNA AB SER-ACNC: <1 IU/ML (ref 0–9)
ENA JO1 AB SER-ACNC: <0.2 AI (ref 0–0.9)
ENA RNP AB SER-ACNC: <0.2 AI (ref 0–0.9)
ENA SCL70 AB SER-ACNC: <0.2 AI (ref 0–0.9)
ENA SM AB SER-ACNC: <0.2 AI (ref 0–0.9)
ENA SS-A AB SER-ACNC: <0.2 AI (ref 0–0.9)
ENA SS-B AB SER-ACNC: <0.2 AI (ref 0–0.9)
GLUCOSE BLDC GLUCOMTR-MCNC: 140 MG/DL (ref 70–130)
GLUCOSE BLDC GLUCOMTR-MCNC: 85 MG/DL (ref 70–130)
HDLC SERPL-MCNC: 63 MG/DL (ref 40–60)
LDLC SERPL CALC-MCNC: 61 MG/DL (ref 0–100)
LDLC/HDLC SERPL: 0.99 {RATIO}
Lab: NORMAL
RPR SER QL: NON REACTIVE
TRIGL SERPL-MCNC: 54 MG/DL (ref 0–150)
VLDLC SERPL-MCNC: 12 MG/DL (ref 5–40)

## 2022-06-14 PROCEDURE — 93246 EXT ECG>7D<15D RECORDING: CPT

## 2022-06-14 PROCEDURE — G0378 HOSPITAL OBSERVATION PER HR: HCPCS

## 2022-06-14 PROCEDURE — 80061 LIPID PANEL: CPT | Performed by: HOSPITALIST

## 2022-06-14 PROCEDURE — 97162 PT EVAL MOD COMPLEX 30 MIN: CPT

## 2022-06-14 PROCEDURE — 99212 OFFICE O/P EST SF 10 MIN: CPT | Performed by: PSYCHIATRY & NEUROLOGY

## 2022-06-14 PROCEDURE — 97530 THERAPEUTIC ACTIVITIES: CPT

## 2022-06-14 PROCEDURE — 82962 GLUCOSE BLOOD TEST: CPT

## 2022-06-14 RX ORDER — CLONAZEPAM 0.5 MG/1
0.5 TABLET ORAL NIGHTLY
Qty: 3 TABLET | Refills: 0 | Status: SHIPPED | OUTPATIENT
Start: 2022-06-14 | End: 2022-06-22 | Stop reason: SDUPTHER

## 2022-06-14 RX ORDER — ATORVASTATIN CALCIUM 20 MG/1
20 TABLET, FILM COATED ORAL NIGHTLY
Qty: 30 TABLET | Refills: 1 | Status: SHIPPED | OUTPATIENT
Start: 2022-06-14 | End: 2022-06-22 | Stop reason: SDUPTHER

## 2022-06-14 RX ADMIN — POTASSIUM CHLORIDE 40 MEQ: 1.5 POWDER, FOR SOLUTION ORAL at 08:56

## 2022-06-14 RX ADMIN — FOLIC ACID 1000 MCG: 1 TABLET ORAL at 08:47

## 2022-06-14 RX ADMIN — CYCLOSPORINE 1 DROP: 0.5 EMULSION OPHTHALMIC at 08:47

## 2022-06-14 RX ADMIN — PILOCARPINE HYDRCHLORIDE 5 MG: 5 TABLET, FILM COATED ORAL at 08:47

## 2022-06-14 RX ADMIN — MIRABEGRON 50 MG: 25 TABLET, FILM COATED, EXTENDED RELEASE ORAL at 08:47

## 2022-06-14 RX ADMIN — METOPROLOL SUCCINATE 50 MG: 50 TABLET, EXTENDED RELEASE ORAL at 08:47

## 2022-06-14 RX ADMIN — APIXABAN 5 MG: 5 TABLET, FILM COATED ORAL at 08:47

## 2022-06-14 RX ADMIN — AMLODIPINE BESYLATE 5 MG: 5 TABLET ORAL at 08:47

## 2022-06-14 NOTE — PLAN OF CARE
Goal Outcome Evaluation:           Progress: improving   Patient will be dc'd via wc to family car to go home.  DC instructions given with a clear understanding.  IV removed and bedside belongings sent home.

## 2022-06-14 NOTE — PROGRESS NOTES
Patient Identification:  NAME:  Dejah Casey  Age:  77 y.o.   Sex:  female   :  1945   MRN:  [id number]       Chief complaint: Subacute bihemispheric embolic strokes    History of present illness: She looks great today and has no focal neurologic complaints at all.  I told her the echocardiogram was normal but the MRI scan does show bihemispheric subacute strokes for which she has been started on Eliquis.  She remains in a normal sinus rhythm      Past medical history:  Past Medical History:   Diagnosis Date   • Allergic     Seasonal alleries, Lisinopril, Dexamethasone   • Anxiety    • ASCVD (arteriosclerotic cardiovascular disease)    • CAD (coronary artery disease)    • Cataract     Cararacts in both eyes.   • Contact dermatitis    • DDD (degenerative disc disease), lumbar    • Depression    • Diabetes mellitus (HCC)    • Dry eyes    • Dry mouth    • Essential hypertension    • Female climacteric state    • Headache    • History of mammogram     2016   • History of total right hip replacement    • Hypercalcemia    • Hyperlipidemia    • Iron deficiency anemia 2021    Added automatically from request for surgery [id number]   • Low back pain     Lumbar area.   • Neuromuscular disorder (HCC) 2016   • Nightmares REM-sleep type    • Nonocclusive coronary atherosclerosis of native coronary artery     cath in  with 30% LAD disease   • Nontoxic multinodular goiter    • Obesity    • Osteoarthritis     both knees, back and hips   • Osteopenia    • Pain, joint, shoulder    • Pernicious anemia    • Polycythemia    • Rotator cuff tendinitis    • Seasonal allergies    • Sleep apnea     CPAP nightly   • Snoring    • Superficial phlebitis    • Type 2 diabetes mellitus (HCC)     Type II   • Vitiligo        Allergies:  Hydrochlorothiazide, Dexamethasone, and Lisinopril    Home medications:  Medications Prior to Admission   Medication Sig Dispense Refill Last Dose   • amLODIPine (NORVASC) 5 MG tablet Take  1 tablet by mouth Daily for 180 days. 90 tablet 1 6/11/2022 at Unknown time   • aspirin 81 MG tablet Take 81 mg by mouth daily.   6/11/2022 at Unknown time   • atorvastatin (LIPITOR) 10 MG tablet Take 1 tablet by mouth Every Night. 90 tablet 2    • clonazePAM (KlonoPIN) 2 MG tablet Take 2 mg by mouth Every Night.      • Durezol 0.05 % ophthalmic emulsion Administer 1 drop to both eyes Daily As Needed.      • fluticasone (FLONASE) 50 MCG/ACT nasal spray 1 spray into the nostril(s) as directed by provider As Needed.      • folic acid (FOLVITE) 1 MG tablet TAKE 1 TABLET BY MOUTH EVERY DAY 90 tablet 3    • metFORMIN (GLUCOPHAGE) 500 MG tablet Take 1 tablet by mouth 2 (Two) Times a Day With Meals for 180 days. 180 tablet 1    • metoprolol succinate XL (TOPROL-XL) 50 MG 24 hr tablet Take 1 tablet by mouth Daily for 180 days. 90 tablet 1    • Mirabegron ER (MYRBETRIQ) 50 MG tablet sustained-release 24 hour 24 hr tablet Take 50 mg by mouth Daily.      • pilocarpine (SALAGEN) 5 MG tablet 5 mg 3 (Three) Times a Day.      • potassium chloride (KAYCIEL) 20 mEq/15 mL solution MIX 15 ML IN 4 OUNCES OF LIQUID AND DRINK DAILY 1350 mL 1    • Restasis 0.05 % ophthalmic emulsion 2 (two) times a day.      • STIOLTO RESPIMAT 2.5-2.5 MCG/ACT aerosol solution inhaler 2 puffs As Needed.      • Accu-Chek FastClix Lancets misc Use to check blood sugar once daily 102 each 5    • Calcium Carb-Cholecalciferol 500-200 MG-UNIT tablet Take 2 tablets by mouth daily.      • Cyanocobalamin ([employer] Vitamin B-12) 5000 MCG sublingual tablet Place 5,000 mcg under the tongue Daily. 90 tablet 3    • ferrous sulfate 325 (65 FE) MG tablet TAKE 1 TABLET BY MOUTH EVERY DAY WITH BREAKFAST 90 tablet 3    • furosemide (LASIX) 40 MG tablet Take 1 tablet by mouth Daily. 90 tablet 3    • glucose blood (Accu-Chek Catherine Plus) test strip Use as instructed 360 each 1    • Lancets Misc. (Accu-Chek FastClix Lancet) kit 1 kit Daily. 1 kit 0    • venlafaxine (EFFEXOR) 75 MG  tablet Take 2 tablets by mouth 2 (Two) Times a Day for 180 days. (Patient taking differently: Take 150 mg by mouth 2 (Two) Times a Day. 1 in am 2 after supper) 360 tablet 1         Hospital medications:  amLODIPine, 5 mg, Oral, Daily  apixaban, 5 mg, Oral, Q12H  atorvastatin, 40 mg, Oral, Nightly  clonazePAM, 0.5 mg, Oral, Nightly  cycloSPORINE, 1 drop, Both Eyes, BID  folic acid, 1,000 mcg, Oral, Daily  insulin lispro, 0-9 Units, Subcutaneous, 4x Daily With Meals & Nightly  metoprolol succinate XL, 50 mg, Oral, Daily  Mirabegron ER, 50 mg, Oral, Daily  pilocarpine, 5 mg, Oral, TID         •  acetaminophen **OR** acetaminophen **OR** acetaminophen  •  aluminum-magnesium hydroxide-simethicone  •  dextrose  •  dextrose  •  fluticasone  •  glucagon (human recombinant)  •  nitroglycerin  •  ondansetron **OR** ondansetron  •  potassium chloride  •  potassium chloride  •  [COMPLETED] Insert peripheral IV **AND** sodium chloride  •  sodium chloride      Objective:  Vitals Ranges:   Temp:  [97.6 °F (36.4 °C)-98.5 °F (36.9 °C)] 97.6 °F (36.4 °C)  Heart Rate:  [69-76] 71  Resp:  [20] 20  BP: (114-138)/(67-86) 133/81      Physical Exam: Awake alert oriented x3 normal visual fields.  Fund of knowledge good extremities no cyanosis or edema.  Good  strength no pronator drift.  Reflexes trace throughout symmetrical toes downgoing bilaterally.  Normal cranial nerves II through VII tongue is midline    Results review:   I reviewed the patient's new clinical results.    Data review:  Lab Results (last 24 hours)     Procedure Component Value Units Date/Time    MELIZA Comprehensive Panel [[id number]] Collected: 06/13/22 1121    Specimen: Blood Updated: 06/14/22 1214     Anti-DNA (DS) Ab Qn <1 IU/mL      Comment:                                    Negative      <5                                     Equivocal  5 - 9                                     Positive      >9        RNP Antibodies <0.2 AI      Payton Antibodies <0.2 AI       Antiscleroderma-70 Antibodies <0.2 AI      JOSE ENRIQUE SSA (RO) Ab <0.2 AI      JOSE ENRIQUE SSB (LA) Ab <0.2 AI      Antichromatin Antibodies <0.2 AI      ROCIO-1 IgG <0.2 AI      Anti-Centromere B Antibodies <0.2 AI      See below: Comment     Comment: Autoantibody                       Disease Association  ------------------------------------------------------------                          Condition                  Frequency  ---------------------   ------------------------   ---------  Antinuclear Antibody,    SLE, mixed connective  Direct (MELIZA-D)           tissue diseases  ---------------------   ------------------------   ---------  dsDNA                    SLE                        40 - 60%  ---------------------   ------------------------   ---------  Chromatin                Drug induced SLE                90%                           SLE                        48 - 97%  ---------------------   ------------------------   ---------  SSA (Ro)                 SLE                        25 - 35%                           Sjogren's Syndrome         40 - 70%                            Lupus                 100%  ---------------------   ------------------------   ---------  SSB (La)                 SLE                             10%                           Sjogren's Syndrome              30%  ---------------------   -----------------------    ---------  Sm (anti-Smith)          SLE                        15 - 30%  ---------------------   -----------------------    ---------  RNP                      Mixed Connective Tissue                           Disease                         95%  (U1 nRNP,                SLE                        30 - 50%  anti-ribonucleoprotein)  Polymyositis and/or                           Dermatomyositis                 20%  ---------------------   ------------------------   ---------  Scl-70 (antiDNA          Scleroderma (diffuse)      20 - 35%  topoisomerase)           Crest                            13%  ---------------------   ------------------------   ---------  Shantelle-1                     Polymyositis and/or                           Dermatomyositis            20 - 40%  ---------------------   ------------------------   ---------  Centromere B             Scleroderma - Crest                           variant                         80%       Narrative:      Performed at:  [address] [facility]  [id number]  : Alden Redding PhD, Phone:  [phone]    POC Glucose Once [[id number]]  (Abnormal) Collected: 06/14/22 1109    Specimen: Blood Updated: 06/14/22 1111     Glucose 140 mg/dL      Comment: RN Notified R and V Meter: [id number] : [id number] Baldev THOMPSON       Lipid Panel [[id number]]  (Abnormal) Collected: 06/14/22 0747    Specimen: Blood Updated: 06/14/22 0920     Total Cholesterol 136 mg/dL      Triglycerides 54 mg/dL      HDL Cholesterol 63 mg/dL      LDL Cholesterol  61 mg/dL      VLDL Cholesterol 12 mg/dL      LDL/HDL Ratio 0.99    Narrative:      Cholesterol Reference Ranges  (U.S. Department of Health and Human Services ATP III Classifications)    Desirable          <200 mg/dL  Borderline High    200-239 mg/dL  High Risk          >240 mg/dL      Triglyceride Reference Ranges  (U.S. Department of Health and Human Services ATP III Classifications)    Normal           <150 mg/dL  Borderline High  150-199 mg/dL  High             200-499 mg/dL  Very High        >500 mg/dL    HDL Reference Ranges  (U.S. Department of Health and Human Services ATP III Classifications)    Low     <40 mg/dl (major risk factor for CHD)  High    >60 mg/dl ('negative' risk factor for CHD)        LDL Reference Ranges  (U.S. Department of Health and Human Services ATP III Classifications)    Optimal          <100 mg/dL  Near Optimal     100-129 mg/dL  Borderline High  130-159 mg/dL  High             160-189 mg/dL  Very High        >189 mg/dL    RPR, Rfx Qn RPR / Confirm TP  [[id number]] Collected: 06/13/22 1121    Specimen: Blood Updated: 06/14/22 0629     RPR Non Reactive    Narrative:      Performed at:  [address] [facility]  [id number]  : Alden Redding PhD, Phone:  [phone]    POC Glucose Once [[id number]]  (Normal) Collected: 06/14/22 0609    Specimen: Blood Updated: 06/14/22 0611     Glucose 85 mg/dL      Comment: Meter: [id number] : [id number] Archie THOMPSON       POC Glucose Once [[id number]]  (Normal) Collected: 06/13/22 2234    Specimen: Blood Updated: 06/13/22 2237     Glucose 111 mg/dL      Comment: Meter: [id number] : AMANDA Caruso RN       POC Glucose Once [[id number]]  (Normal) Collected: 06/13/22 1850    Specimen: Blood Updated: 06/13/22 1852     Glucose 102 mg/dL      Comment: Meter: [id number] : [id number] Opal THOMPSON       Vitamin B12 [[id number]]  (Abnormal) Collected: 06/13/22 1121    Specimen: Blood Updated: 06/13/22 1405     Vitamin B-12 >2,000 pg/mL     Narrative:      Results may be falsely increased if patient taking Biotin.      Basic Metabolic Panel [[id number]]  (Abnormal) Collected: 06/13/22 1121    Specimen: Blood Updated: 06/13/22 1311     Glucose 106 mg/dL      BUN 8 mg/dL      Creatinine 0.68 mg/dL      Sodium 144 mmol/L      Potassium 3.1 mmol/L      Chloride 107 mmol/L      CO2 24.0 mmol/L      Calcium 9.3 mg/dL      BUN/Creatinine Ratio 11.8     Anion Gap 13.0 mmol/L      eGFR 89.3 mL/min/1.73      Comment: National Kidney Foundation and American Society of Nephrology (ASN) Task Force recommended calculation based on the Chronic Kidney Disease Epidemiology Collaboration (CKD-EPI) equation refit without adjustment for race.       Narrative:      GFR Normal >60  Chronic Kidney Disease <60  Kidney Failure <15             Imaging:  Imaging Results (Last 24 Hours)     ** No results found for the last 24 hours. **      PPE worn at all times washed before washed up afterwards  disposed of everything properly was not within 6 feet of her for more than few minutes during my exam no aerosols used at any point    Assessment and Plan:     This patient has bihemispheric subacute areas of stroke.  This is going to be from a cardiac or aortic arch source of embolism and she is now on Eliquis 5 mg p.o. twice daily.  Note that a simple antiplatelet agent is not going to prevent this type of embolic stroke phenomena.  I had a long talk with her about some bleeding risks with the Eliquis or if she ever fell and hurt her self that she should always let someone know or come to the emergency room just because she is on the blood thinner Eliquis.  Having said that, I think she is going to tolerate it very well and it will prevent any further recurrent stroke.  She does not appear to be in A. fib at this time.  The echocardiogram is surprisingly normal for her age without left ventricular hypokinesis.  So, Eliquis and Lipitor and I think she is going to do very very well.    Note that she does appear to be in a normal sinus rhythm and she could have a Zio patch placed at discharge to determine if she is going in and out of A. fib or not.  Note that it may not matter since we are treating her with the Eliquis (which would also be indicated if she was in A. fib).  Neurology will sign off and follow-up as needed.  Thanks      Albert Elizalde MD  06/14/22  12:34 EDT

## 2022-06-14 NOTE — THERAPY EVALUATION
Patient Name: Dejah Casey  : 1945    MRN: [id number]                              Today's Date: 2022       Admit Date: 2022    Visit Dx:     ICD-10-CM ICD-9-CM   1. Acute hypoxemic respiratory failure (HCC)  J96.01 518.81   2. Acute respiratory alkalosis  E87.3 276.3   3. Metabolic alkalosis  E87.3 276.3   4. Confusion  R41.0 298.9   5. Insomnia, unspecified type  G47.00 780.52   6. Cerebrovascular accident (CVA) due to bilateral embolism of posterior cerebral arteries (HCC)  I63.433 434.11     Patient Active Problem List   Diagnosis   • Essential hypertension   • Other hyperlipidemia   • Overactive bladder   • Osteoarthritis   • Type 2 diabetes mellitus without complication, without long-term current use of insulin (HCC)   • Vitamin D deficiency   • Nontoxic multinodular goiter   • Osteopenia   • Chronic depression   • Obstructive sleep apnea on CPAP   • Osteoarthritis of spine with radiculopathy, lumbar region   • Chronic venous insufficiency   • Tracheal cyst   • Nonocclusive coronary atherosclerosis of native coronary artery   • Iron deficiency   • Vitamin B12 deficiency   • Weight loss   • Diastolic dysfunction   • Primary osteoarthritis of right knee   • Primary osteoarthritis of left knee   • Grief reaction with prolonged bereavement   • Constipation   • Embolic stroke (HCC)   • Acute hypoxemic respiratory failure (HCC)     Past Medical History:   Diagnosis Date   • Allergic     Seasonal alleries, Lisinopril, Dexamethasone   • Anxiety    • ASCVD (arteriosclerotic cardiovascular disease)    • CAD (coronary artery disease)    • Cataract     Cararacts in both eyes.   • Contact dermatitis    • DDD (degenerative disc disease), lumbar    • Depression    • Diabetes mellitus (HCC)    • Dry eyes    • Dry mouth    • Essential hypertension    • Female climacteric state    • Headache    • History of mammogram     2016   • History of total right hip replacement    • Hypercalcemia    •  Hyperlipidemia    • Iron deficiency anemia 5/27/2021    Added automatically from request for surgery [id number]   • Low back pain     Lumbar area.   • Neuromuscular disorder (HCC) March 2016   • Nightmares REM-sleep type    • Nonocclusive coronary atherosclerosis of native coronary artery     cath in 2008 with 30% LAD disease   • Nontoxic multinodular goiter    • Obesity    • Osteoarthritis     both knees, back and hips   • Osteopenia    • Pain, joint, shoulder    • Pernicious anemia    • Polycythemia    • Rotator cuff tendinitis    • Seasonal allergies    • Sleep apnea     CPAP nightly   • Snoring    • Superficial phlebitis    • Type 2 diabetes mellitus (HCC)     Type II   • Vitiligo      Past Surgical History:   Procedure Laterality Date   • BREAST BIOPSY     • BREAST LUMPECTOMY  1973    benign   • CARDIAC CATHETERIZATION  2008   • CATARACT EXTRACTION, BILATERAL Bilateral 2019   • COLONOSCOPY  08/15/2010   • COLONOSCOPY N/A 7/20/2021    Procedure: COLONOSCOPY TO CECUM;  Surgeon: Ellen Kothari MD;  Location: [facility] ENDOSCOPY;  Service: Gastroenterology;  Laterality: N/A;  pre: SCREENING FOR COLON CANCER  post: HEMORRHOIDS, DIVERTICULOSIS, TORTUOUS COLON   • ENDOSCOPY N/A 7/20/2021    Procedure: ESOPHAGOGASTRODUODENOSCOPY WITH BX'S;  Surgeon: Ellen Kothari MD;  Location: [facility] ENDOSCOPY;  Service: Gastroenterology;  Laterality: N/A;  pre: IRON DEFICIENCY ANEMIA  post: GASTRITIS, SMALL HIATAL HERNIA   • EYE SURGERY     • HYSTERECTOMY  1982   • JOINT REPLACEMENT  July 10, 2013    Total Hip Replacement   • REPLACEMENT TOTAL KNEE Left 08/06/2018    Dr. Bishnu Larson   • REPLACEMENT TOTAL KNEE Right     [facility]    • TONSILLECTOMY  1967   • TOTAL HIP ARTHROPLASTY Right 2013   • UPPER GASTROINTESTINAL ENDOSCOPY  07/20/2021      General Information     Row Name 06/14/22 1256          Physical Therapy Time and Intention    Document Type evaluation  -CH     Mode of Treatment individual therapy;physical therapy  -CH      Row Name 06/14/22 1256          General Information    Prior Level of Function independent:;gait;transfer;bed mobility  walks with a cane but has walker and rollator if needed  -     Existing Precautions/Restrictions fall  -     Barriers to Rehab medically complex  -     Row Name 06/14/22 1256          Living Environment    People in Home alone  -     Row Name 06/14/22 1256          Cognition    Orientation Status (Cognition) oriented x 3  -     Row Name 06/14/22 1256          Safety Issues, Functional Mobility    Impairments Affecting Function (Mobility) balance  -           User Key  (r) = Recorded By, (t) = Taken By, (c) = Cosigned By    Initials Name Provider Type     Karla Espinosa PT Physical Therapist               Mobility     Row Name 06/14/22 1257          Bed Mobility    Bed Mobility supine-sit;sit-supine  -     Supine-Sit [place] (Bed Mobility) standby assist  -     Sit-Supine [place] (Bed Mobility) standby assist  -     Row Name 06/14/22 1257          Sit-Stand Transfer    Sit-Stand [place] (Transfers) verbal cues;nonverbal cues (demo/gesture);contact guard  -     Assistive Device (Sit-Stand Transfers) walker, front-wheeled  -     Row Name 06/14/22 1257          Gait/Stairs (Locomotion)    [place] Level (Gait) verbal cues;nonverbal cues (demo/gesture);contact guard  -     Assistive Device (Gait) walker, front-wheeled  -     Distance in Feet (Gait) 150  -     Deviations/Abnormal Patterns (Gait) clarence decreased;gait speed decreased;stride length decreased  -     Comment, (Gait/Stairs) pt with slow gait, pt ambulated first 20 ft without AD and required Mary while reaching for furniture. Pt with improved balance and gait when walking with walker  -           User Key  (r) = Recorded By, (t) = Taken By, (c) = Cosigned By    Initials Name Provider Type    Karla Lopez PT Physical Therapist               Obj/Interventions     Row Name  06/14/22 1258          Range of Motion Comprehensive    General Range of Motion no range of motion deficits identified  -     Row Name 06/14/22 1258          Strength Comprehensive (MMT)    General Manual Muscle Testing (MMT) Assessment no strength deficits identified  -     Comment, General Manual Muscle Testing (MMT) Assessment B LE and B UE strength grossly 4+/5  -     Row Name 06/14/22 1258          Balance    Balance Assessment standing static balance;standing dynamic balance  -CH     Static Standing Balance contact guard  -CH     Dynamic Standing Balance contact guard  -CH     Position/Device Used, Standing Balance walker, rolling  -CH           User Key  (r) = Recorded By, (t) = Taken By, (c) = Cosigned By    Initials Name Provider Type     Karla Espinosa S, PT Physical Therapist               Goals/Plan     [facility] Name 06/14/22 1305          Bed Mobility Goal 1 (PT)    Activity/Assistive Device (Bed Mobility Goal 1, PT) bed mobility activities, all  -CH     [place] Level/Cues Needed (Bed Mobility Goal 1, PT) supervision required  -CH     Time Frame (Bed Mobility Goal 1, PT) 1 week  -[facility] Name 06/14/22 1305          Transfer Goal 1 (PT)    Activity/Assistive Device (Transfer Goal 1, PT) transfers, all;walker, rolling  -CH     [place] Level/Cues Needed (Transfer Goal 1, PT) supervision required  -CH     Time Frame (Transfer Goal 1, PT) 1 week  -[facility] Name 06/14/22 1304          Gait Training Goal 1 (PT)    Activity/Assistive Device (Gait Training Goal 1, PT) gait (walking locomotion);walker, rolling  -CH     [place] Level (Gait Training Goal 1, PT) supervision required  -     Distance (Gait Training Goal 1, PT) 150  -CH     Time Frame (Gait Training Goal 1, PT) 1 week  -[facility] Name 06/14/22 1305          Therapy Assessment/Plan (PT)    Planned Therapy Interventions (PT) balance training;bed mobility training;gait training;home exercise program;patient/family  education;strengthening;transfer training  -           User Key  (r) = Recorded By, (t) = Taken By, (c) = Cosigned By    Initials Name Provider Type    Karla Lopez, PT Physical Therapist               Clinical Impression     Row Name 06/14/22 1253          Pain    Pretreatment Pain Rating 0/10 - no pain  -     Posttreatment Pain Rating 0/10 - no pain  -     Row Name 06/14/22 1251          Plan of Care Review    Plan of Care Reviewed With patient  -     Outcome Evaluation Pt is a 78 yo F who was admitted with SOA, weakness, AMS, and subacute CVA. Pt presents to PT with some impaired functional mobility and gait secondary to some impaired balance. Pt may benefit from skilled PT to address strength, mobility, and gait. Pt demonstrates improved gait, balance, and safety when using a rolling walker.  -     Row Name 06/14/22 1257          Therapy Assessment/Plan (PT)    Patient/Family Therapy Goals Statement (PT) to return to [facility]  -     Rehab Potential (PT) good, to achieve stated therapy goals  -     Criteria for Skilled Interventions Met (PT) skilled treatment is necessary  -     Therapy Frequency (PT) 5 times/wk  -     Row Name 06/14/22 1257          Positioning and Restraints    Pre-Treatment Position in bed  -     Post Treatment Position bed  -     In Bed supine;call light within reach;encouraged to call for assist;exit alarm on;with family/caregiver  -           User Key  (r) = Recorded By, (t) = Taken By, (c) = Cosigned By    Initials Name Provider Type    Karla Lopez, PT Physical Therapist               Outcome Measures     Row Name 06/14/22 1300          How much help from another person do you currently need...    Turning from your back to your side while in flat bed without using bedrails? 3  -CH     Moving from lying on back to sitting on the side of a flat bed without bedrails? 3  -CH     Moving to and from a bed to a chair (including a wheelchair)? 3  -CH      Standing up from a chair using your arms (e.g., wheelchair, bedside chair)? 3  -CH     Climbing 3-5 steps with a railing? 3  -CH     To walk in hospital room? 3  -CH     AM-PAC 6 Clicks Score (PT) 18  -     Highest level of mobility 6 --> Walked 10 steps or more  -     Row Name 06/14/22 1306          Functional Assessment    Outcome Measure Options AM-PAC 6 Clicks Basic Mobility (PT)  -           User Key  (r) = Recorded By, (t) = Taken By, (c) = Cosigned By    Initials Name Provider Type     Karla Espinosa PT Physical Therapist                             Physical Therapy Education                 Title: PT OT SLP Therapies (Done)     Topic: Physical Therapy (Done)     Point: Mobility training (Done)     Learning Progress Summary           Patient Acceptance, E,TB,D, VU,NR by  at 6/14/2022 1307                   Point: Home exercise program (Done)     Learning Progress Summary           Patient Acceptance, E,TB,D, VU,NR by  at 6/14/2022 1307                   Point: Body mechanics (Done)     Learning Progress Summary           Patient Acceptance, E,TB,D, VU,NR by  at 6/14/2022 1307                   Point: Precautions (Done)     Learning Progress Summary           Patient Acceptance, E,TB,D, VU,NR by  at 6/14/2022 1307                               User Key     Initials Effective Dates Name Provider Type [facility] 06/16/21 -  Karla Espinosa PT Physical Therapist PT              PT Recommendation and Plan  Planned Therapy Interventions (PT): balance training, bed mobility training, gait training, home exercise program, patient/family education, strengthening, transfer training  Plan of Care Reviewed With: patient  Outcome Evaluation: Pt is a 76 yo F who was admitted with SOA, weakness, AMS, and subacute CVA. Pt presents to PT with some impaired functional mobility and gait secondary to some impaired balance. Pt may benefit from skilled PT to address strength, mobility, and gait. Pt  demonstrates improved gait, balance, and safety when using a rolling walker.     Time Calculation:    PT Charges     Row Name 06/14/22 1308             Time Calculation    Start Time 0950  -      Stop Time 1002  -      Time Calculation (min) 12 min  -      PT Received On 06/14/22  -      PT - Next Appointment 06/15/22  -      PT Goal Re-Cert Due Date 06/21/22  -              Time Calculation- PT    Total Timed Code Minutes- PT 8 minute(s)  -              Timed Charges    06730 - PT Therapeutic Activity Minutes 8  -CH              Total Minutes    Timed Charges Total Minutes 8  -CH       Total Minutes 8  -CH            User Key  (r) = Recorded By, (t) = Taken By, (c) = Cosigned By    Initials Name Provider Type     Karla Espinosa, PT Physical Therapist              Therapy Charges for Today     Code Description Service Date Service Provider Modifiers Qty    17927282471  PT THERAPEUTIC ACT EA 15 MIN 6/14/2022 Karla Espinosa, PT GP 1    [id number]  PT EVAL MOD COMPLEXITY 2 6/14/2022 Karla Espinosa, PT GP 1          PT G-Codes  Outcome Measure Options: AM-PAC 6 Clicks Basic Mobility (PT)  AM-PAC 6 Clicks Score (PT): 18    Karla Espinosa PT  6/14/2022

## 2022-06-14 NOTE — CASE MANAGEMENT/SOCIAL WORK
Continued Stay Note  [facility]     Patient Name: Dejah Casey  MRN: [id number]  Today's Date: 6/14/2022    Admit Date: 6/11/2022     Discharge Plan     Row Name 06/14/22 1048       Plan    Plan Home w/ HH (pending), family to transport    Patient/Family in Agreement with Plan yes    Plan Comments CSW spoke to patient at bedside regarding dc plan. Plan is home w/ HH, family to transport. Patient is agreeable to referral to [facility]. Patient denies any DME needs at this time. CCP to make sure patient has accepting HH agency prior to d/c. HAILY CSW               Discharge Codes    No documentation.                     JORGE ASHTON

## 2022-06-14 NOTE — OUTREACH NOTE
Prep Survey    Flowsheet Row Responses   [facility] patient discharged from? [place]   Is LACE score < 7 ? No   Emergency Room discharge w/ pulse ox? No   Eligibility [facility]   Date of Admission 06/11/22   Date of Discharge 06/14/22   Discharge Disposition Home or Self Care   Discharge diagnosis Embolic stroke    Does the patient have one of the following disease processes/diagnoses(primary or secondary)? Stroke (TIA)   Does the patient have Home health ordered? Yes   What is the Home health agency?  [facility]   Is there a DME ordered? No   Prep survey completed? Yes          MANOHAR ASH - Registered Nurse

## 2022-06-14 NOTE — DISCHARGE SUMMARY
Patient Name: Dejah Casey  : 1945  MRN: [id number]    Date of Admission: 2022  Date of Discharge:  2022  Primary Care Physician: Reji Talamantes MD      Chief Complaint:   Shortness of Breath      Discharge Diagnoses     Active Hospital Problems    Diagnosis  POA   • **Embolic stroke (HCC) [I63.9]  Yes   • Obstructive sleep apnea on CPAP [G47.33, Z99.89]  Not Applicable   • Chronic depression [F32.A]  Yes   • Essential hypertension [I10]  Yes      Resolved Hospital Problems    Diagnosis Date Resolved POA   • Acute respiratory alkalosis [E87.3] 2022 Yes   • Acute hypoxemic respiratory failure (HCC) [J96.01] 2022 Yes   • Dysphagia [R13.10] 2022 Yes        Hospital Course     Ms. Casey is a 77 y.o. female with a history of SISSY, diastolic CHF and hypertension who presented to [facility] initially complaining of shortness of breath.  Please see the admitting history and physical for further details.  Very unusual presentation where she was apparently hypoxic on arrival of EMS and ABG initially here showed a severe respiratory alkalosis.  She was apparently anxious and hyperventilating in ER.  At the time I saw her the following morning she was saturating in the high 90s on room air with essentially no intervention.  Her chest x-ray and CTA chest were negative.  I never found any reason for her to be hypoxic and she never became hypoxic again throughout the hospitalization.  However she was somewhat confused and seemed to have difficulty completing thoughts or even doing menial tasks like eating on my evaluation.  Because of this I initiated a stroke work-up and performed an MRI which did show subacute appearing stroke bilaterally.  There were more chronic foci of infarction within the left centrum semiovale, corona radiata and left temporal lobe.  MRA did not show any large vessel occlusions.  Carotid Dopplers were negative.  Echocardiogram showed no PFO and only  some mild LVH and diastolic dysfunction.  She was seen by neurology who recommended starting Eliquis for possible cardioembolic source.  We will send her home with a Zio patch.  Her mentation has improved.  She was able to converse with me much better today than she was the last couple days.  She had complained of some dysphagia on admission but swallow evaluation was negative with speech therapy.  She appears to be stable for discharge with home health to follow.  We will send her home with Eliquis and no aspirin per neurology recommendations.  She will follow-up with them and her PCP.  I discussed all of this with her son as well.  I also recommended to him that they decrease her Klonopin dose which was 2 mg at night.  She has been getting 0.5 mg here and doing okay.      Day of Discharge     Subjective:  Looks better today    Physical Exam:  Temp:  [97.6 °F (36.4 °C)-98.5 °F (36.9 °C)] 97.6 °F (36.4 °C)  Heart Rate:  [69-76] 71  Resp:  [20] 20  BP: (114-138)/(67-86) 133/81  Body mass index is 32.5 kg/m².  Physical Exam  Vitals and nursing note reviewed.   Constitutional:       General: She is not in acute distress.     Appearance: Normal appearance.   HENT:      Head: Normocephalic and atraumatic.   Eyes:      General: No scleral icterus.  Neck:      Vascular: No JVD.   Cardiovascular:      Rate and Rhythm: Normal rate and regular rhythm.      Pulses: Normal pulses.      Heart sounds: Normal heart sounds. No murmur heard.  Pulmonary:      Effort: Pulmonary effort is normal. No respiratory distress.      Breath sounds: Normal breath sounds.   Abdominal:      General: Bowel sounds are normal. There is no distension.      Palpations: Abdomen is soft.      Tenderness: There is no abdominal tenderness.   Musculoskeletal:         General: No swelling or tenderness.      Cervical back: Neck supple.   Skin:     General: Skin is warm and dry.      Coloration: Skin is not jaundiced.      Findings: No rash.   Neurological:       Mental Status: She is alert and oriented to person, place, and time.      Comments: Strength is equal bilaterally.  No facial droop.  Speech and confusion have improved   Psychiatric:         Mood and Affect: Mood normal.         Behavior: Behavior normal.         Consultants     Consult Orders (all) (From admission, onward)     Start     Ordered    06/12/22 0917  Inpatient Neurology Consult Stroke  Once        Specialty:  Neurology  Provider:  Reji High MD    06/12/22 0917 06/12/22 0802  Inpatient Case Management  Consult  Once        Provider:  (Not yet assigned)    06/12/22 0803    06/12/22 0226  Pulmonology (on-call MD unless specified)  Once        Specialty:  Pulmonary Disease  Provider:  (Not yet assigned)    06/12/22 0225 06/12/22 0202  [facility] (on-call MD unless specified) Details  Once        Specialty:  Hospitalist  Provider:  (Not yet assigned)    06/12/22 0201              Procedures       Imaging Results (All)     Procedure Component Value Units Date/Time    MRI Brain With & Without Contrast [[id number]] Collected: 06/12/22 2036     Updated: 06/12/22 2042    Narrative:      MRI BRAIN WITH AND WITHOUT INTRAVENOUS CONTRAST; MRA HEAD WITHOUT  INTRAVENOUS CONTRAST     HISTORY: Stroke, follow-up     COMPARISON: CT head 03/20/2021     TECHNIQUE: MRI was performed of the brain with and without intravenous  contrast. 3-D time-of-flight MR angiography was performed of the head  with axial as well as 3-D MIP images.      FINDINGS:     MRI brain:      Scattered foci of hyperdensity on diffusion-weighted imaging within the  left centrum semiovale, left temporal lobe and left corona radiata  correspond with hyperdensity on ADC and likely representing T2 shine  through. A few cortical areas of diffusion-weighted hyperintensity  within the high left and right parietal and right thalamus are  isointense on ADC and hyperintense on FLAIR. No true restricted  diffusion is visualized.  White matter demonstrates normal signal  characteristics. There is no extra-axial collection. There is no finding  of parenchymal hemorrhage. There is no hydrocephalus. Midline structures  are unremarkable.     No abnormal intracranial enhancement is seen.     MR angiography head:      The anterior and posterior circulations are without appreciable  stenosis, aneurysm formation or occlusion.       Impression:      1.  No findings of appreciable stenosis, aneurysm formation or occlusion  within the bilateral anterior and posterior intracranial arterial  circulations.  2.  No findings of acute intracranial infarct. There are a few scattered  foci of diffusion-weighted hyperintensity within the right high  parietal, left high frontal and right thalamus which are isointense on  ADC and T2 hyperintense on FLAIR suggestive of evolving subacute  infarcts. More chronic appearing foci of infarction within the left  centrum semiovale, corona radiata and left temporal lobe are present as  well.     This report was finalized on 6/12/2022 8:39 PM by Dr. Colt Guardado M.D.       MRI Angiogram Head Without Contrast [[id number]] Collected: 06/12/22 2036     Updated: 06/12/22 2042    Narrative:      MRI BRAIN WITH AND WITHOUT INTRAVENOUS CONTRAST; MRA HEAD WITHOUT  INTRAVENOUS CONTRAST     HISTORY: Stroke, follow-up     COMPARISON: CT head 03/20/2021     TECHNIQUE: MRI was performed of the brain with and without intravenous  contrast. 3-D time-of-flight MR angiography was performed of the head  with axial as well as 3-D MIP images.      FINDINGS:     MRI brain:      Scattered foci of hyperdensity on diffusion-weighted imaging within the  left centrum semiovale, left temporal lobe and left corona radiata  correspond with hyperdensity on ADC and likely representing T2 shine  through. A few cortical areas of diffusion-weighted hyperintensity  within the high left and right parietal and right thalamus are  isointense on ADC and  hyperintense on FLAIR. No true restricted  diffusion is visualized. White matter demonstrates normal signal  characteristics. There is no extra-axial collection. There is no finding  of parenchymal hemorrhage. There is no hydrocephalus. Midline structures  are unremarkable.     No abnormal intracranial enhancement is seen.     MR angiography head:      The anterior and posterior circulations are without appreciable  stenosis, aneurysm formation or occlusion.       Impression:      1.  No findings of appreciable stenosis, aneurysm formation or occlusion  within the bilateral anterior and posterior intracranial arterial  circulations.  2.  No findings of acute intracranial infarct. There are a few scattered  foci of diffusion-weighted hyperintensity within the right high  parietal, left high frontal and right thalamus which are isointense on  ADC and T2 hyperintense on FLAIR suggestive of evolving subacute  infarcts. More chronic appearing foci of infarction within the left  centrum semiovale, corona radiata and left temporal lobe are present as  well.     This report was finalized on 6/12/2022 8:39 PM by Dr. Colt Guardado M.D.       CT Head Without Contrast [[id number]] Collected: 06/12/22 0330     Updated: 06/12/22 0506    Narrative:      CT HEAD WITHOUT CONTRAST     HISTORY: Confusion     COMPARISON: 03/28/2022     TECHNIQUE: Axial CT imaging was obtained the brain. No IV contrast was  administered.     FINDINGS:  The images are degraded by the presence of residual contrast material  within the patient's intracranial vasculature from earlier CT angiogram  of the chest. This limits the assessment for acute hemorrhage. No  obvious acute intracranial hemorrhage is seen on the current study.  There is atrophy. There is periventricular and deep white matter  microangiopathic change. There is no midline shift or mass effect.  Mucous retention cyst is seen within the left maxillary sinus. Mastoid  air cells are clear.        Impression:      No definite acute intracranial findings identified on this technically  limited study.     Radiation dose reduction techniques were utilized, including automated  exposure control and exposure modulation based on body size.     This report was finalized on 6/12/2022 5:03 AM by Dr. Michelle Liu M.D.       CT Angiogram Chest [[id number]] Collected: 06/12/22 0215     Updated: 06/12/22 0226    Narrative:      CT ANGIOGRAM OF THE CHEST     HISTORY: Shortness of air     COMPARISON: 12/13/2018     TECHNIQUE: Axial CT imaging was obtained through the thorax. IV contrast  was administered. Three-D reformatted images were obtained.     FINDINGS:  No acute pulmonary thromboembolus is seen. Thoracic aorta is normal in  caliber. There is no evidence of dissection. There is calcification of  the aorta and coronary arteries. The thyroid gland is enlarged and  heterogeneous. This was also present in December 2018. It can be better  evaluated with dedicated thyroid ultrasound. There is some mild mass  effect upon the trachea. Esophagus is within normal limits. There is no  pleural or pericardial effusion. There is some atelectasis and scarring  within both lungs. No definite acute infiltrates are seen. As was  previously discussed, there is a low-attenuation lesion seen within the  right paratracheal region. It measures 1.8 cm in diameter. It is stable  to smaller when compared to prior exam. It measures higher in density  than simple fluid. Given its stability, it is felt to be benign. There  is a common origin of the brachiocephalic artery and left common carotid  artery. Images through the upper abdomen are degraded by motion  artifact. No acute abnormalities are seen within the upper abdomen. No  acute osseous abnormalities are seen.       Impression:      No acute intrathoracic findings.     Radiation dose reduction techniques were utilized, including automated  exposure control and exposure modulation  based on body size.     This report was finalized on 6/12/2022 2:23 AM by Dr. Michelle Liu M.D.       XR Chest 1 View [[id number]] Collected: 06/11/22 2155     Updated: 06/11/22 2200    Narrative:      SINGLE VIEW OF THE CHEST     HISTORY: Shortness of air     COMPARISON: 03/28/2021     FINDINGS:  Heart size is within normal limits for technique. No pneumothorax or  pleural effusion is seen. There is calcification of the aorta. There are  extensive degenerative changes involving AC joints bilaterally. No acute  infiltrates are seen.       Impression:      No acute findings.     This report was finalized on 6/11/2022 9:57 PM by Dr. Michelle Liu M.D.           Results for orders placed during the hospital encounter of 06/11/22    Duplex Carotid Ultrasound CAR    Interpretation Summary  · Proximal right internal carotid artery is normal.  · Proximal left internal carotid artery is normal.    Results for orders placed during the hospital encounter of 06/11/22    Adult Transthoracic Echo Complete W/ Cont if Necessary Per Protocol    Interpretation Summary  · Left ventricular ejection fraction appears to be 61 - 65%. Left ventricular systolic function is normal.  · Left ventricular wall thickness is consistent with mild concentric hypertrophy.  · Left ventricular diastolic function was indeterminate.  · Normal right ventricular cavity size and systolic function noted.  · The left atrial cavity is moderately dilated.  · Saline test results are negative.  · Mild tricuspid valve regurgitation is present  · Calculated right ventricular systolic pressure from tricuspid regurgitation is 29 mmHg.  · There is no evidence of pericardial effusion    Pertinent Labs     Results from last 7 days   Lab Units 06/12/22  0908 06/11/22  2213   WBC 10*3/mm3 9.29 6.62   HEMOGLOBIN g/dL 13.0 13.9   PLATELETS 10*3/mm3 274 239     Results from last 7 days   Lab Units 06/13/22  1121 06/12/22  0908 06/11/22  2213   SODIUM mmol/L 144  142 144   POTASSIUM mmol/L 3.1* 2.9* 4.1   CHLORIDE mmol/L 107 109* 105   CO2 mmol/L 24.0 20.3* 20.5*   BUN mg/dL 8 12 14   CREATININE mg/dL 0.68 0.80 0.99   GLUCOSE mg/dL 106* 126* 182*   EGFR mL/min/1.73 89.3 75.5 58.5*     Results from last 7 days   Lab Units 06/11/22  2213   ALBUMIN g/dL 4.00   BILIRUBIN mg/dL 0.4   ALK PHOS U/L 76   AST (SGOT) U/L 16   ALT (SGPT) U/L 9     Results from last 7 days   Lab Units 06/13/22  1121 06/12/22  0908 06/11/22  2213   CALCIUM mg/dL 9.3 9.2 10.1   ALBUMIN g/dL  --   --  4.00   MAGNESIUM mg/dL  --  2.0  --        Results from last 7 days   Lab Units 06/12/22  0005 06/11/22  2213   TROPONIN T ng/mL  --  <0.010   PROBNP pg/mL  --  275.0   D DIMER QUANT MCGFEU/mL 0.54*  --        Results from last 7 days   Lab Units 06/14/22  0747   CHOLESTEROL mg/dL 136   TRIGLYCERIDES mg/dL 54   HDL CHOL mg/dL 63*   LDL CHOL mg/dL 61         Results from last 7 days   Lab Units 06/11/22  2145   COVID19  Not Detected       Test Results Pending at Discharge     Pending Labs     Order Current Status    MELIZA Comprehensive Panel In process          Discharge Details        Discharge Medications      New Medications      Instructions Start Date   apixaban 5 MG tablet tablet  Commonly known as: ELIQUIS   5 mg, Oral, Every 12 Hours Scheduled         Changes to Medications      Instructions Start Date   atorvastatin 20 MG tablet  Commonly known as: LIPITOR  What changed:   · medication strength  · how much to take   20 mg, Oral, Nightly      clonazePAM 0.5 MG tablet  Commonly known as: KlonoPIN  What changed:   · medication strength  · how much to take   0.5 mg, Oral, Nightly      venlafaxine 75 MG tablet  Commonly known as: EFFEXOR  What changed: additional instructions   150 mg, Oral, 2 Times Daily         Continue These Medications      Instructions Start Date   Accu-Chek Catherine Plus test strip  Generic drug: glucose blood   Use as instructed      Accu-Chek FastClix Lancet kit   1 kit, Does not apply,  Daily      Accu-Chek FastClix Lancets misc   Use to check blood sugar once daily      amLODIPine 5 MG tablet  Commonly known as: NORVASC   5 mg, Oral, Daily      Calcium Carb-Cholecalciferol 500-200 MG-UNIT tablet   2 tablets, Oral, Daily      [employer] Vitamin B-12 5000 MCG sublingual tablet  Generic drug: Cyanocobalamin   5,000 mcg, Sublingual, Daily      Durezol 0.05 % ophthalmic emulsion  Generic drug: difluprednate   1 drop, Both Eyes, Daily PRN      ferrous sulfate 325 (65 FE) MG tablet   TAKE 1 TABLET BY MOUTH EVERY DAY WITH BREAKFAST      fluticasone 50 MCG/ACT nasal spray  Commonly known as: FLONASE   1 spray, Nasal, As Needed      folic acid 1 MG tablet  Commonly known as: FOLVITE   TAKE 1 TABLET BY MOUTH EVERY DAY      furosemide 40 MG tablet  Commonly known as: LASIX   40 mg, Oral, Daily      metFORMIN 500 MG tablet  Commonly known as: GLUCOPHAGE   500 mg, Oral, 2 Times Daily With Meals      metoprolol succinate XL 50 MG 24 hr tablet  Commonly known as: TOPROL-XL   50 mg, Oral, Daily      Mirabegron ER 50 MG tablet sustained-release 24 hour 24 hr tablet  Commonly known as: MYRBETRIQ   50 mg, Oral, Daily      pilocarpine 5 MG tablet  Commonly known as: SALAGEN   5 mg, 3 Times Daily      potassium chloride 20 mEq/15 mL solution  Commonly known as: KAYCIEL   MIX 15 ML IN 4 OUNCES OF LIQUID AND DRINK DAILY      Restasis 0.05 % ophthalmic emulsion  Generic drug: cycloSPORINE   2 times daily      Stiolto Respimat 2.5-2.5 MCG/ACT aerosol solution inhaler  Generic drug: tiotropium bromide-olodaterol   2 puffs, As Needed         Stop These Medications    aspirin 81 MG tablet            Allergies   Allergen Reactions   • Hydrochlorothiazide Other (See Comments)     In 2015-developed hypercalcemia-HCTZ was discontinued  Other reaction(s): Other (See Comments)  In 2015-developed hypercalcemia-HCTZ was discontinued  In 2015-developed hypercalcemia-HCTZ was discontinued   • Dexamethasone Dermatitis     Other reaction(s):  Other (See Comments)  CONTACT DERMATITIS   • Lisinopril Cough     Other reaction(s): Other (See Comments)  COUGH       Discharge Disposition:  Home-Health Care Svc      Discharge Diet:  Diet Order   Procedures   • Diet Regular; Cardiac       Discharge Activity:       CODE STATUS:    Code Status and Medical Interventions:   Ordered at: 06/12/22 0520     Code Status (Patient has no pulse and is not breathing):    CPR (Attempt to Resuscitate)     Medical Interventions (Patient has pulse or is breathing):    Full Support       Future Appointments   Date Time Provider Department Center   6/16/2022  1:30 PM Josey Ernandez APRN [facility] CD LCGKR [facility]   6/21/2022  1:30 PM [facility] MAMM 2  [facility] MAMMO [facility]   6/27/2022  3:00 PM Tyrel Hernandez MD [facility] END KRSG [facility]   8/29/2022 12:30 PM [facility] DEXA 1  [facility] DEXA [facility]   9/19/2022  3:30 PM Reji Talamantes MD [facility] PC [id number] [facility]   10/18/2022  3:00 PM LAB CHAIR 5 [facility]  LAB [facility]   10/18/2022  3:40 PM Miguel A Nicholson MD PhD K CBC [facility]      Follow-up Information     Reji Talamantes MD .    Specialty: Family Medicine  Contact information:  [address] [address] [address] [phone]                         Time Spent on Discharge:  Greater than 30 minutes      Jaret Stephen MD  [facility]  06/14/22  11:58 EDT

## 2022-06-14 NOTE — PLAN OF CARE
Goal Outcome Evaluation:  Plan of Care Reviewed With: patient           Outcome Evaluation: Pt is a 76 yo F who was admitted with SOA, weakness, AMS, and subacute CVA. Pt presents to PT with some impaired functional mobility and gait secondary to some impaired balance. Pt may benefit from skilled PT to address strength, mobility, and gait. Pt demonstrates improved gait, balance, and safety when using a rolling walker.

## 2022-06-14 NOTE — DISCHARGE PLACEMENT REQUEST
"Dejah Casey (77 y.o. Female)             Date of Birth   1945    Social Security Number       Address   [address] [place] [address] [address] [facility] [address]    Home Phone   [phone]    MRN   [id number]       Mandaen   Amish    Marital Status                               Admission Date   6/11/22    Admission Type   Emergency    Admitting Provider   Jaret Stephen MD    Attending Provider   Jaret Stephen MD    Department, Room/Bed   [address], [address]/1       Discharge Date       Discharge Disposition       Discharge Destination                               Attending Provider: Jaret Stephen MD    Allergies: Hydrochlorothiazide, Dexamethasone, Lisinopril    Isolation: None   Infection: None   Code Status: CPR   Advance Care Planning Activity    Ht: 154.9 cm (61\")   Wt: 78 kg (172 lb)    Admission Cmt: None   Principal Problem: None                Active Insurance as of 6/11/2022     Primary Coverage     Payor Plan Insurance Group Employer/Plan Group    HUMANA MEDICARE REPLACEMENT HUMANA MEDICARE REPLACEMENT [id number]     Payor Plan Address Payor Plan Phone Number Payor Plan Fax Number Effective Dates    [address] [address] [phone]  1/1/2021 - None Entered    [facility] [id number]       Subscriber Name Subscriber Birth Date Member ID       DEJAH CASEY 1945 [id number]                 Emergency Contacts      (Rel.) Home Phone Work Phone Mobile Phone    ADE CASEY JR (Son) -- -- [phone]    Jacinto(daughter in law)Ramona (Relative) [phone] -- [phone]              "

## 2022-06-14 NOTE — PROGRESS NOTES
[facility] Clinical Pharmacy Services: Pharmacy Education - Direct Oral Anticoagulant - Eliquis    Dejah Casey has been ordered Eliquis for stroke prevention     Counseling points included the following:  Eliquis's indication, patient's need for the medication, and dosing/frequency of this medication.  Enforced the importance of taking their medication as instructed every day and the reason why the medication is dosed that way.  Explained possible side effects of anticoagulation therapy, including increased risk of bleeding, and s/sx of bleeding. Also talked about ways to control bleeding for minor cuts and scrapes.  Emphasized the importance of going to the emergency room if any of the following occur: Falling and hitting your head; noticing bright red blood in urine or dark/tarry stools; vomiting up blood or vomit has a coffee-ground like texture; coughing up blood.  Discussed the importance of informing any physician or dentist that they have been started on a DOAC, in case they need to be taken off for a procedure.  Discussed all important drug interactions, including over-the-counter medications and supplements.  Instructed the patient not to begin or discontinue any medications without informing his/her physician/pharmacist.     I also explained to the patient that this medication may have a high copay associated with it and to let the provider know if it is unaffordable.     Patient expressed understanding and had no further questions.      Camacho Vazquez, [facility]  Clinical Pharmacist

## 2022-06-14 NOTE — CASE MANAGEMENT/SOCIAL WORK
Case Management Discharge Note      Final Note: Home w/ caretenders HH, family to transport.         Selected Continued Care - Admitted Since 6/11/2022     Destination    No services have been selected for the patient.              Durable Medical Equipment    No services have been selected for the patient.              Dialysis/Infusion    No services have been selected for the patient.              Home Medical Care Coordination complete.    Service Provider Selected Services Address Phone Fax Patient Preferred    [facility]  Home Health Services [address] [facility], [address], [facility] [address] [phone] --          Therapy    No services have been selected for the patient.              Community Resources    No services have been selected for the patient.              Community & DME    No services have been selected for the patient.                  Transportation Services  Private: Car    Final Discharge Disposition Code: 06 - home with home health care

## 2022-06-15 ENCOUNTER — TELEPHONE (OUTPATIENT)
Dept: FAMILY MEDICINE CLINIC | Facility: CLINIC | Age: 77
End: 2022-06-15

## 2022-06-15 ENCOUNTER — TRANSITIONAL CARE MANAGEMENT TELEPHONE ENCOUNTER (OUTPATIENT)
Dept: CALL CENTER | Facility: HOSPITAL | Age: 77
End: 2022-06-15

## 2022-06-15 RX ORDER — DIPHENHYDRAMINE HYDROCHLORIDE 25 MG/1
CAPSULE, LIQUID FILLED ORAL
Qty: 1 EACH | Refills: 1 | Status: SHIPPED | OUTPATIENT
Start: 2022-06-15

## 2022-06-15 NOTE — TELEPHONE ENCOUNTER
Pt called back she said Dr Talamantes is the one who has been filling her venalafaxine not psych. Informed pt not to take this until she discusses it with Dr Talamantes on 6-22-22.

## 2022-06-15 NOTE — TELEPHONE ENCOUNTER
Caller: Jacinto Dejah L    Relationship: Self    Best call back number: [phone]    Who are you requesting to speak with (clinical staff, provider,  specific staff member): DR PARADA OR MA    What was the call regarding: PATIENT STATES THAT SHE WAS ASKED BY AUGUST TO SPEAK WITH HER PSYCHIATRIST, DR SHIN, IN REGARDS TO HER MEDICATIONS: venlafaxine (EFFEXOR) 75 MG tablet AND apixaban (ELIQUIS) 5 MG tablet tablet CONCERNING POSSIBLE INTERACTIONS. SHE WAS INFORMED THAT DR SHIN DOES NOT TALK TO PATIENTS OVER THE PHONE, AND WAS UNABLE TO DISCUSS THE INTERACTIONS WITH THE MEDICATIONS WITH HIM. PLEASE CALL AND ADVISE

## 2022-06-15 NOTE — OUTREACH NOTE
Call Center TCM Note    Flowsheet Row Responses   [facility], [place] patient discharged from? [place]   Does the patient have one of the following disease processes/diagnoses(primary or secondary)? Stroke (TIA)   TCM attempt successful? Yes   Call start time 1307   Call end time 1316   Discharge diagnosis Embolic stroke    Meds reviewed with patient/caregiver? Yes   Is the patient having any side effects they believe may be caused by any medication additions or changes? No   Does the patient have all medications ordered at discharge? Yes   Is the patient taking all medications as directed (includes completed medication regime)? Yes   Medication comments Voiced concern taking Eliquis with venlafaxine due to read that there is an interaction with them.  Advised to also contact her pharmacy and will message Dr. Talamantes.  She also states he psychiatrist had her on 1 in the am and 2 at night time.    Does the patient have a primary care provider?  Yes   Does the patient have an appointment with their PCP within 7 days of discharge? Yes   Has the patient kept scheduled appointments due by today? N/A   What is the Home health agency?  [facility]   Has home health visited the patient within 72 hours of discharge? Call prior to 72 hours   Psychosocial issues? No   Did the patient receive a copy of their discharge instructions? Yes   Nursing interventions Reviewed instructions with patient   What is the patient's perception of their health status since discharge? Improving   Nursing interventions Nurse provided patient education   Is the patient able to teach back FAST for Stroke? No  [Educated]   Is the patient/caregiver able to teach back the risk factors for a stroke? High blood pressure-goal below 120/80, High Cholesterol, History of TIAs, Physical inactivity and obesity, Excessive alcohol intake, Sleep apnea, History of Afib, Carotid or other artery disease, Diabetes, Smoking   Is the patient/caregiver able to teach  back signs and symptoms related to disease process for when to call PCP? Yes   Is the patient/caregiver able to teach back signs and symptoms related to disease process for when to call 911? Yes   If the patient is a current smoker, are they able to teach back resources for cessation? Not a smoker   Is the patient/caregiver able to teach back the hierarchy of who to call/visit for symptoms/problems? PCP, Specialist, Home health nurse, Urgent Care, ED, 911 Yes   Additional teach back comments States she is having some memory issues and being very tired.   TCM call completed? Yes   Wrap up additional comments Will message PCP regarding medications concerns.           Gabrielle Hicks LPN    6/15/2022, 13:20 EDT

## 2022-06-16 ENCOUNTER — OFFICE VISIT (OUTPATIENT)
Dept: CARDIOLOGY | Facility: CLINIC | Age: 77
End: 2022-06-16

## 2022-06-16 VITALS
HEIGHT: 61 IN | WEIGHT: 171 LBS | HEART RATE: 87 BPM | DIASTOLIC BLOOD PRESSURE: 72 MMHG | BODY MASS INDEX: 32.28 KG/M2 | SYSTOLIC BLOOD PRESSURE: 138 MMHG

## 2022-06-16 DIAGNOSIS — Z86.73 HISTORY OF RECENT STROKE: Primary | ICD-10-CM

## 2022-06-16 DIAGNOSIS — I51.89 DIASTOLIC DYSFUNCTION: ICD-10-CM

## 2022-06-16 DIAGNOSIS — G47.33 OBSTRUCTIVE SLEEP APNEA ON CPAP: ICD-10-CM

## 2022-06-16 DIAGNOSIS — I25.10 NONOCCLUSIVE CORONARY ATHEROSCLEROSIS OF NATIVE CORONARY ARTERY: ICD-10-CM

## 2022-06-16 DIAGNOSIS — I10 ESSENTIAL HYPERTENSION: ICD-10-CM

## 2022-06-16 DIAGNOSIS — Z99.89 OBSTRUCTIVE SLEEP APNEA ON CPAP: ICD-10-CM

## 2022-06-16 PROCEDURE — 99214 OFFICE O/P EST MOD 30 MIN: CPT | Performed by: NURSE PRACTITIONER

## 2022-06-16 PROCEDURE — 93000 ELECTROCARDIOGRAM COMPLETE: CPT | Performed by: NURSE PRACTITIONER

## 2022-06-16 RX ORDER — BROMFENAC SODIUM 0.7 MG/ML
SOLUTION/ DROPS OPHTHALMIC
COMMUNITY
Start: 2022-05-11

## 2022-06-16 RX ORDER — CLONAZEPAM 2 MG/1
1 TABLET ORAL
COMMUNITY
Start: 2022-06-15 | End: 2022-06-22 | Stop reason: DRUGHIGH

## 2022-06-16 NOTE — PROGRESS NOTES
Date of Office Visit: 2022  Encounter Provider: RAYSHAWN Romero  Place of Service: [facility] CARDIOLOGY  Patient Name: Dejah Casey  :1945  Primary Cardiologist: Dr. Daniel Leal    Chief Complaint   Patient presents with   • Stroke   • Hospital Follow Up Visit   :     HPI: Dejah Casey is a pleasant 77 y.o. female who presents today for hospital follow-up and recent shortness of breath and stroke.    She has known hypertension, hyperlipidemia, and obstructive sleep apnea.  She was previously on HCTZ in , but it was discontinued due to hypercalcemia.    In  she was diagnosed with minimal coronary atherosclerosis (30% LAD) per coronary angiography.  She has a history of rare ectopy and was placed on metoprolol.  She has had multiple stress test in the past that have been normal.    In 2020, she was experiencing dyspnea from diastolic dysfunction and leg swelling which was felt to be due to venous insufficiency.  She was prescribed furosemide 3 times weekly.  In 2020, Lexiscan Myoview stress test was normal.    A week ago she was hospitalized at [facility] for evaluation of shortness of breath, hypoxia, and severe respiratory alkalosis.  Chest x-ray and CTA were negative.  The etiology of hypoxia was unknown.  She had confusion and difficulty with her thoughts.  MRI showed subacute appearing stroke bilaterally.  Carotid Doppler negative.  Echocardiogram showed normal LVEF, mild LVH, and diastolic dysfunction.  Neurology recommended starting apixaban for possible cardioembolic stroke.  Cardiology was not consulted.  She was recommended to wear a Zio patch monitor outpatient.    She presents today for follow-up visit and her granddaughter is accompanying her.  The 14-day Zio patch monitor was placed on Tuesday and she is currently wearing it.  She has had some residual memory issues since the stroke.  She reports some new onset fatigue,  chest congestion, and a cough.  She denies chest pain, shortness of air, palpitations, edema, dizziness, syncope, or bleeding.    She is following up with her PCP on 6/22.  She reached out to their office because she was told to stop venlafaxine and and possibly the clonazepam with the combination of apixaban.        Past Medical History:   Diagnosis Date   • Allergic     Seasonal alleries, Lisinopril, Dexamethasone   • Anxiety    • ASCVD (arteriosclerotic cardiovascular disease)    • CAD (coronary artery disease)    • Cataract     Cararacts in both eyes.   • Contact dermatitis    • DDD (degenerative disc disease), lumbar    • Depression    • Diabetes mellitus (HCC)    • Dry eyes    • Dry mouth    • Essential hypertension    • Female climacteric state    • Headache    • History of mammogram     8/2016   • History of total right hip replacement 2013   • Hypercalcemia    • Hyperlipidemia    • Iron deficiency anemia 05/27/2021    Added automatically from request for surgery [id number]   • Low back pain     Lumbar area.   • Neuromuscular disorder (HCC) 03/2016   • Nightmares REM-sleep type    • Nonocclusive coronary atherosclerosis of native coronary artery     cath in 2008 with 30% LAD disease   • Nontoxic multinodular goiter    • Obesity    • Osteoarthritis     both knees, back and hips   • Osteopenia    • Pain, joint, shoulder    • Pernicious anemia    • Polycythemia    • Rotator cuff tendinitis    • Seasonal allergies    • Sleep apnea     CPAP nightly   • Snoring    • Stroke (HCC)    • Superficial phlebitis    • Type 2 diabetes mellitus (HCC)     Type II   • Vitiligo        Past Surgical History:   Procedure Laterality Date   • BREAST BIOPSY     • BREAST LUMPECTOMY  1973    benign   • CARDIAC CATHETERIZATION  2008   • CATARACT EXTRACTION, BILATERAL Bilateral 2019   • COLONOSCOPY  08/15/2010   • COLONOSCOPY N/A 07/20/2021    Procedure: COLONOSCOPY TO CECUM;  Surgeon: Ellen Kothari MD;  Location[facility] ENDOSCOPY;   Service: Gastroenterology;  Laterality: N/A;  pre: SCREENING FOR COLON CANCER  post: HEMORRHOIDS, DIVERTICULOSIS, TORTUOUS COLON   • ENDOSCOPY N/A 07/20/2021    Procedure: ESOPHAGOGASTRODUODENOSCOPY WITH BX'S;  Surgeon: Ellen Kothari MD;  Location: [facility] ENDOSCOPY;  Service: Gastroenterology;  Laterality: N/A;  pre: IRON DEFICIENCY ANEMIA  post: GASTRITIS, SMALL HIATAL HERNIA   • EYE SURGERY     • EYE SURGERY Left 05/2022   • HYSTERECTOMY  1982   • JOINT REPLACEMENT  07/10/2013    Total Hip Replacement   • REPLACEMENT TOTAL KNEE Left 08/06/2018    Dr. Bishnu Larson   • REPLACEMENT TOTAL KNEE Right     [facility]    • TONSILLECTOMY  1967   • TOTAL HIP ARTHROPLASTY Right 2013   • UPPER GASTROINTESTINAL ENDOSCOPY  07/20/2021       Social History     Socioeconomic History   • Marital status:    Tobacco Use   • Smoking status: Never Smoker   • Smokeless tobacco: Never Used   Vaping Use   • Vaping Use: Never used   Substance and Sexual Activity   • Alcohol use: No     Comment: CAFFEINE: none   • Drug use: No   • Sexual activity: Not Currently     Partners: Male     Birth control/protection: Post-menopausal     Comment: I had a Hysterectomy in 1982.       Family History   Problem Relation Age of Onset   • Diabetes Mother         Mother   • Thyroid disease Mother    • Hypertension Father         Father   • Heart disease Father         Father   • Arthritis Father         Father   • Hyperlipidemia Father         Father   • Hypertension Brother         Brother   • Diabetes Brother         Brother   • Kidney disease Brother         Brother, Renal Failure   • Vision loss Brother         Brother   • Asthma Sister         Sister   • Hypertension Sister         Sister   • Miscarriages / Stillbirths Sister         Sister   • Cancer Brother         Brother   • Lung cancer Brother    • Diabetes Sister         Sister   • Hypertension Sister         Sister   • Early death Sister    • Kidney disease Brother         Brother,   Renal Failure   • Stroke Brother         Brother       The following portion of the patient's history were reviewed and updated as appropriate: past medical history, past surgical history, past social history, past family history, allergies, current medications, and problem list.    Review of Systems   Constitutional: Positive for malaise/fatigue.   Cardiovascular: Negative.    Respiratory: Positive for cough.    Hematologic/Lymphatic: Bruises/bleeds easily.   Neurological: Negative.    Psychiatric/Behavioral: Positive for depression and memory loss.       Allergies   Allergen Reactions   • Hydrochlorothiazide Other (See Comments)     In 2015-developed hypercalcemia-HCTZ was discontinued  Other reaction(s): Other (See Comments)  In 2015-developed hypercalcemia-HCTZ was discontinued  In 2015-developed hypercalcemia-HCTZ was discontinued   • Dexamethasone Dermatitis     Other reaction(s): Other (See Comments)  CONTACT DERMATITIS   • Lisinopril Cough     Other reaction(s): Other (See Comments)  COUGH         Current Outpatient Medications:   •  Accu-Chek FastClix Lancets misc, Use to check blood sugar once daily, Disp: 102 each, Rfl: 5  •  amLODIPine (NORVASC) 5 MG tablet, Take 1 tablet by mouth Daily for 180 days., Disp: 90 tablet, Rfl: 1  •  apixaban (ELIQUIS) 5 MG tablet tablet, Take 1 tablet by mouth Every 12 (Twelve) Hours. Indications: Other - full anticoagulation, Disp: 60 tablet, Rfl: 1  •  atorvastatin (LIPITOR) 20 MG tablet, Take 1 tablet by mouth Every Night., Disp: 30 tablet, Rfl: 1  •  Blood Glucose Monitoring Suppl (Blood Glucose Monitor System) w/Device kit, BS meter Accu-chek Catherine plus meter. DX E11.9, Disp: 1 each, Rfl: 1  •  Calcium Carb-Cholecalciferol 500-200 MG-UNIT tablet, Take 2 tablets by mouth daily., Disp: , Rfl:   •  clonazePAM (KlonoPIN) 0.5 MG tablet, Take 1 tablet by mouth Every Night., Disp: 3 tablet, Rfl: 0  •  clonazePAM (KlonoPIN) 2 MG tablet, Take 1 tablet by mouth every night at  bedtime., Disp: , Rfl:   •  Cyanocobalamin ([employer] Vitamin B-12) 5000 MCG sublingual tablet, Place 5,000 mcg under the tongue Daily., Disp: 90 tablet, Rfl: 3  •  Durezol 0.05 % ophthalmic emulsion, Administer 1 drop to both eyes Daily As Needed., Disp: , Rfl:   •  ferrous sulfate 325 (65 FE) MG tablet, TAKE 1 TABLET BY MOUTH EVERY DAY WITH BREAKFAST, Disp: 90 tablet, Rfl: 3  •  fluticasone (FLONASE) 50 MCG/ACT nasal spray, 1 spray into the nostril(s) as directed by provider As Needed., Disp: , Rfl:   •  folic acid (FOLVITE) 1 MG tablet, TAKE 1 TABLET BY MOUTH EVERY DAY, Disp: 90 tablet, Rfl: 3  •  furosemide (LASIX) 40 MG tablet, Take 1 tablet by mouth Daily., Disp: 90 tablet, Rfl: 3  •  glucose blood (Accu-Chek Catherine Plus) test strip, Use as instructed, Disp: 360 each, Rfl: 1  •  metFORMIN (GLUCOPHAGE) 500 MG tablet, Take 1 tablet by mouth 2 (Two) Times a Day With Meals for 180 days., Disp: 180 tablet, Rfl: 1  •  metoprolol succinate XL (TOPROL-XL) 50 MG 24 hr tablet, Take 1 tablet by mouth Daily for 180 days., Disp: 90 tablet, Rfl: 1  •  Mirabegron ER (MYRBETRIQ) 50 MG tablet sustained-release 24 hour 24 hr tablet, Take 50 mg by mouth Daily., Disp: , Rfl:   •  pilocarpine (SALAGEN) 5 MG tablet, 5 mg 3 (Three) Times a Day., Disp: , Rfl:   •  potassium chloride (KAYCIEL) 20 mEq/15 mL solution, MIX 15 ML IN 4 OUNCES OF LIQUID AND DRINK DAILY, Disp: 1350 mL, Rfl: 1  •  Prolensa 0.07 % solution, PLACE ONE DROP IN BOTH EYES ONCE DAILY FOR MAINTENANCE, Disp: , Rfl:   •  Restasis 0.05 % ophthalmic emulsion, 2 (two) times a day., Disp: , Rfl:   •  STIOLTO RESPIMAT 2.5-2.5 MCG/ACT aerosol solution inhaler, 2 puffs As Needed., Disp: , Rfl:   •  venlafaxine (EFFEXOR) 75 MG tablet, Take 2 tablets by mouth 2 (Two) Times a Day for 180 days. (Patient taking differently: Take 150 mg by mouth 2 (Two) Times a Day. 1 in am 2 after supper), Disp: 360 tablet, Rfl: 1        Objective:     Vitals:    06/16/22 1332 06/16/22 1352   BP: 134/78  "138/72   BP Location: Left arm Right arm   Pulse: 87    Weight: 77.6 kg (171 lb)    Height: 154.9 cm (61\")      Body mass index is 32.31 kg/m².    PHYSICAL EXAM:    Vitals Reviewed.   General Appearance: No acute distress, well developed and well nourished. Obese.   Eyes: Conjunctiva and lids: No erythema, swelling, or discharge. Sclera non-icteric. Glasses.   HENT: Atraumatic, normocephalic. External eyes, ears, and nose normal. No hearing loss noted. Mucous membranes normal. Lips not cyanotic. Neck supple with no tenderness. Wearing mask.   Respiratory: No signs of respiratory distress. Respiration rhythm and depth normal.   Clear to auscultation. No rales, crackles, rhonchi, or wheezing auscultated.   Cardiovascular:  Jugular Venous Pressure: Normal  Heart Rate and Rhythm: Normal, Heart Sounds: Normal S1 and S2. No S3 or S4 noted.  Murmurs: No murmurs noted. No rubs, thrills, or gallops.   Lower Extremities: No edema noted.  Gastrointestinal:  Abdomen soft, non-distended, non-tender.    Musculoskeletal: Normal movement of extremities.  Skin and Nails: General appearance normal. No pallor, cyanosis, diaphoresis. Skin temperature normal. No clubbing of fingernails.   Psychiatric: Patient alert and oriented to person, place, and time. Speech and behavior appropriate. Normal mood and affect.       ECG 12 Lead    Date/Time: 6/16/2022 1:49 PM  Performed by: Josey Ernandez APRN  Authorized by: Josey Ernandez APRN   Comparison: compared with previous ECG from 6/2/2021  Similar to previous ECG  Rhythm: sinus rhythm  Rate: normal  BPM: 87  Conduction: conduction normal  ST Segments: ST segments normal  T Waves: T waves normal  QRS axis: normal  Other findings: left ventricular hypertrophy    Clinical impression: non-specific ECG              Assessment:       Diagnosis Plan   1. History of recent stroke     2. Essential hypertension     3. Nonocclusive coronary atherosclerosis of native coronary artery     4. " Diastolic dysfunction     5. Obstructive sleep apnea on CPAP            Plan:       1.  History of recent stroke noted on MRI.  She reports some memory loss since the stroke.  Neurology recommended apixaban and felt that it was more embolic.  She is wearing a 14-day Holter monitor that was placed 2 days ago and we will follow-up with her about the results.    2.  Hypertension: Blood pressure well controlled today.    3.  Nonocclusive coronary atherosclerosis: Denies anginal symptoms.    4.  Diastolic dysfunction: Euvolemic today.    5.  Obstructive sleep apnea: On CPAP.    6.  I performed an analyzation of apixaban in combination with venlafaxine and clonazepam through up-to-date.  This is a category C which means to monitor therapy.  I do not feel that she has had high risk of bleeding at this time.  She will continue the medications and discuss with her PCP.    As always, it has been a pleasure to participate in your patient's care. Thank you.       Sincerely,         RAYSHAWN Carroll  [facility] Cardiology      · Dictated utilizing Dragon Dictation  · COVID-19 Precautions - Patient was compliant in wearing a mask. When I saw the patient, I used appropriate personal protective equipment (PPE) including mask and eye shield (standard procedure).  Additionally, I used gown and gloves if indicated.  Hand hygiene was completed before and after seeing the patient.  · I spent 30 minutes reviewing her medical records/testing/previous office notes/labs, face-to-face interaction with patient, physical examination, formulating the plan of care, and discussion of plan of care with patient.

## 2022-06-21 ENCOUNTER — APPOINTMENT (OUTPATIENT)
Dept: MAMMOGRAPHY | Facility: HOSPITAL | Age: 77
End: 2022-06-21

## 2022-06-22 ENCOUNTER — READMISSION MANAGEMENT (OUTPATIENT)
Dept: CALL CENTER | Facility: HOSPITAL | Age: 77
End: 2022-06-22

## 2022-06-22 ENCOUNTER — OFFICE VISIT (OUTPATIENT)
Dept: FAMILY MEDICINE CLINIC | Facility: CLINIC | Age: 77
End: 2022-06-22

## 2022-06-22 VITALS
RESPIRATION RATE: 18 BRPM | WEIGHT: 173 LBS | BODY MASS INDEX: 32.66 KG/M2 | TEMPERATURE: 98.6 F | HEIGHT: 61 IN | SYSTOLIC BLOOD PRESSURE: 124 MMHG | HEART RATE: 83 BPM | DIASTOLIC BLOOD PRESSURE: 68 MMHG | OXYGEN SATURATION: 98 %

## 2022-06-22 DIAGNOSIS — G47.00 INSOMNIA, UNSPECIFIED TYPE: ICD-10-CM

## 2022-06-22 DIAGNOSIS — I63.10 CEREBROVASCULAR ACCIDENT (CVA) DUE TO EMBOLISM OF PRECEREBRAL ARTERY: ICD-10-CM

## 2022-06-22 DIAGNOSIS — R05.9 COUGH: ICD-10-CM

## 2022-06-22 DIAGNOSIS — E78.49 OTHER HYPERLIPIDEMIA: ICD-10-CM

## 2022-06-22 DIAGNOSIS — Z09 HOSPITAL DISCHARGE FOLLOW-UP: Primary | ICD-10-CM

## 2022-06-22 PROCEDURE — 99495 TRANSJ CARE MGMT MOD F2F 14D: CPT | Performed by: FAMILY MEDICINE

## 2022-06-22 RX ORDER — ATORVASTATIN CALCIUM 20 MG/1
20 TABLET, FILM COATED ORAL NIGHTLY
Qty: 90 TABLET | Refills: 0 | Status: SHIPPED | OUTPATIENT
Start: 2022-06-22 | End: 2022-09-15

## 2022-06-22 RX ORDER — CLONAZEPAM 0.5 MG/1
0.5 TABLET ORAL NIGHTLY
Qty: 30 TABLET | Refills: 2 | Status: SHIPPED | OUTPATIENT
Start: 2022-06-22 | End: 2022-09-22

## 2022-06-22 NOTE — PROGRESS NOTES
Transitional Care Follow Up Visit  Subjective     eDjah is a 77 y.o. female who presents for a transitional care management visit.    Within 48 business hours after discharge our office contacted her via telephone to coordinate her care and needs.      I reviewed and discussed the details of that call along with the discharge summary, hospital problems, inpatient lab results, inpatient diagnostic studies, and consultation reports with Dejah.     Current outpatient and discharge medications have been reconciled for the patient.  Reviewed by: Reji Talamantes MD      Date of TCM Phone Call 6/14/2022   [facility]   Date of Admission 6/11/2022   Date of Discharge 6/14/2022   Discharge Disposition Home or Self Care       Risk for Readmission (LACE) Score: 7 (6/14/2022  6:01 AM)      Patient presents the office to follow-up from recent hospital stay.  After work-up she was found to have subacute infarctions of the brain as documented on MRI exam.  She was seen by neurologist who suggested some medication changes.  Her potassium was low.  She was noted not to be on potassium in the hospital but has since restarted that.  She ran out of the low-dose Klonopin and has been taking 2 mg at home once again.  She was tolerating 0.5 mg in the hospital and we will revert back to that dose.  Some mild decrease in venlafaxine was suggested and we will continue that.  She does need follow-up visit with her neurologist and this referral has been created today.  She saw cardiology yesterday.  Today she is having some issues with the fatigue and some cough and COVID testing was done.  Other testings and labs have been reviewed as well as the hospital course.  We will have our follow-up in mid September as scheduled.       Course During Hospital Stay The following information was reviewed by: Reji Talamantes MD on 06/22/2022:   Ms. Casey is a 77 y.o. female with a history of SISSY, diastolic CHF and hypertension who  presented to [facility] initially complaining of shortness of breath.  Please see the admitting history and physical for further details.  Very unusual presentation where she was apparently hypoxic on arrival of EMS and ABG initially here showed a severe respiratory alkalosis.  She was apparently anxious and hyperventilating in ER.  At the time I saw her the following morning she was saturating in the high 90s on room air with essentially no intervention.  Her chest x-ray and CTA chest were negative.  I never found any reason for her to be hypoxic and she never became hypoxic again throughout the hospitalization.  However she was somewhat confused and seemed to have difficulty completing thoughts or even doing menial tasks like eating on my evaluation.  Because of this I initiated a stroke work-up and performed an MRI which did show subacute appearing stroke bilaterally.  There were more chronic foci of infarction within the left centrum semiovale, corona radiata and left temporal lobe.  MRA did not show any large vessel occlusions.  Carotid Dopplers were negative.  Echocardiogram showed no PFO and only some mild LVH and diastolic dysfunction.  She was seen by neurology who recommended starting Eliquis for possible cardioembolic source.  We will send her home with a Zio patch.  Her mentation has improved.  She was able to converse with me much better today than she was the last couple days.  She had complained of some dysphagia on admission but swallow evaluation was negative with speech therapy.  She appears to be stable for discharge with home health to follow.  We will send her home with Eliquis and no aspirin per neurology recommendations.  She will follow-up with them and her PCP.  I discussed all of this with her son as well.  I also recommended to him that they decrease her Klonopin dose which was 2 mg at night.  She has been getting 0.5 mg here and doing okay.      The following portions of  "the patient's history were reviewed and updated as appropriate: allergies, current medications, past family history, past medical history, past social history, past surgical history and problem list.     Vitals:    06/22/22 1137   BP: 124/68   Pulse: 83   Resp: 18   Temp: 98.6 °F (37 °C)   SpO2: 98%   Weight: 78.5 kg (173 lb)   Height: 154.9 cm (61\")                 Objective   Physical Exam  Vitals reviewed.   Constitutional:       General: She is not in acute distress.  Eyes:      General: Lids are normal.      Conjunctiva/sclera: Conjunctivae normal.   Neck:      Vascular: No carotid bruit.      Trachea: No tracheal deviation.   Cardiovascular:      Rate and Rhythm: Normal rate and regular rhythm.      Heart sounds: Normal heart sounds. No murmur heard.  Pulmonary:      Effort: Pulmonary effort is normal.      Breath sounds: Normal breath sounds.   Skin:     General: Skin is warm and dry.   Neurological:      Mental Status: She is alert. She is not disoriented.      Comments: Speech is slower but no dysarthria.    Psychiatric:         Speech: Speech normal.         Behavior: Behavior normal. Behavior is cooperative.         DATA REVIEWED:    The following data was reviewed by: Reji Talamantes MD on 06/22/2022:  ED to Hosp-Admission (Discharged) with Jaret Stephen MD; Jero Carver II, MD (06/11/2022)  Lipid Panel (06/14/2022 07:47)-LDL 61  Basic Metabolic Panel (06/13/2022 11:21)-K 3.3(she has resumed potassium)  RPR, Rfx Qn RPR / Confirm TP (06/13/2022 11:21)-non reactive  MELIZA Comprehensive Panel (06/13/2022 11:21)-negative  Vitamin B12 (06/13/2022 11:21)->2000  Magnesium (06/12/2022 09:08)-normal  TSH Rfx On Abnormal To Free T4 (06/12/2022 09:08)-nl  CBC (No Diff) (06/12/2022 09:08)  Hemoglobin A1c (06/12/2022 09:08)    CT Head Without Contrast    Result Date: 6/12/2022  Impression: No definite acute intracranial findings identified on this technically limited study.  Radiation dose reduction " techniques were utilized, including automated exposure control and exposure modulation based on body size.  This report was finalized on 6/12/2022 5:03 AM by Dr. Michelle Liu M.D.      MRI Angiogram Head Without Contrast    Result Date: 6/12/2022  Impression: 1.  No findings of appreciable stenosis, aneurysm formation or occlusion within the bilateral anterior and posterior intracranial arterial circulations. 2.  No findings of acute intracranial infarct. There are a few scattered foci of diffusion-weighted hyperintensity within the right high parietal, left high frontal and right thalamus which are isointense on ADC and T2 hyperintense on FLAIR suggestive of evolving subacute infarcts. More chronic appearing foci of infarction within the left centrum semiovale, corona radiata and left temporal lobe are present as well.  This report was finalized on 6/12/2022 8:39 PM by Dr. Colt Guardado M.D.      MRI Brain With & Without Contrast    Result Date: 6/12/2022  Impression: 1.  No findings of appreciable stenosis, aneurysm formation or occlusion within the bilateral anterior and posterior intracranial arterial circulations. 2.  No findings of acute intracranial infarct. There are a few scattered foci of diffusion-weighted hyperintensity within the right high parietal, left high frontal and right thalamus which are isointense on ADC and T2 hyperintense on FLAIR suggestive of evolving subacute infarcts. More chronic appearing foci of infarction within the left centrum semiovale, corona radiata and left temporal lobe are present as well.  This report was finalized on 6/12/2022 8:39 PM by Dr. Colt Guardado M.D.      XR Chest 1 View    Result Date: 6/11/2022  Impression: No acute findings.  This report was finalized on 6/11/2022 9:57 PM by Dr. Michelle Liu M.D.      CT Angiogram Chest    Result Date: 6/12/2022  Impression: No acute intrathoracic findings.  Radiation dose reduction techniques were utilized,  including automated exposure control and exposure modulation based on body size.  This report was finalized on 6/12/2022 2:23 AM by Dr. Michelle Liu M.D.        Assessment & Plan     Diagnoses and all orders for this visit:    1. Hospital discharge follow-up (Primary)    2. Cough  -     COVID-19,[facility] ROUTINE, NP/OP SWAB IN TRANSPORT MEDIA OR ESWAB 72 HR TAT - Swab, Nasopharynx    3. Cerebrovascular accident (CVA) due to embolism of precerebral artery (HCC)  -     Ambulatory Referral to Neurology  -     apixaban (ELIQUIS) 5 MG tablet tablet; Take 1 tablet by mouth Every 12 (Twelve) Hours for 90 days. Indications: Other - full anticoagulation  Dispense: 180 tablet; Refill: 0    4. Insomnia, unspecified type  -     clonazePAM (KlonoPIN) 0.5 MG tablet; Take 1 tablet by mouth Every Night for 90 days.  Dispense: 30 tablet; Refill: 2    5. Other hyperlipidemia  -     atorvastatin (LIPITOR) 20 MG tablet; Take 1 tablet by mouth Every Night for 90 days.  Dispense: 90 tablet; Refill: 0        Follow Up     Return in about 3 months (around 9/22/2022).

## 2022-06-22 NOTE — OUTREACH NOTE
Stroke Week 2 Survey    Flowsheet Row Responses   [facility] facility patient discharged from? [place]   Does the patient have one of the following disease processes/diagnoses(primary or secondary)? Stroke (TIA)   Week 2 attempt successful? No   Unsuccessful attempts Attempt 1  [all numbers were attempted-no answer ]          PHUC H - Registered Nurse

## 2022-06-23 LAB
LABCORP SARS-COV-2, NAA 2 DAY TAT: NORMAL
SARS-COV-2 RNA RESP QL NAA+PROBE: DETECTED

## 2022-06-24 ENCOUNTER — TELEPHONE (OUTPATIENT)
Dept: ENDOCRINOLOGY | Age: 77
End: 2022-06-24

## 2022-06-24 ENCOUNTER — READMISSION MANAGEMENT (OUTPATIENT)
Dept: CALL CENTER | Facility: HOSPITAL | Age: 77
End: 2022-06-24

## 2022-06-24 ENCOUNTER — TELEPHONE (OUTPATIENT)
Dept: FAMILY MEDICINE CLINIC | Facility: CLINIC | Age: 77
End: 2022-06-24

## 2022-06-24 NOTE — OUTREACH NOTE
Stroke Week 2 Survey    Flowsheet Row Responses   [facility] patient discharged from? [place]   Does the patient have one of the following disease processes/diagnoses(primary or secondary)? Stroke (TIA)   Week 2 attempt successful? Yes   Call start time 1452   Call end time 1456   Discharge diagnosis Embolic stroke    Medication alerts for this patient ELIQUIS   Meds reviewed with patient/caregiver? Yes   Is the patient having any side effects they believe may be caused by any medication additions or changes? No   Does the patient have all medications ordered at discharge? Yes   Is the patient taking all medications as directed (includes completed medication regime)? Yes   Comments regarding appointments See AVS   Does the patient have a primary care provider?  Yes   Does the patient have an appointment with their PCP within 7 days of discharge? Yes   Comments regarding PCP Hospital f/u completed on 6/22/22   Has the patient kept scheduled appointments due by today? Yes   What is the Home health agency?  [facility]   Has home health visited the patient within 72 hours of discharge? Call prior to 72 hours   Home health comments Patient reports that  was scheduled to visit today but has now deferred due to her new Covid diagnosis   Psychosocial issues? No   Does the patient require any assistance with activities of daily living such as eating, bathing, dressing, walking, etc.? No   Does the patient have any residual symptoms from stroke/TIA? Yes   Residual symptoms comments Fatigue   Does the patient understand the diet ordered at discharge? Yes   Did the patient receive a copy of their discharge instructions? Yes   Nursing interventions Reviewed instructions with patient   What is the patient's perception of their health status since discharge? New symptoms unrelated to diagnosis  [Patient reports she is tired and sleepy at time of call--she has now been diagnosed with Covid, endorses cough, congestion,  fatigue.  Encouraged to monitor and notify MD if worsening s/s, reviewed with her. ]   Nursing interventions Nurse provided patient education   Is the patient able to teach back FAST for Stroke? Yes   Is the patient/caregiver able to teach back the risk factors for a stroke? High blood pressure-goal below 120/80, Diabetes, History of TIAs, High Cholesterol   Is the patient/caregiver able to teach back signs and symptoms related to disease process for when to call PCP? Yes   Is the patient/caregiver able to teach back signs and symptoms related to disease process for when to call 911? Yes   Is the patient/caregiver able to teach back the hierarchy of who to call/visit for symptoms/problems? PCP, Specialist, Home health nurse, Urgent Care, ED, 911 Yes   Week 2 call completed? Yes          STEW RHODES - Registered Nurse

## 2022-06-24 NOTE — TELEPHONE ENCOUNTER
Pt called in about her covid results, advised pt she is positive and to stay hydrated, rest, and take it easy and if she starts having shortness of breath or any complications to go to the ER or the nearest urgent care. Advised pt if she wants any medications we can get her scheduled for a mychart video visit. Pt understood and will call back if she needs anything.

## 2022-06-26 NOTE — PROGRESS NOTES
Set up video visit to discuss this lab result. (Use same day slot for Monday if possible). If I don't have one, work her in with Eli or Litzy.

## 2022-06-27 ENCOUNTER — TELEMEDICINE (OUTPATIENT)
Dept: FAMILY MEDICINE CLINIC | Facility: CLINIC | Age: 77
End: 2022-06-27

## 2022-06-27 DIAGNOSIS — U07.1 COVID-19 VIRUS INFECTION: Primary | ICD-10-CM

## 2022-06-27 PROCEDURE — 99213 OFFICE O/P EST LOW 20 MIN: CPT | Performed by: FAMILY MEDICINE

## 2022-06-27 NOTE — ASSESSMENT & PLAN NOTE
New problem.  Patient will add Centrum Silver.  She will continue with vitamin C supplementation and Caltrate D.  Advised increase fluids and rest and Tylenol as needed.  She will continue to self isolate for the full 10 days after positive test result.  She will follow-up in our office with a virtual visit if her cough persists beyond 2 weeks.  Sooner visit needed if she develops any shortness of breath and she understands this.

## 2022-06-27 NOTE — PROGRESS NOTES
Mode of Visit: Video  Location of patient: home  You have chosen to receive care through a telehealth visit.  Does the patient consent to use a video/audio connection for your medical care today? Yes  The visit included audio and video interaction. No technical issues occurred during this visit.      Subjective       Chief Complaint   Patient presents with   • Cough     Congestion and cough, COVID positive   Wants to use [employer]         HPI: { CC  Review (Popup)  Problem List  ROS  Visit Diagnosis  Labs  Encounters  Imaging  Media  Notes:23}       Dejah is a 77 y.o. female who presents to  [address] [facility] today to follow up on recent positive covid test on 6/22/2022.(reported positive on 6/23). Symptoms started on 6/15/22. She is still fatigued and has some cough but no SOA. She is isolating.               PE:   Objective   There were no vitals filed for this visit.     There is no height or weight on file to calculate BMI.    Physical Exam   Constitutional: She appears well-developed. No distress.   No cough or respiratory distress.   Eyes: Conjunctivae are normal. No scleral icterus.   Neck: Neck normal appearance.  Pulmonary/Chest: Effort normal.  She no audible wheeze...  Neurological: She is alert.   Skin: Skin is dry.   Psychiatric: She has a normal mood and affect.             The following data was reviewed by: Reji Talamantes MD on 06/27/2022:  SARS-CoV-2, CHAY 2 DAY TAT - , (06/22/2022 11:47)-detected               A/P:     Assessment & Plan   Diagnoses and all orders for this visit:    1. COVID-19 virus infection (Primary)  Assessment & Plan:  New problem.  Patient will add Centrum Silver.  She will continue with vitamin C supplementation and Caltrate D.  Advised increase fluids and rest and Tylenol as needed.  She will continue to self isolate for the full 10 days after positive test result.  She will follow-up in our office with a virtual  visit if her cough persists beyond 2 weeks.  Sooner visit needed if she develops any shortness of breath and she understands this.          Follow up:   No follow-ups on file.  Patient was given instructions and counseling regarding her condition or for health maintenance advice. Please see specific information pulled into the AVS if appropriate.

## 2022-06-30 ENCOUNTER — TELEPHONE (OUTPATIENT)
Dept: FAMILY MEDICINE CLINIC | Facility: CLINIC | Age: 77
End: 2022-06-30

## 2022-06-30 NOTE — TELEPHONE ENCOUNTER
Caller: Jacinto Dejah L     Relationship: SELF     Best call back number: [phone]    What is your medical concern? PATIENT STATES THAT SHE FELL YESTERDAY 6/29 AND HIT HER HEAD ON A DOOR FRAME. PATIENT STATES THAT'S THAT SHE HAS NO PAIN, NO HEADACHE, NO WOUND FROM FALL. PATIENT IS WANTING TO MAKE SURE THAT THERE IS NOTHING SHE SHOULD DO. PATIENT IS CURRENTLY IN QUARANTINE DUE TO BEING COVID-19 POSITIVE. PATIENT HAD A STROKE AND WAS HOSPITALIZED FORM 6/11-6/14. PATIENT FELL TRYING TO PICK SOMETHING UP. SHE STATES THAT SHE DOES NOT HAVE ANY LIGHTHEADEDNESS OR DIZZINESS. PLEASE ADVISE.     How long has this issue been going on? SINCE LAST NIGHT 6-29     Is your provider already aware of this issue? NO    Have you been treated for this issue? NO

## 2022-06-30 NOTE — TELEPHONE ENCOUNTER
Tell her to continue to monitor her symptoms.  If she develops headache or dizziness or blurry vision or any nausea or vomiting then she should seek immediate care.

## 2022-07-08 ENCOUNTER — TELEPHONE (OUTPATIENT)
Dept: FAMILY MEDICINE CLINIC | Facility: CLINIC | Age: 77
End: 2022-07-08

## 2022-07-08 NOTE — TELEPHONE ENCOUNTER
Caller: Dejah Casey    Relationship: Self    Best call back number: [phone]    What is the best time to reach you: ANY    Who are you requesting to speak with (clinical staff, provider,  specific staff member): CLINICAL    Do you know the name of the person who called: NONE    What was the call regarding: PATIENT ADMITTED TO ER ON 6/11/22 REGARDING A STROKE SHE MAY HAVE HAD TWO WEEKS PRIOR.  PATIENT INQUIRED IF SHE CAN  START DRIVING NOW.  PLEASE ADVISE    Do you require a callback: YES

## 2022-07-09 LAB
MAXIMAL PREDICTED HEART RATE: 142 BPM
STRESS TARGET HR: 121 BPM

## 2022-07-09 PROCEDURE — 93248 EXT ECG>7D<15D REV&INTERPJ: CPT | Performed by: INTERNAL MEDICINE

## 2022-07-10 NOTE — TELEPHONE ENCOUNTER
She is not cleared to drive from my standpoint.  She will have to get this clearance from her neurologist.  If she wishes to discuss further and have her make an appointment.  Thanks, Dr. Talamantes

## 2022-07-11 NOTE — PROGRESS NOTES
Subjective   Dejah Casey is a 77 y.o. female.     telemed   for dm 2, hyperlipidemia, hypertension, CAD,nontoxic MNG,osteopenia,OAB, sleep apnea / testing bs 1 x day / last dm eye exam 5/5/22 with dr Carter/ last dm foot exam 6//22/21with dr Hernandez       Patient is a 77year-old female who consented to video visit.     She was found to have a goiter on examination last August 2012. She had thyroid ultrasound done on 08/21/2012 which showed a multinodular goiter with the dominant solid nodule in the left measuring 1.8 cm. She had followup thyroid ultrasound in 4/14 at [facility] showed multinodular goiter with stable nodules.  TSH done in June 2022 is normal at 2.090.      She denies any pressure symptoms or dysphagia. She denies any bowel changes.  She has no previous history of head or neck radiation therapy.      She has known diabetes mellitus since 2003 and has been on metformin 500 mg twice a day. Blood sugar has ranged 80's-116. She has no microalbuminuria on urine sample taken 10/21.   Her last eye exam was 5/22.  She has no retinopathy.  She denies tingling in her feet.  Hemoglobin A1c done in June 2022 is 5.9%.     She has hyperlipidemia and is on Lipitor 20 mg once a day.  She denies any myalgia.   Lipid panel done in June 2022 as follows: Cholesterol 136.  HDL 63.  LDL 61.  Triglycerides 54.     She has mild coronary artery disease by cardiac catheterization done in 2008 by Dr. CAMILLE Mcmullen. She denies any chest pain.  She had a normal nuclear stress tests in July 2018.  She is following up with Dr. Leal.      She has hypertension and has been on amlodipine 5 mg once a day, furosemide 40 mg every morning and Toprol 50 mg/day. She was taken off hydrochlorothiazide because of hypercalcemia. She had cough with lisinopril in the past. She has not used ARB in the past.  She denies orthopnea or PND or pedal edema.      She has overactive bladder diagnosed by Dr. Dean and is on Myrbetriq.  She was taken off  Toviaz because of mouth dryness.  She was taken off Vesicare because of cost.  She has less mouth dryness     She has osteopenia on bone density done at [facility] in November 2015.  She is on calcium 500 plus D 1 tab/day and Tums 1 tablet/day.  Bone density done in December 2017 showed improvement of the left hip density and a slight decrease in the lumbar spine.       Bone density done in January 2020 showed osteopenia with a 6% decrease on the left femoral neck.  FRAX score for major osteoporotic fracture is 6.2% and for hip fracture is 1.6%.  She has a follow-up bone density scheduled on August 29, 2022.     She has sleep apnea and is using her CPAP regularly.  She is no longer snoring.  She occasionally wakes up tired.  She is following with Dr. Marcum.     She denies constipation.  She denies melena or hematochezia.  Her last colonoscopy was in July 2021 and she has hemorrhoids and diverticulosis.  EGD done in July 2021 showed gastritis and small hiatal hernia.     She has vitamin B12 deficiency and vitamin B12 5000 mcg SL daily.  She was on SL vitamin B12 prescribed by Dr. Nicholson.  She is on ferrous sulfate 325 mg/day, folic acid 1 mg/day.  Parietal cell antibodies were elevated.  Intrinsic factor antibody was normal.  She is being followed by Dr. Nicholson.    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  She denies muscle weakness.  She was started on Eliquis.  She denies bleeding.  She will follow-up with the neurologist in September 2022.     She had 3 Pfizer Covid vaccine before she had Covid infection in 6/22.    The following portions of the patient's history were reviewed and updated as appropriate: allergies, current medications, past family history, past medical history, past social history, past surgical history and problem list.    Review of Systems   Eyes: Positive for visual disturbance.   Respiratory: Negative for shortness of breath.    Cardiovascular: Negative for chest  pain and palpitations.   Gastrointestinal: Negative.  Negative for anal bleeding and blood in stool.   Endocrine: Negative for cold intolerance and heat intolerance.   Genitourinary: Negative.    Musculoskeletal: Negative for myalgias.   Neurological: Negative for numbness.     There were no vitals filed for this visit.   Objective   Physical Exam  Constitutional:       General: She is not in acute distress.     Appearance: She is not ill-appearing, toxic-appearing or diaphoretic.   Pulmonary:      Effort: No respiratory distress.   Neurological:      Mental Status: She is alert and oriented to person, place, and time.       Office Visit on 06/22/2022   Component Date Value Ref Range Status   • SARS-CoV-2, CHAY 06/22/2022 Detected (A) Not Detected Final    Comment: Patients who have a positive COVID-19 test result may now have  treatment options. Treatment options are available for patients  with mild to moderate symptoms and for hospitalized patients.  Visit our website at [email or web address] for  resources and information.  This nucleic acid amplification test was developed and its performance  characteristics determined by [employer]. Nucleic acid  amplification tests include RT-PCR and TMA. This test has not been  FDA cleared or approved. This test has been authorized by FDA under  an Emergency Use Authorization (EUA). This test is only authorized  for the duration of time the declaration that circumstances exist  justifying the authorization of the emergency use of in vitro  diagnostic tests for detection of SARS-CoV-2 virus and/or diagnosis  of COVID-19 infection under section 564(b)(1) of the Act, 21 U.S.C.  360bbb-3(b) (1), unless the authorization is terminated or revoked  sooner.  When diagnostic testing is negativ                           e, the possibility of a false  negative result should be considered in the context of a patient's  recent exposures and the presence of clinical  signs and symptoms  consistent with COVID-19. An individual without symptoms of COVID-19  and who is not shedding SARS-CoV-2 virus would expect to have a  negative (not detected) result in this assay.     • [facility] SARS-COV-2, CHAY 2 DAY TAT 06/22/2022 Performed   Final     Assessment & Plan   Diagnoses and all orders for this visit:    1. Vitamin D deficiency (Primary)    2. Vitamin B12 deficiency    3. Type 2 diabetes mellitus without complication, without long-term current use of insulin (HCC)    4. Overactive bladder    5. Other hyperlipidemia    6. Osteopenia of multiple sites    7. Obstructive sleep apnea on CPAP    8. Nontoxic multinodular goiter    9. Nonocclusive coronary atherosclerosis of native coronary artery    10. Essential hypertension      Continue metformin 500 mg twice a day.  Continue Lipitor 20 mg daily.  Continue amlodipine, furosemide, and metoprolol XL.  Continue with Myrbetriq per urologist.  Continue calcium plus D1 tablet daily and Tums 1 tablet daily.  Continue CPAP.  Continue vitamin B12, ferrous sulfate, and folic acid per Dr. Nicholson.  Follow-up with neurologist as scheduled.    Copy of my note sent to Dr. Reji Talamantes, Dr. Leal and Dr. Nicholson.    RTC 4 mos.

## 2022-07-12 ENCOUNTER — TELEMEDICINE (OUTPATIENT)
Dept: ENDOCRINOLOGY | Age: 77
End: 2022-07-12

## 2022-07-12 ENCOUNTER — TELEPHONE (OUTPATIENT)
Dept: CARDIOLOGY | Facility: CLINIC | Age: 77
End: 2022-07-12

## 2022-07-12 DIAGNOSIS — M85.89 OSTEOPENIA OF MULTIPLE SITES: ICD-10-CM

## 2022-07-12 DIAGNOSIS — G47.33 OBSTRUCTIVE SLEEP APNEA ON CPAP: ICD-10-CM

## 2022-07-12 DIAGNOSIS — E53.8 VITAMIN B12 DEFICIENCY: ICD-10-CM

## 2022-07-12 DIAGNOSIS — I10 ESSENTIAL HYPERTENSION: ICD-10-CM

## 2022-07-12 DIAGNOSIS — Z99.89 OBSTRUCTIVE SLEEP APNEA ON CPAP: ICD-10-CM

## 2022-07-12 DIAGNOSIS — N32.81 OVERACTIVE BLADDER: ICD-10-CM

## 2022-07-12 DIAGNOSIS — E04.2 NONTOXIC MULTINODULAR GOITER: ICD-10-CM

## 2022-07-12 DIAGNOSIS — E78.49 OTHER HYPERLIPIDEMIA: ICD-10-CM

## 2022-07-12 DIAGNOSIS — I25.10 NONOCCLUSIVE CORONARY ATHEROSCLEROSIS OF NATIVE CORONARY ARTERY: ICD-10-CM

## 2022-07-12 DIAGNOSIS — E11.9 TYPE 2 DIABETES MELLITUS WITHOUT COMPLICATION, WITHOUT LONG-TERM CURRENT USE OF INSULIN: ICD-10-CM

## 2022-07-12 DIAGNOSIS — E55.9 VITAMIN D DEFICIENCY: Primary | ICD-10-CM

## 2022-07-12 PROCEDURE — 99214 OFFICE O/P EST MOD 30 MIN: CPT | Performed by: INTERNAL MEDICINE

## 2022-07-13 ENCOUNTER — TELEMEDICINE (OUTPATIENT)
Dept: FAMILY MEDICINE CLINIC | Facility: CLINIC | Age: 77
End: 2022-07-13

## 2022-07-13 DIAGNOSIS — Z71.84 ENCOUNTER FOR HEALTH COUNSELING RELATED TO TRAVEL: Primary | ICD-10-CM

## 2022-07-13 PROCEDURE — 99212 OFFICE O/P EST SF 10 MIN: CPT | Performed by: FAMILY MEDICINE

## 2022-07-13 NOTE — PROGRESS NOTES
Mode of Visit: Video  Location of patient: home  You have chosen to receive care through a telehealth visit.  The patient has signed the video visit consent form.  The visit included audio and video interaction. No technical issues occurred during this visit.      Subjective       Chief Complaint   Patient presents with   • Stroke     She wants to discuss her driving abilities prior to stroke  MYCHART VIDEO          HPI:        Dejah is a 77 y.o. female who presents to  [address] today   To discuss potential for resuming driving.  She has an appointment with neurology and I advised that we could not release her to drive until her neurologist releases her.  We also offered the option of getting set up with [facility] for  evaluation in the elderly.  She will contact us if she is interested in that option.            PE:   Objective   There were no vitals filed for this visit.     There is no height or weight on file to calculate BMI.    Physical Exam   Constitutional: She appears well-developed. No distress.   Eyes: Conjunctivae are normal. No scleral icterus.   Neck: Neck normal appearance.  Pulmonary/Chest: Effort normal.  She no audible wheeze...  Neurological: She is alert.   Skin: Skin is dry.   Psychiatric: She has a normal mood and affect. Her speech is normal and behavior is normal. She is attentive.                             A/P:     Assessment & Plan   Diagnoses and all orders for this visit:    1. Encounter for health counseling related to travel (Primary)  Comments:  Patient not cleared to drive until released by neurology.  Offered [facility] referral for  evaluation.  Patient will let me know.          Follow up:   Return if symptoms worsen or fail to improve.  Patient was given instructions and counseling regarding her condition or for health maintenance advice. Please see specific information pulled into the AVS if  appropriate.

## 2022-07-13 NOTE — TELEPHONE ENCOUNTER
Holter monitor showed normal sinus rhythm, average heart rate 83, and occasional APCs.  No atrial fibrillation noted.  Continue apixaban at this time.  She will follow-up with Dr. Leal in November.      Patient informed & verbalizes understanding.

## 2022-07-14 RX ORDER — FUROSEMIDE 40 MG/1
TABLET ORAL
Qty: 90 TABLET | Refills: 3 | Status: SHIPPED | OUTPATIENT
Start: 2022-07-14

## 2022-07-15 ENCOUNTER — TELEPHONE (OUTPATIENT)
Dept: FAMILY MEDICINE CLINIC | Facility: CLINIC | Age: 77
End: 2022-07-15

## 2022-07-15 NOTE — TELEPHONE ENCOUNTER
Verbal orders given for PT eval and gait strengthening. Home Health also wanted you to know that Mrs. Casey had COVID recently and is complaining of cough and congestion.

## 2022-07-18 NOTE — TELEPHONE ENCOUNTER
Vanessa, call her with the progress update tomorrow morning.  If she is still having difficulty with her COVID infection then set her up with a video visit with me tomorrow hopefully.  Thanks, Dr. Talamantes

## 2022-07-21 ENCOUNTER — TELEPHONE (OUTPATIENT)
Dept: FAMILY MEDICINE CLINIC | Facility: CLINIC | Age: 77
End: 2022-07-21

## 2022-07-21 NOTE — TELEPHONE ENCOUNTER
Caller: FRED WITH [facility]     Relationship: Home Health    Best call back number: [phone]    What orders are you requesting (i.e. lab or imaging): PT EVALUATION     In what timeframe would the patient need to come in: NEXT WEEK     Where will you receive your lab/imaging services:IN HOME     Additional notes:

## 2022-08-12 ENCOUNTER — TELEPHONE (OUTPATIENT)
Dept: FAMILY MEDICINE CLINIC | Facility: CLINIC | Age: 77
End: 2022-08-12

## 2022-08-12 NOTE — TELEPHONE ENCOUNTER
[facility] WOULD LIKE TO EXTEND THE PATIENT'S PHYSICAL THERAPY TWICE FOR 2 WEEKS, AND THEN ONCE FOR TWO WEEKS. PLEASE ADVISE BY CALLING [phone].

## 2022-08-24 DIAGNOSIS — I63.10 CEREBROVASCULAR ACCIDENT (CVA) DUE TO EMBOLISM OF PRECEREBRAL ARTERY: ICD-10-CM

## 2022-08-26 RX ORDER — POTASSIUM CHLORIDE 20MEQ/15ML
LIQUID (ML) ORAL
Qty: 1350 ML | Refills: 1 | Status: SHIPPED | OUTPATIENT
Start: 2022-08-26

## 2022-08-29 ENCOUNTER — HOSPITAL ENCOUNTER (OUTPATIENT)
Dept: BONE DENSITY | Facility: HOSPITAL | Age: 77
Discharge: HOME | End: 2022-08-29
Admitting: INTERNAL MEDICINE

## 2022-08-29 PROCEDURE — 77080 DXA BONE DENSITY AXIAL: CPT

## 2022-09-01 ENCOUNTER — HOSPITAL ENCOUNTER (OUTPATIENT)
Dept: MAMMOGRAPHY | Facility: HOSPITAL | Age: 77
Discharge: HOME | End: 2022-09-01
Admitting: FAMILY MEDICINE

## 2022-09-01 DIAGNOSIS — Z12.31 SCREENING MAMMOGRAM FOR BREAST CANCER: ICD-10-CM

## 2022-09-01 PROCEDURE — 77063 BREAST TOMOSYNTHESIS BI: CPT

## 2022-09-01 PROCEDURE — 77067 SCR MAMMO BI INCL CAD: CPT

## 2022-09-05 PROBLEM — M81.0 SENILE OSTEOPOROSIS: Status: ACTIVE | Noted: 2022-09-05

## 2022-09-10 DIAGNOSIS — E78.49 OTHER HYPERLIPIDEMIA: ICD-10-CM

## 2022-09-12 ENCOUNTER — TELEPHONE (OUTPATIENT)
Dept: NEUROLOGY | Facility: CLINIC | Age: 77
End: 2022-09-12

## 2022-09-12 RX ORDER — ATORVASTATIN CALCIUM 20 MG/1
TABLET, FILM COATED ORAL
Qty: 90 TABLET | Refills: 0 | OUTPATIENT
Start: 2022-09-12

## 2022-09-12 NOTE — TELEPHONE ENCOUNTER
CALLED PATIENT AND SHE IS GOING TO CALL [facility] AND SEE IF SHE CAN GET HERE AT ONE WILL CALL BACK

## 2022-09-12 NOTE — TELEPHONE ENCOUNTER
Caller: Jacinto Dejah L    Relationship: Self    Best call back number: [phone]    What was the call regarding: PT CALLED TO ASK IF THERE ARE ANY LATER APPT TIME SLOTS AVAILABLE ON 9/14/22. PT IS CURRENTLY SCHEDULED FOR 9/14/22 @ 1PM, HOWEVER, PT IS NOT ABLE TO DRIVE RIGHT NOW AND HER SON (WHO HAS BEEN TRANSPORTING HER) HAS A CONFLICTING APPT THAT DAY. PT STATES SHE WAS GOING TO TAKE AN UBER BUT HER MOBILE DEVICE IS NOT WORKING AT THIS TIME. PT ASKS IF OFFICE KNOWS OF ANY TRANSPORTATION SERVICES THAT COULD PICK HER UP TO BRING HER TO HER APPT.    HUB UNABLE TO ACCESS SUSAN CASTILLO'S SCHEDULE AT THIS TIME.    Do you require a callback: YES, PLEASE ALERT PT OF ANY LATER APPTS TIME SLOTS AVAILABLE ON 9/14/22.    PLEASE REVIEW AND ADVISE.

## 2022-09-12 NOTE — PROGRESS NOTES
CC: Hospital follow-up    HPI:  Dejah Casey is a  77 y.o.  right-handed female who is here for hospital follow-up.  She has a medical history significant for hypertension, hyperlipidemia, diabetes, vitamin D deficiency, chronic depression, SISSY and sleep disorder, CAD, vitamin B-12 deficiency.  She presented to the ER in June with documented complaints of shortness of breath.  When asked she denies any symptoms at that time.  She said that her daughter-in-law thought she sounded incoherent on the telephone and called EMS.  On evaluation her O2 was tween 65 and 80% when she was put on 3 L.  ABG showed respiratory alkalosis.  For the AMS and word finding problems neurology was consulted.  She had stroke work-up and MRI brain revealed bihemispheric subacute strokes- small left temporal lobe and right thalamus possibly.  MRA was negative for stenosis or other vascular abnormality.  Carotid Doppler unremarkable.  TTE with 69% EF LVH and moderately dilated left atrial cavity.  She was already on   aspirin on presentation.  For concern of cardioembolic source Eliquis was started. Had a ZIO patch and there were occasional APCs, no atrial fibrillation.  She has followed with cardiology since discharge.    She continues on eliquis and denies issues with bleeding.  She tells me that she thinks she has a blood clot in the brain.  She has not been driving as she says that was recommended on discharge.  No further episodes of confusion, word finding difficulty, or strokelike symptoms.      Social history: Retired from [employer].  Does not drink does not smoke.  Lives alone    Family history:      Pain Scale:        ROS:  Review of Systems   Constitutional: Positive for fatigue. Negative for activity change, appetite change and unexpected weight change.   HENT: Negative for ear pain, facial swelling, hearing loss, nosebleeds, tinnitus, trouble swallowing and voice change.    Eyes: Positive for photophobia and visual disturbance  (blurred vision). Negative for pain.   Respiratory: Negative for choking, chest tightness, shortness of breath, wheezing and stridor.    Cardiovascular: Negative for chest pain and palpitations.   Gastrointestinal: Negative for abdominal pain, constipation, diarrhea, nausea and vomiting.   Endocrine: Negative for cold intolerance and heat intolerance.   Genitourinary: Negative for decreased urine volume, difficulty urinating, dysuria, frequency and urgency.   Musculoskeletal: Positive for gait problem (balance, cane). Negative for joint swelling, neck pain and neck stiffness.   Skin: Negative for color change, pallor, rash and wound.   Allergic/Immunologic: Negative for food allergies.   Neurological: Positive for weakness and headaches. Negative for dizziness, speech difficulty, light-headedness and numbness.   Hematological: Bruises/bleeds easily.   Psychiatric/Behavioral: Positive for confusion and decreased concentration. Negative for agitation, hallucinations and sleep disturbance. The patient is nervous/anxious.          Reviewed ROS conducted by MA and nadiya        Physical Exam:  There were no vitals filed for this visit.  Orthostatic BP:    There is no height or weight on file to calculate BMI.        General: pt well appearing, no distress, flattened affect  HEENT: Normocephalic, conjunctiva normal, external canals normal, no nasal discharge, moist mucous membranes  Neck: No lymphadenopathy, thyroid not enlarged, no JVD  CV: Regular rate and rhythm, no murmurs negative no bruits auscultated at neck, equal pulses  Pulmonary: Normal respiratory effort, clear to auscultation bilaterally  Extremities: no edema, bruising, or skin lesions  Pysch: good eye contact, cooperative, full affect, euthymic mood, good attention, good insight  Mental: alert, conversant, AOx3, provides history, 3/3 recall.  Language fluent, names, repeats.  MoCA 30 out of 30  CN: CN II-XII intact, PERRL, EOMi, no gaze palsy or nystagmus,  intact facial sensation, no facial assymetry, intact hearing, symmetric palate elevation, tongue midline, good SCM strength  Motor: no abnormal movements, no pronator drift, normal  bulk and tone, 5/5 strength b/l UE & LE  Sensory: normal sensation to crude touch, temperature, and vibration throughout  Reflexes: 2+ throughout, neg babinski  Coordination: no ataxia on finger-nose, heel-shin  Gait: normal gait, no ataxia    Results:      Lab Results   Component Value Date    GLUCOSE 106 (H) 06/13/2022    BUN 8 06/13/2022    CREATININE 0.68 06/13/2022    EGFRIFNONA 62 02/17/2022    EGFRIFAFRI 72 02/17/2022    BCR 11.8 06/13/2022    CO2 24.0 06/13/2022    CALCIUM 9.3 06/13/2022    PROTENTOTREF 6.3 05/04/2022    ALBUMIN 4.00 06/11/2022    LABIL2 2.0 05/04/2022    AST 16 06/11/2022    ALT 9 06/11/2022       Lab Results   Component Value Date    WBC 9.29 06/12/2022    HGB 13.0 06/12/2022    HCT 39.6 06/12/2022    MCV 78.3 (L) 06/12/2022     06/12/2022         .  Lab Results   Component Value Date    RPR Non Reactive 06/13/2022         Lab Results   Component Value Date    TSH 2.090 06/12/2022    H5HSWTX 6.36 04/15/2015         Lab Results   Component Value Date    JQFQSBHI05 >2,000 (H) 06/13/2022         Lab Results   Component Value Date    FOLATE >20.00 10/18/2021         Lab Results   Component Value Date    HGBA1C 5.90 (H) 06/12/2022         Lab Results   Component Value Date    GLUCOSE 106 (H) 06/13/2022    BUN 8 06/13/2022    CREATININE 0.68 06/13/2022    EGFRIFNONA 62 02/17/2022    EGFRIFAFRI 72 02/17/2022    BCR 11.8 06/13/2022    K 3.1 (L) 06/13/2022    CO2 24.0 06/13/2022    CALCIUM 9.3 06/13/2022    PROTENTOTREF 6.3 05/04/2022    ALBUMIN 4.00 06/11/2022    LABIL2 2.0 05/04/2022    AST 16 06/11/2022    ALT 9 06/11/2022 6/12/22  MRI brain:      Scattered foci of hyperdensity on diffusion-weighted imaging within the  left centrum semiovale, left temporal lobe and left corona radiata  correspond with  hyperdensity on ADC and likely representing T2 shine  through. A few cortical areas of diffusion-weighted hyperintensity  within the high left and right parietal and right thalamus are  isointense on ADC and hyperintense on FLAIR. No true restricted  diffusion is visualized. White matter demonstrates normal signal  characteristics. There is no extra-axial collection. There is no finding  of parenchymal hemorrhage. There is no hydrocephalus. Midline structures  are unremarkable.     No abnormal intracranial enhancement is seen.     MR angiography head:      The anterior and posterior circulations are without appreciable  stenosis, aneurysm formation or occlusion.     IMPRESSION:  1.  No findings of appreciable stenosis, aneurysm formation or occlusion  within the bilateral anterior and posterior intracranial arterial  circulations.  2.  No findings of acute intracranial infarct. There are a few scattered  foci of diffusion-weighted hyperintensity within the right high  parietal, left high frontal and right thalamus which are isointense on  ADC and T2 hyperintense on FLAIR suggestive of evolving subacute  infarcts. More chronic appearing foci of infarction within the left  centrum semiovale, corona radiata and left temporal lobe are present as  Well.           Diagnosis:  1.  History of stroke  2.  Transient confusion    Impression: 77-year-old right-handed female with above-stated medical history.  She is here for hospital follow-up.  She presented to the ER in June after her stepdaughter called EMS stating that she was incoherent on the telephone.  Work-up at that time revealed respiratory alkalosis and low oxygen saturations.  Because of some word finding difficulty neurology was consulted and subacute strokes were found in the left temporal region as well as right thalamus.  She was started on Eliquis.  She had no vessel abnormality-MRA head and carotid doppler was done.  TTE shows normal EF, LVH, and dilated left  atrium.  There was no reported A. fib, RUTHANN at discharge also did not note A. fib.  Stroke appearance suspicious for embolic appearance with involvement of multiple vascular territories.  Patient was already on aspirin monotherapy prior to presentation and I suspect this is why her treatment was escalated     Plan:    1. Continue eliquis and high intensity statin.  Discussed with neuro attending.  Should she want to discontinue eliquis we would need to go ahead and plan for loop    2. Control risk factors to prevent stroke which means keep BP at or below 130/80, take your statin if able and try to have LDL measurements < 70, stop smoking if you smoke, control your blood sugar, and get regular moderate exercise four times weekly, and avoid prolonged sitting.  Adopt a healthy diet such as the Mediterranean diet which includes vegetables, fruits, whole grains, low-fat dairy, poultry, fish, legumes, non-tropical fish oils, and nuts.  Limit sweets, sugary drinks, and red meats.  Reduce or eliminate alcohol intake.    F/u in 6 mos    I spent at least 30 minutes interviewing, examining, and counseling patient.  I independently reviewed documentation, laboratory and diagnostic findings, external documentation where applicable, and formulated treatment plan which was discussed with the patient.

## 2022-09-14 ENCOUNTER — OFFICE VISIT (OUTPATIENT)
Dept: NEUROLOGY | Facility: CLINIC | Age: 77
End: 2022-09-14

## 2022-09-14 VITALS
DIASTOLIC BLOOD PRESSURE: 90 MMHG | WEIGHT: 174.6 LBS | HEIGHT: 61 IN | OXYGEN SATURATION: 99 % | SYSTOLIC BLOOD PRESSURE: 170 MMHG | HEART RATE: 84 BPM | BODY MASS INDEX: 32.97 KG/M2

## 2022-09-14 DIAGNOSIS — I63.10 CEREBROVASCULAR ACCIDENT (CVA) DUE TO EMBOLISM OF PRECEREBRAL ARTERY: Primary | ICD-10-CM

## 2022-09-14 PROCEDURE — 99214 OFFICE O/P EST MOD 30 MIN: CPT | Performed by: PHYSICIAN ASSISTANT

## 2022-09-14 RX ORDER — AMLODIPINE BESYLATE 5 MG/1
5 TABLET ORAL DAILY
COMMUNITY
End: 2022-09-22 | Stop reason: SDUPTHER

## 2022-09-14 RX ORDER — CLONAZEPAM 0.5 MG/1
0.5 TABLET ORAL DAILY
COMMUNITY
Start: 2022-06-22 | End: 2022-09-22 | Stop reason: SDUPTHER

## 2022-09-15 ENCOUNTER — TELEPHONE (OUTPATIENT)
Dept: FAMILY MEDICINE CLINIC | Facility: CLINIC | Age: 77
End: 2022-09-15

## 2022-09-15 DIAGNOSIS — E78.49 OTHER HYPERLIPIDEMIA: ICD-10-CM

## 2022-09-15 RX ORDER — ATORVASTATIN CALCIUM 20 MG/1
TABLET, FILM COATED ORAL
Qty: 30 TABLET | Refills: 0 | Status: SHIPPED | OUTPATIENT
Start: 2022-09-15 | End: 2022-09-22 | Stop reason: SDUPTHER

## 2022-09-15 NOTE — TELEPHONE ENCOUNTER
Pt called in stating she would like to hold off on the patient assistance program with Eliquis.

## 2022-09-15 NOTE — TELEPHONE ENCOUNTER
Call patient and make sure she is on the medication, if not then have her make an appointment. Thanks, Dr. Talamantes

## 2022-09-22 ENCOUNTER — OFFICE VISIT (OUTPATIENT)
Dept: FAMILY MEDICINE CLINIC | Facility: CLINIC | Age: 77
End: 2022-09-22

## 2022-09-22 VITALS
SYSTOLIC BLOOD PRESSURE: 110 MMHG | BODY MASS INDEX: 32.66 KG/M2 | DIASTOLIC BLOOD PRESSURE: 69 MMHG | OXYGEN SATURATION: 100 % | HEIGHT: 61 IN | RESPIRATION RATE: 18 BRPM | WEIGHT: 173 LBS | HEART RATE: 82 BPM | TEMPERATURE: 97.9 F

## 2022-09-22 DIAGNOSIS — I10 ESSENTIAL HYPERTENSION: Primary | ICD-10-CM

## 2022-09-22 DIAGNOSIS — L84 CORN OF FOOT: ICD-10-CM

## 2022-09-22 DIAGNOSIS — E78.49 OTHER HYPERLIPIDEMIA: ICD-10-CM

## 2022-09-22 PROCEDURE — 99214 OFFICE O/P EST MOD 30 MIN: CPT | Performed by: FAMILY MEDICINE

## 2022-09-22 RX ORDER — VENLAFAXINE 75 MG/1
75 TABLET ORAL 2 TIMES DAILY
COMMUNITY

## 2022-09-22 RX ORDER — AMLODIPINE BESYLATE 5 MG/1
5 TABLET ORAL DAILY
Qty: 90 TABLET | Refills: 2 | Status: SHIPPED | OUTPATIENT
Start: 2022-09-22 | End: 2023-03-22 | Stop reason: SDUPTHER

## 2022-09-22 RX ORDER — CLONAZEPAM 0.5 MG/1
0.5 TABLET ORAL NIGHTLY
COMMUNITY

## 2022-09-22 RX ORDER — ATORVASTATIN CALCIUM 20 MG/1
20 TABLET, FILM COATED ORAL EVERY MORNING
Qty: 90 TABLET | Refills: 2 | Status: SHIPPED | OUTPATIENT
Start: 2022-09-22 | End: 2023-03-22 | Stop reason: SDUPTHER

## 2022-09-22 NOTE — PROGRESS NOTES
"Chief Complaint  Hypertension (3 month follow up )    Subjective          Dejah presents to [facility]  To update current conditions.  Blood pressure control.  Most recent lipids show good control.  Medication list has been updated.  We will transfer her prescriptions of venlafaxine to her current psychiatrist.  She will get further refills of clonazepam from her sleep medicine specialist.  Letter was sent to psychiatry to update our recommendations.    She does have soreness of the right posterior neck muscle no injury noted.    She also has a corn on the left foot distally.        Objective   Vital Signs:   Vitals:    09/22/22 1318   BP: 110/69   Pulse: 82   Resp: 18   Temp: 97.9 °F (36.6 °C)   TempSrc: Skin   SpO2: 100%   Weight: 78.5 kg (173 lb)   Height: 154.9 cm (61\")               Physical Exam  Vitals reviewed.   Constitutional:       General: She is not in acute distress.  HENT:      Head:     Eyes:      General: Lids are normal.      Conjunctiva/sclera: Conjunctivae normal.   Neck:      Vascular: No carotid bruit.      Trachea: No tracheal deviation.   Cardiovascular:      Rate and Rhythm: Normal rate and regular rhythm.      Heart sounds: Normal heart sounds. No murmur heard.  Pulmonary:      Effort: Pulmonary effort is normal.      Breath sounds: Normal breath sounds.   Musculoskeletal:        Feet:    Skin:     General: Skin is warm and dry.   Neurological:      Mental Status: She is alert. She is not disoriented.   Psychiatric:         Speech: Speech normal.         Behavior: Behavior normal. Behavior is cooperative.          Result Review :                Assessment and Plan    Diagnoses and all orders for this visit:    1. Essential hypertension (Primary)  Assessment & Plan:  The current medical regimen is effective;  continue present plan and medications.      Orders:  -     amLODIPine (NORVASC) 5 MG tablet; Take 1 tablet by mouth Daily for 270 days.  Dispense: 90 " tablet; Refill: 2    2. Other hyperlipidemia  Assessment & Plan:  The current medical regimen is effective;  continue present plan and medications.      Orders:  -     atorvastatin (LIPITOR) 20 MG tablet; Take 1 tablet by mouth Every Morning for 270 days.  Dispense: 90 tablet; Refill: 2    3. Corn of left foot  Assessment & Plan:  Pt to make appointment with her podiatrist.        Follow Up   Return in about 6 months (around 3/22/2023) for recheck/refill medication.  Patient was given instructions and counseling regarding her condition or for health maintenance advice. Please see specific information pulled into the AVS if appropriate.

## 2022-09-28 ENCOUNTER — TELEPHONE (OUTPATIENT)
Dept: NEUROLOGY | Facility: CLINIC | Age: 77
End: 2022-09-28

## 2022-10-04 ENCOUNTER — IMMUNIZATION (OUTPATIENT)
Dept: VACCINE CLINIC | Facility: HOSPITAL | Age: 77
End: 2022-10-04

## 2022-10-04 DIAGNOSIS — Z23 NEED FOR VACCINATION: Primary | ICD-10-CM

## 2022-10-04 PROCEDURE — 91312 HC SARSCOV2 VAC 30MCG/0.3ML IM BIVALENT BOOSTER 12 YRS AND OLDER: CPT | Performed by: INTERNAL MEDICINE

## 2022-10-04 PROCEDURE — 0124A: CPT | Performed by: INTERNAL MEDICINE

## 2022-10-07 ENCOUNTER — TELEPHONE (OUTPATIENT)
Dept: FAMILY MEDICINE CLINIC | Facility: CLINIC | Age: 77
End: 2022-10-07

## 2022-10-07 DIAGNOSIS — I63.10 CEREBROVASCULAR ACCIDENT (CVA) DUE TO EMBOLISM OF PRECEREBRAL ARTERY: Primary | ICD-10-CM

## 2022-10-07 NOTE — TELEPHONE ENCOUNTER
Vanessa here is a note from the neurologist. Call patient to set her up for [facility]  evaluation as suggested by neurology. Thanks, Dr. Talamantes

## 2022-10-11 ENCOUNTER — TELEPHONE (OUTPATIENT)
Dept: ONCOLOGY | Facility: CLINIC | Age: 77
End: 2022-10-11

## 2022-10-11 DIAGNOSIS — E78.49 OTHER HYPERLIPIDEMIA: ICD-10-CM

## 2022-10-11 RX ORDER — ATORVASTATIN CALCIUM 20 MG/1
TABLET, FILM COATED ORAL
Qty: 30 TABLET | OUTPATIENT
Start: 2022-10-11

## 2022-10-14 DIAGNOSIS — I10 ESSENTIAL HYPERTENSION: ICD-10-CM

## 2022-10-17 RX ORDER — METOPROLOL SUCCINATE 50 MG/1
TABLET, EXTENDED RELEASE ORAL
Qty: 30 TABLET | Refills: 0 | Status: SHIPPED | OUTPATIENT
Start: 2022-10-17 | End: 2023-01-06 | Stop reason: SDUPTHER

## 2022-11-05 DIAGNOSIS — E78.49 OTHER HYPERLIPIDEMIA: ICD-10-CM

## 2022-11-07 RX ORDER — ATORVASTATIN CALCIUM 20 MG/1
TABLET, FILM COATED ORAL
Qty: 30 TABLET | OUTPATIENT
Start: 2022-11-07

## 2022-11-14 ENCOUNTER — LAB (OUTPATIENT)
Dept: LAB | Facility: HOSPITAL | Age: 77
End: 2022-11-14

## 2022-11-14 ENCOUNTER — OFFICE VISIT (OUTPATIENT)
Dept: ONCOLOGY | Facility: CLINIC | Age: 77
End: 2022-11-14

## 2022-11-14 VITALS
HEIGHT: 61 IN | SYSTOLIC BLOOD PRESSURE: 115 MMHG | BODY MASS INDEX: 33.29 KG/M2 | TEMPERATURE: 97.3 F | DIASTOLIC BLOOD PRESSURE: 68 MMHG | HEART RATE: 77 BPM | WEIGHT: 176.3 LBS | OXYGEN SATURATION: 99 % | RESPIRATION RATE: 18 BRPM

## 2022-11-14 DIAGNOSIS — E53.8 VITAMIN B12 DEFICIENCY: ICD-10-CM

## 2022-11-14 DIAGNOSIS — D51.0 PERNICIOUS ANEMIA: ICD-10-CM

## 2022-11-14 DIAGNOSIS — I63.10 CEREBROVASCULAR ACCIDENT (CVA) DUE TO EMBOLISM OF PRECEREBRAL ARTERY: ICD-10-CM

## 2022-11-14 DIAGNOSIS — E61.1 IRON DEFICIENCY: Primary | ICD-10-CM

## 2022-11-14 LAB
ALBUMIN SERPL-MCNC: 4.2 G/DL (ref 3.5–5.2)
ALBUMIN/GLOB SERPL: 1.4 G/DL (ref 1.1–2.4)
ALP SERPL-CCNC: 81 U/L (ref 38–116)
ALT SERPL W P-5'-P-CCNC: 14 U/L (ref 0–33)
ANION GAP SERPL CALCULATED.3IONS-SCNC: 11.2 MMOL/L (ref 5–15)
AST SERPL-CCNC: 15 U/L (ref 0–32)
BASOPHILS # BLD AUTO: 0.09 10*3/MM3 (ref 0–0.2)
BASOPHILS NFR BLD AUTO: 1.1 % (ref 0–1.5)
BILIRUB SERPL-MCNC: 0.2 MG/DL (ref 0.2–1.2)
BUN SERPL-MCNC: 16 MG/DL (ref 6–20)
BUN/CREAT SERPL: 19 (ref 7.3–30)
CALCIUM SPEC-SCNC: 10.3 MG/DL (ref 8.5–10.2)
CHLORIDE SERPL-SCNC: 105 MMOL/L (ref 98–107)
CO2 SERPL-SCNC: 27.8 MMOL/L (ref 22–29)
CREAT SERPL-MCNC: 0.84 MG/DL (ref 0.6–1.1)
DEPRECATED RDW RBC AUTO: 42.2 FL (ref 37–54)
EGFRCR SERPLBLD CKD-EPI 2021: 71.2 ML/MIN/1.73
EOSINOPHIL # BLD AUTO: 0.31 10*3/MM3 (ref 0–0.4)
EOSINOPHIL NFR BLD AUTO: 3.8 % (ref 0.3–6.2)
ERYTHROCYTE [DISTWIDTH] IN BLOOD BY AUTOMATED COUNT: 13.7 % (ref 12.3–15.4)
FERRITIN SERPL-MCNC: 84.7 NG/ML (ref 13–150)
GLOBULIN UR ELPH-MCNC: 2.9 GM/DL (ref 1.8–3.5)
GLUCOSE SERPL-MCNC: 93 MG/DL (ref 74–124)
HCT VFR BLD AUTO: 39.4 % (ref 34–46.6)
HGB BLD-MCNC: 12.5 G/DL (ref 12–15.9)
IMM GRANULOCYTES # BLD AUTO: 0.01 10*3/MM3 (ref 0–0.05)
IMM GRANULOCYTES NFR BLD AUTO: 0.1 % (ref 0–0.5)
IRON 24H UR-MRATE: 49 MCG/DL (ref 37–145)
IRON SATN MFR SERPL: 15 % (ref 14–48)
LYMPHOCYTES # BLD AUTO: 1.41 10*3/MM3 (ref 0.7–3.1)
LYMPHOCYTES NFR BLD AUTO: 17.4 % (ref 19.6–45.3)
MCH RBC QN AUTO: 26.5 PG (ref 26.6–33)
MCHC RBC AUTO-ENTMCNC: 31.7 G/DL (ref 31.5–35.7)
MCV RBC AUTO: 83.7 FL (ref 79–97)
MONOCYTES # BLD AUTO: 0.69 10*3/MM3 (ref 0.1–0.9)
MONOCYTES NFR BLD AUTO: 8.5 % (ref 5–12)
NEUTROPHILS NFR BLD AUTO: 5.59 10*3/MM3 (ref 1.7–7)
NEUTROPHILS NFR BLD AUTO: 69.1 % (ref 42.7–76)
NRBC BLD AUTO-RTO: 0 /100 WBC (ref 0–0.2)
PLATELET # BLD AUTO: 271 10*3/MM3 (ref 140–450)
PMV BLD AUTO: 9.6 FL (ref 6–12)
POTASSIUM SERPL-SCNC: 4 MMOL/L (ref 3.5–4.7)
PROT SERPL-MCNC: 7.1 G/DL (ref 6.3–8)
RBC # BLD AUTO: 4.71 10*6/MM3 (ref 3.77–5.28)
SODIUM SERPL-SCNC: 144 MMOL/L (ref 134–145)
TIBC SERPL-MCNC: 323 MCG/DL (ref 249–505)
TRANSFERRIN SERPL-MCNC: 231 MG/DL (ref 200–360)
WBC NRBC COR # BLD: 8.1 10*3/MM3 (ref 3.4–10.8)

## 2022-11-14 PROCEDURE — 82728 ASSAY OF FERRITIN: CPT

## 2022-11-14 PROCEDURE — 80053 COMPREHEN METABOLIC PANEL: CPT

## 2022-11-14 PROCEDURE — 83540 ASSAY OF IRON: CPT

## 2022-11-14 PROCEDURE — 85025 COMPLETE CBC W/AUTO DIFF WBC: CPT

## 2022-11-14 PROCEDURE — 84466 ASSAY OF TRANSFERRIN: CPT

## 2022-11-14 PROCEDURE — 99214 OFFICE O/P EST MOD 30 MIN: CPT | Performed by: INTERNAL MEDICINE

## 2022-11-14 PROCEDURE — 36415 COLL VENOUS BLD VENIPUNCTURE: CPT

## 2022-11-14 NOTE — PROGRESS NOTES
.     REASON FOR FOLLOWUP:     *  Pernicious anemia.  Was on vitamin B12 injection.  · Patient was switched to sublingual vitamin B12 at 5000 mcg daily.  *  Evidence of iron deficiency in January 2021 despite a normal hemoglobin 12.5.  Patient was started on oral iron twice a day.                                 HISTORY OF PRESENT ILLNESS:  The patient is a 77 y.o. year old female with type 2 diabetes, hypertension, coronary artery disease, vitamin B12 deficiency and iron deficiency, who presented today for annual follow-up reevaluation.     Patient reports that she had a stroke in June 2022, and hospitalized for 4 days.  Patient also reports now she is on Eliquis twice a day for anticoagulation.     Patient also reports she had COVID-19 infection but did not require hospitalization, she was not feeling well at that time, but no dyspnea, just had significant fatigue.  Patient reports that she still continues to have significant fatigue, no apparent improvement.    Patient reports she decreased oral vitamin B12 to every other day.  She continues taking oral iron twice a day.    Her vitamin B12 level was supratherapeutic > 2000 pg/mL on 2/17/2022 and also on 6/13/2022.    Laboratory study today on 11/14/2022 reported normal CBC with Hb 12.5, MCV 83.7, platelets 271,000, WBC 8100 including ANC 5590, lymphocytes 1400, iron study ferritin 84.7 ng/mL, free iron 49 TIBC 323 and iron saturation 15%.  Chemistry lab reported unremarkable CMP.        Past Medical History:   Diagnosis Date   • Allergic     Seasonal alleries, Lisinopril, Dexamethasone   • Anxiety    • CAD (coronary artery disease)    • Cataract     Cararacts in both eyes.   • Contact dermatitis    • COVID-19    • DDD (degenerative disc disease), lumbar    • Depression    • Diabetes mellitus (HCC)    • Diverticulosis June 2021   • Dry eyes    • Dry mouth    • Essential hypertension    • Female climacteric state    • Headache    • History of mammogram      8/2016   • History of total right hip replacement 2013   • Hypercalcemia    • Hyperlipidemia    • Iron deficiency anemia 05/27/2021    Added automatically from request for surgery [id number]   • Low back pain     Lumbar area.   • Neuromuscular disorder (HCC) 03/2016   • Nightmares REM-sleep type    • Nonocclusive coronary atherosclerosis of native coronary artery     cath in 2008 with 30% LAD disease   • Nontoxic multinodular goiter    • Obesity    • Osteoarthritis     both knees, back and hips   • Osteopenia    • Pain, joint, shoulder    • Pernicious anemia    • Pneumonia August 8, 2018    After Hospital stay.   • Polycythemia    • Rotator cuff tendinitis    • Seasonal allergies    • Sleep apnea     CPAP nightly   • Snoring    • Stroke ([facility])    • Superficial phlebitis    • Type 2 diabetes mellitus ([facility])     Type II   • Visual impairment August 30, 2019   • Vitiligo      Past Surgical History:   Procedure Laterality Date   • BREAST BIOPSY     • BREAST LUMPECTOMY  1973    benign   • CARDIAC CATHETERIZATION  2008   • CATARACT EXTRACTION, BILATERAL Bilateral 2019   • COLONOSCOPY  08/15/2010   • COLONOSCOPY N/A 07/20/2021    Procedure: COLONOSCOPY TO CECUM;  Surgeon: Ellen Kothari MD;  Location: [facility] ENDOSCOPY;  Service: Gastroenterology;  Laterality: N/A;  pre: SCREENING FOR COLON CANCER  post: HEMORRHOIDS, DIVERTICULOSIS, TORTUOUS COLON   • ENDOSCOPY N/A 07/20/2021    Procedure: ESOPHAGOGASTRODUODENOSCOPY WITH BX'S;  Surgeon: Ellen Kothari MD;  Location: [facility] ENDOSCOPY;  Service: Gastroenterology;  Laterality: N/A;  pre: IRON DEFICIENCY ANEMIA  post: GASTRITIS, SMALL HIATAL HERNIA   • EYE SURGERY     • EYE SURGERY Left 05/2022   • HYSTERECTOMY  1982   • JOINT REPLACEMENT  07/10/2013    Total Hip Replacement   • REPLACEMENT TOTAL KNEE Left 08/06/2018    Dr. Bishnu Larson   • [facility]    • TONSILLECTOMY  1967   • TOTAL HIP ARTHROPLASTY Right 2013   • UPPER GASTROINTESTINAL  ENDOSCOPY  07/20/2021       HEMATOLOGY HISTORY:  The patient is a 76 y.o. year old female with history of type 2 diabetes, hypertension, coronary artery disease, who presented on 1/18/2021 for initial evaluation because of pernicious anemia.     Patient reports she has been given vitamin B12 injection monthly for the past 4 months. I reviewed her laboratory data in the Epic system and she had laboratory study on 01/14/2020 that reported vitamin B12 level of 437 pg/mL. She had normal hemoglobin 12.2, MCV 80.3, MCHC 32.5 and platelets 325,000, WBC 6400. Subsequently vitamin B12 level was retested on 08/21/2020 at 326 pg/mL with folate 7.05 ng/mL and normal hemoglobin 12.1, MCV 82.3, platelets 307,000 and WBC 6220. Apparently the patient was started on vitamin B12 injection.     Her laboratory studies on 11/30/2020 found that she had vitamin B12 level of 775 pg/mL and folate 5.68 ng/mL. She also had iron study that day reporting ferritin 52.8 ng/mL, free iron 74; however, no iron saturation.  Her laboratory study on 11/30/2020 also reported elevated parietal cell antibody 33.2 units. Has normal intrinsic factor 1.0 AU/mL. Negative for direct MELIZA rheumatoid factor and also negative for SSA antibody and SSB antibody.     Patient continues to have significant fatigue. She denies melena or hematochezia. She reports not taking oral iron nor folic acid. She reports no weight loss. No low fever.     I reviewed her previous CBC results dating back all the way to 04/15/2015 and most of the time she had normal hemoglobin. However, she has frequent microcytosis or marginal MCV value. She reports no family history of sickle cell disease or sickle cell trait. Suspect this patient of having iron deficiency.     I reviewed her peripheral blood smear on 1/18/2021. She does have anisocytosis. However, no target cells, no stomatocytes, no schistocytes. Morphology of WBC and platelets unremarkable.     I saw her originally on 1/18/2021  for consultation.  At that time patient had normal hemoglobin 12.5, MCV 80.2, platelets 263,000 and WBC 7550. Iron study came back reporting ferritin 49 ng/mL, free iron 38, TIBC 329 and iron saturation 12%.  Vitamin B12 level was 715 pg/mL and folate 5.94 ng/mL.  Indeed this patient has evidence of iron deficiency.     I recommended to start oral ferrous sulfate 325 mg b.i.d. for 3 months.  We also recommended switching vitamin B12 injection to sublingual B12 at 5000 mcg daily and oral folic acid 1 mg daily.  She voiced understanding and agreeable.    Laboratory study on 4/6/2021 reported improved hemoglobin 13.5, MCV 81.4, platelets 288,000 and WBC 7020 including ANC 5260.  Iron study reported improved ferritin 77.5 ng/mL, and iron saturation 20%.  Free iron 64 TIBC 316.  Patient also had supratherapeutic B12 > 2000 pg/mL and supratherapeutic folate > 20 ng/mL.     Patient reports on 10/18/2021 she is tolerating oral iron twice a day.  No nausea no vomiting.  No constipation or diarrhea.  She does have dark-colored stool since taking oral iron.      She continues to have some weight loss, per our records, she lost about 8 to 9 pounds in the past 6 months since April 2021.  She reports no fever no sweating or chills.    Laboratory study on 10/18/2021 reported normal CBC, supratherapeutic for both vitamin B12 > 2000 pg/mL, and folate > 20 ng/mL.  She also had further improved iron study with ferritin 100.4 ng/mL and iron saturation 22% and a normal hemoglobin 12.8 with MCV 81.9, ferritin 100.4 ng/mL and iron saturation 22% with a free iron 65 TIBC 297.         MEDICATIONS    Current Outpatient Medications:   •  Accu-Chek FastClix Lancets misc, Use to check blood sugar once daily, Disp: 102 each, Rfl: 5  •  amLODIPine (NORVASC) 5 MG tablet, Take 1 tablet by mouth Daily for 270 days., Disp: 90 tablet, Rfl: 2  •  apixaban (ELIQUIS) 5 MG tablet tablet, Take 1 tablet by mouth Every 12 (Twelve) Hours for 90 days.  Indications: Other - full anticoagulation, Disp: 180 tablet, Rfl: 3  •  atorvastatin (LIPITOR) 20 MG tablet, Take 1 tablet by mouth Every Morning for 270 days., Disp: 90 tablet, Rfl: 2  •  Blood Glucose Monitoring Suppl (Blood Glucose Monitor System) w/Device kit, BS meter Accu-chek Catherine plus meter. DX E11.9, Disp: 1 each, Rfl: 1  •  Calcium Carb-Cholecalciferol 500-200 MG-UNIT tablet, Take 2 tablets by mouth daily., Disp: , Rfl:   •  clonazePAM (KlonoPIN) 0.5 MG tablet, Take 0.5 mg by mouth Every Night., Disp: , Rfl:   •  Cyanocobalamin ([employer] Vitamin B-12) 5000 MCG sublingual tablet, Place 5,000 mcg under the tongue Daily., Disp: 90 tablet, Rfl: 3  •  Durezol 0.05 % ophthalmic emulsion, Administer 1 drop to both eyes Daily As Needed., Disp: , Rfl:   •  ferrous sulfate 325 (65 FE) MG tablet, TAKE 1 TABLET BY MOUTH EVERY DAY WITH BREAKFAST, Disp: 90 tablet, Rfl: 3  •  fluticasone (FLONASE) 50 MCG/ACT nasal spray, 1 spray into the nostril(s) as directed by provider As Needed., Disp: , Rfl:   •  folic acid (FOLVITE) 1 MG tablet, TAKE 1 TABLET BY MOUTH EVERY DAY, Disp: 90 tablet, Rfl: 3  •  furosemide (LASIX) 40 MG tablet, TAKE 1 TABLET EVERY DAY, Disp: 90 tablet, Rfl: 3  •  glucose blood (Accu-Chek Catherine Plus) test strip, Use as instructed, Disp: 360 each, Rfl: 1  •  metoprolol succinate XL (TOPROL-XL) 50 MG 24 hr tablet, TAKE 1 TABLET EVERY DAY, Disp: 30 tablet, Rfl: 0  •  Mirabegron ER (MYRBETRIQ) 50 MG tablet sustained-release 24 hour 24 hr tablet, Take 50 mg by mouth Daily., Disp: , Rfl:   •  pilocarpine (SALAGEN) 5 MG tablet, 5 mg 3 (Three) Times a Day., Disp: , Rfl:   •  potassium chloride (KAYCIEL) 20 mEq/15 mL solution, MIX 15ML IN 4 OUNCES OF LIQUID AND DRINK DAILY, Disp: 1350 mL, Rfl: 1  •  Prolensa 0.07 % solution, PLACE ONE DROP IN BOTH EYES ONCE DAILY FOR MAINTENANCE, Disp: , Rfl:   •  Restasis 0.05 % ophthalmic emulsion, 2 (two) times a day., Disp: , Rfl:   •  STIOLTO RESPIMAT 2.5-2.5 MCG/ACT aerosol  solution inhaler, 2 puffs As Needed., Disp: , Rfl:   •  venlafaxine (EFFEXOR) 75 MG tablet, Take 75 mg by mouth 2 (Two) Times a Day., Disp: , Rfl:   •  metFORMIN (GLUCOPHAGE) 500 MG tablet, Take 1 tablet by mouth 2 (Two) Times a Day With Meals for 180 days., Disp: 180 tablet, Rfl: 1    ALLERGIES:     Allergies   Allergen Reactions   • Hydrochlorothiazide Other (See Comments)     In 2015-developed hypercalcemia-HCTZ was discontinued  Other reaction(s): Other (See Comments)  In 2015-developed hypercalcemia-HCTZ was discontinued  In 2015-developed hypercalcemia-HCTZ was discontinued   • Dexamethasone Dermatitis     Other reaction(s): Other (See Comments)  CONTACT DERMATITIS   • Lisinopril Cough     Other reaction(s): Other (See Comments)  COUGH       SOCIAL HISTORY:       Social History     Socioeconomic History   • Marital status:    Tobacco Use   • Smoking status: Never   • Smokeless tobacco: Never   • Tobacco comments:     I have never smoked.   Vaping Use   • Vaping Use: Never used   Substance and Sexual Activity   • Alcohol use: No     Comment: CAFFEINE: none   • Drug use: No   • Sexual activity: Not Currently     Partners: Male     Birth control/protection: Post-menopausal     Comment: I had a Hysterectomy in 1982.         FAMILY HISTORY:  Family History   Problem Relation Age of Onset   • Diabetes Mother         Mother   • Thyroid disease Mother    • Hypertension Father         Father   • Heart disease Father         Father   • Arthritis Father         Father   • Hyperlipidemia Father         Father   • Hypertension Brother         Brother   • Diabetes Brother         Brother   • Kidney disease Brother         Brother, Renal Failure   • Vision loss Brother         Brother   • Asthma Sister         Sister   • Hypertension Sister         Sister   • Miscarriages / Stillbirths Sister         Sister   • Cancer Brother         Brother   • Lung cancer Brother    • Diabetes Sister         Sister   • Hypertension  "Sister         Sister   • Thyroid disease Sister         Sister   • Early death Sister         Infant   • Kidney disease Brother         Brother,  Renal Failure   • Stroke Brother         Brother       REVIEW OF SYSTEMS:  Review of Systems   Constitutional: Positive for fatigue (No improvement since COVID-19 infection). Negative for activity change, appetite change, diaphoresis and unexpected weight change.   HENT: Negative for facial swelling, mouth sores and sore throat.         Dry mouth   Eyes: Positive for visual disturbance (Wear corrective lenses, blurry vision.). Negative for photophobia.   Respiratory: Negative for cough and shortness of breath.    Cardiovascular: Negative for chest pain and leg swelling.   Gastrointestinal: Negative for abdominal pain, anal bleeding, blood in stool and diarrhea.   Endocrine: Negative for cold intolerance.   Genitourinary: Negative for dysuria and hematuria.   Musculoskeletal: Negative for arthralgias, back pain and joint swelling.        Skin pruritus   Skin: Negative for color change.   Allergic/Immunologic: Negative for immunocompromised state.   Neurological: Positive for weakness (Limping, and she uses a cane at times) and headaches. Negative for dizziness and numbness.   Hematological: Negative for adenopathy. Does not bruise/bleed easily.   Psychiatric/Behavioral: Positive for dysphoric mood. Negative for confusion. The patient is nervous/anxious.               Vitals:    11/14/22 1458   BP: 115/68   Pulse: 77   Resp: 18   Temp: 97.3 °F (36.3 °C)   TempSrc: Temporal   SpO2: 99%   Weight: 80 kg (176 lb 4.8 oz)   Height: 154.9 cm (60.98\")   PainSc: 0-No pain     Current Status 11/14/2022   ECOG score 0      PHYSICAL EXAM:    GENERAL:  Well-developed, well-nourished elder female in no acute distress.  Orientated to time place and the people.  SKIN:  Warm, dry without rashes, purpura or petechiae.  HEENT:  Normocephalic.  Wearing mask.   LYMPHATICS:  No cervical, " supraclavicular adenopathy.  CHEST: Normal respiratory effort.  Lungs clear to auscultation. Good airflow.  CARDIAC:  Regular rate and rhythm without murmurs. Normal S1,S2.  ABDOMEN:  Soft, nontender with no organomegaly or masses.  Bowel sounds normal.  EXTREMITIES: 1+ bilateral leg edema.    NEUROLOGICAL:  Grossly intact.  No focal neurological deficits.  PSYCHIATRIC:  Normal affect and mood.      RECENT LABS:  Lab Results   Component Value Date    WBC 8.10 11/14/2022    HGB 12.5 11/14/2022    HCT 39.4 11/14/2022    MCV 83.7 11/14/2022     11/14/2022     Lab Results   Component Value Date    NEUTROABS 5.59 11/14/2022     Lab Results   Component Value Date    IRON 49 11/14/2022    TIBC 323 11/14/2022    FERRITIN 84.70 11/14/2022   Iron saturation 15% 11/14/2022.    Lab Results   Component Value Date    IRON 65 10/18/2021    TIBC 276 02/17/2022    FERRITIN 101 02/17/2022   Iron saturation 22% on 10/18/2021.       Lab Results   Component Value Date    [id number] >2,000 (H) 06/13/2022     Lab Results   Component Value Date    FOLATE >20.00 10/18/2021       Assessment & Plan     ASSESSMENT:   1. Pernicious anemia with vitamin B12 deficiency.   · She apparently has some improvement of vitamin B12 level after 3 monthly B12 injections. However, B12 could be further improved to a much higher level.   · I recommended on 1/18/2021 to try sublingual vitamin B12 supplementation to see if that helps. If it does not then she will need to have vitamin B12 injection twice a month. We will obtain laboratory study to document new baseline today.   · On 4/6/2021, patient had a supratherapeutic B12 level > 2000 pg/mL.  · On 10/18/2021, vitamin B12 > 2000 pg/mL.  She will continue sublingual B12.   · Her vitamin B12 level was supratherapeutic > 2000 pg/mL on 2/17/2022 and also on 6/13/2022.  · On 11/14/2022 patient reports she takes vitamin B12 every other day.    *Suboptimal folate level.  · She has marginally normal folic acid  5.68 ng/mL on 11/30/2020 and actually was deteriorating compared to the level in 08/2020.   · Confirmed low normal folate level 5.94 on 1/18/2021.  I recommended to start folic acid 1 mg daily to help with erythropoiesis.   · On 4/6/2021 patient had supratherapeutic folate > 20 ng/mL.   · On 10/18/2021, supratherapeutic folate > 20 ng/mL.    * Microcytosis of RBC with low normal B12 level and sometimes anemic.   · Patient reports no family history for sickle cell disease or sickle cell trait.   · I am highly suspicious of this patient having iron deficiency which could cause significant fatigue.   · Lab study on 1/18/2021 reported iron saturation 12% with free iron 38 TIBC 329, and ferritin 49 ng/mL.    · We started patient on ferrous sulfate 325 mg twice a day.  · Patient reported good tolerance of oral iron treatment.  Lab study on 4/6/2021 reported improved ferritin 78, and normalized iron saturation 20% and hemoglobin 13.5.   · On 10/18/2021, hemoglobin 12.8, with ferritin 100.4 ng/mL and iron saturation 22%.  This is further improved.  Continue oral iron twice a day.   · Laboratory study today on 11/14/2022 reported normal CBC with Hb 12.5, MCV 83.7, ferritin 84.7 ng/mL, free iron 49 TIBC 323 and iron saturation 15%. On 11/14/22, oral iron once a day and also folic acid daily.   ·     * COVID-19 vaccine.   · Received 2 doses of Pfizer vaccine.   · Patient had COVID-19 infection in 2022.  Continues to have fatigue from the infection.    PLAN:    1. Continue sublingual vitamin B12 at 5000 mcg every other day.   2. Continue ferrous sulfate 325 mg once a day as we prescribed.  3. Continue Eliquis anticoagulation because of stroke.  4. Continue oral folic acid 1 mg daily.    5. We will arrange patient for follow-up with me in 12 months, will check CBC, iron, ferritin, CMP and vitamin B12 level.    I discussed with the patient about laboratory results and further management plan.  Patient voiced understanding and  agreeable.      IVANIA HEATH M.D., Ph.D.    11/14/2022.      CC:   Reji Talamantes MD

## 2022-11-16 ENCOUNTER — OFFICE VISIT (OUTPATIENT)
Dept: CARDIOLOGY | Facility: CLINIC | Age: 77
End: 2022-11-16

## 2022-11-16 VITALS
BODY MASS INDEX: 33.15 KG/M2 | HEART RATE: 81 BPM | SYSTOLIC BLOOD PRESSURE: 120 MMHG | HEIGHT: 61 IN | WEIGHT: 175.6 LBS | DIASTOLIC BLOOD PRESSURE: 80 MMHG

## 2022-11-16 DIAGNOSIS — I25.10 NONOCCLUSIVE CORONARY ATHEROSCLEROSIS OF NATIVE CORONARY ARTERY: ICD-10-CM

## 2022-11-16 DIAGNOSIS — I49.1 APC (ATRIAL PREMATURE CONTRACTIONS): ICD-10-CM

## 2022-11-16 DIAGNOSIS — I63.10 CEREBROVASCULAR ACCIDENT (CVA) DUE TO EMBOLISM OF PRECEREBRAL ARTERY: Primary | ICD-10-CM

## 2022-11-16 DIAGNOSIS — I87.2 CHRONIC VENOUS INSUFFICIENCY: ICD-10-CM

## 2022-11-16 DIAGNOSIS — E78.49 OTHER HYPERLIPIDEMIA: ICD-10-CM

## 2022-11-16 DIAGNOSIS — I10 ESSENTIAL HYPERTENSION: ICD-10-CM

## 2022-11-16 PROBLEM — J96.01 ACUTE HYPOXEMIC RESPIRATORY FAILURE: Status: RESOLVED | Noted: 2022-06-14 | Resolved: 2022-11-16

## 2022-11-16 PROCEDURE — 93000 ELECTROCARDIOGRAM COMPLETE: CPT | Performed by: INTERNAL MEDICINE

## 2022-11-16 PROCEDURE — 99214 OFFICE O/P EST MOD 30 MIN: CPT | Performed by: INTERNAL MEDICINE

## 2022-11-17 DIAGNOSIS — E11.9 DIABETES MELLITUS WITHOUT COMPLICATION: ICD-10-CM

## 2022-11-18 NOTE — TELEPHONE ENCOUNTER
Rx Refill Note  Requested Prescriptions     Pending Prescriptions Disp Refills   • metFORMIN (GLUCOPHAGE) 500 MG tablet [Pharmacy Med Name: METFORMIN HYDROCHLORIDE 500 MG Tablet] 180 tablet 1     Sig: TAKE 1 TABLET TWICE DAILY WITH MEALS      Last office visit with prescribing clinician: 9/22/2022      Next office visit with prescribing clinician: 3/22/2023            Senait Patterson LPN  11/18/22, 15:26 EST

## 2022-11-25 DIAGNOSIS — E78.49 OTHER HYPERLIPIDEMIA: ICD-10-CM

## 2022-11-27 RX ORDER — ATORVASTATIN CALCIUM 20 MG/1
TABLET, FILM COATED ORAL
Qty: 30 TABLET | OUTPATIENT
Start: 2022-11-27

## 2022-12-12 NOTE — PROGRESS NOTES
Subjective   Dejah Casey is a 77 y.o. female.     History of Present Illness  F/u for dm 2, hyperlipidemia, hypertension, CAD,nontoxic MNG,osteopenia,OAB, sleep apnea / testing bs 1 x day / last dm eye exam 10/6/22/ last dm foot exam 6//22/22        She was found to have a goiter on examination last August 2012. She had thyroid ultrasound done on 08/21/2012 which showed a multinodular goiter with the dominant solid nodule in the left measuring 1.8 cm. She had followup thyroid ultrasound in 4/14 at [facility] showed multinodular goiter with stable nodules.       She denies any pressure symptoms or dysphagia. She denies any bowel changes.  She has no previous history of head or neck radiation therapy.      She has known diabetes mellitus since 2003 and has been on metformin 500 mg twice a day. Blood sugar has ranged .     She has no microalbuminuria on urine sample taken 10/21.   Her last eye exam was 10/22.  She has no retinopathy.  She denies tingling in her feet.       She has hyperlipidemia and is on Lipitor 20 mg once a day.  She denies any myalgia.        She has mild coronary artery disease by cardiac catheterization done in 2008 by Dr. CAMILLE Mcmullen. She denies any chest pain.  She had a normal nuclear stress tests in July 2018.  She is following up with Dr. Leal.      She has hypertension and has been on amlodipine 5 mg once a day, furosemide 40 mg every other day and Toprol 50 mg/day. She was taken off hydrochlorothiazide because of hypercalcemia. She had cough with lisinopril in the past. She has not used ARB in the past.  She denies orthopnea or PND or pedal edema.      She has overactive bladder diagnosed by Dr. Dean and is on Myrbetriq.  She was taken off Toviaz because of mouth dryness.  She was taken off Vesicare because of cost.  She denies mouth dryness     She has osteopenia on bone density done at [facility] in November 2015.  She is on calcium 500 plus D 2 tab/day and Tums 1 tablet as needed  "for gas.  Bone density done in December 2017 showed improvement of the left hip density and a slight decrease in the lumbar spine.       Bone density done in August 2022 showed osteoporosis.     She has sleep apnea and is using her CPAP regularly.  She wakes up tired.  She is following with Dr. Marcum.     She denies constipation.  She denies melena or hematochezia.  Her last colonoscopy was in July 2021 and she has hemorrhoids and diverticulosis.  EGD done in July 2021 showed gastritis and small hiatal hernia.     She has vitamin B12 deficiency and is on vitamin B12 5000 mcg SL daily.  She was on SL vitamin B12 prescribed by Dr. Nicholson.  She is on ferrous sulfate 325 mg/day, and folic acid 1 mg/day.  Parietal cell antibodies were elevated.  Intrinsic factor antibody was normal.  She is being followed by Dr. Nicholson.     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  She denies muscle weakness.  She was started on Eliquis.  She denies bleeding.       She had 3 Pfizer Covid vaccine before she had Covid infection in 6/22.      The following portions of the patient's history were reviewed and updated as appropriate: allergies, current medications, past family history, past medical history, past social history, past surgical history and problem list.    Review of Systems   Eyes: Negative for visual disturbance.   Respiratory: Negative for shortness of breath and wheezing.    Gastrointestinal: Negative.    Endocrine: Negative for cold intolerance and heat intolerance.   Genitourinary: Negative.    Musculoskeletal: Negative.    Neurological: Negative for weakness and numbness.     Vitals:    12/13/22 1251   BP: 118/60   Pulse: 83   Temp: 98.6 °F (37 °C)   TempSrc: Temporal   SpO2: 98%   Weight: 80.3 kg (177 lb)   Height: 154.9 cm (60.98\")      Objective   Physical Exam  Constitutional:       Appearance: She is obese.   HENT:      Right Ear: There is no impacted cerumen.      Left Ear: There is no " impacted cerumen.      Mouth/Throat:      Pharynx: No posterior oropharyngeal erythema.   Eyes:      General: No scleral icterus.        Right eye: No discharge.         Left eye: No discharge.      Extraocular Movements: Extraocular movements intact.      Conjunctiva/sclera: Conjunctivae normal.   Neck:      Vascular: No carotid bruit.   Cardiovascular:      Rate and Rhythm: Normal rate and regular rhythm.      Pulses: Normal pulses.      Heart sounds: Normal heart sounds.   Pulmonary:      Breath sounds: Normal breath sounds.   Chest:      Chest wall: No tenderness.   Abdominal:      Palpations: Abdomen is soft. There is no mass.      Tenderness: There is no right CVA tenderness or left CVA tenderness.   Lymphadenopathy:      Cervical: No cervical adenopathy.   Neurological:      General: No focal deficit present.      Mental Status: She is oriented to person, place, and time.   Psychiatric:         Mood and Affect: Mood normal.         Behavior: Behavior normal.       Lab on 11/14/2022   Component Date Value Ref Range Status   • Glucose 11/14/2022 93  74 - 124 mg/dL Final   • BUN 11/14/2022 16  6 - 20 mg/dL Final   • Creatinine 11/14/2022 0.84  0.60 - 1.10 mg/dL Final   • Sodium 11/14/2022 144  134 - 145 mmol/L Final   • Potassium 11/14/2022 4.0  3.5 - 4.7 mmol/L Final   • Chloride 11/14/2022 105  98 - 107 mmol/L Final   • CO2 11/14/2022 27.8  22.0 - 29.0 mmol/L Final   • Calcium 11/14/2022 10.3 (H)  8.5 - 10.2 mg/dL Final   • Total Protein 11/14/2022 7.1  6.3 - 8.0 g/dL Final   • Albumin 11/14/2022 4.20  3.50 - 5.20 g/dL Final   • ALT (SGPT) 11/14/2022 14  0 - 33 U/L Final   • AST (SGOT) 11/14/2022 15  0 - 32 U/L Final   • Alkaline Phosphatase 11/14/2022 81  38 - 116 U/L Final   • Total Bilirubin 11/14/2022 0.2  0.2 - 1.2 mg/dL Final   • Globulin 11/14/2022 2.9  1.8 - 3.5 gm/dL Final   • A/G Ratio 11/14/2022 1.4  1.1 - 2.4 g/dL Final   • BUN/Creatinine Ratio 11/14/2022 19.0  7.3 - 30.0 Final   • Anion Gap  11/14/2022 11.2  5.0 - 15.0 mmol/L Final   • eGFR 11/14/2022 71.2  >60.0 mL/min/1.73 Final    National Kidney Foundation and American Society of Nephrology (ASN) Task Force recommended calculation based on the Chronic Kidney Disease Epidemiology Collaboration (CKD-EPI) equation refit without adjustment for race.   • Ferritin 11/14/2022 84.70  13.00 - 150.00 ng/mL Final   • Iron 11/14/2022 49  37 - 145 mcg/dL Final   • Iron Saturation 11/14/2022 15  14 - 48 % Final   • Transferrin 11/14/2022 231  200 - 360 mg/dL Final   • TIBC 11/14/2022 323  249 - 505 mcg/dL Final   • WBC 11/14/2022 8.10  3.40 - 10.80 10*3/mm3 Final   • RBC 11/14/2022 4.71  3.77 - 5.28 10*6/mm3 Final   • Hemoglobin 11/14/2022 12.5  12.0 - 15.9 g/dL Final   • Hematocrit 11/14/2022 39.4  34.0 - 46.6 % Final   • MCV 11/14/2022 83.7  79.0 - 97.0 fL Final   • MCH 11/14/2022 26.5 (L)  26.6 - 33.0 pg Final   • MCHC 11/14/2022 31.7  31.5 - 35.7 g/dL Final   • RDW 11/14/2022 13.7  12.3 - 15.4 % Final   • RDW-SD 11/14/2022 42.2  37.0 - 54.0 fl Final   • MPV 11/14/2022 9.6  6.0 - 12.0 fL Final   • Platelets 11/14/2022 271  140 - 450 10*3/mm3 Final   • Neutrophil % 11/14/2022 69.1  42.7 - 76.0 % Final   • Lymphocyte % 11/14/2022 17.4 (L)  19.6 - 45.3 % Final   • Monocyte % 11/14/2022 8.5  5.0 - 12.0 % Final   • Eosinophil % 11/14/2022 3.8  0.3 - 6.2 % Final   • Basophil % 11/14/2022 1.1  0.0 - 1.5 % Final   • Immature Grans % 11/14/2022 0.1  0.0 - 0.5 % Final   • Neutrophils, Absolute 11/14/2022 5.59  1.70 - 7.00 10*3/mm3 Final   • Lymphocytes, Absolute 11/14/2022 1.41  0.70 - 3.10 10*3/mm3 Final   • Monocytes, Absolute 11/14/2022 0.69  0.10 - 0.90 10*3/mm3 Final   • Eosinophils, Absolute 11/14/2022 0.31  0.00 - 0.40 10*3/mm3 Final   • Basophils, Absolute 11/14/2022 0.09  0.00 - 0.20 10*3/mm3 Final   • Immature Grans, Absolute 11/14/2022 0.01  0.00 - 0.05 10*3/mm3 Final   • nRBC 11/14/2022 0.0  0.0 - 0.2 /100 WBC Final     Assessment & Plan   Diagnoses and all  orders for this visit:    1. Nontoxic multinodular goiter (Primary)  -     TSH    2. Essential hypertension  -     Comprehensive Metabolic Panel    3. Other hyperlipidemia  -     Lipid Panel    4. Type 2 diabetes mellitus without complication, without long-term current use of insulin (HCC)  -     Microalbumin / Creatinine Urine Ratio - Urine, Clean Catch  -     Vitamin B12  -     Comprehensive Metabolic Panel  -     Hemoglobin A1c    5. Vitamin D deficiency  -     Vitamin D,25-Hydroxy    6. Obstructive sleep apnea on CPAP    7. Vitamin B12 deficiency  -     Vitamin B12  -     Lipid Panel    8. 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 Age-related osteoporosis without current pathological fracture  -     Comprehensive Metabolic Panel  -     Vitamin D,25-Hydroxy    Other orders  -     risedronate (Actonel) 35 MG tablet; 1 tablet weekly with water on empty stomach, nothing by mouth or lie down for next 30 minutes.  Dispense: 12 tablet; Refill: 2      Check thyroid function tests.    Continue metformin 500 mg twice a day.  Check hemoglobin A1c and urine microalbumin.    Continue Lipitor 20 mg/day.  Check lipid panel.    Continue amlodipine, furosemide and Toprol-XL.  Will defer blood pressure control to Dr. Leal and PCP.    Patient has transitined to osteoporosis.  Continue calcium and vitamin D supplements.  Check vitamin D levels.  Start Actonel 35 mg weekly.  Side effects and precautions were discussed with patient.      Continue vitamin B12 and iron supplements per hematologist.     Advise to follow-up with Dr. Marcum for further evaluation chronic fatigue.    Copy of my note sent to Dr. Reji Talamantes, Dr. Marcum, Dr. Nicholson, and Nikki DAVIS.    RTC 4 mos.  Total time 59 min

## 2022-12-13 ENCOUNTER — OFFICE VISIT (OUTPATIENT)
Dept: ENDOCRINOLOGY | Age: 77
End: 2022-12-13

## 2022-12-13 VITALS
OXYGEN SATURATION: 98 % | HEIGHT: 61 IN | TEMPERATURE: 98.6 F | WEIGHT: 177 LBS | DIASTOLIC BLOOD PRESSURE: 60 MMHG | SYSTOLIC BLOOD PRESSURE: 118 MMHG | HEART RATE: 83 BPM | BODY MASS INDEX: 33.42 KG/M2

## 2022-12-13 DIAGNOSIS — G47.33 OBSTRUCTIVE SLEEP APNEA ON CPAP: ICD-10-CM

## 2022-12-13 DIAGNOSIS — I63.10 CEREBROVASCULAR ACCIDENT (CVA) DUE TO EMBOLISM OF PRECEREBRAL ARTERY: ICD-10-CM

## 2022-12-13 DIAGNOSIS — E04.2 NONTOXIC MULTINODULAR GOITER: Primary | ICD-10-CM

## 2022-12-13 DIAGNOSIS — M81.0 AGE-RELATED OSTEOPOROSIS WITHOUT CURRENT PATHOLOGICAL FRACTURE: ICD-10-CM

## 2022-12-13 DIAGNOSIS — E11.9 TYPE 2 DIABETES MELLITUS WITHOUT COMPLICATION, WITHOUT LONG-TERM CURRENT USE OF INSULIN: ICD-10-CM

## 2022-12-13 DIAGNOSIS — Z99.89 OBSTRUCTIVE SLEEP APNEA ON CPAP: ICD-10-CM

## 2022-12-13 DIAGNOSIS — E55.9 VITAMIN D DEFICIENCY: ICD-10-CM

## 2022-12-13 DIAGNOSIS — I10 ESSENTIAL HYPERTENSION: ICD-10-CM

## 2022-12-13 DIAGNOSIS — E53.8 VITAMIN B12 DEFICIENCY: ICD-10-CM

## 2022-12-13 DIAGNOSIS — E78.49 OTHER HYPERLIPIDEMIA: ICD-10-CM

## 2022-12-13 PROCEDURE — 99215 OFFICE O/P EST HI 40 MIN: CPT | Performed by: INTERNAL MEDICINE

## 2022-12-13 RX ORDER — PREDNISOLONE ACETATE 10 MG/ML
SUSPENSION/ DROPS OPHTHALMIC
COMMUNITY
Start: 2022-11-30

## 2022-12-13 RX ORDER — CLONAZEPAM 0.5 MG/1
1 TABLET ORAL NIGHTLY
COMMUNITY
Start: 2022-11-02 | End: 2022-12-13 | Stop reason: SDUPTHER

## 2022-12-13 RX ORDER — RISEDRONATE SODIUM 35 MG/1
TABLET, FILM COATED ORAL
Qty: 12 TABLET | Refills: 2 | Status: SHIPPED | OUTPATIENT
Start: 2022-12-13

## 2022-12-14 LAB
25(OH)D3+25(OH)D2 SERPL-MCNC: 55.1 NG/ML (ref 30–100)
ALBUMIN SERPL-MCNC: 4.1 G/DL (ref 3.5–5.2)
ALBUMIN/CREAT UR: 6 MG/G CREAT (ref 0–29)
ALBUMIN/GLOB SERPL: 1.9 G/DL
ALP SERPL-CCNC: 74 U/L (ref 39–117)
ALT SERPL-CCNC: 10 U/L (ref 1–33)
AST SERPL-CCNC: 11 U/L (ref 1–32)
BILIRUB SERPL-MCNC: <0.2 MG/DL (ref 0–1.2)
BUN SERPL-MCNC: 21 MG/DL (ref 8–23)
BUN/CREAT SERPL: 25.9 (ref 7–25)
CALCIUM SERPL-MCNC: 10.6 MG/DL (ref 8.6–10.5)
CHLORIDE SERPL-SCNC: 104 MMOL/L (ref 98–107)
CHOLEST SERPL-MCNC: 123 MG/DL (ref 0–200)
CO2 SERPL-SCNC: 30.4 MMOL/L (ref 22–29)
CREAT SERPL-MCNC: 0.81 MG/DL (ref 0.57–1)
CREAT UR-MCNC: 95.3 MG/DL
EGFRCR SERPLBLD CKD-EPI 2021: 74.4 ML/MIN/1.73
GLOBULIN SER CALC-MCNC: 2.2 GM/DL
GLUCOSE SERPL-MCNC: 93 MG/DL (ref 65–99)
HBA1C MFR BLD: 6.1 % (ref 4.8–5.6)
HDLC SERPL-MCNC: 64 MG/DL (ref 40–60)
IMP & REVIEW OF LAB RESULTS: NORMAL
LDLC SERPL CALC-MCNC: 49 MG/DL (ref 0–100)
MICROALBUMIN UR-MCNC: 5.6 UG/ML
POTASSIUM SERPL-SCNC: 4.3 MMOL/L (ref 3.5–5.2)
PROT SERPL-MCNC: 6.3 G/DL (ref 6–8.5)
SODIUM SERPL-SCNC: 144 MMOL/L (ref 136–145)
TRIGL SERPL-MCNC: 39 MG/DL (ref 0–150)
TSH SERPL DL<=0.005 MIU/L-ACNC: 0.65 UIU/ML (ref 0.27–4.2)
VIT B12 SERPL-MCNC: >2000 PG/ML (ref 211–946)
VLDLC SERPL CALC-MCNC: 10 MG/DL (ref 5–40)

## 2022-12-18 NOTE — PROGRESS NOTES
Normal urine microalbumin.  Elevated vitamin B12.  On vitamin B12 5000 mcg sublingual daily per Dr. Nicholson  Hemoglobin A1c 6.1%.  Diabetes well controlled.  LDL 49.  HDL 64.  Continue Lipitor 20 mg/day.  Normal thyroid function tests.  Normal vitamin D.  Continue calcium plus D 2 tablets daily.  Copy of labs sent to Dr. Talamantes, Dr. Leal, and Dr. Nicholson.  Please notify patient of results.

## 2023-01-03 ENCOUNTER — TELEPHONE (OUTPATIENT)
Dept: FAMILY MEDICINE CLINIC | Facility: CLINIC | Age: 78
End: 2023-01-03

## 2023-01-03 RX ORDER — FOLIC ACID 1 MG/1
TABLET ORAL
Qty: 90 TABLET | Refills: 3 | Status: SHIPPED | OUTPATIENT
Start: 2023-01-03

## 2023-01-04 DIAGNOSIS — E11.9 DIABETES MELLITUS TYPE II, CONTROLLED, WITH NO COMPLICATIONS: ICD-10-CM

## 2023-01-04 DIAGNOSIS — I10 ESSENTIAL HYPERTENSION: ICD-10-CM

## 2023-01-04 DIAGNOSIS — E11.9 TYPE 2 DIABETES MELLITUS WITHOUT COMPLICATION, WITHOUT LONG-TERM CURRENT USE OF INSULIN: ICD-10-CM

## 2023-01-04 RX ORDER — BLOOD SUGAR DIAGNOSTIC
STRIP MISCELLANEOUS
OUTPATIENT
Start: 2023-01-04

## 2023-01-04 RX ORDER — METOPROLOL SUCCINATE 50 MG/1
TABLET, EXTENDED RELEASE ORAL
Qty: 60 TABLET | OUTPATIENT
Start: 2023-01-04

## 2023-01-06 ENCOUNTER — OFFICE VISIT (OUTPATIENT)
Dept: FAMILY MEDICINE CLINIC | Facility: CLINIC | Age: 78
End: 2023-01-06
Payer: MEDICARE

## 2023-01-06 VITALS
SYSTOLIC BLOOD PRESSURE: 130 MMHG | BODY MASS INDEX: 33.61 KG/M2 | OXYGEN SATURATION: 98 % | HEART RATE: 85 BPM | HEIGHT: 61 IN | TEMPERATURE: 97.6 F | RESPIRATION RATE: 18 BRPM | DIASTOLIC BLOOD PRESSURE: 72 MMHG | WEIGHT: 178 LBS

## 2023-01-06 DIAGNOSIS — I10 ESSENTIAL HYPERTENSION: ICD-10-CM

## 2023-01-06 DIAGNOSIS — I10 ESSENTIAL HYPERTENSION: Primary | ICD-10-CM

## 2023-01-06 PROCEDURE — 99213 OFFICE O/P EST LOW 20 MIN: CPT

## 2023-01-06 RX ORDER — METOPROLOL SUCCINATE 50 MG/1
50 TABLET, EXTENDED RELEASE ORAL DAILY
Qty: 90 TABLET | Refills: 1 | Status: SHIPPED | OUTPATIENT
Start: 2023-01-06

## 2023-01-06 NOTE — PROGRESS NOTES
Chief Complaint  Hypertension (F/u)    Subjective          History of Present Illness    Dejah Casey 77 y.o. female presents for blood pressure check.  The patient has been monitoring blood pressures at home and they have ranged from 127-142 over 78-85.  The patient reports some intermittent headaches and lightheadedness when blood pressure is elevated.    Today, the patient's blood pressure is within normal range at 130/72.  She takes Amlodipine 5 mg daily and Metoprolol succinate XL 50 mg once daily for hypertension.  She reports headaches and lightheadedness have resolved.  She has not had any symptoms for 2 days.    She has an upcoming appointment with Neurology in March.  The patient is established with Dr. Daniel Leal, Cardiology.    Review of Systems   Constitutional: Negative for chills, fatigue and fever.   HENT: Negative for congestion and sinus pressure.    Eyes: Negative for visual disturbance.   Respiratory: Negative for cough, chest tightness and shortness of breath.    Cardiovascular: Negative for chest pain and palpitations.   Gastrointestinal: Negative for abdominal pain, constipation, diarrhea, nausea and vomiting.   Genitourinary: Negative for dysuria, frequency and urgency.   Musculoskeletal: Negative for back pain.   Skin: Negative for rash.   Neurological: Negative for dizziness, syncope, speech difficulty, weakness and light-headedness.   Psychiatric/Behavioral: Negative for behavioral problems and sleep disturbance.        Objective   Vital Signs:   /72   Pulse 85   Temp 97.6 °F (36.4 °C)   Resp 18   Ht 154.9 cm (60.98\")   Wt 80.7 kg (178 lb)   SpO2 98%   BMI 33.65 kg/m²      BMI is >= 30 and <35. (Class 1 Obesity). The following options were offered after discussion;: exercise counseling/recommendations and nutrition counseling/recommendations        Physical Exam  Vitals and nursing note reviewed.   Constitutional:       Appearance: Normal appearance. She is well-developed.  She is obese. She is not toxic-appearing.   HENT:      Head: Normocephalic.      Right Ear: External ear normal.      Left Ear: External ear normal.   Eyes:      General: No scleral icterus.     Pupils: Pupils are equal, round, and reactive to light.   Neck:      Thyroid: No thyromegaly.   Cardiovascular:      Rate and Rhythm: Normal rate and regular rhythm.      Pulses: Normal pulses.      Heart sounds: Normal heart sounds.   Pulmonary:      Effort: Pulmonary effort is normal. No respiratory distress.      Breath sounds: Normal breath sounds. No stridor.   Musculoskeletal:         General: No deformity.      Right lower leg: No edema.      Left lower leg: No edema.   Skin:     General: Skin is warm.      Coloration: Skin is not jaundiced.   Neurological:      General: No focal deficit present.      Mental Status: She is alert and oriented to person, place, and time.      Motor: No weakness.      Gait: Gait normal.   Psychiatric:         Behavior: Behavior normal.         Thought Content: Thought content normal.         Judgment: Judgment normal.                         Assessment and Plan      Diagnoses and all orders for this visit:    1. Essential hypertension (Primary)  Comments:  BP is normal, symptoms resolved  Continue BP meds  Check BP at home, report abnormal readings  Follow-up with PCP in 3 months            Follow Up     Return in about 3 months (around 4/6/2023) for Next scheduled follow up with PCP.    Patient was given instructions and counseling regarding her condition or for health maintenance advice. Please see specific information pulled into the AVS if appropriate.     -Follow-up with PCP in 3 months.

## 2023-01-11 ENCOUNTER — OFFICE VISIT (OUTPATIENT)
Dept: FAMILY MEDICINE CLINIC | Facility: CLINIC | Age: 78
End: 2023-01-11
Payer: MEDICARE

## 2023-01-11 ENCOUNTER — TELEPHONE (OUTPATIENT)
Dept: FAMILY MEDICINE CLINIC | Facility: CLINIC | Age: 78
End: 2023-01-11

## 2023-01-11 VITALS
DIASTOLIC BLOOD PRESSURE: 60 MMHG | SYSTOLIC BLOOD PRESSURE: 110 MMHG | BODY MASS INDEX: 32.85 KG/M2 | HEART RATE: 67 BPM | WEIGHT: 174 LBS | HEIGHT: 61 IN | RESPIRATION RATE: 16 BRPM | TEMPERATURE: 97.5 F | OXYGEN SATURATION: 97 %

## 2023-01-11 DIAGNOSIS — J02.9 SORE THROAT: Primary | ICD-10-CM

## 2023-01-11 DIAGNOSIS — J06.9 ACUTE URI: ICD-10-CM

## 2023-01-11 LAB
EXPIRATION DATE: NORMAL
FLUAV AG UPPER RESP QL IA.RAPID: NOT DETECTED
FLUBV AG UPPER RESP QL IA.RAPID: NOT DETECTED
INTERNAL CONTROL: NORMAL
Lab: NORMAL
SARS-COV-2 AG UPPER RESP QL IA.RAPID: NOT DETECTED

## 2023-01-11 PROCEDURE — 87428 SARSCOV & INF VIR A&B AG IA: CPT | Performed by: PHYSICIAN ASSISTANT

## 2023-01-11 PROCEDURE — 99213 OFFICE O/P EST LOW 20 MIN: CPT | Performed by: PHYSICIAN ASSISTANT

## 2023-01-11 RX ORDER — CEFPROZIL 250 MG/5ML
250 POWDER, FOR SUSPENSION ORAL 2 TIMES DAILY
Qty: 100 ML | Refills: 1 | Status: SHIPPED | OUTPATIENT
Start: 2023-01-11 | End: 2023-03-22

## 2023-01-11 RX ORDER — BENZONATATE 100 MG/1
CAPSULE ORAL
Qty: 40 CAPSULE | Refills: 0 | Status: SHIPPED | OUTPATIENT
Start: 2023-01-11 | End: 2023-03-22

## 2023-01-11 NOTE — TELEPHONE ENCOUNTER
Caller: Dejah Casey    Relationship: Self    Best call back number:     What is the best time to reach you:     Who are you requesting to speak with (clinical staff, provider,  specific staff member):     Do you know the name of the person who called:     What was the call regarding: PATIENT IS CALLING IN STATING THAT SHE STARTED HAVING A SORE THROAT LAST NIGHT.  SHE WANTS TO KNOW IF THIS IS A COVID SYMPTOM. SHE SAYS THAT SHE HAS AN APPOINTMENT TODAY WITH HER  AT 2PM AND WANTS TO KNOW IF SHE SHOULD STILL GO OR NOT.  SHE WANTS TO BE CALLED TO DISCUSS.  SHE HAS A COUGH AND RUNNY NOSE AS WELL.     Do you require a callback: YES

## 2023-01-11 NOTE — PROGRESS NOTES
Subjective   Dejah Casey is a 78 y.o. female.     History of Present Illness     Dejah Casey 78 y.o. female who presents for evaluation of upper respiratory congestion, sore throat, cough, fatigue. Symptoms include sneezing, sore throat, swollen glands and dry cough.  Onset of symptoms was 3 days ago, unchanged since that time. Patient denies shortness of breath, wheezing, fever, history of asthma, diarrhea.   Evaluation to date: none Treatment to date:  none.       Est CC 69  The following portions of the patient's history were reviewed and updated as appropriate: allergies, current medications, past family history, past medical history, past social history, past surgical history and problem list.    Review of Systems   Constitutional: Positive for activity change and fatigue. Negative for appetite change and unexpected weight change.   HENT: Positive for postnasal drip, sneezing and sore throat. Negative for nosebleeds and trouble swallowing.    Eyes: Negative for pain and visual disturbance.   Respiratory: Positive for cough. Negative for chest tightness, shortness of breath and wheezing.    Cardiovascular: Negative for chest pain and palpitations.   Gastrointestinal: Negative for abdominal pain, blood in stool and diarrhea.   Endocrine: Negative.    Genitourinary: Negative for difficulty urinating and hematuria.   Musculoskeletal: Negative for joint swelling.   Skin: Negative for color change and rash.   Allergic/Immunologic: Negative.    Neurological: Negative for syncope, speech difficulty and headaches.   Hematological: Negative for adenopathy.   Psychiatric/Behavioral: Negative for agitation and confusion.   All other systems reviewed and are negative.      Objective   Physical Exam  Vitals and nursing note reviewed.   Constitutional:       General: She is not in acute distress.     Appearance: She is well-developed. She is not toxic-appearing or diaphoretic.   HENT:      Head: Normocephalic and  atraumatic. Hair is normal.      Right Ear: External ear normal. No drainage, swelling or tenderness. Tympanic membrane is retracted.      Left Ear: External ear normal. No drainage, swelling or tenderness. Tympanic membrane is retracted.      Nose: Mucosal edema present.      Mouth/Throat:      Mouth: No oral lesions.      Pharynx: Uvula midline. Posterior oropharyngeal erythema present. No oropharyngeal exudate or uvula swelling.   Eyes:      General: No scleral icterus.        Right eye: No discharge.         Left eye: No discharge.      Conjunctiva/sclera: Conjunctivae normal.      Pupils: Pupils are equal, round, and reactive to light.   Cardiovascular:      Rate and Rhythm: Normal rate and regular rhythm.      Heart sounds: Normal heart sounds. No murmur heard.    No gallop.   Pulmonary:      Effort: No respiratory distress.      Breath sounds: Normal breath sounds. No stridor. No wheezing or rales.   Chest:      Chest wall: No tenderness.   Abdominal:      Palpations: Abdomen is soft.      Tenderness: There is no abdominal tenderness.   Musculoskeletal:      Cervical back: Normal range of motion and neck supple.   Lymphadenopathy:      Cervical: No cervical adenopathy.   Skin:     General: Skin is warm and dry.      Findings: No rash.   Neurological:      Mental Status: She is alert and oriented to person, place, and time.      Motor: No abnormal muscle tone.   Psychiatric:         Behavior: Behavior normal.         Thought Content: Thought content normal.         Judgment: Judgment normal.         Assessment & Plan   Diagnoses and all orders for this visit:    1. Sore throat (Primary)  -     POCT SARS-CoV-2 Antigen DEX + Flu    2. Acute URI    Other orders  -     cefprozil (CEFZIL) 250 MG/5ML suspension; Take 5 mL by mouth 2 (Two) Times a Day. For infection  Dispense: 100 mL; Refill: 1  -     benzonatate (Tessalon Perles) 100 MG capsule; 1-2 caps PO Q 8 hours PRN cough  Dispense: 40 capsule; Refill:  0      Tessalon for cough PRN cough  Cefzil if secondary infx--on file  Viral URI

## 2023-01-11 NOTE — PATIENT INSTRUCTIONS
Upper Respiratory Infection, Adult  An upper respiratory infection (URI) is a common viral infection of the nose, throat, and upper air passages that lead to the lungs. The most common type of URI is the common cold. URIs usually get better on their own, without medical treatment.  What are the causes?  A URI is caused by a virus. You may catch a virus by:  Breathing in droplets from an infected person's cough or sneeze.  Touching something that has been exposed to the virus (is contaminated) and then touching your mouth, nose, or eyes.  What increases the risk?  You are more likely to get a URI if:  You are very young or very old.  You have close contact with others, such as at work, school, or a health care facility.  You smoke.  You have long-term (chronic) heart or lung disease.  You have a weakened disease-fighting system (immune system).  You have nasal allergies or asthma.  You are experiencing a lot of stress.  You have poor nutrition.  What are the signs or symptoms?  A URI usually involves some of the following symptoms:  Runny or stuffy (congested) nose.  Cough.  Sneezing.  Sore throat.  Headache.  Fatigue.  Fever.  Loss of appetite.  Pain in your forehead, behind your eyes, and over your cheekbones (sinus pain).  Muscle aches.  Redness or irritation of the eyes.  Pressure in the ears or face.  How is this diagnosed?  This condition may be diagnosed based on your medical history and symptoms, and a physical exam. Your health care provider may use a swab to take a mucus sample from your nose (nasal swab). This sample can be tested to determine what virus is causing the illness.  How is this treated?  URIs usually get better on their own within 7-10 days. Medicines cannot cure URIs, but your health care provider may recommend certain medicines to help relieve symptoms, such as:  Over-the-counter cold medicines.  Cough suppressants. Coughing is a type of defense against infection that helps to clear the  respiratory system, so take these medicines only as recommended by your health care provider.  Fever-reducing medicines.  Follow these instructions at home:  Activity  Rest as needed.  If you have a fever, stay home from work or school until your fever is gone or until your health care provider says your URI cannot spread to other people (is no longer contagious). Your health care provider may have you wear a face mask to prevent your infection from spreading.  Relieving symptoms  Gargle with a mixture of salt and water 3-4 times a day or as needed. To make salt water, completely dissolve ½-1 tsp (3-6 g) of salt in 1 cup (237 mL) of warm water.  Use a cool-mist humidifier to add moisture to the air. This can help you breathe more easily.  Eating and drinking    Drink enough fluid to keep your urine pale yellow.  Eat soups and other clear broths.  General instructions    Take over-the-counter and prescription medicines only as told by your health care provider. These include cold medicines, fever reducers, and cough suppressants.  Do not use any products that contain nicotine or tobacco. These products include cigarettes, chewing tobacco, and vaping devices, such as e-cigarettes. If you need help quitting, ask your health care provider.  Stay away from secondhand smoke.  Stay up to date on all immunizations, including the yearly (annual) flu vaccine.  Keep all follow-up visits. This is important.  How to prevent the spread of infection to others  URIs can be contagious. To prevent the infection from spreading:  Wash your hands with soap and water for at least 20 seconds. If soap and water are not available, use hand .  Avoid touching your mouth, face, eyes, or nose.  Cough or sneeze into a tissue or your sleeve or elbow instead of into your hand or into the air.    Contact a health care provider if:  You are getting worse instead of better.  You have a fever or chills.  Your mucus is brown or red.  You have  yellow or brown discharge coming from your nose.  You have pain in your face, especially when you bend forward.  You have swollen neck glands.  You have pain while swallowing.  You have white areas in the back of your throat.  Get help right away if:  You have shortness of breath that gets worse.  You have severe or persistent:  Headache.  Ear pain.  Sinus pain.  Chest pain.  You have chronic lung disease along with any of the following:  Making high-pitched whistling sounds when you breathe, most often when you breathe out (wheezing).  Prolonged cough (more than 14 days).  Coughing up blood.  A change in your usual mucus.  You have a stiff neck.  You have changes in your:  Vision.  Hearing.  Thinking.  Mood.  These symptoms may be an emergency. Get help right away. Call 911.  Do not wait to see if the symptoms will go away.  Do not drive yourself to the hospital.  Summary  An upper respiratory infection (URI) is a common infection of the nose, throat, and upper air passages that lead to the lungs.  A URI is caused by a virus.  URIs usually get better on their own within 7-10 days.  Medicines cannot cure URIs, but your health care provider may recommend certain medicines to help relieve symptoms.  This information is not intended to replace advice given to you by your health care provider. Make sure you discuss any questions you have with your health care provider.  Document Revised: 07/20/2022 Document Reviewed: 07/20/2022  [email or web address] Patient Education © 2022 [employer]

## 2023-02-06 ENCOUNTER — TELEPHONE (OUTPATIENT)
Dept: NEUROLOGY | Facility: CLINIC | Age: 78
End: 2023-02-06

## 2023-02-06 NOTE — TELEPHONE ENCOUNTER
Caller: Dejah Casey    Relationship: Self    Best call back number: [phone]    What is the best time to reach you: HOME UNTIL 1:45PM DUE TO APPT.     Who are you requesting to speak with (clinical staff, provider,  specific staff member): FRITZ HOLDEN    What was the call regarding:PATIENT TELEPHONED RE: MEDICATION ELIQUIS. SHE WOULD LIKE TO KNOW WHEN SHE CAN STOP TAKING IT. IT IS 5 MG & SHE IS ALSO TAKING [facility] [employer] [facility] PLUS & IT HAS VITAMIN E 15 MG, PATIENT RESEARCHED VITAMIN E & IT OPENS BLOOD VESSELS & KEEPS THE BLOOD FROM CLOTTING. IT DOES ALMOST THE SAME AS THE MED ELIQUIS.     PLEASE CALL & ADVISE WHEN SHE CAN STOP TAKING ELIQUIS.    THANK YOU     Do you require a callback:YES

## 2023-02-08 DIAGNOSIS — E11.9 DIABETES MELLITUS TYPE II, CONTROLLED, WITH NO COMPLICATIONS: ICD-10-CM

## 2023-02-08 DIAGNOSIS — E11.9 TYPE 2 DIABETES MELLITUS WITHOUT COMPLICATION, WITHOUT LONG-TERM CURRENT USE OF INSULIN: ICD-10-CM

## 2023-02-10 ENCOUNTER — TELEPHONE (OUTPATIENT)
Dept: ENDOCRINOLOGY | Age: 78
End: 2023-02-10

## 2023-02-10 NOTE — TELEPHONE ENCOUNTER
PT CALLED IN WANTING TO SPEAK WITH DR RODRIGUEZ REGARDING HER MEDICATION RISEDRONATE. PLEASE CALL BACK [phone].

## 2023-02-10 NOTE — TELEPHONE ENCOUNTER
2/0 pt called in  worried about starting the medication  because these are some of the side effects that she having eye problems, dry eye cataract, jaw (TMJ) has had, has trouble swallowing a pill, sleep apnea with cpap, anemia, stroke In june

## 2023-02-10 NOTE — TELEPHONE ENCOUNTER
Spoke with patient.  She has not started the risedronate and is wary of using it because of listed side effects.  She will think it over and let me know her decision.

## 2023-02-13 ENCOUNTER — TELEPHONE (OUTPATIENT)
Dept: NEUROLOGY | Facility: CLINIC | Age: 78
End: 2023-02-13
Payer: MEDICARE

## 2023-02-17 ENCOUNTER — PATIENT MESSAGE (OUTPATIENT)
Dept: ENDOCRINOLOGY | Age: 78
End: 2023-02-17
Payer: MEDICARE

## 2023-03-02 ENCOUNTER — TELEPHONE (OUTPATIENT)
Dept: CARDIOLOGY | Facility: CLINIC | Age: 78
End: 2023-03-02

## 2023-03-02 NOTE — TELEPHONE ENCOUNTER
Caller: Jacinto Dejah RIVERA    Relationship: Self    Best call back number: [phone]    What medications are you currently taking:   Current Outpatient Medications on File Prior to Visit   Medication Sig Dispense Refill   • Accu-Chek FastClix Lancets misc Use to check blood sugar once daily 102 each 5   • amLODIPine (NORVASC) 5 MG tablet Take 1 tablet by mouth Daily for 270 days. 90 tablet 2   • apixaban (ELIQUIS) 5 MG tablet tablet Take 1 tablet by mouth Every 12 (Twelve) Hours for 90 days. Indications: Other - full anticoagulation 180 tablet 3   • atorvastatin (LIPITOR) 20 MG tablet Take 1 tablet by mouth Every Morning for 270 days. 90 tablet 2   • benzonatate (Tessalon Perles) 100 MG capsule 1-2 caps PO Q 8 hours PRN cough 40 capsule 0   • Blood Glucose Monitoring Suppl (Blood Glucose Monitor System) w/Device kit BS meter Accu-chek Catherine plus meter. DX E11.9 1 each 1   • Calcium Carb-Cholecalciferol 500-200 MG-UNIT tablet Take 2 tablets by mouth daily.     • cefprozil (CEFZIL) 250 MG/5ML suspension Take 5 mL by mouth 2 (Two) Times a Day. For infection 100 mL 1   • clonazePAM (KlonoPIN) 0.5 MG tablet Take 0.5 mg by mouth Every Night.     • Cyanocobalamin ([employer] Vitamin B-12) 5000 MCG sublingual tablet Place 5,000 mcg under the tongue Daily. 90 tablet 3   • ferrous sulfate 325 (65 FE) MG tablet TAKE 1 TABLET BY MOUTH EVERY DAY WITH BREAKFAST 90 tablet 3   • fluticasone (FLONASE) 50 MCG/ACT nasal spray 1 spray into the nostril(s) as directed by provider As Needed.     • folic acid (FOLVITE) 1 MG tablet TAKE 1 TABLET BY MOUTH EVERY DAY 90 tablet 3   • furosemide (LASIX) 40 MG tablet TAKE 1 TABLET EVERY DAY (Patient taking differently: 40 mg Every Other Day.) 90 tablet 3   • glucose blood (Accu-Chek Catherine Plus) test strip Use as instructed 360 each 1   • metFORMIN (GLUCOPHAGE) 500 MG tablet TAKE 1 TABLET TWICE DAILY WITH MEALS 180 tablet 0   • metoprolol succinate XL (TOPROL-XL) 50 MG 24 hr tablet Take 1 tablet by  mouth Daily. 90 tablet 1   • Mirabegron ER (MYRBETRIQ) 50 MG tablet sustained-release 24 hour 24 hr tablet Take 50 mg by mouth Daily.     • pilocarpine (SALAGEN) 5 MG tablet 5 mg 3 (Three) Times a Day.     • potassium chloride (KAYCIEL) 20 mEq/15 mL solution MIX 15ML IN 4 OUNCES OF LIQUID AND DRINK DAILY 1350 mL 1   • prednisoLONE acetate (PRED FORTE) 1 % ophthalmic suspension INSTILL 1 DROP INTO BOTH EYES TWICE A DAY TO REPLACE DUREZOL     • Prolensa 0.07 % solution PLACE ONE DROP IN BOTH EYES ONCE DAILY FOR MAINTENANCE     • Restasis 0.05 % ophthalmic emulsion 2 (two) times a day.     • risedronate (Actonel) 35 MG tablet 1 tablet weekly with water on empty stomach, nothing by mouth or lie down for next 30 minutes. 12 tablet 2   • STIOLTO RESPIMAT 2.5-2.5 MCG/ACT aerosol solution inhaler 2 puffs As Needed.     • venlafaxine (EFFEXOR) 75 MG tablet Take 75 mg by mouth 2 (Two) Times a Day. Pt is taking 1 tab in the morning and 2 tabs at night       No current facility-administered medications on file prior to visit.          When did you start taking these medications: 06/12/22    Which medication are you concerned about: JENNY    Who prescribed you this medication: DR SHERON PARADA      How long have you had these concerns:   PATIENT STATES THE FRITZ CASH PA WITH [facility] WANTS PATIENT TO FOLLOW UP WITH CARDIOLOGY TO SEE IF SHE CAN COME OFF ELIQUIS.

## 2023-03-09 ENCOUNTER — HOSPITAL ENCOUNTER (EMERGENCY)
Facility: HOSPITAL | Age: 78
Discharge: HOME | End: 2023-03-09
Attending: EMERGENCY MEDICINE | Admitting: EMERGENCY MEDICINE
Payer: MEDICARE

## 2023-03-09 ENCOUNTER — APPOINTMENT (OUTPATIENT)
Dept: GENERAL RADIOLOGY | Facility: HOSPITAL | Age: 78
End: 2023-03-09
Payer: MEDICARE

## 2023-03-09 ENCOUNTER — TELEPHONE (OUTPATIENT)
Dept: FAMILY MEDICINE CLINIC | Facility: CLINIC | Age: 78
End: 2023-03-09

## 2023-03-09 VITALS
OXYGEN SATURATION: 100 % | HEIGHT: 63 IN | HEART RATE: 100 BPM | TEMPERATURE: 98.3 F | DIASTOLIC BLOOD PRESSURE: 78 MMHG | SYSTOLIC BLOOD PRESSURE: 131 MMHG | BODY MASS INDEX: 29.95 KG/M2 | WEIGHT: 169 LBS | RESPIRATION RATE: 18 BRPM

## 2023-03-09 DIAGNOSIS — S70.02XA CONTUSION OF LEFT HIP, INITIAL ENCOUNTER: Primary | ICD-10-CM

## 2023-03-09 PROCEDURE — 73502 X-RAY EXAM HIP UNI 2-3 VIEWS: CPT

## 2023-03-09 PROCEDURE — 99282 EMERGENCY DEPT VISIT SF MDM: CPT

## 2023-03-09 NOTE — TELEPHONE ENCOUNTER
Caller: Dejah Casey    Relationship: Self    Best call back number: [phone]    What is the best time to reach you: ANYTIME   Who are you requesting to speak with (clinical staff, provider,  specific staff member):CLINICAL STAFF    Do you know the name of the person who called: SELF   What was the call regarding: PATIENT CALLED AND STATED SHE FELL GETTING OUT OF SHOWER AND HER HIPS ARE NOW HURTING. PATIENT STATED SHE HAD TO CRAWL TO BEDROOM TO GET UP. PATIENT WAS ADVISED TO GO TO THR ER AND PATIENT STATED HER SON WAS TAKING HER TO [facility].   Do you require a callback: YES

## 2023-03-09 NOTE — ED TRIAGE NOTES
Patient reports falling out of shower last night. She did not hit her head or have LOC. She c/o bilateral hip pain.

## 2023-03-09 NOTE — ED PROVIDER NOTES
EMERGENCY DEPARTMENT ENCOUNTER    Room Number:  T03/03  Date of encounter:  3/9/2023  PCP: Reji Talamantes MD  Historian: Patient  Full history not obtainable due to: None    HPI:  Chief Complaint: Hip pain    Context: Dejah Casey is a 78 y.o. female with a PMH significant for anxiety, osteoarthritis, osteopenia, diabetes,, embolic stroke, anticoagulated on Eliquis who presents to the ED c/o left hip pain after a fall last night.  The patient was stepping out of her shower and onto her bath mat when the mat slipped and she fell to the floor.  She required help from her  to get up at that time secondary to left hip pain.  Since that time she has been able to ambulate unassisted with a steady gait and perform full range of motion of the left hip without much difficulty.  She has not taken any medications to alleviate symptoms prior to arrival.  Symptoms are worse with ambulation.  No other injuries sustained at the time of the fall and specifically she denies injury to the head or neck.      MEDICAL RECORD REVIEW:    Upon review of the medical record it appears the patient was evaluated in the office with family medicine on 1/11/2023 for sore throat and diagnosed with acute URI.  No changes to chronic medications at that time.  The patient had a normal TSH on 12/13/2022 and a normal iron profile on 11/14/2022.    PAST MEDICAL HISTORY    Active Ambulatory Problems     Diagnosis Date Noted   • Essential hypertension 03/17/2016   • Other hyperlipidemia 03/17/2016   • Overactive bladder 03/17/2016   • Osteoarthritis 03/17/2016   • Type 2 diabetes mellitus without complication, without long-term current use of insulin (HCC) 03/17/2016   • Vitamin D deficiency 03/17/2016   • Nontoxic multinodular goiter 06/06/2016   • Chronic depression 08/25/2016   • Obstructive sleep apnea on CPAP 08/25/2016   • Osteoarthritis of spine with radiculopathy, lumbar region 08/25/2016   • Chronic venous insufficiency 05/01/2018   •  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   • Nonocclusive coronary atherosclerosis of native coronary artery    • Iron deficiency 01/18/2021   • Vitamin B12 deficiency 05/27/2021   • Weight loss 05/27/2021   • Diastolic dysfunction 06/02/2021   • Primary osteoarthritis of right knee 10/25/2018   • Primary osteoarthritis of left knee 06/29/2018   • Grief reaction with prolonged bereavement 10/13/2021   • Constipation 02/15/2022   • Embolic stroke (HCC) 06/14/2022   • Encounter for health counseling related to travel 07/13/2022   • APC (atrial premature contractions)    • Corn of left foot 09/22/2022   • Age-related osteoporosis without current pathological fracture 12/13/2022     Resolved Ambulatory Problems     Diagnosis Date Noted   • Anxiety 03/17/2016   • Erythrocytosis 03/17/2016   • Menopausal syndrome 03/17/2016   • Headache 03/17/2016   • Hypercalcemia 03/17/2016   • Hypertension 03/17/2016   • Adiposity 03/17/2016   • Knee pain, left 05/20/2016   • Coronary artery disease involving native coronary artery of native heart without angina pectoris 06/06/2016   • Osteopenia 06/06/2016   • Routine health maintenance 07/28/2016   • Breast cancer screening 07/28/2016   • Arthritis of left hip 07/28/2016   • Sleep disorder 08/25/2016   • Left foot pain 11/22/2016   • Paroxysmal tachycardia (HCC) 04/11/2017   • Right flank pain 08/01/2017   • Chronic bronchitis (HCC) 06/12/2018   • H/O total knee replacement, right 12/03/2018   • Class 2 obesity due to excess calories without serious comorbidity with body mass index (BMI) of 38.0 to 38.9 in adult 07/02/2020   • Tachycardia 07/02/2020   • Hypokalemia 08/21/2020   • Vitamin B 12 deficiency 09/09/2020   • Pernicious anemia 01/18/2021   • Iron deficiency anemia 05/27/2021   • Colon cancer screening 05/27/2021   • Acute hypoxemic respiratory failure (HCC) 06/12/2022   • Dysphagia 06/12/2022   • Acute respiratory alkalosis 06/14/2022   • Acute hypoxemic respiratory failure (HCC) 06/14/2022    • Senile osteoporosis 09/05/2022     Past Medical History:   Diagnosis Date   • Allergic    • CAD (coronary artery disease)    • Cataract    • Contact dermatitis    • COVID-19    • DDD (degenerative disc disease), lumbar    • Depression    • Diabetes mellitus (HCC)    • Diverticulosis June 2021   • Dry eyes    • Dry mouth    • Female climacteric state    • History of mammogram    • History of total right hip replacement 2013   • Hyperlipidemia    • Low back pain    • Neuromuscular disorder (HCC) 03/2016   • Nightmares REM-sleep type    • Obesity    • Pain, joint, shoulder    • Pneumonia August 8, 2018   • Polycythemia    • Rotator cuff tendinitis    • Seasonal allergies    • Sleep apnea    • Snoring    • Stroke (HCC)    • Superficial phlebitis    • Type 2 diabetes mellitus (HCC)    • Visual impairment August 30, 2019   • Vitiligo          PAST SURGICAL HISTORY  Past Surgical History:   Procedure Laterality Date   • BREAST BIOPSY     • BREAST LUMPECTOMY  1973    benign   • CARDIAC CATHETERIZATION  2008   • CATARACT EXTRACTION, BILATERAL Bilateral 2019   • COLONOSCOPY  08/15/2010   • COLONOSCOPY N/A 07/20/2021    Procedure: COLONOSCOPY TO CECUM;  Surgeon: Ellen Kothari MD;  Location: [facility] ENDOSCOPY;  Service: Gastroenterology;  Laterality: N/A;  pre: SCREENING FOR COLON CANCER  post: HEMORRHOIDS, DIVERTICULOSIS, TORTUOUS COLON   • ENDOSCOPY N/A 07/20/2021    Procedure: ESOPHAGOGASTRODUODENOSCOPY WITH BX'S;  Surgeon: Ellen Kothari MD;  Location: [facility] ENDOSCOPY;  Service: Gastroenterology;  Laterality: N/A;  pre: IRON DEFICIENCY ANEMIA  post: GASTRITIS, SMALL HIATAL HERNIA   • EYE SURGERY     • EYE SURGERY Left 05/2022   • HYSTERECTOMY  1982   • JOINT REPLACEMENT  07/10/2013    Total Hip Replacement   • REPLACEMENT TOTAL KNEE Left 08/06/2018    Dr. Bishnu Larson   • REPLACEMENT TOTAL KNEE Right     [facility]    • TONSILLECTOMY  1967   • TOTAL HIP ARTHROPLASTY Right 2013   • UPPER GASTROINTESTINAL ENDOSCOPY   07/20/2021         FAMILY HISTORY  Family History   Problem Relation Age of Onset   • Diabetes Mother         Mother   • Thyroid disease Mother    • Hypertension Father         Father   • Heart disease Father         Father   • Arthritis Father         Father   • Hyperlipidemia Father         Father   • Hypertension Brother         Brother   • Diabetes Brother         Brother   • Kidney disease Brother         Brother, Renal Failure   • Vision loss Brother         Brother   • Asthma Sister         Sister   • Hypertension Sister         Sister   • Miscarriages / Stillbirths Sister         Sister   • Cancer Brother         Brother   • Lung cancer Brother    • Diabetes Sister         Sister   • Hypertension Sister         Sister   • Thyroid disease Sister         Sister   • Early death Sister         Infant   • Kidney disease Brother         Brother,  Renal Failure   • Stroke Brother         Brother         SOCIAL HISTORY  Social History     Socioeconomic History   • Marital status:    Tobacco Use   • Smoking status: Never   • Smokeless tobacco: Never   • Tobacco comments:     I have never smoked.   Vaping Use   • Vaping Use: Never used   Substance and Sexual Activity   • Alcohol use: No     Comment: CAFFEINE: none   • Drug use: No   • Sexual activity: Not Currently     Partners: Male     Birth control/protection: Post-menopausal     Comment: I had a Hysterectomy in 1982.         ALLERGIES  Hydrochlorothiazide, Dexamethasone, and Lisinopril        REVIEW OF SYSTEMS    All systems reviewed and marked as negative except as listed in HPI     PHYSICAL EXAM    I have reviewed the triage vital signs and nursing notes.    ED Triage Vitals   Temp Heart Rate Resp BP SpO2   03/09/23 1541 03/09/23 1541 03/09/23 1541 03/09/23 1652 03/09/23 1541   98.3 °F (36.8 °C) 88 16 131/78 100 %      Temp src Heart Rate Source Patient Position BP Location FiO2 (%)   03/09/23 1541 03/09/23 1541 03/09/23 1652 03/09/23 1652 --   Tympanic  Monitor Sitting Left arm        Physical Exam  Constitutional:       General: She is not in acute distress.     Appearance: She is well-developed.   HENT:      Head: Normocephalic and atraumatic.   Eyes:      General: No scleral icterus.     Conjunctiva/sclera: Conjunctivae normal.   Neck:      Trachea: No tracheal deviation.   Cardiovascular:      Rate and Rhythm: Normal rate and regular rhythm.   Pulmonary:      Effort: Pulmonary effort is normal.      Breath sounds: Normal breath sounds.   Abdominal:      Palpations: Abdomen is soft.      Tenderness: There is no abdominal tenderness.   Musculoskeletal:         General: No deformity.      Cervical back: Normal range of motion.      Left hip: No deformity or bony tenderness. Normal range of motion. Normal strength.   Lymphadenopathy:      Cervical: No cervical adenopathy.   Skin:     General: Skin is warm and dry.   Neurological:      Mental Status: She is alert and oriented to person, place, and time.   Psychiatric:         Behavior: Behavior normal.         Vital signs and nursing notes reviewed.            LAB RESULTS  No results found for this or any previous visit (from the past 24 hour(s)).    Ordered the above labs and independently reviewed the results.        RADIOLOGY  XR Hip With or Without Pelvis 2 - 3 View Left    Result Date: 3/9/2023  PELVIS AND LEFT HIP  HISTORY: Fall, pain.  FINDINGS: An AP view of the pelvis, as well as AP and frog leg lateral views of the left hip were obtained. A right hip prosthesis is noted. There is moderate osteoarthritis involving the left hip. There is no evidence of fracture or dislocation. Further evaluation could be performed with a limited MRI examination of the left hip as indicated.        I ordered the above noted radiological studies. Independently reviewed by me and discussed with radiologist.  See dictation above for official radiology interpretation.      PROCEDURES    Procedures        MEDICATIONS GIVEN IN  ER    Medications - No data to display      PROGRESS, DATA ANALYSIS, CONSULTS, AND MEDICAL DECISION MAKING    All labs have been independently interpreted by me.  All radiology studies have been interpreted by me.  Discussion below represents my analysis of pertinent findings related to patient's condition, differential diagnosis, treatment plan and final disposition.    Patient presentation and work-up consistent with left hip contusion from a fall.  She has some osteoarthritic changes that may contribute to delayed recovery but I feel it is most appropriate for her to be discharged to follow-up with the PCP as an outpatient.  She will be encouraged to use over-the-counter anti-inflammatories and Tylenol for pain control.  Return precautions given at this time.    - Chronic or social conditions impacting care: None      DIFFERENTIAL DIAGNOSIS INCLUDE BUT NOT LIMITED TO:     Hip fracture hip, hip contusion, hip dislocation      Orders placed during this visit:  Orders Placed This Encounter   Procedures   • XR Hip With or Without Pelvis 2 - 3 View Left              AS OF 18:56 EST VITALS:    BP - 131/78  HR - 100  TEMP - 98.3 °F (36.8 °C) (Tympanic)  02 SATS - 100%    1856 I rechecked the patient.  I discussed the patient's radiology findings (including all incidental findings), diagnosis, and plan for discharge.  A repeat exam reveals no new worrisome changes from my initial exam findings.  The patient understands that the fact that they are being discharged does not denote that nothing is abnormal, it indicates that no clinical emergency is present and that they must follow-up as directed in order to properly maintain their health.  Follow-up instructions (specifically listed below) and return to ER precautions were given at this time.  I specifically instructed the patient to follow-up with their PCP.  The patient understands and agrees with the plan, and is ready for d/c.  All questions  answered.        DIAGNOSIS  Final diagnoses:   Contusion of left hip, initial encounter         DISPOSITION  D/c    Pt masked in first look. I wore a surgical mask throughout my encounters with the pt. I performed hand hygiene on entry into the pt room and upon exit.     Dictated utilizing Dragon dictation     Note Disclaimer: At [facility], we believe that sharing information builds trust and better relationships. You are receiving this note because you recently visited [facility]. It is possible you will see health information before a provider has talked with you about it. This kind of information can be easy to misunderstand. To help you fully understand what it means for your health, we urge you to discuss this note with your provider.      Jef Laws PA  03/15/23 [id number]

## 2023-03-09 NOTE — ED TRIAGE NOTES
Pt was getting out of shower last night when the bathmat slipped and she fell out of the shower. Pt landed on mat. Denies hitting head. Pt c/o anterior left hip pain. Pt ambulatory to triage    This RN wore mask and goggles during time of contact

## 2023-03-13 ENCOUNTER — TELEPHONE (OUTPATIENT)
Dept: FAMILY MEDICINE CLINIC | Facility: CLINIC | Age: 78
End: 2023-03-13

## 2023-03-13 NOTE — TELEPHONE ENCOUNTER
Caller: Dejah Casey    Relationship: Self    Best call back number:    [phone]          What was the call regarding: PATIENT CALLING STATING THAT SHE FELL OUT OF THE SHOWER ON 03/08/23 SHE STATED THAT SHE WENT TO THE EMERGENCY ROOM ON 03/09/23. SHE WOULD LIKE TO KNOW IF THERE IS ANYTHING SHE NEEDS TO DO DUE TO HER TAKING BLOOD THINNERS

## 2023-03-22 ENCOUNTER — OFFICE VISIT (OUTPATIENT)
Dept: FAMILY MEDICINE CLINIC | Facility: CLINIC | Age: 78
End: 2023-03-22
Payer: MEDICARE

## 2023-03-22 VITALS
HEIGHT: 63 IN | BODY MASS INDEX: 30.83 KG/M2 | OXYGEN SATURATION: 100 % | WEIGHT: 174 LBS | HEART RATE: 90 BPM | DIASTOLIC BLOOD PRESSURE: 68 MMHG | SYSTOLIC BLOOD PRESSURE: 99 MMHG

## 2023-03-22 DIAGNOSIS — E78.49 OTHER HYPERLIPIDEMIA: ICD-10-CM

## 2023-03-22 DIAGNOSIS — I10 ESSENTIAL HYPERTENSION: ICD-10-CM

## 2023-03-22 PROBLEM — R63.4 WEIGHT LOSS: Status: RESOLVED | Noted: 2021-05-27 | Resolved: 2023-03-22

## 2023-03-22 PROBLEM — Z71.84 ENCOUNTER FOR HEALTH COUNSELING RELATED TO TRAVEL: Status: RESOLVED | Noted: 2022-07-13 | Resolved: 2023-03-22

## 2023-03-22 PROBLEM — K59.00 CONSTIPATION: Status: RESOLVED | Noted: 2022-02-15 | Resolved: 2023-03-22

## 2023-03-22 PROCEDURE — 3074F SYST BP LT 130 MM HG: CPT | Performed by: FAMILY MEDICINE

## 2023-03-22 PROCEDURE — 3078F DIAST BP <80 MM HG: CPT | Performed by: FAMILY MEDICINE

## 2023-03-22 PROCEDURE — 99214 OFFICE O/P EST MOD 30 MIN: CPT | Performed by: FAMILY MEDICINE

## 2023-03-22 RX ORDER — AMLODIPINE BESYLATE 5 MG/1
5 TABLET ORAL DAILY
Qty: 90 TABLET | Refills: 2 | Status: SHIPPED | OUTPATIENT
Start: 2023-03-22 | End: 2023-12-17

## 2023-03-22 RX ORDER — ATORVASTATIN CALCIUM 20 MG/1
20 TABLET, FILM COATED ORAL EVERY MORNING
Qty: 90 TABLET | Refills: 2 | Status: SHIPPED | OUTPATIENT
Start: 2023-03-22 | End: 2023-12-17

## 2023-03-22 NOTE — PROGRESS NOTES
"Chief Complaint  Hypertension, Hyperlipidemia, and Diabetes    Subjective          Dejah presents to [facility] forlab review and to refill current medications. Overall she feels well. No medication side effects are reported.             Objective   Vital Signs:   Vitals:    03/22/23 1434   BP: 99/68   Pulse: 90   SpO2: 100%   Weight: 78.9 kg (174 lb)   Height: 160 cm (62.99\")                Physical Exam  Vitals reviewed.   Constitutional:       General: She is not in acute distress.  Eyes:      General: Lids are normal.      Conjunctiva/sclera: Conjunctivae normal.   Neck:      Vascular: No carotid bruit.      Trachea: No tracheal deviation.   Cardiovascular:      Rate and Rhythm: Normal rate and regular rhythm.      Heart sounds: Normal heart sounds. No murmur heard.  Pulmonary:      Effort: Pulmonary effort is normal.      Breath sounds: Normal breath sounds.   Skin:     General: Skin is warm and dry.   Neurological:      Mental Status: She is alert. She is not disoriented.   Psychiatric:         Speech: Speech normal.         Behavior: Behavior normal. Behavior is cooperative.          Result Review :     The following data was reviewed by: Reji Talamantes MD on 03/22/2023:  Cardiovascular Risk Assessment (12/13/2022 14:03)  Vitamin D,25-Hydroxy (12/13/2022 14:03)-normal  TSH (12/13/2022 14:03)  Lipid Panel (12/13/2022 14:03)-LDL to goal  Hemoglobin A1c (12/13/2022 14:03)-controlled  Comprehensive Metabolic Panel (12/13/2022 14:03)  Vitamin B12 (12/13/2022 14:03)>2000  Microalbumin / Creatinine Urine Ratio - Urine, Clean Catch (12/13/2022 14:03)-6           Assessment and Plan    Diagnoses and all orders for this visit:    1. Other hyperlipidemia  Assessment & Plan:  Lipid abnormalities are unchanged.  Pharmacotherapy as ordered.  Lipids will be reassessed in 6 months.    Orders:  -     atorvastatin (LIPITOR) 20 MG tablet; Take 1 tablet by mouth Every Morning for 270 days.  " Dispense: 90 tablet; Refill: 2    2. Essential hypertension  Assessment & Plan:  Hypertension is unchanged.  Continue current treatment regimen.  Blood pressure will be reassessed at the next regular appointment.    Orders:  -     amLODIPine (NORVASC) 5 MG tablet; Take 1 tablet by mouth Daily for 270 days.  Dispense: 90 tablet; Refill: 2      Follow Up   Return in about 6 months (around 9/22/2023) for Medicare Wellness & regular visit, [id number] min.  Patient was given instructions and counseling regarding her condition or for health maintenance advice. Please see specific information pulled into the AVS if appropriate.

## 2023-03-27 NOTE — PROGRESS NOTES
CC: Follow-up    HPI:  Dejah Casey is a  78 y.o.  right-handed female who is here for stroke follow-up.  She has medical history significant for hypertension, hyperlipidemia, diabetes, vitamin D deficiency, chronic depression, SISSY, sleep disorder, CAD, vitamin B-12 deficiency.  Last summer she was in the ER with complaints of shortness of breath and was found to have subacute embolic appearing strokes.  She does not remember coming to the hospital.  Apparently in phone call to her daughter-in-law and was incoherent.  She was already on aspirin at the time and was changed to Eliquis for strong suspicion of cardioembolic etiology.  During hospitalization she had some altered mental status and word finding problems and this is why neurology was consulted.  She saw my colleague Dr Elizalde.  No vascular abnormality per MRA head carotid Doppler.  She had TTE showing 69% EF LVH moderately dilated left atrial cavity.  Zio patch at discharge showed an occasional PACs but no A-fib.    She has some confusion about whether or not she had a stroke.  She tells me she was told that she did have stroke and then did not have stroke.  She is here with her son-in-law.  They want to talk about her sleep habits, fatigue, and Eliquis.  She goes to bed about 5 AM and awakens at 3 in the afternoon.  She does not have much in the way of social engagement.  She has no physical activity.  She lives alone and performs her own ADLs and finances.  She says she is depressed often and this has been a long problem for her.  She is on Effexor and says she sees a psychiatrist and mental health therapist weekly.  They are trying to decrease her Effexor dose.  There are some issues with balance but no falls      Social history:    Family history:      Pain Scale:        ROS:  Review of Systems   Constitutional: Positive for fatigue.   HENT: Negative for ear pain, hearing loss and tinnitus.    Eyes: Negative for pain, redness and visual disturbance.    Respiratory: Negative for chest tightness, shortness of breath, wheezing and stridor.    Cardiovascular: Negative for chest pain and palpitations.   Musculoskeletal: Negative for gait problem.   Neurological: Negative for weakness (general), light-headedness and headaches.   Psychiatric/Behavioral: Negative for confusion and decreased concentration.         Reviewed ROS conducted by MA and nadiya        Physical Exam:  There were no vitals filed for this visit.   Orthostatic BP:    There is no height or weight on file to calculate BMI.        General: pt well appearing, no distress  HEENT: Normocephalic, conjunctiva normal, external canals normal, no nasal discharge, moist mucous membranes  Neck: No lymphadenopathy, thyroid not enlarged, no JVD  CV: Regular rate and rhythm, no murmurs negative no bruits auscultated at neck, equal pulses  Pulmonary: Normal respiratory effort, clear to auscultation bilaterally  Extremities: no edema, bruising, or skin lesions  Pysch: good eye contact, cooperative, full affect, euthymic mood, good attention, good insight  Mental: alert, conversant, AOx3, provides history, 3/3 recall.  Language fluent, names, repeats.  CN: CN II-XII intact, PERRL, EOMi, no gaze palsy or nystagmus, intact facial sensation, no facial assymetry, intact hearing, symmetric palate elevation, tongue midline, good SCM strength  Motor: no abnormal movements, no pronator drift, normal  bulk and tone, 5/5 strength b/l UE & LE  Sensory: normal sensation to crude touch, temperature, and vibration throughout  Reflexes: 2+ throughout, neg babinski  Coordination: no ataxia on finger-nose, heel-shin  Gait: A bit wide-based and some clumsiness with turns but no ataxia, negative Romberg, does fine with pull test, good arm swing      Results:      Lab Results   Component Value Date    GLUCOSE 93 12/13/2022    BUN 21 12/13/2022    CREATININE 0.81 12/13/2022    EGFRIFNONA 62 02/17/2022    EGFRIFAFRI 72 02/17/2022    BCR  25.9 (H) 12/13/2022    CO2 30.4 (H) 12/13/2022    CALCIUM 10.6 (H) 12/13/2022    PROTENTOTREF 6.3 12/13/2022    ALBUMIN 4.10 12/13/2022    LABIL2 1.9 12/13/2022    AST 11 12/13/2022    ALT 10 12/13/2022       Lab Results   Component Value Date    WBC 8.10 11/14/2022    HGB 12.5 11/14/2022    HCT 39.4 11/14/2022    MCV 83.7 11/14/2022     11/14/2022         .  Lab Results   Component Value Date    RPR Non Reactive 06/13/2022         Lab Results   Component Value Date    TSH 0.648 12/13/2022    K7PQMKX 6.36 04/15/2015         Lab Results   Component Value Date    WFDSTCJV78 >2000 (H) 12/13/2022         Lab Results   Component Value Date    FOLATE >20.00 10/18/2021         Lab Results   Component Value Date    HGBA1C 6.10 (H) 12/13/2022         Lab Results   Component Value Date    GLUCOSE 93 12/13/2022    BUN 21 12/13/2022    CREATININE 0.81 12/13/2022    EGFRIFNONA 62 02/17/2022    EGFRIFAFRI 72 02/17/2022    BCR 25.9 (H) 12/13/2022    K 4.3 12/13/2022    CO2 30.4 (H) 12/13/2022    CALCIUM 10.6 (H) 12/13/2022    PROTENTOTREF 6.3 12/13/2022    ALBUMIN 4.10 12/13/2022    LABIL2 1.9 12/13/2022    AST 11 12/13/2022    ALT 10 12/13/2022         Diagnosis:  1.  History of stroke  2.  Depression      Impression: 78-year-old right-handed female with history of hypertension, hyperlipidemia, diabetes, vitamin D and B12 deficiency, SISSY, long history of depression.  She is followed in clinic after hospitalization last summer when she presented with altered mental status surrounding respiratory insufficiency and was found to have subacute bilateral strokes suspicious for cardioembolic etiology.   Because she was on aspirin at the time she was escalated to Eliquis.  She has no report of further neurologic or strokelike events.  She is here with son-in-law and she is interested in not taking Eliquis.  If we de-escalate her stroke management I think it would behoove her to have loop monitor placed.  I'll forward to her  cardiologist for this consideration.  I encouraged her to try for a proper sleep-wake cycle and begin Silver sneakers.  On first arrival her BP was elevated to 160 systolic but on recheck it is 130 Per my measure she needs to monitor this more often      Follow-up in 4 to 6 months sooner if problems    I spent at least 40 minutes interviewing, examining, and counseling patient.  I independently reviewed documentation, laboratory and diagnostic findings, external documentation where applicable, and formulated treatment plan which was discussed with the patient.

## 2023-03-29 ENCOUNTER — OFFICE VISIT (OUTPATIENT)
Dept: NEUROLOGY | Facility: CLINIC | Age: 78
End: 2023-03-29
Payer: MEDICARE

## 2023-03-29 VITALS
BODY MASS INDEX: 30.87 KG/M2 | HEIGHT: 63 IN | OXYGEN SATURATION: 99 % | HEART RATE: 87 BPM | DIASTOLIC BLOOD PRESSURE: 80 MMHG | WEIGHT: 174.2 LBS | SYSTOLIC BLOOD PRESSURE: 150 MMHG

## 2023-03-29 DIAGNOSIS — F32.89 OTHER DEPRESSION: ICD-10-CM

## 2023-03-29 DIAGNOSIS — I10 ESSENTIAL HYPERTENSION: ICD-10-CM

## 2023-03-29 DIAGNOSIS — Z86.73 HISTORY OF STROKE: Primary | ICD-10-CM

## 2023-03-29 PROCEDURE — 1160F RVW MEDS BY RX/DR IN RCRD: CPT | Performed by: PHYSICIAN ASSISTANT

## 2023-03-29 PROCEDURE — 3079F DIAST BP 80-89 MM HG: CPT | Performed by: PHYSICIAN ASSISTANT

## 2023-03-29 PROCEDURE — 1159F MED LIST DOCD IN RCRD: CPT | Performed by: PHYSICIAN ASSISTANT

## 2023-03-29 PROCEDURE — 3077F SYST BP >= 140 MM HG: CPT | Performed by: PHYSICIAN ASSISTANT

## 2023-03-29 PROCEDURE — 99215 OFFICE O/P EST HI 40 MIN: CPT | Performed by: PHYSICIAN ASSISTANT

## 2023-03-29 NOTE — PATIENT INSTRUCTIONS
BE FAST   Balance, eyesight, face, arm, speech, time  Silver sneakers  Try for better sleep hygiene, melatonin at night (8pm) 5-10mg  Check your blood pressure 3 times a week    I will message your cardiologist for follow up regarding possible long term cardiac monitor

## 2023-03-30 NOTE — PROGRESS NOTES
Malu, will you get Ms Jacinto in with me or TK in a couple of weeks to discuss plans?    Hansa -- Even with a loop, we would need to monitor for a year before I would feel comfortable stopping AC.     Yesy brock

## 2023-03-30 NOTE — PROGRESS NOTES
Patient is scheduled with TK 05.08.2023 at 3:00pm. Offered [facility] location for sooner appointment but patient did not want to travel.

## 2023-04-03 RX ORDER — FERROUS SULFATE 325(65) MG
TABLET ORAL
Qty: 90 TABLET | Refills: 1 | Status: SHIPPED | OUTPATIENT
Start: 2023-04-03

## 2023-04-28 NOTE — TELEPHONE ENCOUNTER
Caller: Dejah Casey    Relationship to patient: Self    Best call back number: [phone]    Type of visit: LABS AND FOLLOW UP    Requested date: 11/09    If rescheduling, when is the original appointment: 11/07    Additional notes: PLEASE CALL ONCE R/S. APPT NOTES SAY DON'T MOVE.     Patient: Oliva Flores    Procedure Summary     Date: 04/28/23 Room / Location: [facility] INTERVENTIONAL    Anesthesia Start: 1338 Anesthesia Stop: 1552    Procedure: IR EMBOLIZATION Diagnosis: (Cerebral Aneurysm)    Scheduled Providers:  Provider: Dany Coe MD    Anesthesia Type: general, Delisa ASA Status: 3          Anesthesia Type: general, Delisa    Vitals  Vitals Value Taken Time   BP 98/55 04/28/23 1731   Temp 37 °C (98.6 °F) 04/28/23 1730   Pulse 85 04/28/23 1742   Resp 16 04/28/23 1730   SpO2 97 % 04/28/23 1742   Vitals shown include unvalidated device data.        Post Anesthesia Care and Evaluation      Comments: Pt. Discharged prior to being evaluated by anesthesia.  Chart is reviewed and no complications are noted.  THIS CASE IS NOT MEDICALLY DIRECTED

## 2023-05-08 ENCOUNTER — OFFICE VISIT (OUTPATIENT)
Dept: CARDIOLOGY | Facility: CLINIC | Age: 78
End: 2023-05-08
Payer: MEDICARE

## 2023-05-08 VITALS
DIASTOLIC BLOOD PRESSURE: 80 MMHG | SYSTOLIC BLOOD PRESSURE: 124 MMHG | WEIGHT: 176.2 LBS | HEART RATE: 81 BPM | HEIGHT: 63 IN | BODY MASS INDEX: 31.22 KG/M2

## 2023-05-08 DIAGNOSIS — I25.10 NONOCCLUSIVE CORONARY ATHEROSCLEROSIS OF NATIVE CORONARY ARTERY: ICD-10-CM

## 2023-05-08 DIAGNOSIS — I51.89 DIASTOLIC DYSFUNCTION: ICD-10-CM

## 2023-05-08 DIAGNOSIS — I49.1 APC (ATRIAL PREMATURE CONTRACTIONS): ICD-10-CM

## 2023-05-08 DIAGNOSIS — I63.9 CEREBROVASCULAR ACCIDENT (CVA), UNSPECIFIED MECHANISM: Primary | ICD-10-CM

## 2023-05-08 DIAGNOSIS — I10 ESSENTIAL HYPERTENSION: ICD-10-CM

## 2023-05-08 DIAGNOSIS — R60.0 LOWER EXTREMITY EDEMA: ICD-10-CM

## 2023-05-08 NOTE — LETTER
May 10, 2023     SUSAN Sarabia  [address]  [facility] [address]  [facility] [address]    Patient: Dejah Casey   YOB: 1945   Date of Visit: 2023       Dear SUSAN Cook:    Thank you for referring Dejah Casey to me for evaluation. Below are the relevant portions of my assessment and plan of care.    If you have questions, please do not hesitate to call me. I look forward to following Dejah along with you.         Sincerely,        RAYSHAWN Romero        CC: No Recipients    Josey Ernandez APRN  05/10/23 1734  Signed    Date of Office Visit: 2023  Encounter Provider: RAYSHAWN Romero  Place of Service: [facility] CARDIOLOGY  Patient Name: Dejah Casey  :1945  Primary Cardiologist: Dr. Daniel Leal    Chief Complaint   Patient presents with   • Discussion of anticoagulation   :     HPI: Dejah Casey is a 78 y.o. female who presents today for discussion of ICD implant. I have reviewed her medical records.    She has known hypertension, hyperlipidemia, and obstructive sleep apnea.  She was previously on HCTZ in , but it was discontinued due to hypercalcemia.     In  she was diagnosed with minimal coronary atherosclerosis (30% LAD) per coronary angiography.  She has a history of rare ectopy and was placed on metoprolol.  She has had multiple stress test in the past that have been normal.     In 2020, she was experiencing dyspnea from diastolic dysfunction and leg swelling which was felt to be due to venous insufficiency.  She was prescribed furosemide 3 times weekly.  In 2020, Lexiscan Myoview stress test was normal.    In 2022, she was hospitalized for shortness of breath, hypoxia, and severe respiratory alkalosis.  MRI showed subacute appearing strokes bilaterally.  Carotid duplex negative.  Echocardiogram showed normal LVEF, mild LVH, and diastolic dysfunction.  Neurology started her on apixaban because they felt  "the stroke could be cardioembolic.  Follow-up 9-day Holter monitor showed NSR, average heart rate of 83, rare APCs/PVCs, and 7 burst of PACs lasting up to 8 beats in duration.    SUSAN Devlin (neurology) referred her to our office for discussion of apixaban and/or loop recorder implantation.  Patient recently saw her and is not interested in taking apixaban.  Dr. Leal recommended that she be seen in the office for further discussion.  She mentions that even with a loop recorder, patient would need to be monitored for a year before Dr. Leal would feel comfortable stopping anticoagulation.    She presents today for further discussion.  She was told by her endocrinologist that it was \"time to come off the anticoagulation\".  She also would like to because she feels \"I am taking too much medicine\".  This past March she had a fall in the shower because her mat slid.  Otherwise she is stable and has not had any further falls.  She has some chronic knee and leg swelling.  She denies chest pain, shortness of air, palpitations, dizziness, or syncope.      Past Medical History:   Diagnosis Date   • Allergic     Seasonal alleries, Lisinopril, Dexamethasone   • Anxiety    • CAD (coronary artery disease)    • Cataract     Cararacts in both eyes.   • Contact dermatitis    • COVID-19    • DDD (degenerative disc disease), lumbar    • Depression    • Diabetes mellitus    • Diverticulosis June 2021   • Dry eyes    • Dry mouth    • Essential hypertension    • Female climacteric state    • Headache    • History of mammogram     8/2016   • History of total right hip replacement 2013   • Hypercalcemia    • Hyperlipidemia    • Iron deficiency anemia 05/27/2021    Added automatically from request for surgery [id number]   • Low back pain     Lumbar area.   • Neuromuscular disorder 03/2016   • Nightmares REM-sleep type    • Nonocclusive coronary atherosclerosis of native coronary artery     cath in 2008 with 30% LAD disease   • Nontoxic " multinodular goiter    • Obesity    • Osteoarthritis     both knees, back and hips   • Osteopenia    • Pain, joint, shoulder    • Pernicious anemia    • Pneumonia August 8, 2018    After Hospital stay.   • Polycythemia    • Rotator cuff tendinitis    • Seasonal allergies    • Sleep apnea     CPAP nightly   • Snoring    • Stroke    • Superficial phlebitis    • Type 2 diabetes mellitus     Type II   • Visual impairment August 30, 2019   • Vitiligo        Past Surgical History:   Procedure Laterality Date   • BREAST BIOPSY     • BREAST LUMPECTOMY  1973    benign   • CARDIAC CATHETERIZATION  2008   • CATARACT EXTRACTION, BILATERAL Bilateral 2019   • COLONOSCOPY  08/15/2010   • COLONOSCOPY N/A 07/20/2021    Procedure: COLONOSCOPY TO CECUM;  Surgeon: Ellen Kothari MD;  [facility];  Service: Gastroenterology;  Laterality: N/A;  pre: SCREENING FOR COLON CANCER  post: HEMORRHOIDS, DIVERTICULOSIS, TORTUOUS COLON   • ENDOSCOPY N/A 07/20/2021    Procedure: ESOPHAGOGASTRODUODENOSCOPY WITH BX'S;  Surgeon: Ellen Kothari MD;  [facility];  Service: Gastroenterology;  Laterality: N/A;  pre: IRON DEFICIENCY ANEMIA  post: GASTRITIS, SMALL HIATAL HERNIA   • EYE SURGERY     • EYE SURGERY Left 05/2022   • HYSTERECTOMY  1982   • JOINT REPLACEMENT  07/10/2013    Total Hip Replacement   • REPLACEMENT TOTAL KNEE Left 08/06/2018    Dr. Bishnu Larson   • REPLACEMENT TOTAL KNEE Right     [facility]    • TONSILLECTOMY  1967   • TOTAL HIP ARTHROPLASTY Right 2013   • UPPER GASTROINTESTINAL ENDOSCOPY  07/20/2021       Social History     Socioeconomic History   • Marital status:    Tobacco Use   • Smoking status: Never   • Smokeless tobacco: Never   • Tobacco comments:     I have never smoked.   Vaping Use   • Vaping Use: Never used   Substance and Sexual Activity   • Alcohol use: No     Comment: CAFFEINE: none   • Drug use: No   • Sexual activity: Not Currently     Partners: Male     Birth  control/protection: Post-menopausal     Comment: I had a Hysterectomy in 1982.       Family History   Problem Relation Age of Onset   • Diabetes Mother         Mother   • Thyroid disease Mother    • Hypertension Father         Father   • Heart disease Father         Father   • Arthritis Father         Father   • Hyperlipidemia Father         Father   • Hypertension Brother         Brother   • Diabetes Brother         Brother   • Kidney disease Brother         Brother, Renal Failure   • Vision loss Brother         Brother   • Asthma Sister         Sister   • Hypertension Sister         Sister   • Miscarriages / Stillbirths Sister         Sister   • Cancer Brother         Brother   • Lung cancer Brother    • Diabetes Sister         Sister   • Hypertension Sister         Sister   • Thyroid disease Sister         Sister   • Early death Sister         Infant   • Kidney disease Brother         Brother,  Renal Failure   • Stroke Brother         Brother       The following portion of the patient's history were reviewed and updated as appropriate: past medical history, past surgical history, past social history, past family history, allergies, current medications, and problem list.    Review of Systems   Constitutional: Negative.   Cardiovascular: Positive for leg swelling.   Respiratory: Negative.    Hematologic/Lymphatic: Bruises/bleeds easily.   Neurological: Positive for excessive daytime sleepiness.   Psychiatric/Behavioral: Positive for depression.       Allergies   Allergen Reactions   • Hydrochlorothiazide Other (See Comments)     In 2015-developed hypercalcemia-HCTZ was discontinued  Other reaction(s): Other (See Comments)  In 2015-developed hypercalcemia-HCTZ was discontinued  In 2015-developed hypercalcemia-HCTZ was discontinued   • Dexamethasone Dermatitis     Other reaction(s): Other (See Comments)  CONTACT DERMATITIS   • Lisinopril Cough     Other reaction(s): Other (See Comments)  COUGH         Current  Outpatient Medications:   •  Accu-Chek FastClix Lancets misc, Use to check blood sugar once daily, Disp: 102 each, Rfl: 5  •  amLODIPine (NORVASC) 5 MG tablet, Take 1 tablet by mouth Daily for 270 days., Disp: 90 tablet, Rfl: 2  •  atorvastatin (LIPITOR) 20 MG tablet, Take 1 tablet by mouth Every Morning for 270 days., Disp: 90 tablet, Rfl: 2  •  Blood Glucose Monitoring Suppl (Blood Glucose Monitor System) w/Device kit, BS meter Accu-chek Catherine plus meter. DX E11.9, Disp: 1 each, Rfl: 1  •  Calcium Carb-Cholecalciferol 500-200 MG-UNIT tablet, Take 2 tablets by mouth daily., Disp: , Rfl:   •  clonazePAM (KlonoPIN) 0.5 MG tablet, Take 1 tablet by mouth Every Night., Disp: , Rfl:   •  Cyanocobalamin ([employer] Vitamin B-12) 5000 MCG sublingual tablet, Place 5,000 mcg under the tongue Daily., Disp: 90 tablet, Rfl: 3  •  ferrous sulfate 325 (65 FE) MG tablet, TAKE 1 TABLET BY MOUTH EVERY DAY WITH BREAKFAST, Disp: 90 tablet, Rfl: 1  •  fluticasone (FLONASE) 50 MCG/ACT nasal spray, 1 spray into the nostril(s) as directed by provider As Needed., Disp: , Rfl:   •  folic acid (FOLVITE) 1 MG tablet, TAKE 1 TABLET BY MOUTH EVERY DAY, Disp: 90 tablet, Rfl: 3  •  furosemide (LASIX) 40 MG tablet, TAKE 1 TABLET EVERY DAY (Patient taking differently: 1 tablet Every Other Day.), Disp: 90 tablet, Rfl: 3  •  glucose blood (Accu-Chek Catherine Plus) test strip, Use as instructed, Disp: 360 each, Rfl: 1  •  metFORMIN (GLUCOPHAGE) 500 MG tablet, Take 1 tablet by mouth 2 (Two) Times a Day With Meals., Disp: , Rfl:   •  metoprolol succinate XL (TOPROL-XL) 50 MG 24 hr tablet, Take 1 tablet by mouth Daily., Disp: 90 tablet, Rfl: 1  •  Mirabegron ER (MYRBETRIQ) 50 MG tablet sustained-release 24 hour 24 hr tablet, Take 50 mg by mouth Daily., Disp: , Rfl:   •  pilocarpine (SALAGEN) 5 MG tablet, 1 tablet 3 (Three) Times a Day., Disp: , Rfl:   •  potassium chloride (KAYCIEL) 20 mEq/15 mL solution, MIX 15ML IN 4 OUNCES OF LIQUID AND DRINK DAILY, Disp: 8317  "mL, Rfl: 1  •  prednisoLONE acetate (PRED FORTE) 1 % ophthalmic suspension, INSTILL 1 DROP INTO BOTH EYES TWICE A DAY TO REPLACE DUREZOL, Disp: , Rfl:   •  Prolensa 0.07 % solution, PLACE ONE DROP IN BOTH EYES ONCE DAILY FOR MAINTENANCE, Disp: , Rfl:   •  Restasis 0.05 % ophthalmic emulsion, 2 (two) times a day., Disp: , Rfl:   •  risedronate (Actonel) 35 MG tablet, 1 tablet weekly with water on empty stomach, nothing by mouth or lie down for next 30 minutes., Disp: 12 tablet, Rfl: 2  •  STIOLTO RESPIMAT 2.5-2.5 MCG/ACT aerosol solution inhaler, 2 puffs As Needed., Disp: , Rfl:   •  venlafaxine (EFFEXOR) 75 MG tablet, Take 1 tablet by mouth 2 (Two) Times a Day. Pt is taking 1 tab in the morning and 2 tabs at night, Disp: , Rfl:   •  apixaban (ELIQUIS) 5 MG tablet tablet, Take 1 tablet by mouth Every 12 (Twelve) Hours for 90 days. Indications: Other - full anticoagulation, Disp: 180 tablet, Rfl: 3        Objective:     Vitals:    05/08/23 1507   BP: 124/80   BP Location: Left arm   Patient Position: Sitting   Cuff Size: Adult   Pulse: 81   Weight: 79.9 kg (176 lb 3.2 oz)   Height: 160 cm (62.99\")     Body mass index is 31.22 kg/m².    PHYSICAL EXAM:    Vitals Reviewed.   General Appearance: No acute distress, well developed and well nourished.   Eyes: Glasses.   HENT: No hearing loss noted.    Respiratory: No signs of respiratory distress. Respiration rhythm and depth normal.  Clear to auscultation.   Cardiovascular:  Jugular Venous Pressure: Normal  Heart Rate and Rhythm: Normal, Heart Sounds: Normal S1 and S2. No S3 or S4 noted.  Murmurs: No murmurs noted. No rubs, thrills, or gallops.   Lower Extremities: No edema noted.  Musculoskeletal: Normal movement of extremities.  Skin: General appearance normal.    Psychiatric: Patient alert and oriented to person, place, and time. Speech and behavior appropriate. Normal mood and affect.       ECG 12 Lead    Date/Time: 5/10/2023 3:05 PM  Performed by: Josey Ernandez, " RAYSHAWN  Authorized by: Josey Ernandez APRN   Comparison: compared with previous ECG from 11/16/2022  Similar to previous ECG  Rhythm: sinus rhythm  Rate: normal  BPM: 81  Conduction: conduction normal  ST Segments: ST segments normal  T Waves: T waves normal  QRS axis: normal  Other findings: left ventricular hypertrophy    Clinical impression: non-specific ECG              Assessment:       Diagnosis Plan   1. Cerebrovascular accident (CVA), unspecified mechanism        2. APC (atrial premature contractions)        3. Diastolic dysfunction        4. Essential hypertension        5. Nonocclusive coronary atherosclerosis of native coronary artery        6. Lower extremity edema               Plan:       1.  History of stroke: Multiple pole subacute strokes noted bilaterally per MRI in June 2022.  Neurology started her on apixaban because they felt the stroke could be cardioembolic.  Follow-up Holter monitor showed rare APCs and no atrial fibrillation.  She is wanting to stop the anticoagulation and neurology referred her back to us.  Dr. Leal recommended a loop recorder implantation that would need to be monitored for at least a year before anticoagulation would be discontinued.  We discussed this today and she wants to think about it.    2.  APCs: Denies palpitations.    3.  History of diastolic dysfunction: Well compensated today.  I think her swelling today is from ill fitting compression stockings.    4.  Hypertension: Blood pressure well controlled.    5.  Nonocclusive coronary artery disease: 30% LAD stenosis per cath in 2008.  Denies angina.  Continue atorvastatin.    6.  Lower extremity edema: This occurred after her knee surgeries and I have recommended better compression stockings that go up to her knee.  Amlodipine could also be causing the swelling.    7.  She will think about the loop recorder implantation and call us back.    ADDENDUM 5/9/2023:  · Patient called back and left a message.  She says she  does not want to have the loop recorder implantation and will stay on the rivaroxaban.  She will keep her follow-up appointment with Dr. Daniel Leal in November.    As always, it has been a pleasure to participate in your patient's care. Thank you.         Sincerely,         RAYSHAWN Carroll  [facility]      · Dictated utilizing Dragon Dictation  · COVID-19 Precautions - Patient was compliant in wearing a mask. When I saw the patient, I used appropriate personal protective equipment (PPE) including mask and eye shield (standard procedure).  Additionally, I used gown and gloves if indicated.  Hand hygiene was completed before and after seeing the patient.

## 2023-05-08 NOTE — PROGRESS NOTES
Date of Office Visit: 2023  Encounter Provider: RAYSHAWN Romero  Place of Service: [facility] CARDIOLOGY  Patient Name: Dejah Casey  :1945  Primary Cardiologist: Dr. Daniel Leal    Chief Complaint   Patient presents with   • Discussion of anticoagulation   :     HPI: Dejah Casey is a 78 y.o. female who presents today for discussion of ICD implant. I have reviewed her medical records.    She has known hypertension, hyperlipidemia, and obstructive sleep apnea.  She was previously on HCTZ in , but it was discontinued due to hypercalcemia.     In  she was diagnosed with minimal coronary atherosclerosis (30% LAD) per coronary angiography.  She has a history of rare ectopy and was placed on metoprolol.  She has had multiple stress test in the past that have been normal.     In 2020, she was experiencing dyspnea from diastolic dysfunction and leg swelling which was felt to be due to venous insufficiency.  She was prescribed furosemide 3 times weekly.  In 2020, Lexiscan Myoview stress test was normal.    In 2022, she was hospitalized for shortness of breath, hypoxia, and severe respiratory alkalosis.  MRI showed subacute appearing strokes bilaterally.  Carotid duplex negative.  Echocardiogram showed normal LVEF, mild LVH, and diastolic dysfunction.  Neurology started her on apixaban because they felt the stroke could be cardioembolic.  Follow-up 9-day Holter monitor showed NSR, average heart rate of 83, rare APCs/PVCs, and 7 burst of PACs lasting up to 8 beats in duration.    SUSAN Devlin (neurology) referred her to our office for discussion of apixaban and/or loop recorder implantation.  Patient recently saw her and is not interested in taking apixaban.  Dr. Leal recommended that she be seen in the office for further discussion.  She mentions that even with a loop recorder, patient would need to be monitored for a year before Dr. Leal  "would feel comfortable stopping anticoagulation.    She presents today for further discussion.  She was told by her endocrinologist that it was \"time to come off the anticoagulation\".  She also would like to because she feels \"I am taking too much medicine\".  This past March she had a fall in the shower because her mat slid.  Otherwise she is stable and has not had any further falls.  She has some chronic knee and leg swelling.  She denies chest pain, shortness of air, palpitations, dizziness, or syncope.      Past Medical History:   Diagnosis Date   • Allergic     Seasonal alleries, Lisinopril, Dexamethasone   • Anxiety    • CAD (coronary artery disease)    • Cataract     Cararacts in both eyes.   • Contact dermatitis    • COVID-19    • DDD (degenerative disc disease), lumbar    • Depression    • Diabetes mellitus    • Diverticulosis June 2021   • Dry eyes    • Dry mouth    • Essential hypertension    • Female climacteric state    • Headache    • History of mammogram     8/2016   • History of total right hip replacement 2013   • Hypercalcemia    • Hyperlipidemia    • Iron deficiency anemia 05/27/2021    Added automatically from request for surgery [id number]   • Low back pain     Lumbar area.   • Neuromuscular disorder 03/2016   • Nightmares REM-sleep type    • Nonocclusive coronary atherosclerosis of native coronary artery     cath in 2008 with 30% LAD disease   • Nontoxic multinodular goiter    • Obesity    • Osteoarthritis     both knees, back and hips   • Osteopenia    • Pain, joint, shoulder    • Pernicious anemia    • Pneumonia August 8, 2018    After Hospital stay.   • Polycythemia    • Rotator cuff tendinitis    • Seasonal allergies    • Sleep apnea     CPAP nightly   • Snoring    • Stroke    • Superficial phlebitis    • Type 2 diabetes mellitus     Type II   • Visual impairment August 30, 2019   • Vitiligo        Past Surgical History:   Procedure Laterality Date   • BREAST BIOPSY     • BREAST LUMPECTOMY  " 1973    benign   • CARDIAC CATHETERIZATION  2008   • CATARACT EXTRACTION, BILATERAL Bilateral 2019   • COLONOSCOPY  08/15/2010   • COLONOSCOPY N/A 07/20/2021    Procedure: COLONOSCOPY TO CECUM;  Surgeon: Ellen Kothari MD;  Location: [facility] ENDOSCOPY;  Service: Gastroenterology;  Laterality: N/A;  pre: SCREENING FOR COLON CANCER  post: HEMORRHOIDS, DIVERTICULOSIS, TORTUOUS COLON   • ENDOSCOPY N/A 07/20/2021    Procedure: ESOPHAGOGASTRODUODENOSCOPY WITH BX'S;  Surgeon: Ellen Kothari MD;  Location: [facility] ENDOSCOPY;  Service: Gastroenterology;  Laterality: N/A;  pre: IRON DEFICIENCY ANEMIA  post: GASTRITIS, SMALL HIATAL HERNIA   • EYE SURGERY     • EYE SURGERY Left 05/2022   • HYSTERECTOMY  1982   • JOINT REPLACEMENT  07/10/2013    Total Hip Replacement   • REPLACEMENT TOTAL KNEE Left 08/06/2018    Dr. Bishnu Larson   • REPLACEMENT TOTAL KNEE Right     [facility]    • TONSILLECTOMY  1967   • TOTAL HIP ARTHROPLASTY Right 2013   • UPPER GASTROINTESTINAL ENDOSCOPY  07/20/2021       Social History     Socioeconomic History   • Marital status:    Tobacco Use   • Smoking status: Never   • Smokeless tobacco: Never   • Tobacco comments:     I have never smoked.   Vaping Use   • Vaping Use: Never used   Substance and Sexual Activity   • Alcohol use: No     Comment: CAFFEINE: none   • Drug use: No   • Sexual activity: Not Currently     Partners: Male     Birth control/protection: Post-menopausal     Comment: I had a Hysterectomy in 1982.       Family History   Problem Relation Age of Onset   • Diabetes Mother         Mother   • Thyroid disease Mother    • Hypertension Father         Father   • Heart disease Father         Father   • Arthritis Father         Father   • Hyperlipidemia Father         Father   • Hypertension Brother         Brother   • Diabetes Brother         Brother   • Kidney disease Brother         Brother, Renal Failure   • Vision loss Brother         Brother   • Asthma Sister         Sister   •  Hypertension Sister         Sister   • Miscarriages / Stillbirths Sister         Sister   • Cancer Brother         Brother   • Lung cancer Brother    • Diabetes Sister         Sister   • Hypertension Sister         Sister   • Thyroid disease Sister         Sister   • Early death Sister         Infant   • Kidney disease Brother         Brother,  Renal Failure   • Stroke Brother         Brother       The following portion of the patient's history were reviewed and updated as appropriate: past medical history, past surgical history, past social history, past family history, allergies, current medications, and problem list.    Review of Systems   Constitutional: Negative.   Cardiovascular: Positive for leg swelling.   Respiratory: Negative.    Hematologic/Lymphatic: Bruises/bleeds easily.   Neurological: Positive for excessive daytime sleepiness.   Psychiatric/Behavioral: Positive for depression.       Allergies   Allergen Reactions   • Hydrochlorothiazide Other (See Comments)     In 2015-developed hypercalcemia-HCTZ was discontinued  Other reaction(s): Other (See Comments)  In 2015-developed hypercalcemia-HCTZ was discontinued  In 2015-developed hypercalcemia-HCTZ was discontinued   • Dexamethasone Dermatitis     Other reaction(s): Other (See Comments)  CONTACT DERMATITIS   • Lisinopril Cough     Other reaction(s): Other (See Comments)  COUGH         Current Outpatient Medications:   •  Accu-Chek FastClix Lancets misc, Use to check blood sugar once daily, Disp: 102 each, Rfl: 5  •  amLODIPine (NORVASC) 5 MG tablet, Take 1 tablet by mouth Daily for 270 days., Disp: 90 tablet, Rfl: 2  •  atorvastatin (LIPITOR) 20 MG tablet, Take 1 tablet by mouth Every Morning for 270 days., Disp: 90 tablet, Rfl: 2  •  Blood Glucose Monitoring Suppl (Blood Glucose Monitor System) w/Device kit, BS meter Accu-chek Catherine plus meter. DX E11.9, Disp: 1 each, Rfl: 1  •  Calcium Carb-Cholecalciferol 500-200 MG-UNIT tablet, Take 2 tablets by  mouth daily., Disp: , Rfl:   •  clonazePAM (KlonoPIN) 0.5 MG tablet, Take 1 tablet by mouth Every Night., Disp: , Rfl:   •  Cyanocobalamin ([employer] Vitamin B-12) 5000 MCG sublingual tablet, Place 5,000 mcg under the tongue Daily., Disp: 90 tablet, Rfl: 3  •  ferrous sulfate 325 (65 FE) MG tablet, TAKE 1 TABLET BY MOUTH EVERY DAY WITH BREAKFAST, Disp: 90 tablet, Rfl: 1  •  fluticasone (FLONASE) 50 MCG/ACT nasal spray, 1 spray into the nostril(s) as directed by provider As Needed., Disp: , Rfl:   •  folic acid (FOLVITE) 1 MG tablet, TAKE 1 TABLET BY MOUTH EVERY DAY, Disp: 90 tablet, Rfl: 3  •  furosemide (LASIX) 40 MG tablet, TAKE 1 TABLET EVERY DAY (Patient taking differently: 1 tablet Every Other Day.), Disp: 90 tablet, Rfl: 3  •  glucose blood (Accu-Chek Catherine Plus) test strip, Use as instructed, Disp: 360 each, Rfl: 1  •  metFORMIN (GLUCOPHAGE) 500 MG tablet, Take 1 tablet by mouth 2 (Two) Times a Day With Meals., Disp: , Rfl:   •  metoprolol succinate XL (TOPROL-XL) 50 MG 24 hr tablet, Take 1 tablet by mouth Daily., Disp: 90 tablet, Rfl: 1  •  Mirabegron ER (MYRBETRIQ) 50 MG tablet sustained-release 24 hour 24 hr tablet, Take 50 mg by mouth Daily., Disp: , Rfl:   •  pilocarpine (SALAGEN) 5 MG tablet, 1 tablet 3 (Three) Times a Day., Disp: , Rfl:   •  potassium chloride (KAYCIEL) 20 mEq/15 mL solution, MIX 15ML IN 4 OUNCES OF LIQUID AND DRINK DAILY, Disp: 1350 mL, Rfl: 1  •  prednisoLONE acetate (PRED FORTE) 1 % ophthalmic suspension, INSTILL 1 DROP INTO BOTH EYES TWICE A DAY TO REPLACE DUREZOL, Disp: , Rfl:   •  Prolensa 0.07 % solution, PLACE ONE DROP IN BOTH EYES ONCE DAILY FOR MAINTENANCE, Disp: , Rfl:   •  Restasis 0.05 % ophthalmic emulsion, 2 (two) times a day., Disp: , Rfl:   •  risedronate (Actonel) 35 MG tablet, 1 tablet weekly with water on empty stomach, nothing by mouth or lie down for next 30 minutes., Disp: 12 tablet, Rfl: 2  •  STIOLTO RESPIMAT 2.5-2.5 MCG/ACT aerosol solution inhaler, 2 puffs As  "Needed., Disp: , Rfl:   •  venlafaxine (EFFEXOR) 75 MG tablet, Take 1 tablet by mouth 2 (Two) Times a Day. Pt is taking 1 tab in the morning and 2 tabs at night, Disp: , Rfl:   •  apixaban (ELIQUIS) 5 MG tablet tablet, Take 1 tablet by mouth Every 12 (Twelve) Hours for 90 days. Indications: Other - full anticoagulation, Disp: 180 tablet, Rfl: 3         Objective:     Vitals:    05/08/23 1507   BP: 124/80   BP Location: Left arm   Patient Position: Sitting   Cuff Size: Adult   Pulse: 81   Weight: 79.9 kg (176 lb 3.2 oz)   Height: 160 cm (62.99\")     Body mass index is 31.22 kg/m².    PHYSICAL EXAM:    Vitals Reviewed.   General Appearance: No acute distress, well developed and well nourished.   Eyes: Glasses.   HENT: No hearing loss noted.    Respiratory: No signs of respiratory distress. Respiration rhythm and depth normal.  Clear to auscultation.   Cardiovascular:  Jugular Venous Pressure: Normal  Heart Rate and Rhythm: Normal, Heart Sounds: Normal S1 and S2. No S3 or S4 noted.  Murmurs: No murmurs noted. No rubs, thrills, or gallops.   Lower Extremities: Bilateral lower extremity edema noted.  Musculoskeletal: Normal movement of extremities.  Skin: General appearance normal.    Psychiatric: Patient alert and oriented to person, place, and time. Speech and behavior appropriate. Normal mood and affect.       ECG 12 Lead    Date/Time: 5/10/2023 3:05 PM  Performed by: Josey Ernandez APRN  Authorized by: Josey Ernandez APRN   Comparison: compared with previous ECG from 11/16/2022  Similar to previous ECG  Rhythm: sinus rhythm  Rate: normal  BPM: 81  Conduction: conduction normal  ST Segments: ST segments normal  T Waves: T waves normal  QRS axis: normal  Other findings: left ventricular hypertrophy    Clinical impression: non-specific ECG              Assessment:       Diagnosis Plan   1. Cerebrovascular accident (CVA), unspecified mechanism        2. APC (atrial premature contractions)        3. Diastolic " dysfunction        4. Essential hypertension        5. Nonocclusive coronary atherosclerosis of native coronary artery        6. Lower extremity edema               Plan:       1.  History of stroke: Multiple pole subacute strokes noted bilaterally per MRI in June 2022.  Neurology started her on apixaban because they felt the stroke could be cardioembolic.  Follow-up Holter monitor showed rare APCs and no atrial fibrillation.  She is wanting to stop the anticoagulation and neurology referred her back to us.  Dr. Leal recommended a loop recorder implantation that would need to be monitored for at least a year before anticoagulation would be discontinued.  We discussed this today and she wants to think about it.    2.  APCs: Denies palpitations.    3.  History of diastolic dysfunction: Well compensated today.  I think her swelling today is from ill fitting compression stockings.    4.  Hypertension: Blood pressure well controlled.    5.  Nonocclusive coronary artery disease: 30% LAD stenosis per cath in 2008.  Denies angina.  Continue atorvastatin.    6.  Lower extremity edema: This occurred after her knee surgeries and I have recommended better compression stockings that go up to her knee.  Amlodipine could also be causing the swelling.    7.  She will think about the loop recorder implantation and call us back.    ADDENDUM 5/9/2023:  · Patient called back and left a message.  She says she does not want to have the loop recorder implantation and will stay on the rivaroxaban.  She will keep her follow-up appointment with Dr. Daniel Leal in November.    As always, it has been a pleasure to participate in your patient's care. Thank you.         Sincerely,         RAYSHAWN Carroll  [facility] Cardiology      · Dictated utilizing Dragon Dictation  · COVID-19 Precautions - Patient was compliant in wearing a mask. When I saw the patient, I used appropriate personal protective equipment (PPE)  including mask and eye shield (standard procedure).  Additionally, I used gown and gloves if indicated.  Hand hygiene was completed before and after seeing the patient.

## 2023-05-09 ENCOUNTER — TELEPHONE (OUTPATIENT)
Dept: CARDIOLOGY | Facility: CLINIC | Age: 78
End: 2023-05-09
Payer: MEDICARE

## 2023-05-09 NOTE — TELEPHONE ENCOUNTER
Patient was here in the office on yesterday. Patient called stating she decided to stay on Eliquis and she decided to not have the loop procedure.      Please Advise

## 2023-05-10 ENCOUNTER — OFFICE VISIT (OUTPATIENT)
Dept: ENDOCRINOLOGY | Age: 78
End: 2023-05-10
Payer: MEDICARE

## 2023-05-10 VITALS
OXYGEN SATURATION: 95 % | WEIGHT: 176 LBS | SYSTOLIC BLOOD PRESSURE: 110 MMHG | DIASTOLIC BLOOD PRESSURE: 60 MMHG | HEIGHT: 63 IN | BODY MASS INDEX: 31.18 KG/M2 | HEART RATE: 94 BPM | TEMPERATURE: 96.8 F

## 2023-05-10 DIAGNOSIS — E55.9 VITAMIN D DEFICIENCY: ICD-10-CM

## 2023-05-10 DIAGNOSIS — E53.8 VITAMIN B12 DEFICIENCY: ICD-10-CM

## 2023-05-10 DIAGNOSIS — I10 ESSENTIAL HYPERTENSION: ICD-10-CM

## 2023-05-10 DIAGNOSIS — G47.33 OBSTRUCTIVE SLEEP APNEA ON CPAP: ICD-10-CM

## 2023-05-10 DIAGNOSIS — M81.0 AGE-RELATED OSTEOPOROSIS WITHOUT CURRENT PATHOLOGICAL FRACTURE: ICD-10-CM

## 2023-05-10 DIAGNOSIS — Z99.89 OBSTRUCTIVE SLEEP APNEA ON CPAP: ICD-10-CM

## 2023-05-10 DIAGNOSIS — E04.2 NONTOXIC MULTINODULAR GOITER: Primary | ICD-10-CM

## 2023-05-10 DIAGNOSIS — E11.9 TYPE 2 DIABETES MELLITUS WITHOUT COMPLICATION, WITHOUT LONG-TERM CURRENT USE OF INSULIN: ICD-10-CM

## 2023-05-10 DIAGNOSIS — E78.49 OTHER HYPERLIPIDEMIA: ICD-10-CM

## 2023-05-10 NOTE — PROGRESS NOTES
Subjective   Dejah Casey is a 78 y.o. female.     History of Present Illness        She was found to have a goiter on examination last August 2012. She had thyroid ultrasound done on 08/21/2012 which showed a multinodular goiter with the dominant solid nodule in the left measuring 1.8 cm. She had followup thyroid ultrasound in 4/14 at [facility] showed multinodular goiter with stable nodules.       She denies any pressure symptoms or dysphagia. She denies any bowel changes.  She has no previous history of head or neck radiation therapy.      She has known diabetes mellitus since 2003 and has been on metformin 500 mg twice a day. Blood sugar has ranged .     She has no microalbuminuria on urine sample taken 12/22.   Her last eye exam was 4/23.  She has no retinopathy.  She denies tingling in her feet.       She has hyperlipidemia and is on Lipitor 20 mg once a day.  She denies any myalgia.        She has mild coronary artery disease by cardiac catheterization done in 2008 by Dr. CAMILLE Mcmullen. She denies any chest pain.  She had a normal nuclear stress tests in July 2018.  She is following up with Dr. Leal.      She has hypertension and has been on amlodipine 5 mg once a day, furosemide 40 mg every other day and Toprol 50 mg/day. She was taken off hydrochlorothiazide because of hypercalcemia. She had cough with lisinopril in the past. She has not used ARB in the past.  She denies orthopnea or PND or pedal edema.      She has overactive bladder diagnosed by Dr. Dean and is on Myrbetriq.  She was taken off Toviaz because of mouth dryness.  She was taken off Vesicare because of cost.  She denies mouth dryness     She has osteopenia on bone density done at [facility] in November 2015.  She is on calcium 500 plus D 2 tab/day and Tums 1 tablet as needed for gas.  Bone density done in December 2017 showed improvement of the left hip density and a slight decrease in the lumbar spine.       Bone density done in August 2022  "showed osteoporosis.  She started on risedronate 35 mg weekly on February 17, 2023.  She denies side effects.     She has sleep apnea and is using her CPAP regularly.  She wakes up tired.  She is following with Dr. Marcum.     She denies constipation.  She denies heartburn, melena or hematochezia.  Her last colonoscopy was in July 2021 and she has hemorrhoids and diverticulosis.  EGD done in July 2021 showed gastritis and small hiatal hernia.     She has vitamin B12 deficiency and is on vitamin B12 5000 mcg SL daily.  She was on SL vitamin B12 prescribed by Dr. Nicholson.  She is on ferrous sulfate 325 mg/day, and folic acid 1 mg/day.  Parietal cell antibodies were elevated.  Intrinsic factor antibody was normal.  She is being followed by Dr. Nicholson.     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  She denies muscle weakness.  She was started on Eliquis.  She denies bleeding.       She had 3 Pfizer Covid vaccine before she had Covid infection in 6/22.    The following portions of the patient's history were reviewed and updated as appropriate: allergies, current medications, past family history, past medical history, past social history, past surgical history and problem list.    Review of Systems   Eyes: Negative for visual disturbance.   Respiratory: Negative for shortness of breath.    Cardiovascular: Negative for chest pain and palpitations.   Gastrointestinal: Negative.    Genitourinary: Positive for frequency.   Musculoskeletal: Negative for myalgias.   Neurological: Negative for numbness.     Vitals:    05/10/23 1431   BP: 110/60   Pulse: 94   Temp: 96.8 °F (36 °C)   SpO2: 95%   Weight: 79.8 kg (176 lb)   Height: 160 cm (62.99\")      Objective   Physical Exam  Constitutional:       General: She is not in acute distress.     Appearance: Normal appearance. She is obese. She is not ill-appearing.   Eyes:      General: No scleral icterus.        Right eye: No discharge.         Left eye: " No discharge.   Neck:      Vascular: No carotid bruit.   Cardiovascular:      Rate and Rhythm: Normal rate and regular rhythm.      Heart sounds: Normal heart sounds. No murmur heard.    No friction rub.   Pulmonary:      Effort: No respiratory distress.      Breath sounds: Normal breath sounds. No stridor. No rales.   Chest:      Chest wall: No tenderness.   Abdominal:      General: Bowel sounds are normal.      Palpations: Abdomen is soft.      Tenderness: There is no right CVA tenderness or left CVA tenderness.   Musculoskeletal:      Right lower leg: No edema.      Left lower leg: No edema.   Lymphadenopathy:      Cervical: No cervical adenopathy.   Skin:     General: Skin is warm.   Neurological:      General: No focal deficit present.      Mental Status: She is alert and oriented to person, place, and time.   Psychiatric:         Mood and Affect: Mood normal.         Behavior: Behavior normal.       Office Visit on 01/11/2023   Component Date Value Ref Range Status   • SARS Antigen 01/11/2023 Not Detected  Not Detected, Presumptive Negative Final   • Influenza A Antigen DEX 01/11/2023 Not Detected  Not Detected Final   • Influenza B Antigen DEX 01/11/2023 Not Detected  Not Detected Final   • Internal Control 01/11/2023 Passed  Passed Final   • Lot Number 01/11/2023 [id number]   Final   • Expiration Date 01/11/2023 03/02/2023   Final     Assessment & Plan   Diagnoses and all orders for this visit:    1. Nontoxic multinodular goiter (Primary)  -     T4, Free  -     TSH    2. Essential hypertension  -     Comprehensive Metabolic Panel    3. Other hyperlipidemia  -     T4, Free  -     TSH  -     Lipid Panel    4. Type 2 diabetes mellitus without complication, without long-term current use of insulin  -     T4, Free  -     TSH  -     Comprehensive Metabolic Panel  -     Lipid Panel  -     Hemoglobin A1c  -     Vitamin B12    5. Vitamin D deficiency  -     Vitamin D,25-Hydroxy    6. Obstructive sleep apnea on  CPAP    7. Vitamin B12 deficiency  -     Vitamin B12    8. Age-related osteoporosis without current pathological fracture  -     Comprehensive Metabolic Panel  -     Vitamin D,25-Hydroxy      Check thyroid function tests.  Continue metformin 500 mg twice a day.  Continue Lipitor 20 mg/day.  Continue amlodipine, furosemide and Toprol per Dr. Leal.  Continue risedronate 35 mg weekly, calcium 500+ D 2 tablets per day and Tums as needed.  Continue CPAP.  Follow-up with Dr. Nicholson.    RTC 4 mos.    Copy my note sent to Dr. Talamantes and Dr. Nicholson

## 2023-05-10 NOTE — LETTER
May 10, 2023     Reji Talamantes MD  [address] [facility]  [address]  [facility] [address]    Patient: Dejah Casey   YOB: 1945   Date of Visit: 5/10/2023       Dear Dr. Albin MD:    Thank you for referring Dejah Casey to me for evaluation. Below are the relevant portions of my assessment and plan of care.    If you have questions, please do not hesitate to call me. I look forward to following Dejah along with you.         Sincerely,        Tyrel Hernandez MD        CC: Miguel A Nicholson MD PhD  Jefry Weeks, DPM  MD David Sherwood, Tyrel GONZALES MD  05/10/23 1530  Signed  Subjective    Dejah Casey is a 78 y.o. female.     History of Present Illness        She was found to have a goiter on examination last August 2012. She had thyroid ultrasound done on 08/21/2012 which showed a multinodular goiter with the dominant solid nodule in the left measuring 1.8 cm. She had followup thyroid ultrasound in 4/14 at [facility] showed multinodular goiter with stable nodules.       She denies any pressure symptoms or dysphagia. She denies any bowel changes.  She has no previous history of head or neck radiation therapy.      She has known diabetes mellitus since 2003 and has been on metformin 500 mg twice a day. Blood sugar has ranged .     She has no microalbuminuria on urine sample taken 12/22.   Her last eye exam was 4/23.  She has no retinopathy.  She denies tingling in her feet.       She has hyperlipidemia and is on Lipitor 20 mg once a day.  She denies any myalgia.        She has mild coronary artery disease by cardiac catheterization done in 2008 by Dr. CAMILLE Mcmullen. She denies any chest pain.  She had a normal nuclear stress tests in July 2018.  She is following up with Dr. Leal.      She has hypertension and has been on amlodipine 5 mg once a day, furosemide 40 mg every other day and Toprol 50 mg/day. She was taken off hydrochlorothiazide because of hypercalcemia. She had  cough with lisinopril in the past. She has not used ARB in the past.  She denies orthopnea or PND or pedal edema.      She has overactive bladder diagnosed by Dr. Dean and is on Myrbetriq.  She was taken off Toviaz because of mouth dryness.  She was taken off Vesicare because of cost.  She denies mouth dryness     She has osteopenia on bone density done at [facility] in November 2015.  She is on calcium 500 plus D 2 tab/day and Tums 1 tablet as needed for gas.  Bone density done in December 2017 showed improvement of the left hip density and a slight decrease in the lumbar spine.       Bone density done in August 2022 showed osteoporosis.  She started on risedronate 35 mg weekly on February 17, 2023.  She denies side effects.     She has sleep apnea and is using her CPAP regularly.  She wakes up tired.  She is following with Dr. Marcum.     She denies constipation.  She denies heartburn, melena or hematochezia.  Her last colonoscopy was in July 2021 and she has hemorrhoids and diverticulosis.  EGD done in July 2021 showed gastritis and small hiatal hernia.     She has vitamin B12 deficiency and is on vitamin B12 5000 mcg SL daily.  She was on SL vitamin B12 prescribed by Dr. Nicholson.  She is on ferrous sulfate 325 mg/day, and folic acid 1 mg/day.  Parietal cell antibodies were elevated.  Intrinsic factor antibody was normal.  She is being followed by Dr. Nicholson.     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  She denies muscle weakness.  She was started on Eliquis.  She denies bleeding.       She had 3 Pfizer Covid vaccine before she had Covid infection in 6/22.    The following portions of the patient's history were reviewed and updated as appropriate: allergies, current medications, past family history, past medical history, past social history, past surgical history and problem list.    Review of Systems   Eyes: Negative for visual disturbance.   Respiratory: Negative for shortness of  "breath.    Cardiovascular: Negative for chest pain and palpitations.   Gastrointestinal: Negative.    Genitourinary: Positive for frequency.   Musculoskeletal: Negative for myalgias.   Neurological: Negative for numbness.     Vitals:    05/10/23 1431   BP: 110/60   Pulse: 94   Temp: 96.8 °F (36 °C)   SpO2: 95%   Weight: 79.8 kg (176 lb)   Height: 160 cm (62.99\")      Objective    Physical Exam  Constitutional:       General: She is not in acute distress.     Appearance: Normal appearance. She is obese. She is not ill-appearing.   Eyes:      General: No scleral icterus.        Right eye: No discharge.         Left eye: No discharge.   Neck:      Vascular: No carotid bruit.   Cardiovascular:      Rate and Rhythm: Normal rate and regular rhythm.      Heart sounds: Normal heart sounds. No murmur heard.    No friction rub.   Pulmonary:      Effort: No respiratory distress.      Breath sounds: Normal breath sounds. No stridor. No rales.   Chest:      Chest wall: No tenderness.   Abdominal:      General: Bowel sounds are normal.      Palpations: Abdomen is soft.      Tenderness: There is no right CVA tenderness or left CVA tenderness.   Musculoskeletal:      Right lower leg: No edema.      Left lower leg: No edema.   Lymphadenopathy:      Cervical: No cervical adenopathy.   Skin:     General: Skin is warm.   Neurological:      General: No focal deficit present.      Mental Status: She is alert and oriented to person, place, and time.   Psychiatric:         Mood and Affect: Mood normal.         Behavior: Behavior normal.       Office Visit on 01/11/2023   Component Date Value Ref Range Status   • SARS Antigen 01/11/2023 Not Detected  Not Detected, Presumptive Negative Final   • Influenza A Antigen DEX 01/11/2023 Not Detected  Not Detected Final   • Influenza B Antigen DEX 01/11/2023 Not Detected  Not Detected Final   • Internal Control 01/11/2023 Passed  Passed Final   • Lot Number 01/11/2023 [id number]   Final   • " Expiration Date 01/11/2023 03/02/2023   Final     Assessment & Plan   Diagnoses and all orders for this visit:    1. Nontoxic multinodular goiter (Primary)  -     T4, Free  -     TSH    2. Essential hypertension  -     Comprehensive Metabolic Panel    3. Other hyperlipidemia  -     T4, Free  -     TSH  -     Lipid Panel    4. Type 2 diabetes mellitus without complication, without long-term current use of insulin  -     T4, Free  -     TSH  -     Comprehensive Metabolic Panel  -     Lipid Panel  -     Hemoglobin A1c  -     Vitamin B12    5. Vitamin D deficiency  -     Vitamin D,25-Hydroxy    6. Obstructive sleep apnea on CPAP    7. Vitamin B12 deficiency  -     Vitamin B12    8. Age-related osteoporosis without current pathological fracture  -     Comprehensive Metabolic Panel  -     Vitamin D,25-Hydroxy      Check thyroid function tests.  Continue metformin 500 mg twice a day.  Continue Lipitor 20 mg/day.  Continue amlodipine, furosemide and Toprol per Dr. Leal.  Continue risedronate 35 mg weekly, calcium 500+ D 2 tablets per day and Tums as needed.  Continue CPAP.  Follow-up with Dr. Nicholson.    RTC 4 mos.    Copy my note sent to Dr. Talamantes and Dr. Nicholson

## 2023-05-11 LAB
25(OH)D3+25(OH)D2 SERPL-MCNC: 54.5 NG/ML (ref 30–100)
ALBUMIN SERPL-MCNC: 4.3 G/DL (ref 3.5–5.2)
ALBUMIN/GLOB SERPL: 1.9 G/DL
ALP SERPL-CCNC: 70 U/L (ref 39–117)
ALT SERPL-CCNC: 14 U/L (ref 1–33)
AST SERPL-CCNC: 12 U/L (ref 1–32)
BILIRUB SERPL-MCNC: 0.3 MG/DL (ref 0–1.2)
BUN SERPL-MCNC: 19 MG/DL (ref 8–23)
BUN/CREAT SERPL: 19.2 (ref 7–25)
CALCIUM SERPL-MCNC: 10.9 MG/DL (ref 8.6–10.5)
CHLORIDE SERPL-SCNC: 102 MMOL/L (ref 98–107)
CHOLEST SERPL-MCNC: 130 MG/DL (ref 0–200)
CO2 SERPL-SCNC: 33 MMOL/L (ref 22–29)
CREAT SERPL-MCNC: 0.99 MG/DL (ref 0.57–1)
EGFRCR SERPLBLD CKD-EPI 2021: 58.1 ML/MIN/1.73
GLOBULIN SER CALC-MCNC: 2.3 GM/DL
GLUCOSE SERPL-MCNC: 101 MG/DL (ref 65–99)
HBA1C MFR BLD: 5.8 % (ref 4.8–5.6)
HDLC SERPL-MCNC: 68 MG/DL (ref 40–60)
IMP & REVIEW OF LAB RESULTS: NORMAL
LDLC SERPL CALC-MCNC: 49 MG/DL (ref 0–100)
POTASSIUM SERPL-SCNC: 3.9 MMOL/L (ref 3.5–5.2)
PROT SERPL-MCNC: 6.6 G/DL (ref 6–8.5)
SODIUM SERPL-SCNC: 141 MMOL/L (ref 136–145)
T4 FREE SERPL-MCNC: 0.94 NG/DL (ref 0.93–1.7)
TRIGL SERPL-MCNC: 60 MG/DL (ref 0–150)
TSH SERPL DL<=0.005 MIU/L-ACNC: 0.4 UIU/ML (ref 0.27–4.2)
VIT B12 SERPL-MCNC: >2000 PG/ML (ref 211–946)
VLDLC SERPL CALC-MCNC: 13 MG/DL (ref 5–40)

## 2023-05-12 ENCOUNTER — OFFICE VISIT (OUTPATIENT)
Dept: GASTROENTEROLOGY | Facility: CLINIC | Age: 78
End: 2023-05-12
Payer: MEDICARE

## 2023-05-12 VITALS
BODY MASS INDEX: 31.36 KG/M2 | OXYGEN SATURATION: 97 % | SYSTOLIC BLOOD PRESSURE: 118 MMHG | WEIGHT: 177 LBS | HEIGHT: 63 IN | HEART RATE: 88 BPM | TEMPERATURE: 97.5 F | DIASTOLIC BLOOD PRESSURE: 77 MMHG

## 2023-05-12 DIAGNOSIS — E53.8 VITAMIN B12 DEFICIENCY: ICD-10-CM

## 2023-05-12 DIAGNOSIS — D50.9 IRON DEFICIENCY ANEMIA, UNSPECIFIED IRON DEFICIENCY ANEMIA TYPE: Primary | ICD-10-CM

## 2023-05-12 NOTE — PROGRESS NOTES
"Chief Complaint  HX of Anemia    Subjective          History of Present Illness    Dejah Casey is a  78 y.o. female presents for follow-up on iron deficiency, B12 deficiency/pernicious anemia.  She is a patient Dr. Kothari last seen 2/15/2022.  She is new to me.    Last CBC checked on 11/14/2022 by Dr. Nicholson showed hemoglobin normal at 12.5, normal iron panel and ferritin.  5/10/2023 B12 was >2000.  She sees hematology yearly.  She is on iron and B12 pills daily.  Denies melena or hematchezia, GERD, abdominal pain, nausea, vomiting, abnormal weight loss, dysphagia, constipation, and diarrhea.    She continues to be fatigued. She has SISSY and uses CPAP.     She is on high-fiber cereal and prunes for her constipation. This works well for her.     She has a history of CVA (June 2022) and CAD on Eliquis.     Objective   Vital Signs:   /77 (BP Location: Left arm, Patient Position: Sitting, Cuff Size: Adult)   Pulse 88   Temp 97.5 °F (36.4 °C) (Temporal)   Ht 160 cm (63\")   Wt 80.3 kg (177 lb)   SpO2 97%   BMI 31.35 kg/m²       Physical Exam  Vitals reviewed.   Constitutional:       General: She is awake. She is not in acute distress.     Appearance: Normal appearance. She is well-developed and well-groomed.   HENT:      Head: Normocephalic.   Pulmonary:      Effort: Pulmonary effort is normal. No respiratory distress.   Skin:     Coloration: Skin is not pale.   Neurological:      Mental Status: She is alert and oriented to person, place, and time.      Gait: Gait is intact.   Psychiatric:         Mood and Affect: Mood and affect normal.         Speech: Speech normal.         Behavior: Behavior is cooperative.         Judgment: Judgment normal.          Result Review :             Assessment and Plan    Diagnoses and all orders for this visit:    1. Iron deficiency anemia, unspecified iron deficiency anemia type (Primary)    2. Vitamin B12 deficiency    Continue to follow with Dr. Nicholson for anemia.  " Hemoglobin, iron, and B12 appear to be normalized at this point.  As long as she has no further evidence of GI bleeding, we will see her as needed.  Continue high-fiber diet for constipation.    Follow Up   Return if symptoms worsen or fail to improve.    Dragon dictation used throughout this note.     Eli Salmeron PA-C

## 2023-05-14 DIAGNOSIS — E83.52 HYPERCALCEMIA: Primary | ICD-10-CM

## 2023-05-14 NOTE — PROGRESS NOTES
Vitamin B12 greater than 2000 pg/mL.  On vitamin B12 5000 micrograms sublingual prescribed by Dr. Nicholson  Calcium mildly elevated at 10.9 mg per DL.  Ask  patient to come in for CMP, intact PTH, serum protein electrophoresis and phosphorus.  Orders placed on chart.  LDL 49.  HDL 68.  Continue Lipitor 20 mg/day.  Hemoglobin A1c 5.8%.  Diabetes well controlled.  Normal vitamin D.  Continue calcium plus D 2 tablets per day.  Stop taking Tums.  Normal thyroid function tests.  Copy of labs sent to Dr. Reji Talamantes and Dr. Nicholson.  Please notify patient of results and instructions.

## 2023-05-18 ENCOUNTER — LAB (OUTPATIENT)
Dept: ENDOCRINOLOGY | Age: 78
End: 2023-05-18
Payer: MEDICARE

## 2023-05-18 DIAGNOSIS — E83.52 HYPERCALCEMIA: ICD-10-CM

## 2023-05-26 NOTE — TELEPHONE ENCOUNTER
Rx Refill Note  Requested Prescriptions     Pending Prescriptions Disp Refills   • metFORMIN (GLUCOPHAGE) 500 MG tablet [Pharmacy Med Name: METFORMIN HYDROCHLORIDE 500 MG Tablet] 180 tablet      Sig: TAKE 1 TABLET TWICE DAILY WITH MEALS      Last office visit with prescribing clinician: 3/22/2023   Next office visit with prescribing clinician: 9/25/2023

## 2023-05-27 LAB
ALBUMIN SERPL ELPH-MCNC: 3.4 G/DL (ref 2.9–4.4)
ALBUMIN SERPL-MCNC: 4.1 G/DL (ref 3.7–4.7)
ALBUMIN/GLOB SERPL: 1.2 {RATIO} (ref 0.7–1.7)
ALBUMIN/GLOB SERPL: 1.8 {RATIO} (ref 1.2–2.2)
ALP SERPL-CCNC: 67 IU/L (ref 44–121)
ALPHA1 GLOB SERPL ELPH-MCNC: 0.2 G/DL (ref 0–0.4)
ALPHA2 GLOB SERPL ELPH-MCNC: 0.8 G/DL (ref 0.4–1)
ALT SERPL-CCNC: 11 IU/L (ref 0–32)
AST SERPL-CCNC: 15 IU/L (ref 0–40)
B-GLOBULIN SERPL ELPH-MCNC: 1 G/DL (ref 0.7–1.3)
BILIRUB SERPL-MCNC: 0.3 MG/DL (ref 0–1.2)
BUN SERPL-MCNC: 14 MG/DL (ref 8–27)
BUN/CREAT SERPL: 17 (ref 12–28)
CALCIUM SERPL-MCNC: 9.8 MG/DL (ref 8.7–10.3)
CHLORIDE SERPL-SCNC: 104 MMOL/L (ref 96–106)
CO2 SERPL-SCNC: 26 MMOL/L (ref 20–29)
CREAT SERPL-MCNC: 0.82 MG/DL (ref 0.57–1)
EGFRCR SERPLBLD CKD-EPI 2021: 73 ML/MIN/1.73
GAMMA GLOB SERPL ELPH-MCNC: 1 G/DL (ref 0.4–1.8)
GLOBULIN SER CALC-MCNC: 2.3 G/DL (ref 1.5–4.5)
GLOBULIN SER-MCNC: 3 G/DL (ref 2.2–3.9)
GLUCOSE SERPL-MCNC: 114 MG/DL (ref 70–99)
IGA SERPL-MCNC: 334 MG/DL (ref 64–422)
IGG SERPL-MCNC: 980 MG/DL (ref 586–1602)
IGM SERPL-MCNC: 67 MG/DL (ref 26–217)
INTERPRETATION SERPL IEP-IMP: ABNORMAL
LABORATORY COMMENT REPORT: ABNORMAL
M PROTEIN SERPL ELPH-MCNC: ABNORMAL G/DL
PHOSPHATE SERPL-MCNC: 2.8 MG/DL (ref 3–4.3)
POTASSIUM SERPL-SCNC: 4.2 MMOL/L (ref 3.5–5.2)
PROT SERPL-MCNC: 6.4 G/DL (ref 6–8.5)
PTH RELATED PROT SERPL-SCNC: <2 PMOL/L
PTH-INTACT SERPL-MCNC: 38 PG/ML (ref 15–65)
SODIUM SERPL-SCNC: 144 MMOL/L (ref 134–144)

## 2023-05-28 NOTE — PROGRESS NOTES
Serum phosphorus mildly low at 2.8 mg per DL.  Normal serum calcium.  Normal PTH RP.  PTH 38 pg/mL.  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  Instruct patient to make an follow-up appointment with Dr. Nicholson for further evaluation.  Copy of labs sent to Dr. Talamantes and Dr. Nicholson

## 2023-05-31 ENCOUNTER — TELEPHONE (OUTPATIENT)
Dept: ONCOLOGY | Facility: CLINIC | Age: 78
End: 2023-05-31

## 2023-05-31 NOTE — TELEPHONE ENCOUNTER
Laurita from patient:  DR MARTELL  ENDOCRINOLOGIST. WOULD LIKE HENRIK TO MAKE AN APPT SOONER THEN November DUE TO RECENT LAB RESULTS DONE ON  05/10 AND 05/18     Patient informed Dr Nicholson is out [place] and follow up maybe 2-3 weeks out.   Message sent to appointment desk to schedule.    Patient v/u

## 2023-05-31 NOTE — TELEPHONE ENCOUNTER
Caller: Dejah Casey    Relationship: Self    Best call back number: [phone]    What is the best time to reach you: ANYTIME    CAN LEAVE VOICEMAIL IF UNABLE TO REACH     Who are you requesting to speak with (clinical staff, provider,  specific staff member): DR HEATH OR NURSE         What was the call regarding:     DR MARTELL  ENDOCRINOLOGIST. WOULD LIKE DEJAH TO MAKE AN APPT SOONER THEN November DUE TO RECENT LAB RESULTS DONE ON  05/10 AND 05/18     PLEASE CALL TO DISCUSS.     Is it okay if the provider responds through [employer]:NO REQUIRES CALL BACK.

## 2023-06-28 PROBLEM — D47.2 MONOCLONAL GAMMOPATHY: Status: ACTIVE | Noted: 2023-06-28

## 2023-07-31 DIAGNOSIS — I10 ESSENTIAL HYPERTENSION: ICD-10-CM

## 2023-07-31 RX ORDER — METOPROLOL SUCCINATE 50 MG/1
50 TABLET, EXTENDED RELEASE ORAL DAILY
Qty: 90 TABLET | Refills: 0 | Status: SHIPPED | OUTPATIENT
Start: 2023-07-31

## 2023-08-11 DIAGNOSIS — M85.89 OSTEOPENIA OF MULTIPLE SITES: Primary | ICD-10-CM

## 2023-08-14 RX ORDER — POTASSIUM CHLORIDE 20MEQ/15ML
LIQUID (ML) ORAL
Qty: 1350 ML | Refills: 2 | Status: SHIPPED | OUTPATIENT
Start: 2023-08-14

## 2023-08-14 RX ORDER — FUROSEMIDE 40 MG/1
TABLET ORAL
Qty: 90 TABLET | Refills: 2 | Status: SHIPPED | OUTPATIENT
Start: 2023-08-14

## 2023-08-16 ENCOUNTER — TELEPHONE (OUTPATIENT)
Dept: ENDOCRINOLOGY | Age: 78
End: 2023-08-16
Payer: MEDICARE

## 2023-09-07 NOTE — PROGRESS NOTES
CC: Follow-up    HPI:  Dejah Casey is a  78 y.o.  right-handed female who is here for stroke follow-up.  She has medical history significant for hypertension, hyperlipidemia, diabetes, vitamin D deficiency, chronic depression, SISSY, sleep disorder, CAD, vitamin B-12 deficiency. In June of 2022 she was in the ER with complaints of shortness of breath and was found to have subacute embolic appearing strokes.  She does not remember coming to the hospital.  Apparently in phone call to her daughter-in-law and was incoherent.  She was already on aspirin at the time and was changed to Eliquis for strong suspicion of cardioembolic etiology.  During hospitalization she had some altered mental status and word finding problems and this is why neurology was consulted.  She saw my colleague Dr Elizalde.  No vascular abnormality per MRA head and carotid Doppler.  She had TTE showing 69% EF LVH moderately dilated left atrial cavity.  Zio patch at discharge showed an occasional PACs but no A-fib.    She continue on eliquis and plans to be on this ongoing.   Even with a loop AC would be wanted for at least a year per cardiology. She has h/o depression since her  passed and follows with therapist.   Her sleep is abnormal.  She has no physical activity.  She lives alone and performs her own ADLs and finances.      Social history:    Family history:      Pain Scale:        ROS:  Review of Systems   Constitutional: Positive for fatigue.   HENT: Negative for ear pain, hearing loss and tinnitus.    Eyes: Negative for pain, redness and visual disturbance.   Respiratory: Negative for chest tightness, shortness of breath, wheezing and stridor.    Cardiovascular: Negative for chest pain and palpitations.   Musculoskeletal: Negative for gait problem.   Neurological: Negative for weakness (general), light-headedness and headaches.   Psychiatric/Behavioral: Negative for confusion and decreased concentration.         Reviewed ROS conducted  by MA and nadiya        Physical Exam:  There were no vitals filed for this visit.   Orthostatic BP:    There is no height or weight on file to calculate BMI.        General: pt well appearing, no distress  HEENT: Normocephalic, conjunctiva normal, external canals normal, no nasal discharge, moist mucous membranes  Neck: No lymphadenopathy, thyroid not enlarged, no JVD  CV: Regular rate and rhythm, no murmurs negative no bruits auscultated at neck, equal pulses  Pulmonary: Normal respiratory effort, clear to auscultation bilaterally  Extremities: no edema, bruising, or skin lesions  Pysch: good eye contact, cooperative, full affect, euthymic mood, good attention, good insight  Mental: alert, conversant, AOx3, provides history, 3/3 recall.  Language fluent, names, repeats.  CN: CN II-XII intact, PERRL, EOMi, no gaze palsy or nystagmus, intact facial sensation, no facial assymetry, intact hearing, symmetric palate elevation, tongue midline, good SCM strength  Motor: no abnormal movements, no pronator drift, normal  bulk and tone, 5/5 strength b/l UE & LE  Sensory: normal sensation to crude touch, temperature, and vibration throughout  Reflexes: 2+ throughout, neg babinski  Coordination: no ataxia on finger-nose, heel-shin  Gait: A bit wide-based and some clumsiness with turns but no ataxia, negative Romberg, does fine with pull test, good arm swing      Results:        Lab Results   Component Value Date    GLUCOSE 139 (H) 06/28/2023    BUN 13 06/28/2023    CREATININE 0.83 06/28/2023    EGFRIFNONA 62 02/17/2022    EGFRIFAFRI 72 02/17/2022    BCR 15.7 06/28/2023    CO2 26.0 06/28/2023    CALCIUM 9.8 06/28/2023    PROTENTOTREF 6.4 05/18/2023    ALBUMIN 4.2 06/28/2023    LABIL2 1.2 05/18/2023    LABIL2 1.8 05/18/2023    AST 13 06/28/2023    ALT 11 06/28/2023       Lab Results   Component Value Date    WBC 7.03 06/28/2023    HGB 12.5 06/28/2023    HCT 40.5 06/28/2023    MCV 85.1 06/28/2023     06/28/2023          .  Lab Results   Component Value Date    RPR Non Reactive 06/13/2022         Lab Results   Component Value Date    TSH 0.402 05/10/2023    B4HUMSL 6.36 04/15/2015         Lab Results   Component Value Date    PXJEMLDH90 >2,000 (H) 06/28/2023         Lab Results   Component Value Date    FOLATE >20.00 10/18/2021         Lab Results   Component Value Date    HGBA1C 5.80 (H) 05/10/2023         Lab Results   Component Value Date    GLUCOSE 139 (H) 06/28/2023    BUN 13 06/28/2023    CREATININE 0.83 06/28/2023    EGFRIFNONA 62 02/17/2022    EGFRIFAFRI 72 02/17/2022    BCR 15.7 06/28/2023    K 4.0 06/28/2023    CO2 26.0 06/28/2023    CALCIUM 9.8 06/28/2023    PROTENTOTREF 6.4 05/18/2023    ALBUMIN 4.2 06/28/2023    LABIL2 1.2 05/18/2023    LABIL2 1.8 05/18/2023    AST 13 06/28/2023    ALT 11 06/28/2023         Diagnosis:  1.  History of stroke  2.  Depression      Impression: 78-year-old right-handed female with history of hypertension, hyperlipidemia, diabetes, vitamin D and B12 deficiency, SISSY, long history of depression.  She is followed in clinic after hospitalization last summer when she presented with altered mental status surrounding respiratory insufficiency and was found to have subacute bilateral strokes suspicious for cardioembolic etiology.   Because she was on aspirin at the time she was escalated to Eliquis.  She has no report of further neurologic or strokelike events.  She was interested in discontinuing eliquis but for strong cardioembolic etiology loop was discussed.  AC is wanted for at least a year anyway and pt tells me she prefers eliquis over anything more.    Plan  1 Primary prevention with eliquis.  She is not on high intensity statin but LDL at goal    Control risk factors to prevent stroke which means keep BP at or below 130/80, take your statin if able and try to have LDL measurements < 70, stop smoking if you smoke, control your blood sugar, and get regular moderate exercise four times  weekly, and avoid prolonged sitting.  Adopt a healthy diet such as the Mediterranean diet which includes vegetables, fruits, whole grains, low-fat dairy, poultry, fish, legumes, nontropical fish oils, and nuts.  Limit sweets, sugary drinks, and red meats.  Reduce or eliminate alcohol intake.      F.u with neurology as needed    I spent at least 60 minutes interviewing, examining, and counseling patient.  I independently reviewed documentation, laboratory and diagnostic findings, external documentation where applicable, and formulated treatment plan which was discussed with the patient.

## 2023-09-12 ENCOUNTER — OFFICE VISIT (OUTPATIENT)
Dept: NEUROLOGY | Facility: CLINIC | Age: 78
End: 2023-09-12
Payer: MEDICARE

## 2023-09-12 VITALS
SYSTOLIC BLOOD PRESSURE: 120 MMHG | HEIGHT: 63 IN | WEIGHT: 178.6 LBS | BODY MASS INDEX: 31.64 KG/M2 | OXYGEN SATURATION: 97 % | DIASTOLIC BLOOD PRESSURE: 70 MMHG | HEART RATE: 87 BPM

## 2023-09-12 DIAGNOSIS — I10 ESSENTIAL HYPERTENSION: ICD-10-CM

## 2023-09-12 DIAGNOSIS — F32.89 OTHER DEPRESSION: ICD-10-CM

## 2023-09-12 DIAGNOSIS — Z86.73 HISTORY OF STROKE: Primary | ICD-10-CM

## 2023-09-12 NOTE — LETTER
September 12, 2023       No Recipients    Patient: Dejah Casey   YOB: 1945   Date of Visit: 9/12/2023     Dear Reji Talamantes MD:       Thank you for referring Dejah Casey to me for evaluation. Below are the relevant portions of my assessment and plan of care.    If you have questions, please do not hesitate to call me. I look forward to following Dejah along with you.         Sincerely,        SUSAN Sarabia        CC:   No Recipients

## 2023-09-28 DIAGNOSIS — M85.89 OSTEOPENIA OF MULTIPLE SITES: Primary | ICD-10-CM

## 2023-09-29 RX ORDER — RISEDRONATE SODIUM 35 MG/1
TABLET, FILM COATED ORAL
Qty: 12 TABLET | Refills: 2 | Status: SHIPPED | OUTPATIENT
Start: 2023-09-29

## 2023-10-02 NOTE — PROGRESS NOTES
Date of Office Visit: 22  Encounter Provider: Daniel Leal MD  Place of Service: [facility] CARDIOLOGY  Patient Name: Dejah Casey  :1945    Chief Complaint   Patient presents with   • Stroke   :     HPI:     Ms. Casey is 77 y.o. and presents today to follow up. I have reviewed prior notes and there are no changes except for any new updates described below. I have also reviewed any information entered into the medical record by the patient or by ancillary staff.     She was followed by [facility] until 2017. I first saw her in 2017.  In , she had a cath which showed minimal coronary atherosclerosis (30% LAD).  In 2016, she was having palpitations; a Holter showed rare PVCs and PACs and rare bursts of PACs lasting up to six beats. Metoprolol was started, which helped. She had an echocardiogram in 2017 that was unremarkable; it showed an LVEF of 55%, grade I diastolic dysfunction, and mild MR.  A proBNP in  was normal (30). In 2017, a perfusion stress test was completely normal.  This was performed due to dyspnea.  Another perfusion stress was performed in 2018 and was normal (it was also performed because of dyspnea).    In early , she again reported dyspnea, leg swelling, and palpitations. An echo revealed normal LVSF, grade 1A diastolic dysfunction, and no significant valvular disease or PHTN. I started furosemide.  She was previously on HCTZ but it was stopped in  due to hypercalcemia. She has had issues with her potassium in the past as well.     She presented in 2022 with bilateral subacute strokes. An echo revealed normal LVSF and moderate LA dilation. Even though a Zio did not show any atrial arrhythmias, anticoagulation was recommended given the bilateral nature of the strokes and the atrial dilation.     She denies chest pain, palpitations, syncope, lightheadedness, orthopnea, or PND.  She has mild exertional dyspnea, which is stable. She has chronic mild pedal edema.     Past Medical History:   Diagnosis Date   • Allergic     Seasonal alleries, Lisinopril, Dexamethasone   • Anxiety    • CAD (coronary artery disease)    • Cataract     Cararacts in both eyes.   • Contact dermatitis    • COVID-19    • DDD (degenerative disc disease), lumbar    • Depression    • Diabetes mellitus (HCC)    • Diverticulosis June 2021   • Dry eyes    • Dry mouth    • Essential hypertension    • Female climacteric state    • Headache    • History of mammogram     8/2016   • History of total right hip replacement 2013   • Hypercalcemia    • Hyperlipidemia    • Iron deficiency anemia 05/27/2021    Added automatically from request for surgery [id number]   • Low back pain     Lumbar area.   • Neuromuscular disorder (HCC) 03/2016   • Nightmares REM-sleep type    • Nonocclusive coronary atherosclerosis of native coronary artery     cath in 2008 with 30% LAD disease   • Nontoxic multinodular goiter    • Obesity    • Osteoarthritis     both knees, back and hips   • Osteopenia    • Pain, joint, shoulder    • Pernicious anemia    • Pneumonia August 8, 2018    After Hospital stay.   • Polycythemia    • Rotator cuff tendinitis    • Seasonal allergies    • Sleep apnea     CPAP nightly   • Snoring    • Stroke (HCC)    • Superficial phlebitis    • Type 2 diabetes mellitus (HCC)     Type II   • Visual impairment August 30, 2019   • Vitiligo        Past Surgical History:   Procedure Laterality Date   • BREAST BIOPSY     • BREAST LUMPECTOMY  1973    benign   • CARDIAC CATHETERIZATION  2008   • CATARACT EXTRACTION, BILATERAL Bilateral 2019   • COLONOSCOPY  08/15/2010   • COLONOSCOPY N/A 07/20/2021    Procedure: COLONOSCOPY TO CECUM;  Surgeon: Ellen Kothari MD;  Location: [facility] ENDOSCOPY;  Service: Gastroenterology;  Laterality: N/A;  pre: SCREENING FOR COLON CANCER  post: HEMORRHOIDS, DIVERTICULOSIS, TORTUOUS COLON   • ENDOSCOPY N/A  07/20/2021    Procedure: ESOPHAGOGASTRODUODENOSCOPY WITH BX'S;  Surgeon: Ellen Kothari MD;  Location: [facility] ENDOSCOPY;  Service: Gastroenterology;  Laterality: N/A;  pre: IRON DEFICIENCY ANEMIA  post: GASTRITIS, SMALL HIATAL HERNIA   • EYE SURGERY     • EYE SURGERY Left 05/2022   • HYSTERECTOMY  1982   • JOINT REPLACEMENT  07/10/2013    Total Hip Replacement   • REPLACEMENT TOTAL KNEE Left 08/06/2018    Dr. Bishnu Larson   • REPLACEMENT TOTAL KNEE Right     [facility]    • TONSILLECTOMY  1967   • TOTAL HIP ARTHROPLASTY Right 2013   • UPPER GASTROINTESTINAL ENDOSCOPY  07/20/2021       Social History     Socioeconomic History   • Marital status:    Tobacco Use   • Smoking status: Never   • Smokeless tobacco: Never   • Tobacco comments:     I have never smoked.   Vaping Use   • Vaping Use: Never used   Substance and Sexual Activity   • Alcohol use: No     Comment: CAFFEINE: none   • Drug use: No   • Sexual activity: Not Currently     Partners: Male     Birth control/protection: Post-menopausal     Comment: I had a Hysterectomy in 1982.       Family History   Problem Relation Age of Onset   • Diabetes Mother         Mother   • Thyroid disease Mother    • Hypertension Father         Father   • Heart disease Father         Father   • Arthritis Father         Father   • Hyperlipidemia Father         Father   • Hypertension Brother         Brother   • Diabetes Brother         Brother   • Kidney disease Brother         Brother, Renal Failure   • Vision loss Brother         Brother   • Asthma Sister         Sister   • Hypertension Sister         Sister   • Miscarriages / Stillbirths Sister         Sister   • Cancer Brother         Brother   • Lung cancer Brother    • Diabetes Sister         Sister   • Hypertension Sister         Sister   • Thyroid disease Sister         Sister   • Early death Sister         Infant   • Kidney disease Brother         Brother,  Renal Failure   • Stroke Brother         Brother        Review of Systems   Cardiovascular: Positive for leg swelling.   Musculoskeletal: Positive for joint pain.   Genitourinary: Positive for frequency.   All other systems reviewed and are negative.      Allergies   Allergen Reactions   • Hydrochlorothiazide Other (See Comments)     In 2015-developed hypercalcemia-HCTZ was discontinued  Other reaction(s): Other (See Comments)  In 2015-developed hypercalcemia-HCTZ was discontinued  In 2015-developed hypercalcemia-HCTZ was discontinued   • Dexamethasone Dermatitis     Other reaction(s): Other (See Comments)  CONTACT DERMATITIS   • Lisinopril Cough     Other reaction(s): Other (See Comments)  COUGH         Current Outpatient Medications:   •  Accu-Chek FastClix Lancets misc, Use to check blood sugar once daily, Disp: 102 each, Rfl: 5  •  amLODIPine (NORVASC) 5 MG tablet, Take 1 tablet by mouth Daily for 270 days., Disp: 90 tablet, Rfl: 2  •  apixaban (ELIQUIS) 5 MG tablet tablet, Take 1 tablet by mouth Every 12 (Twelve) Hours for 90 days. Indications: Other - full anticoagulation, Disp: 180 tablet, Rfl: 3  •  atorvastatin (LIPITOR) 20 MG tablet, Take 1 tablet by mouth Every Morning for 270 days., Disp: 90 tablet, Rfl: 2  •  Blood Glucose Monitoring Suppl (Blood Glucose Monitor System) w/Device kit, BS meter Accu-chek Catherine plus meter. DX E11.9, Disp: 1 each, Rfl: 1  •  Calcium Carb-Cholecalciferol 500-200 MG-UNIT tablet, Take 2 tablets by mouth daily., Disp: , Rfl:   •  clonazePAM (KlonoPIN) 0.5 MG tablet, Take 0.5 mg by mouth Every Night., Disp: , Rfl:   •  Cyanocobalamin ([employer] Vitamin B-12) 5000 MCG sublingual tablet, Place 5,000 mcg under the tongue Daily., Disp: 90 tablet, Rfl: 3  •  Durezol 0.05 % ophthalmic emulsion, Administer 1 drop to both eyes Daily As Needed., Disp: , Rfl:   •  ferrous sulfate 325 (65 FE) MG tablet, TAKE 1 TABLET BY MOUTH EVERY DAY WITH BREAKFAST, Disp: 90 tablet, Rfl: 3  •  fluticasone (FLONASE) 50 MCG/ACT nasal spray, 1 spray into the  "nostril(s) as directed by provider As Needed., Disp: , Rfl:   •  folic acid (FOLVITE) 1 MG tablet, TAKE 1 TABLET BY MOUTH EVERY DAY, Disp: 90 tablet, Rfl: 3  •  furosemide (LASIX) 40 MG tablet, TAKE 1 TABLET EVERY DAY, Disp: 90 tablet, Rfl: 3  •  glucose blood (Accu-Chek Catherine Plus) test strip, Use as instructed, Disp: 360 each, Rfl: 1  •  metoprolol succinate XL (TOPROL-XL) 50 MG 24 hr tablet, TAKE 1 TABLET EVERY DAY, Disp: 30 tablet, Rfl: 0  •  Mirabegron ER (MYRBETRIQ) 50 MG tablet sustained-release 24 hour 24 hr tablet, Take 50 mg by mouth Daily., Disp: , Rfl:   •  pilocarpine (SALAGEN) 5 MG tablet, 5 mg 3 (Three) Times a Day., Disp: , Rfl:   •  potassium chloride (KAYCIEL) 20 mEq/15 mL solution, MIX 15ML IN 4 OUNCES OF LIQUID AND DRINK DAILY, Disp: 1350 mL, Rfl: 1  •  Prolensa 0.07 % solution, PLACE ONE DROP IN BOTH EYES ONCE DAILY FOR MAINTENANCE, Disp: , Rfl:   •  Restasis 0.05 % ophthalmic emulsion, 2 (two) times a day., Disp: , Rfl:   •  STIOLTO RESPIMAT 2.5-2.5 MCG/ACT aerosol solution inhaler, 2 puffs As Needed., Disp: , Rfl:   •  venlafaxine (EFFEXOR) 75 MG tablet, Take 75 mg by mouth 2 (Two) Times a Day., Disp: , Rfl:   •  metFORMIN (GLUCOPHAGE) 500 MG tablet, Take 1 tablet by mouth 2 (Two) Times a Day With Meals for 180 days., Disp: 180 tablet, Rfl: 1      Objective:     Vitals:    11/16/22 1458 11/16/22 1512   BP: 140/80 120/80   BP Location: Left arm    Pulse: 81    Weight: 79.7 kg (175 lb 9.6 oz)    Height: 154.9 cm (60.98\")      Body mass index is 33.2 kg/m².    Physical Exam  Vitals reviewed.   Constitutional:       Comments: overweight   HENT:      Head: Normocephalic.      Nose: Nose normal.      Mouth/Throat:      Comments: Masked  Eyes:      Conjunctiva/sclera: Conjunctivae normal.   Neck:      Vascular: No JVD.   Cardiovascular:      Rate and Rhythm: Normal rate and regular rhythm.      Pulses: Normal pulses and intact distal pulses.      Heart sounds: Normal heart sounds. No murmur " heard.  Pulmonary:      Effort: Pulmonary effort is normal.      Breath sounds: Normal breath sounds.   Abdominal:      Palpations: Abdomen is soft.      Tenderness: There is no abdominal tenderness.      Comments: Obesity limits abdominal exam   Musculoskeletal:         General: Normal range of motion.      Cervical back: Normal range of motion.   Skin:     General: Skin is warm and dry.      Findings: No rash.   Neurological:      General: No focal deficit present.      Mental Status: She is alert and oriented to person, place, and time.      Cranial Nerves: No cranial nerve deficit.   Psychiatric:         Mood and Affect: Mood normal.         Behavior: Behavior normal.         Thought Content: Thought content normal.           ECG 12 Lead    Date/Time: 11/16/2022 3:02 PM  Performed by: Daniel Leal MD  Authorized by: Daniel Leal MD   Comparison: compared with previous ECG   Similar to previous ECG  Rhythm: sinus rhythm  Conduction comments: Short PA interval  ST Segments: ST segments normal  T Waves: T waves normal  QRS axis: normal  Other: no other findings    Clinical impression: non-specific ECG            Assessment:       Diagnosis Plan   1. Cerebrovascular accident (CVA) due to embolism of precerebral artery (HCC)  ECG 12 Lead      2. APC (atrial premature contractions)        3. Nonocclusive coronary atherosclerosis of native coronary artery        4. Other hyperlipidemia        5. Essential hypertension        6. Chronic venous insufficiency           Plan:       1/2. She suffered bilateral strokes in June 2022, which are presumed to be embolic. An echo revealed normal LVSF and moderate LAE. She was noted to have PACs. No true AF was seen, but the suspicion was great enough to switch her to OAC.     3/4. She had a cath in 2008 that showed nonocclusive CAD. She had a normal perfusion stress test in 2020. She's not on aspirin as she's anticoagulated.  She is on atorvastatin.     5/6. She has  hypertension, grade 1A diastolic dysfunction, and chronic leg swelling and dyspnea. She's doing much better with compression stockings and furosemide. She does not tolerate spironolactone or HCTZ due to electrolyte issues.    Sincerely,       Daniel Leal MD                 Odomzo Counseling- I discussed with the patient the risks of Odomzo including but not limited to nausea, vomiting, diarrhea, constipation, weight loss, changes in the sense of taste, decreased appetite, muscle spasms, and hair loss.  The patient verbalized understanding of the proper use and possible adverse effects of Odomzo.  All of the patient's questions and concerns were addressed.

## 2023-10-06 DIAGNOSIS — I63.10 CEREBRAL INFARCTION DUE TO EMBOLISM OF UNSPECIFIED PRECEREBRAL ARTERY: ICD-10-CM

## 2023-10-06 DIAGNOSIS — I10 ESSENTIAL HYPERTENSION: ICD-10-CM

## 2023-10-06 RX ORDER — FERROUS SULFATE 325(65) MG
TABLET ORAL
Qty: 90 TABLET | Refills: 1 | Status: SHIPPED | OUTPATIENT
Start: 2023-10-06

## 2023-10-06 RX ORDER — APIXABAN 5 MG/1
TABLET, FILM COATED ORAL
Qty: 60 TABLET | Refills: 0 | Status: SHIPPED | OUTPATIENT
Start: 2023-10-06

## 2023-10-06 NOTE — TELEPHONE ENCOUNTER
Rx Refill Note  Requested Prescriptions     Pending Prescriptions Disp Refills    metoprolol succinate XL (TOPROL-XL) 50 MG 24 hr tablet [Pharmacy Med Name: METOPROLOL SUCC ER 50 MG TAB] 90 tablet 0     Sig: TAKE 1 TABLET BY MOUTH EVERY DAY      Last office visit with prescribing clinician: 3/22/2023   Last telemedicine visit with prescribing clinician: Visit date not found   Next office visit with prescribing clinician: Visit date not found                         Would you like a call back once the refill request has been completed: [] Yes [] No    If the office needs to give you a call back, can they leave a voicemail: [] Yes [] No    Scott Chery MA  10/06/23, 09:21 EDT

## 2023-10-08 RX ORDER — METOPROLOL SUCCINATE 50 MG/1
TABLET, EXTENDED RELEASE ORAL
Qty: 90 TABLET | Refills: 0 | Status: SHIPPED | OUTPATIENT
Start: 2023-10-08

## 2023-11-08 DIAGNOSIS — E11.9 TYPE 2 DIABETES MELLITUS WITHOUT COMPLICATION, WITHOUT LONG-TERM CURRENT USE OF INSULIN: Primary | ICD-10-CM

## 2023-11-08 DIAGNOSIS — I63.10 CEREBRAL INFARCTION DUE TO EMBOLISM OF UNSPECIFIED PRECEREBRAL ARTERY: ICD-10-CM

## 2023-11-08 RX ORDER — APIXABAN 5 MG/1
TABLET, FILM COATED ORAL
Qty: 30 TABLET | Refills: 0 | Status: SHIPPED | OUTPATIENT
Start: 2023-11-08

## 2023-11-08 NOTE — TELEPHONE ENCOUNTER
Caller: Dejah Casey    Relationship: Self    Best call back number: [phone] (Home)     What is the best time to reach you: ANY     Who are you requesting to speak with (clinical staff, provider,  specific staff member): JUSTUS     What was the call regarding: WANTS TO KNOW IF DR. PARADA IS GOING TO APPROVE HER MEDICATION.     Is it okay if the provider responds through MyChart: NO

## 2023-11-08 NOTE — TELEPHONE ENCOUNTER
Last Ov- 3/22/2023  Nex OV- Visit date not found     PT HAS [employer] INSURANCE, SHE HAS NOT FOUND NEW PROVIDER AT THIS TIME. DECLINED TO MAKE APPT UNTIL INSURANCE ISSUE IS RESOLVED.

## 2023-11-10 ENCOUNTER — TELEPHONE (OUTPATIENT)
Dept: ENDOCRINOLOGY | Age: 78
End: 2023-11-10

## 2023-11-10 NOTE — TELEPHONE ENCOUNTER
Caller: Dejah Casey    Relationship to patient: Self    Best call back number: [phone]     Patient is needing:   PATIENT CALLED REQUESTING TO SPEAK TO JOEL.  SHE STATES SHE RECEIVED A COUPLE OF LETTERS IN THE MAIL THAT SHE DOES NOT UNDERSTAND.  ONE LETTER IS IN REFERENCE TO [employer] BEING OUT OF NETWORK WITH [employer], AND THE OTHER LETTER IS FROM [employer] BUT LISTS HER FUTURE APPOINTMENTS WITH DIFFERENT PROVIDERS THAN WHAT SHE SCHEDULED.  PLEASE CONTACT PATIENT TO ADVISE, OKAY TO LEAVE VOICEMAIL.  THANK YOU.

## 2023-11-13 ENCOUNTER — OFFICE VISIT (OUTPATIENT)
Dept: ENDOCRINOLOGY | Age: 78
End: 2023-11-13
Payer: MEDICARE

## 2023-11-13 ENCOUNTER — TELEPHONE (OUTPATIENT)
Dept: CARDIOLOGY | Facility: CLINIC | Age: 78
End: 2023-11-13
Payer: MEDICARE

## 2023-11-13 VITALS
OXYGEN SATURATION: 98 % | HEIGHT: 63 IN | WEIGHT: 177.8 LBS | HEART RATE: 81 BPM | DIASTOLIC BLOOD PRESSURE: 64 MMHG | BODY MASS INDEX: 31.5 KG/M2 | SYSTOLIC BLOOD PRESSURE: 108 MMHG | TEMPERATURE: 93.9 F

## 2023-11-13 DIAGNOSIS — E11.9 TYPE 2 DIABETES MELLITUS WITHOUT COMPLICATION, WITHOUT LONG-TERM CURRENT USE OF INSULIN: ICD-10-CM

## 2023-11-13 DIAGNOSIS — E55.9 VITAMIN D DEFICIENCY: ICD-10-CM

## 2023-11-13 DIAGNOSIS — I10 ESSENTIAL HYPERTENSION: ICD-10-CM

## 2023-11-13 DIAGNOSIS — E78.49 OTHER HYPERLIPIDEMIA: ICD-10-CM

## 2023-11-13 DIAGNOSIS — E04.2 NONTOXIC MULTINODULAR GOITER: Primary | ICD-10-CM

## 2023-11-13 DIAGNOSIS — M81.0 AGE-RELATED OSTEOPOROSIS WITHOUT CURRENT PATHOLOGICAL FRACTURE: ICD-10-CM

## 2023-11-13 DIAGNOSIS — G47.33 OBSTRUCTIVE SLEEP APNEA ON CPAP: ICD-10-CM

## 2023-11-13 DIAGNOSIS — I63.10 CEREBRAL INFARCTION DUE TO EMBOLISM OF UNSPECIFIED PRECEREBRAL ARTERY: ICD-10-CM

## 2023-11-13 PROCEDURE — 3078F DIAST BP <80 MM HG: CPT | Performed by: INTERNAL MEDICINE

## 2023-11-13 PROCEDURE — 3074F SYST BP LT 130 MM HG: CPT | Performed by: INTERNAL MEDICINE

## 2023-11-13 PROCEDURE — 99214 OFFICE O/P EST MOD 30 MIN: CPT | Performed by: INTERNAL MEDICINE

## 2023-11-13 RX ORDER — INFLUENZA A VIRUS A/MICHIGAN/45/2015 X-275 (H1N1) ANTIGEN (FORMALDEHYDE INACTIVATED), INFLUENZA A VIRUS A/SINGAPORE/INFIMH-16-0019/2016 IVR-186 (H3N2) ANTIGEN (FORMALDEHYDE INACTIVATED), INFLUENZA B VIRUS B/PHUKET/3073/2013 ANTIGEN (FORMALDEHYDE INACTIVATED), AND INFLUENZA B VIRUS B/MARYLAND/15/2016 BX-69A ANTIGEN (FORMALDEHYDE INACTIVATED) 60; 60; 60; 60 UG/.7ML; UG/.7ML; UG/.7ML; UG/.7ML
INJECTION, SUSPENSION INTRAMUSCULAR
COMMUNITY
Start: 2023-09-28

## 2023-11-13 NOTE — TELEPHONE ENCOUNTER
Pt called and would like to know if Dr. Leal would manage her Apixaban. Pt has hx of a CVA, but neurology only gave her 15 days and wanted to see if cardiology would manage.

## 2023-11-13 NOTE — PROGRESS NOTES
Subjective   Dejah Casey is a 78 y.o. female.     History of Present Illness     She was found to have a goiter on examination last August 2012. She had thyroid ultrasound done on 08/21/2012 which showed a multinodular goiter with the dominant solid nodule in the left measuring 1.8 cm. She had followup thyroid ultrasound in 4/14 at [facility] showed multinodular goiter with stable nodules.       She denies any pressure symptoms or dysphagia. She denies any bowel changes.  She has no previous history of head or neck radiation therapy.      She has known diabetes mellitus since 2003 and has been on metformin 500 mg twice a day. FBS .  Her last meal was at 11 AM.     She has no microalbuminuria on urine sample taken 12/22.   Her last eye exam was 8/23.  She has no retinopathy.  She denies tingling in her feet.       She has hyperlipidemia and is on Lipitor 20 mg once a day.  She denies any myalgia.        She has mild coronary artery disease by cardiac catheterization done in 2008 by Dr. CAMILLE Mcmullen. She denies any chest pain.  She had a normal nuclear stress tests in July 2018.  She is following up with Dr. Leal.      She has hypertension and has been on amlodipine 5 mg once a day, furosemide 40 mg every other day and Toprol 50 mg/day. She was taken off hydrochlorothiazide because of hypercalcemia. She had cough with lisinopril in the past. She has not used ARB in the past.  She denies orthopnea or PND or pedal edema.      She has overactive bladder diagnosed by Dr. Dean and is on Myrbetriq.  She was taken off Toviaz because of mouth dryness.  She was taken off Vesicare because of cost.  She denies mouth dryness     She has osteopenia on bone density done at [facility] in November 2015.  She is on Tums 1 tablet as needed for gas.  Bone density done in December 2017 showed improvement of the left hip density and a slight decrease in the lumbar spine.       Bone density done in August 2022 showed osteoporosis.  She  "started on risedronate 35 mg weekly on February 17, 2023.  She denies side effects.     She has sleep apnea and is using her CPAP regularly.  She wakes up tired.  She is following with Dr. Marcum.     She denies constipation.  She denies heartburn, melena or hematochezia.  Her last colonoscopy was in July 2021 and she has hemorrhoids and diverticulosis.  EGD done in July 2021 showed gastritis and small hiatal hernia.     She has vitamin B12 deficiency and is on vitamin B12 5000 mcg SL daily prescribed by Dr. Nicholson.  She is on ferrous sulfate 325 mg/day, and folic acid 1 mg/day.  Parietal cell antibodies were elevated.  Intrinsic factor antibody was normal.  She is being followed by Dr. Nicholson.     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  She denies muscle weakness.  She is on Eliquis.  She denies bleeding.           The following portions of the patient's history were reviewed and updated as appropriate: allergies, current medications, past family history, past medical history, past social history, past surgical history, and problem list.    Review of Systems   Constitutional:  Negative for fever.   HENT: Negative.     Eyes:  Negative for visual disturbance.   Respiratory:  Negative for shortness of breath.    Cardiovascular:  Negative for chest pain and palpitations.   Gastrointestinal: Negative.    Endocrine: Negative for cold intolerance, heat intolerance and polyuria.   Genitourinary: Negative.    Musculoskeletal:  Negative for myalgias.   Neurological:  Negative for numbness.     Vitals:    11/13/23 1409   BP: 108/64   Pulse: 81   Temp: 93.9 °F (34.4 °C)   TempSrc: Temporal   SpO2: 98%   Weight: 80.6 kg (177 lb 12.8 oz)   Height: 160 cm (62.99\")      Objective   Physical Exam  Constitutional:       General: She is not in acute distress.     Appearance: Normal appearance. She is not ill-appearing.   Eyes:      General: No scleral icterus.        Right eye: No discharge.         " Left eye: No discharge.   Cardiovascular:      Rate and Rhythm: Normal rate and regular rhythm.      Heart sounds:      No gallop.   Pulmonary:      Effort: No respiratory distress.      Breath sounds: No wheezing or rales.   Abdominal:      Palpations: Abdomen is soft.      Tenderness: There is no abdominal tenderness.   Musculoskeletal:      Right lower leg: No edema.      Left lower leg: No edema.   Neurological:      Mental Status: She is alert and oriented to person, place, and time.       Lab on 06/28/2023   Component Date Value Ref Range Status    Glucose 06/28/2023 139 (H)  65 - 99 mg/dL Final    BUN 06/28/2023 13  8 - 23 mg/dL Final    Creatinine 06/28/2023 0.83  0.60 - 1.10 mg/dL Final    Sodium 06/28/2023 143  136 - 145 mmol/L Final    Potassium 06/28/2023 4.0  3.5 - 5.2 mmol/L Final    Chloride 06/28/2023 105  98 - 107 mmol/L Final    CO2 06/28/2023 26.0  22.0 - 29.0 mmol/L Final    Calcium 06/28/2023 9.8  8.6 - 10.5 mg/dL Final    Total Protein 06/28/2023 6.7  6.0 - 8.5 g/dL Final    Albumin 06/28/2023 4.2  3.5 - 5.2 g/dL Final    ALT (SGPT) 06/28/2023 11  1 - 33 U/L Final    AST (SGOT) 06/28/2023 13  1 - 32 U/L Final    Alkaline Phosphatase 06/28/2023 68  39 - 117 U/L Final    Total Bilirubin 06/28/2023 0.3  0.0 - 1.2 mg/dL Final    Globulin 06/28/2023 2.5  gm/dL Final    A/G Ratio 06/28/2023 1.7  g/dL Final    BUN/Creatinine Ratio 06/28/2023 15.7  7.0 - 25.0 Final    Anion Gap 06/28/2023 12.0  5.0 - 15.0 mmol/L Final    eGFR 06/28/2023 71.8  >60.0 mL/min/1.73 Final    Ferritin 06/28/2023 82.00  13.00 - 150.00 ng/mL Final    Iron 06/28/2023 98  37 - 145 mcg/dL Final    Iron Saturation (TSAT) 06/28/2023 31  20 - 50 % Final    Transferrin 06/28/2023 225  200 - 360 mg/dL Final    TIBC 06/28/2023 315  298 - 536 mcg/dL Final    Vitamin B-12 06/28/2023 >2,000 (H)  211 - 946 pg/mL Final    WBC 06/28/2023 7.03  3.40 - 10.80 10*3/mm3 Final    RBC 06/28/2023 4.76  3.77 - 5.28 10*6/mm3 Final    Hemoglobin  06/28/2023 12.5  12.0 - 15.9 g/dL Final    Hematocrit 06/28/2023 40.5  34.0 - 46.6 % Final    MCV 06/28/2023 85.1  79.0 - 97.0 fL Final    MCH 06/28/2023 26.3 (L)  26.6 - 33.0 pg Final    MCHC 06/28/2023 30.9 (L)  31.5 - 35.7 g/dL Final    RDW 06/28/2023 14.0  12.3 - 15.4 % Final    RDW-SD 06/28/2023 43.7  37.0 - 54.0 fl Final    MPV 06/28/2023 10.0  6.0 - 12.0 fL Final    Platelets 06/28/2023 266  140 - 450 10*3/mm3 Final    Neutrophil % 06/28/2023 69.9  42.7 - 76.0 % Final    Lymphocyte % 06/28/2023 17.9 (L)  19.6 - 45.3 % Final    Monocyte % 06/28/2023 7.3  5.0 - 12.0 % Final    Eosinophil % 06/28/2023 3.7  0.3 - 6.2 % Final    Basophil % 06/28/2023 0.9  0.0 - 1.5 % Final    Immature Grans % 06/28/2023 0.3  0.0 - 0.5 % Final    Neutrophils, Absolute 06/28/2023 4.92  1.70 - 7.00 10*3/mm3 Final    Lymphocytes, Absolute 06/28/2023 1.26  0.70 - 3.10 10*3/mm3 Final    Monocytes, Absolute 06/28/2023 0.51  0.10 - 0.90 10*3/mm3 Final    Eosinophils, Absolute 06/28/2023 0.26  0.00 - 0.40 10*3/mm3 Final    Basophils, Absolute 06/28/2023 0.06  0.00 - 0.20 10*3/mm3 Final    Immature Grans, Absolute 06/28/2023 0.02  0.00 - 0.05 10*3/mm3 Final    nRBC 06/28/2023 0.0  0.0 - 0.2 /100 WBC Final     Assessment & Plan   Diagnoses and all orders for this visit:    1. Nontoxic multinodular goiter (Primary)  -     TSH    2. Type 2 diabetes mellitus without complication, without long-term current use of insulin  -     Comprehensive Metabolic Panel  -     Hemoglobin A1c  -     Microalbumin / Creatinine Urine Ratio - Urine, Clean Catch    3. Other hyperlipidemia  -     Comprehensive Metabolic Panel  -     Lipid Panel    4. Essential hypertension  -     Comprehensive Metabolic Panel    5. Age-related osteoporosis without current pathological fracture  -     Comprehensive Metabolic Panel  -     Vitamin D,25-Hydroxy    6. Obstructive sleep apnea on CPAP    7. Vitamin D deficiency  -     Vitamin D,25-Hydroxy      Check thyroid function  tests.  Continue metformin 500 mg twice a day.  Check hemoglobin A1c and urine microalbumin.  Continue Lipitor 20 mg/day.  Check lipid panel.  Continue amlodipine, furosemide and Toprol-XL.  Will defer blood pressure control to Dr. Talamantes and Dr. Leal.  Check vitamin D levels.  Continue risedronate 35 mg weekly.  Repeat bone density in August 2024.  Follow-up with Dr. Marcum.  Will defer treatment of vitamin B12 deficiency to Dr. Nicholson.    Copy of my note sent to Dr. Reji Talamantes, Dr. Nicholson, and Dr. Marcum.    RTC 6 mos.

## 2023-11-13 NOTE — LETTER
November 13, 2023     Reji Talamantes MD  [id number] [facility]  [address]  [facility] [address]    Patient: Dejah Casey   YOB: 1945   Date of Visit: 11/13/2023     Dear Reji Talamantes MD:       Thank you for referring Dejah Casey to me for evaluation. Below are the relevant portions of my assessment and plan of care.    If you have questions, please do not hesitate to call me. I look forward to following Dejah along with you.         Sincerely,        Tyrel Hernandez MD        CC: Miguel A Nicholson MD PhD  MD David Sherwood, Tyrel GONZALES MD  11/13/23 1455  Sign when Signing Visit  Subjective  Dejah Casey is a 78 y.o. female.     History of Present Illness     She was found to have a goiter on examination last August 2012. She had thyroid ultrasound done on 08/21/2012 which showed a multinodular goiter with the dominant solid nodule in the left measuring 1.8 cm. She had followup thyroid ultrasound in 4/14 at [facility] showed multinodular goiter with stable nodules.       She denies any pressure symptoms or dysphagia. She denies any bowel changes.  She has no previous history of head or neck radiation therapy.      She has known diabetes mellitus since 2003 and has been on metformin 500 mg twice a day. FBS .  Her last meal was at 11 AM.     She has no microalbuminuria on urine sample taken 12/22.   Her last eye exam was 8/23.  She has no retinopathy.  She denies tingling in her feet.       She has hyperlipidemia and is on Lipitor 20 mg once a day.  She denies any myalgia.        She has mild coronary artery disease by cardiac catheterization done in 2008 by Dr. CAMILLE Mcmullen. She denies any chest pain.  She had a normal nuclear stress tests in July 2018.  She is following up with Dr. Leal.      She has hypertension and has been on amlodipine 5 mg once a day, furosemide 40 mg every other day and Toprol 50 mg/day. She was taken off hydrochlorothiazide because of hypercalcemia. She  had cough with lisinopril in the past. She has not used ARB in the past.  She denies orthopnea or PND or pedal edema.      She has overactive bladder diagnosed by Dr. Dean and is on Myrbetriq.  She was taken off Toviaz because of mouth dryness.  She was taken off Vesicare because of cost.  She denies mouth dryness     She has osteopenia on bone density done at [facility] in November 2015.  She is on Tums 1 tablet as needed for gas.  Bone density done in December 2017 showed improvement of the left hip density and a slight decrease in the lumbar spine.       Bone density done in August 2022 showed osteoporosis.  She started on risedronate 35 mg weekly on February 17, 2023.  She denies side effects.     She has sleep apnea and is using her CPAP regularly.  She wakes up tired.  She is following with Dr. Marcum.     She denies constipation.  She denies heartburn, melena or hematochezia.  Her last colonoscopy was in July 2021 and she has hemorrhoids and diverticulosis.  EGD done in July 2021 showed gastritis and small hiatal hernia.     She has vitamin B12 deficiency and is on vitamin B12 5000 mcg SL daily prescribed by Dr. Nicholson.  She is on ferrous sulfate 325 mg/day, and folic acid 1 mg/day.  Parietal cell antibodies were elevated.  Intrinsic factor antibody was normal.  She is being followed by Dr. Nicholson.     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  She denies muscle weakness.  She is on Eliquis.  She denies bleeding.           The following portions of the patient's history were reviewed and updated as appropriate: allergies, current medications, past family history, past medical history, past social history, past surgical history, and problem list.    Review of Systems   Constitutional:  Negative for fever.   HENT: Negative.     Eyes:  Negative for visual disturbance.   Respiratory:  Negative for shortness of breath.    Cardiovascular:  Negative for chest pain and palpitations.  "  Gastrointestinal: Negative.    Endocrine: Negative for cold intolerance, heat intolerance and polyuria.   Genitourinary: Negative.    Musculoskeletal:  Negative for myalgias.   Neurological:  Negative for numbness.     Vitals:    11/13/23 1409   BP: 108/64   Pulse: 81   Temp: 93.9 °F (34.4 °C)   TempSrc: Temporal   SpO2: 98%   Weight: 80.6 kg (177 lb 12.8 oz)   Height: 160 cm (62.99\")      Objective  Physical Exam  Lab on 06/28/2023   Component Date Value Ref Range Status   • Glucose 06/28/2023 139 (H)  65 - 99 mg/dL Final   • BUN 06/28/2023 13  8 - 23 mg/dL Final   • Creatinine 06/28/2023 0.83  0.60 - 1.10 mg/dL Final   • Sodium 06/28/2023 143  136 - 145 mmol/L Final   • Potassium 06/28/2023 4.0  3.5 - 5.2 mmol/L Final   • Chloride 06/28/2023 105  98 - 107 mmol/L Final   • CO2 06/28/2023 26.0  22.0 - 29.0 mmol/L Final   • Calcium 06/28/2023 9.8  8.6 - 10.5 mg/dL Final   • Total Protein 06/28/2023 6.7  6.0 - 8.5 g/dL Final   • Albumin 06/28/2023 4.2  3.5 - 5.2 g/dL Final   • ALT (SGPT) 06/28/2023 11  1 - 33 U/L Final   • AST (SGOT) 06/28/2023 13  1 - 32 U/L Final   • Alkaline Phosphatase 06/28/2023 68  39 - 117 U/L Final   • Total Bilirubin 06/28/2023 0.3  0.0 - 1.2 mg/dL Final   • Globulin 06/28/2023 2.5  gm/dL Final   • A/G Ratio 06/28/2023 1.7  g/dL Final   • BUN/Creatinine Ratio 06/28/2023 15.7  7.0 - 25.0 Final   • Anion Gap 06/28/2023 12.0  5.0 - 15.0 mmol/L Final   • eGFR 06/28/2023 71.8  >60.0 mL/min/1.73 Final   • Ferritin 06/28/2023 82.00  13.00 - 150.00 ng/mL Final   • Iron 06/28/2023 98  37 - 145 mcg/dL Final   • Iron Saturation (TSAT) 06/28/2023 31  20 - 50 % Final   • Transferrin 06/28/2023 225  200 - 360 mg/dL Final   • TIBC 06/28/2023 315  298 - 536 mcg/dL Final   • Vitamin B-12 06/28/2023 >2,000 (H)  211 - 946 pg/mL Final   • WBC 06/28/2023 7.03  3.40 - 10.80 10*3/mm3 Final   • RBC 06/28/2023 4.76  3.77 - 5.28 10*6/mm3 Final   • Hemoglobin 06/28/2023 12.5  12.0 - 15.9 g/dL Final   • Hematocrit " 06/28/2023 40.5  34.0 - 46.6 % Final   • MCV 06/28/2023 85.1  79.0 - 97.0 fL Final   • MCH 06/28/2023 26.3 (L)  26.6 - 33.0 pg Final   • MCHC 06/28/2023 30.9 (L)  31.5 - 35.7 g/dL Final   • RDW 06/28/2023 14.0  12.3 - 15.4 % Final   • RDW-SD 06/28/2023 43.7  37.0 - 54.0 fl Final   • MPV 06/28/2023 10.0  6.0 - 12.0 fL Final   • Platelets 06/28/2023 266  140 - 450 10*3/mm3 Final   • Neutrophil % 06/28/2023 69.9  42.7 - 76.0 % Final   • Lymphocyte % 06/28/2023 17.9 (L)  19.6 - 45.3 % Final   • Monocyte % 06/28/2023 7.3  5.0 - 12.0 % Final   • Eosinophil % 06/28/2023 3.7  0.3 - 6.2 % Final   • Basophil % 06/28/2023 0.9  0.0 - 1.5 % Final   • Immature Grans % 06/28/2023 0.3  0.0 - 0.5 % Final   • Neutrophils, Absolute 06/28/2023 4.92  1.70 - 7.00 10*3/mm3 Final   • Lymphocytes, Absolute 06/28/2023 1.26  0.70 - 3.10 10*3/mm3 Final   • Monocytes, Absolute 06/28/2023 0.51  0.10 - 0.90 10*3/mm3 Final   • Eosinophils, Absolute 06/28/2023 0.26  0.00 - 0.40 10*3/mm3 Final   • Basophils, Absolute 06/28/2023 0.06  0.00 - 0.20 10*3/mm3 Final   • Immature Grans, Absolute 06/28/2023 0.02  0.00 - 0.05 10*3/mm3 Final   • nRBC 06/28/2023 0.0  0.0 - 0.2 /100 WBC Final     Assessment & Plan  Diagnoses and all orders for this visit:    1. Nontoxic multinodular goiter (Primary)  -     TSH    2. Type 2 diabetes mellitus without complication, without long-term current use of insulin  -     Comprehensive Metabolic Panel  -     Hemoglobin A1c  -     Microalbumin / Creatinine Urine Ratio - Urine, Clean Catch    3. Other hyperlipidemia  -     Comprehensive Metabolic Panel  -     Lipid Panel    4. Essential hypertension  -     Comprehensive Metabolic Panel    5. Age-related osteoporosis without current pathological fracture  -     Comprehensive Metabolic Panel  -     Vitamin D,25-Hydroxy    6. Obstructive sleep apnea on CPAP    7. Vitamin D deficiency  -     Vitamin D,25-Hydroxy      Check thyroid function tests.  Continue metformin 500 mg twice  a day.  Check hemoglobin A1c and urine microalbumin.  Continue Lipitor 20 mg/day.  Check lipid panel.  Continue amlodipine, furosemide and Toprol-XL.  Will defer blood pressure control to Dr. Talamantes and Dr. Leal.  Check vitamin D levels.  Continue risedronate 35 mg weekly.  Repeat bone density in August 2024.  Follow-up with Dr. Marcum.  Will defer treatment of vitamin B12 deficiency to Dr. Nicholson.    Copy of my note sent to Dr. Reji Talamantes, Dr. Nicholson, and Dr. Marcum.    RTC 6 mos.

## 2023-11-14 LAB
25(OH)D3+25(OH)D2 SERPL-MCNC: 66.4 NG/ML (ref 30–100)
ALBUMIN SERPL-MCNC: 4.3 G/DL (ref 3.5–5.2)
ALBUMIN/CREAT UR: 9 MG/G CREAT (ref 0–29)
ALBUMIN/GLOB SERPL: 1.7 G/DL
ALP SERPL-CCNC: 74 U/L (ref 39–117)
ALT SERPL-CCNC: 13 U/L (ref 1–33)
AST SERPL-CCNC: 16 U/L (ref 1–32)
BILIRUB SERPL-MCNC: 0.3 MG/DL (ref 0–1.2)
BUN SERPL-MCNC: 16 MG/DL (ref 8–23)
BUN/CREAT SERPL: 18.6 (ref 7–25)
CALCIUM SERPL-MCNC: 10.4 MG/DL (ref 8.6–10.5)
CHLORIDE SERPL-SCNC: 103 MMOL/L (ref 98–107)
CHOLEST SERPL-MCNC: 125 MG/DL (ref 0–200)
CO2 SERPL-SCNC: 30.6 MMOL/L (ref 22–29)
CREAT SERPL-MCNC: 0.86 MG/DL (ref 0.57–1)
CREAT UR-MCNC: 105.4 MG/DL
EGFRCR SERPLBLD CKD-EPI 2021: 68.8 ML/MIN/1.73
GLOBULIN SER CALC-MCNC: 2.5 GM/DL
GLUCOSE SERPL-MCNC: 111 MG/DL (ref 65–99)
HBA1C MFR BLD: 5.7 % (ref 4.8–5.6)
HDLC SERPL-MCNC: 65 MG/DL (ref 40–60)
IMP & REVIEW OF LAB RESULTS: NORMAL
LDLC SERPL CALC-MCNC: 49 MG/DL (ref 0–100)
MICROALBUMIN UR-MCNC: 10 UG/ML
POTASSIUM SERPL-SCNC: 4.2 MMOL/L (ref 3.5–5.2)
PROT SERPL-MCNC: 6.8 G/DL (ref 6–8.5)
SODIUM SERPL-SCNC: 143 MMOL/L (ref 136–145)
TRIGL SERPL-MCNC: 49 MG/DL (ref 0–150)
TSH SERPL DL<=0.005 MIU/L-ACNC: 0.96 UIU/ML (ref 0.27–4.2)
VLDLC SERPL CALC-MCNC: 11 MG/DL (ref 5–40)

## 2023-11-18 NOTE — PROGRESS NOTES
Hemoglobin A1c 5.7%. Diabetes is well controlled.  LDL 49.  HDL 65.  Continue Lipitor 20 mg/day.  Normal urine microalbumin.  Normal thyroid function test.  Copy of labs sent to Dr. Talamantes and to patient thru my chart

## 2023-12-01 ENCOUNTER — TRANSCRIBE ORDERS (OUTPATIENT)
Dept: ADMINISTRATIVE | Facility: HOSPITAL | Age: 78
End: 2023-12-01
Payer: MEDICARE

## 2023-12-01 DIAGNOSIS — Z12.31 ENCOUNTER FOR SCREENING MAMMOGRAM FOR BREAST CANCER: Primary | ICD-10-CM

## 2023-12-09 DIAGNOSIS — I10 ESSENTIAL HYPERTENSION: ICD-10-CM

## 2023-12-11 RX ORDER — AMLODIPINE BESYLATE 5 MG/1
5 TABLET ORAL DAILY
Qty: 90 TABLET | Refills: 3 | OUTPATIENT
Start: 2023-12-11

## 2023-12-20 NOTE — TELEPHONE ENCOUNTER
Caller: Dejah Casey    Relationship: Self    Best call back number: [phone]  What is the best time to reach you: ANYTIME     Who are you requesting to speak with (clinical staff, provider,  specific staff member): CLINICAL STAFF     What was the call regarding: PATIENT STATES THAT HER BLOOD PRESSURE IS ELEVATED.     PATIENT WOULD LIKE TO KNOW IF SHE NEEDS TO BE SEEN IN OFFICE.     PATIENT STATES THAT SHE GETS HEADACHES AND LIGHTHEADED WHEN SHE FEELS THAT HER BLOOD PRESSURE IS UP.     PATIENTS READINGS ARE AS FOLLOWS     12/16- 142/85  12/17- 139/80   12/18- 135/78   12/19- 135/89  12/24- 135/83  12/25- 127/85  1/1- 141/84  1/2- 135/85  1/3-  131/83    Do you require a callback: YES      Spoke to Eliza and clarified Sumatriptan was one tablet twice a day as needed for migraine.        ----- Message from Torrie Yanez sent at 12/20/2023 11:49 AM CST -----  Eliza from [facility] called about the Rx:  sumatriptan (IMITREX) 100 MG tablet  needs clarification on this script for the pt.     Kevin Kay [phone]

## 2024-01-02 ENCOUNTER — HOSPITAL ENCOUNTER (OUTPATIENT)
Dept: MAMMOGRAPHY | Facility: HOSPITAL | Age: 79
Discharge: HOME | End: 2024-01-02
Admitting: FAMILY MEDICINE
Payer: MEDICARE

## 2024-01-02 DIAGNOSIS — Z12.31 ENCOUNTER FOR SCREENING MAMMOGRAM FOR BREAST CANCER: ICD-10-CM

## 2024-01-02 PROCEDURE — 77063 BREAST TOMOSYNTHESIS BI: CPT

## 2024-01-02 PROCEDURE — 77067 SCR MAMMO BI INCL CAD: CPT

## 2024-01-02 RX ORDER — FOLIC ACID 1 MG/1
TABLET ORAL
Qty: 90 TABLET | Refills: 1 | Status: SHIPPED | OUTPATIENT
Start: 2024-01-02

## 2024-01-08 ENCOUNTER — TELEPHONE (OUTPATIENT)
Dept: NEUROLOGY | Facility: CLINIC | Age: 79
End: 2024-01-08

## 2024-01-08 NOTE — TELEPHONE ENCOUNTER
Caller: Dejah Casey     Relationship: SELF    Best call back number: [phone] (home)     What is your medical concern? PATIENT HAS AN EPISODE ON SATURDAY 1/6/24. SHE STATED THAT SHE HAD A STRANGE FEELING IN HER HEAD AND STARTED TO GET REALLY DIZZY. SHE STATED SHE WAS ABLE TO STAY STANDING BY SUPPORT OF HER A COUNTERS AND APPLIANCES. SHE SAID THIS LASTED FOR 10 MINUTES.     PATIENT SAID LEADING UP TO SATURDAY SHE HAS HAD BLURRY VISION EVEN WITH HER GLASSES.     How long has this issue been going on? PATIENT HAS HAD PREVIOUS STROKES    Is your provider already aware of this issue? OF THE PREV STROKES    Have you been treated for this issue? HOSPITAL CARE FOR PREVIOUS STROKES.     PATIENT IS NOT SURE IF THIS WAS ANOTHER STROKE OR NOT AND THAT SHE IS LOOKING FOR MEDICAL ADVICE FROM [facility]    PLEASE REVIEW  THANK YOU

## 2024-01-10 NOTE — PROGRESS NOTES
RM:________     PCP: Reji Talamantes MD    : 1945  AGE: 79 y.o.  EST PATIENT     REASON FOR VISIT/  CC:        BP Readings from Last 3 Encounters:   23 108/64   23 120/70   23 114/70      Wt Readings from Last 3 Encounters:   23 80.6 kg (177 lb 12.8 oz)   23 81 kg (178 lb 9.6 oz)   23 80.6 kg (177 lb 11.2 oz)        WT: ____________ BP: __________L __________R HR______    CHEST PAIN: _____________    SOA: _____________PALPS: _______________     LIGHTHEADED: ___________FATIGUE: ________________ EDEMA __________    ALLERGIES:Hydrochlorothiazide, Dexamethasone, and Lisinopril SMOKING HISTORY:  Social History     Tobacco Use    Smoking status: Never    Smokeless tobacco: Never    Tobacco comments:     I have never smoked.   Vaping Use    Vaping Use: Never used   Substance Use Topics    Alcohol use: No     Comment: CAFFEINE: none    Drug use: No     CAFFEINE USE_________________  ALCOHOL ______________________

## 2024-01-11 ENCOUNTER — OFFICE VISIT (OUTPATIENT)
Dept: CARDIOLOGY | Facility: CLINIC | Age: 79
End: 2024-01-11
Payer: MEDICARE

## 2024-01-11 VITALS
HEIGHT: 63 IN | HEART RATE: 82 BPM | DIASTOLIC BLOOD PRESSURE: 82 MMHG | BODY MASS INDEX: 32.04 KG/M2 | SYSTOLIC BLOOD PRESSURE: 128 MMHG | WEIGHT: 180.8 LBS

## 2024-01-11 DIAGNOSIS — I10 ESSENTIAL HYPERTENSION: ICD-10-CM

## 2024-01-11 DIAGNOSIS — I49.1 APC (ATRIAL PREMATURE CONTRACTIONS): ICD-10-CM

## 2024-01-11 DIAGNOSIS — R42 DIZZINESS: ICD-10-CM

## 2024-01-11 DIAGNOSIS — I25.10 NONOCCLUSIVE CORONARY ATHEROSCLEROSIS OF NATIVE CORONARY ARTERY: Primary | ICD-10-CM

## 2024-01-11 DIAGNOSIS — E78.49 OTHER HYPERLIPIDEMIA: ICD-10-CM

## 2024-01-11 DIAGNOSIS — I63.10 CEREBROVASCULAR ACCIDENT (CVA) DUE TO EMBOLISM OF PRECEREBRAL ARTERY: ICD-10-CM

## 2024-01-11 PROBLEM — I51.89 DIASTOLIC DYSFUNCTION: Status: RESOLVED | Noted: 2021-06-02 | Resolved: 2024-01-11

## 2024-01-11 PROCEDURE — 1160F RVW MEDS BY RX/DR IN RCRD: CPT | Performed by: INTERNAL MEDICINE

## 2024-01-11 PROCEDURE — 3074F SYST BP LT 130 MM HG: CPT | Performed by: INTERNAL MEDICINE

## 2024-01-11 PROCEDURE — 3079F DIAST BP 80-89 MM HG: CPT | Performed by: INTERNAL MEDICINE

## 2024-01-11 PROCEDURE — 99214 OFFICE O/P EST MOD 30 MIN: CPT | Performed by: INTERNAL MEDICINE

## 2024-01-11 PROCEDURE — 1159F MED LIST DOCD IN RCRD: CPT | Performed by: INTERNAL MEDICINE

## 2024-01-11 PROCEDURE — 93000 ELECTROCARDIOGRAM COMPLETE: CPT | Performed by: INTERNAL MEDICINE

## 2024-01-11 NOTE — PROGRESS NOTES
Date of Office Visit: 24  Encounter Provider: Daniel Leal MD  Place of Service: [facility] CARDIOLOGY  Patient Name: Dejah Casey  :1945    Chief Complaint   Patient presents with    Cerebrovascular Accident   :     HPI:     Ms. Casey is 79 y.o. and presents today to follow up. I have reviewed prior notes and there are no changes except for any new updates described below. I have also reviewed any information entered into the medical record by the patient or by ancillary staff.     She was followed by [facility] until 2017. I first saw her in 2017.  In , she had a cath which showed minimal coronary atherosclerosis (30% LAD).  In 2016, she was having palpitations; a Holter showed rare PVCs and PACs and rare bursts of PACs lasting up to six beats. Metoprolol was started, which helped. She had an echocardiogram in 2017 that was unremarkable; it showed an LVEF of 55%, grade I diastolic dysfunction, and mild MR.  A proBNP in  was normal (30). In 2017, a perfusion stress test was completely normal.  This was performed due to dyspnea.  Another perfusion stress was performed in 2018 and was normal (it was also performed because of dyspnea).    In early , she again reported dyspnea, leg swelling, and palpitations. An echo revealed normal LVSF, grade 1A diastolic dysfunction, and no significant valvular disease or PHTN. I started furosemide.  She was previously on HCTZ but it was stopped in  due to hypercalcemia. She has had issues with her potassium in the past as well.     She presented in 2022 with bilateral subacute strokes. An echo revealed normal LVSF and moderate LA dilation. Even though a Zio did not show any atrial arrhythmias, anticoagulation was recommended given the bilateral nature of the strokes and the atrial dilation.  In follow-up, we discussed potentially placing a loop recorder, but  she wanted to avoid any procedures and just remain on apixaban.    She denies chest pain, palpitations, syncope, lightheadedness, orthopnea, or PND. She has mild exertional dyspnea, which is stable. She has chronic mild pedal edema.  She had an episode of dizziness the other day that occurred while standing in her kitchen.  She developed abrupt onset true dizziness that lasted several minutes and then resolved on its own.  She did not have any palpitations while it was happening.    Past Medical History:   Diagnosis Date    Allergic     Seasonal alleries, Lisinopril, Dexamethasone    Anxiety     Cataract     Cararacts in both eyes.    Contact dermatitis     COVID-19 06/2022    DDD (degenerative disc disease), lumbar     Deep vein thrombosis June 11, 2022    Stroke    Depression     Diverticulosis June 2021    Dry eyes     Dry mouth     Essential hypertension     Female climacteric state     Headache     History of mammogram     8/2016    History of total right hip replacement 2013    Hypercalcemia     Hyperlipidemia     Iron deficiency anemia 05/27/2021    Added automatically from request for surgery [id number]    Low back pain     Lumbar area.    Neuromuscular disorder 03/2016    Nightmares REM-sleep type     Nonocclusive coronary atherosclerosis of native coronary artery     cath in 2008 with 30% LAD disease    Nontoxic multinodular goiter     Obesity     Osteoarthritis     both knees, back and hips    Osteopenia     Pain, joint, shoulder     Pernicious anemia     Pneumonia August 8, 2018    After Hospital stay.    Polycythemia     Rotator cuff tendinitis     Seasonal allergies     Sleep apnea     CPAP nightly    Snoring     Stroke 06/2022    Superficial phlebitis     Type 2 diabetes mellitus     Type II    Visual impairment August 30, 2019    Vitiligo        Past Surgical History:   Procedure Laterality Date    BREAST BIOPSY      BREAST LUMPECTOMY  1973    benign    CARDIAC CATHETERIZATION  2008    CATARACT  EXTRACTION, BILATERAL Bilateral     COLONOSCOPY  08/15/2010    COLONOSCOPY N/A 2021    Procedure: COLONOSCOPY TO CECUM;  Surgeon: Ellen Kothari MD;  Location:  [facility];  Service: Gastroenterology;  Laterality: N/A;  pre: SCREENING FOR COLON CANCER  post: HEMORRHOIDS, DIVERTICULOSIS, TORTUOUS COLON    ENDOSCOPY N/A 2021    Procedure: ESOPHAGOGASTRODUODENOSCOPY WITH BX'S;  Surgeon: Ellen Kothari MD;  Location:  [facility];  Service: Gastroenterology;  Laterality: N/A;  pre: IRON DEFICIENCY ANEMIA  post: GASTRITIS, SMALL HIATAL HERNIA    EYE SURGERY      EYE SURGERY Left 2022    HYSTERECTOMY  1982    JOINT REPLACEMENT  07/10/2013    Total Hip Replacement    REPLACEMENT TOTAL KNEE Left 2018    Dr. Bishnu Larson    REPLACEMENT TOTAL KNEE Right     [facility]     TONSILLECTOMY  1967    TOTAL HIP ARTHROPLASTY Right 2013    UPPER GASTROINTESTINAL ENDOSCOPY  2021       Social History     Socioeconomic History    Marital status:    Tobacco Use    Smoking status: Never    Smokeless tobacco: Never    Tobacco comments:     I have never smoked.   Vaping Use    Vaping Use: Never used   Substance and Sexual Activity    Alcohol use: No     Comment: CAFFEINE: none    Drug use: No    Sexual activity: Not Currently     Partners: Male     Birth control/protection: Post-menopausal     Comment: I had a Hysterectomy in .       Family History   Problem Relation Age of Onset    Diabetes Mother         Mother    Thyroid disease Mother     Hypertension Father         Father      Heart disease Father         Father      Arthritis Father         Father    Hyperlipidemia Father         Father    Asthma Sister         Sister      Hypertension Sister         Sister      Miscarriages / Stillbirths Sister         Sister    Thyroid disease Sister         Sister    Diabetes Sister         Sister    Hypertension Sister         Sister    Early death Sister          Infant    Hypertension Brother         Brother      Diabetes Brother         Brother    Kidney disease Brother         Brother, Renal Failure    Vision loss Brother         Brother    Cancer Brother         Brother    Lung cancer Brother     Kidney disease Brother         Brother,  Renal Failure    Stroke Brother         Brother       Review of Systems   Musculoskeletal:  Positive for joint pain.   Genitourinary:  Positive for frequency.   Neurological:  Positive for dizziness.   All other systems reviewed and are negative.      Allergies   Allergen Reactions    Hydrochlorothiazide Other (See Comments)     In 2015-developed hypercalcemia-HCTZ was discontinued  Other reaction(s): Other (See Comments)  In 2015-developed hypercalcemia-HCTZ was discontinued  In 2015-developed hypercalcemia-HCTZ was discontinued    Dexamethasone Dermatitis     Other reaction(s): Other (See Comments)  CONTACT DERMATITIS    Lisinopril Cough     Other reaction(s): Other (See Comments)  COUGH         Current Outpatient Medications:     Accu-Chek FastClix Lancets misc, Use to check blood sugar once daily, Disp: 102 each, Rfl: 5    apixaban (Eliquis) 5 MG tablet tablet, Take 1 tablet by mouth Every 12 (Twelve) Hours., Disp: 180 tablet, Rfl: 1    Blood Glucose Monitoring Suppl (Blood Glucose Monitor System) w/Device kit, BS meter Accu-chek Catherine plus meter. DX E11.9, Disp: 1 each, Rfl: 1    Calcium Carb-Cholecalciferol 500-200 MG-UNIT tablet, Take 2 tablets by mouth daily., Disp: , Rfl:     clonazePAM (KlonoPIN) 0.5 MG tablet, Take 1 tablet by mouth Every Night., Disp: , Rfl:     Cyanocobalamin ([employer] Vitamin B-12) 5000 MCG sublingual tablet, Place 5,000 mcg under the tongue Daily., Disp: 90 tablet, Rfl: 3    ferrous sulfate 325 (65 FE) MG tablet, TAKE 1 TABLET BY MOUTH EVERY DAY WITH BREAKFAST, Disp: 90 tablet, Rfl: 1    fluticasone (FLONASE) 50 MCG/ACT nasal spray, 1 spray into the nostril(s) as directed by provider As Needed., Disp: ,  Rfl:     Fluzone High-Dose Quadrivalent 0.7 ML suspension prefilled syringe injection, , Disp: , Rfl:     folic acid (FOLVITE) 1 MG tablet, TAKE 1 TABLET BY MOUTH EVERY DAY, Disp: 90 tablet, Rfl: 1    furosemide (LASIX) 40 MG tablet, TAKE 1 TABLET EVERY DAY, Disp: 90 tablet, Rfl: 2    glucose blood (Accu-Chek Catherine Plus) test strip, Use as instructed, Disp: 360 each, Rfl: 1    metFORMIN (GLUCOPHAGE) 500 MG tablet, Take 1 tablet by mouth 2 (Two) Times a Day With Meals. Indications: Type 2 Diabetes, Disp: 180 tablet, Rfl: 2    metoprolol succinate XL (TOPROL-XL) 50 MG 24 hr tablet, TAKE 1 TABLET BY MOUTH EVERY DAY, Disp: 90 tablet, Rfl: 0    Mirabegron ER (MYRBETRIQ) 50 MG tablet sustained-release 24 hour 24 hr tablet, Take 50 mg by mouth Daily., Disp: , Rfl:     pilocarpine (SALAGEN) 5 MG tablet, 1 tablet 3 (Three) Times a Day., Disp: , Rfl:     potassium chloride (KAYCIEL) 20 mEq/15 mL solution, MIX 15ML IN 4 OUNCES OF LIQUID AND DRINK DAILY, Disp: 1350 mL, Rfl: 2    prednisoLONE acetate (PRED FORTE) 1 % ophthalmic suspension, INSTILL 1 DROP INTO BOTH EYES TWICE A DAY TO REPLACE DUREZOL, Disp: , Rfl:     Prolensa 0.07 % solution, PLACE ONE DROP IN BOTH EYES ONCE DAILY FOR MAINTENANCE, Disp: , Rfl:     Restasis 0.05 % ophthalmic emulsion, 2 (two) times a day., Disp: , Rfl:     risedronate (ACTONEL) 35 MG tablet, TAKE 1 TABLET WEEKLY WITH WATER ON EMPTY STOMACH, NOTHING BY MOUTH OR LIE DOWN FOR NEXT 30 MINUTES., Disp: 12 tablet, Rfl: 2    venlafaxine (EFFEXOR) 75 MG tablet, Take 1 tablet by mouth 2 (Two) Times a Day. Pt is taking 1 tab in the morning and 2 tabs at night, Disp: , Rfl:     amLODIPine (NORVASC) 5 MG tablet, Take 1 tablet by mouth Daily for 270 days., Disp: 90 tablet, Rfl: 2    atorvastatin (LIPITOR) 20 MG tablet, Take 1 tablet by mouth Every Morning for 270 days., Disp: 90 tablet, Rfl: 2      Objective:     Vitals:    01/11/24 1454   BP: 128/82   BP Location: Left arm   Pulse: 82   Weight: 82 kg (180 lb  "12.8 oz)   Height: 160 cm (62.99\")     Vitals:    01/11/24 1525 01/11/24 1527 01/11/24 1530   Orthostatic BP: 120/74 130/80 128/78   Orthostatic Pulse: 85 90 86   Patient Position: Lying Standing Standing         Body mass index is 32.04 kg/m².    Physical Exam  Vitals reviewed.   Constitutional:       Comments: overweight   HENT:      Head: Normocephalic.      Nose: Nose normal.   Eyes:      Conjunctiva/sclera: Conjunctivae normal.   Neck:      Vascular: No JVD.   Cardiovascular:      Rate and Rhythm: Normal rate and regular rhythm.      Pulses: Normal pulses and intact distal pulses.      Heart sounds: Normal heart sounds. No murmur heard.  Pulmonary:      Effort: Pulmonary effort is normal.      Breath sounds: Normal breath sounds.   Abdominal:      Palpations: Abdomen is soft.      Tenderness: There is no abdominal tenderness.      Comments: Obesity limits abdominal exam   Musculoskeletal:         General: No swelling. Normal range of motion.      Cervical back: Normal range of motion.   Skin:     General: Skin is warm and dry.   Neurological:      General: No focal deficit present.      Mental Status: She is alert.   Psychiatric:         Mood and Affect: Mood normal.           ECG 12 Lead    Date/Time: 1/11/2024 4:10 PM  Performed by: Daniel Leal MD    Authorized by: Daniel Leal MD  Comparison: compared with previous ECG   Similar to previous ECG  Rhythm: sinus rhythm  Conduction: conduction normal  ST Segments: ST segments normal  T Waves: T waves normal  QRS axis: normal  Other: no other findings    Clinical impression: normal ECG          Assessment:       Diagnosis Plan   1. Nonocclusive coronary atherosclerosis of native coronary artery        2. Other hyperlipidemia        3. Essential hypertension        4. APC (atrial premature contractions)        5. Cerebrovascular accident (CVA) due to embolism of precerebral artery        6. Dizziness           Plan:     1/2. She had a cath in 2008 that showed " nonocclusive CAD. She had a normal perfusion stress test in 2020. She's not on aspirin as she's anticoagulated.  She is on atorvastatin.       3. She has hypertension, grade 1A diastolic dysfunction, and chronic leg swelling and dyspnea. She's doing much better with compression stockings and furosemide. She does not tolerate spironolactone or HCTZ due to electrolyte issues.    4/5. She suffered bilateral strokes in June 2022, which are presumed to be embolic. An echo revealed normal LVSF and moderate LAE. She was noted to have PACs. No true AF was seen, but the suspicion was great enough to switch her to OAC.  We discussed placing a loop recorder, but she did not want to proceed with invasive testing.    6.  She had a short-lived episode of abrupt onset dizziness the other day.  The description of the event sounds like a short episode of vertigo.  I wonder if she briefly dislodged a crystal in her inner ear.  We checked orthostatics today, which are documented, and which are normal.  The description of the event does not sound orthostatic or vasovagal.  She did not have any palpitations during it, but if she has recurrent episodes, we will consider rhythm monitoring.        Sincerely,       Daniel Leal MD

## 2024-01-15 DIAGNOSIS — I63.10 CEREBRAL INFARCTION DUE TO EMBOLISM OF UNSPECIFIED PRECEREBRAL ARTERY: ICD-10-CM

## 2024-01-15 DIAGNOSIS — E11.9 TYPE 2 DIABETES MELLITUS WITHOUT COMPLICATION, WITHOUT LONG-TERM CURRENT USE OF INSULIN: ICD-10-CM

## 2024-01-15 DIAGNOSIS — I10 ESSENTIAL HYPERTENSION: ICD-10-CM

## 2024-01-15 RX ORDER — METOPROLOL SUCCINATE 50 MG/1
50 TABLET, EXTENDED RELEASE ORAL DAILY
Qty: 90 TABLET | Refills: 1 | Status: SHIPPED | OUTPATIENT
Start: 2024-01-15 | End: 2024-01-21

## 2024-01-15 RX ORDER — FUROSEMIDE 40 MG/1
40 TABLET ORAL DAILY
Qty: 90 TABLET | Refills: 1 | Status: SHIPPED | OUTPATIENT
Start: 2024-01-15

## 2024-01-19 DIAGNOSIS — I10 ESSENTIAL HYPERTENSION: ICD-10-CM

## 2024-01-19 NOTE — TELEPHONE ENCOUNTER
Rx Refill Note  Requested Prescriptions     Pending Prescriptions Disp Refills    metoprolol succinate XL (TOPROL-XL) 50 MG 24 hr tablet [Pharmacy Med Name: METOPROLOL SUCC ER 50 MG TAB] 90 tablet 1     Sig: TAKE 1 TABLET BY MOUTH EVERY DAY      Last office visit with prescribing clinician: 3/22/2023   Last telemedicine visit with prescribing clinician: Visit date not found   Next office visit with prescribing clinician: Visit date not found                         Would you like a call back once the refill request has been completed: [] Yes [] No    If the office needs to give you a call back, can they leave a voicemail: [] Yes [] No    Scott Chery MA  01/19/24, 08:07 EST

## 2024-01-21 RX ORDER — METOPROLOL SUCCINATE 50 MG/1
50 TABLET, EXTENDED RELEASE ORAL DAILY
Qty: 30 TABLET | Refills: 0 | Status: SHIPPED | OUTPATIENT
Start: 2024-01-21

## 2024-02-06 NOTE — TELEPHONE ENCOUNTER
Left a voice message giving patient information  
Provider: BROCK HOLDEN  Caller: HENRIK  Relationship to Patient: SELF  Phone Number: [phone]  Reason for Call: PT WANTS TO KNOW WHAT INFORMATION TO GET FROM THE CARDIOLOGIST? AND WHAT TEST  THE CARDIOLOGIST NEEDS TO RUN? PT IS REQUESTING FOR A CALL BACK OR FOR THE QUESTION TO BE ANSWERED IN HER MYCHART.     PLEASE REVIEW AND ADVISE  
Provider: BROCK HOLDEN  Caller: PATIENT  Relationship to Patient: SELF  Phone Number: [phone]  Reason for Call: PATIENT CALLED REGARDING REFERRAL TO CARDIOLOGIST. SHE STATES SHE SEES DR LUTHER GREENWOOD FOR CARDIO AND WOULD LIKE TO KNOW IF PROVIDER IS OK WITH HER GETTING TESTING DONE THERE/IF A REFERRAL NEEDS TO BE SENT.    PLEASE REVIEW AND ADVISE. THANK YOU.    
Never smoker

## 2024-02-08 ENCOUNTER — TELEPHONE (OUTPATIENT)
Dept: ENDOCRINOLOGY | Age: 79
End: 2024-02-08
Payer: MEDICARE

## 2024-02-08 NOTE — TELEPHONE ENCOUNTER
2/8 called and sw pt told her to call her PCP  
Patient called asking to speak to sanju. She has osteopetrosis and having pain in her left arm. She said it is not a normal pain. It is not constant or everyday. She had 2 shots. 2 weeks after one of the shots she started having a small amount of pain but as time go on it has been increasing. It is her shoulder down   
Normal

## 2024-03-26 RX ORDER — FERROUS SULFATE 325(65) MG
TABLET ORAL
Qty: 90 TABLET | Refills: 1 | Status: SHIPPED | OUTPATIENT
Start: 2024-03-26

## 2024-04-16 ENCOUNTER — LAB (OUTPATIENT)
Dept: LAB | Facility: HOSPITAL | Age: 79
End: 2024-04-16
Payer: MEDICARE

## 2024-04-16 DIAGNOSIS — E61.1 IRON DEFICIENCY: ICD-10-CM

## 2024-04-16 DIAGNOSIS — D47.2 MONOCLONAL GAMMOPATHY: ICD-10-CM

## 2024-04-16 DIAGNOSIS — E53.8 VITAMIN B12 DEFICIENCY: ICD-10-CM

## 2024-04-16 LAB
ALBUMIN SERPL-MCNC: 4.1 G/DL (ref 3.5–5.2)
ALBUMIN/GLOB SERPL: 1.5 G/DL
ALP SERPL-CCNC: 65 U/L (ref 39–117)
ALT SERPL W P-5'-P-CCNC: 12 U/L (ref 1–33)
ANION GAP SERPL CALCULATED.3IONS-SCNC: 11.2 MMOL/L (ref 5–15)
AST SERPL-CCNC: 18 U/L (ref 1–32)
BASOPHILS # BLD AUTO: 0.08 10*3/MM3 (ref 0–0.2)
BASOPHILS NFR BLD AUTO: 1.2 % (ref 0–1.5)
BILIRUB SERPL-MCNC: 0.2 MG/DL (ref 0–1.2)
BUN SERPL-MCNC: 20 MG/DL (ref 8–23)
BUN/CREAT SERPL: 22 (ref 7–25)
CALCIUM SPEC-SCNC: 10 MG/DL (ref 8.6–10.5)
CHLORIDE SERPL-SCNC: 102 MMOL/L (ref 98–107)
CO2 SERPL-SCNC: 27.8 MMOL/L (ref 22–29)
CREAT SERPL-MCNC: 0.91 MG/DL (ref 0.57–1)
DEPRECATED RDW RBC AUTO: 42.8 FL (ref 37–54)
EGFRCR SERPLBLD CKD-EPI 2021: 63.9 ML/MIN/1.73
EOSINOPHIL # BLD AUTO: 0.26 10*3/MM3 (ref 0–0.4)
EOSINOPHIL NFR BLD AUTO: 3.9 % (ref 0.3–6.2)
ERYTHROCYTE [DISTWIDTH] IN BLOOD BY AUTOMATED COUNT: 14.3 % (ref 12.3–15.4)
FERRITIN SERPL-MCNC: 102 NG/ML (ref 13–150)
GLOBULIN UR ELPH-MCNC: 2.7 GM/DL
GLUCOSE SERPL-MCNC: 104 MG/DL (ref 65–99)
HCT VFR BLD AUTO: 41.2 % (ref 34–46.6)
HGB BLD-MCNC: 13.3 G/DL (ref 12–15.9)
IMM GRANULOCYTES # BLD AUTO: 0.02 10*3/MM3 (ref 0–0.05)
IMM GRANULOCYTES NFR BLD AUTO: 0.3 % (ref 0–0.5)
IRON 24H UR-MRATE: 58 MCG/DL (ref 37–145)
IRON SATN MFR SERPL: 18 % (ref 20–50)
LYMPHOCYTES # BLD AUTO: 1.09 10*3/MM3 (ref 0.7–3.1)
LYMPHOCYTES NFR BLD AUTO: 16.2 % (ref 19.6–45.3)
MCH RBC QN AUTO: 26.8 PG (ref 26.6–33)
MCHC RBC AUTO-ENTMCNC: 32.3 G/DL (ref 31.5–35.7)
MCV RBC AUTO: 83.1 FL (ref 79–97)
MONOCYTES # BLD AUTO: 0.46 10*3/MM3 (ref 0.1–0.9)
MONOCYTES NFR BLD AUTO: 6.9 % (ref 5–12)
NEUTROPHILS NFR BLD AUTO: 4.8 10*3/MM3 (ref 1.7–7)
NEUTROPHILS NFR BLD AUTO: 71.5 % (ref 42.7–76)
NRBC BLD AUTO-RTO: 0 /100 WBC (ref 0–0.2)
PLATELET # BLD AUTO: 160 10*3/MM3 (ref 140–450)
PMV BLD AUTO: 11.8 FL (ref 6–12)
POTASSIUM SERPL-SCNC: 3.8 MMOL/L (ref 3.5–5.2)
PROT SERPL-MCNC: 6.8 G/DL (ref 6–8.5)
RBC # BLD AUTO: 4.96 10*6/MM3 (ref 3.77–5.28)
SODIUM SERPL-SCNC: 141 MMOL/L (ref 136–145)
TIBC SERPL-MCNC: 316 MCG/DL (ref 298–536)
TRANSFERRIN SERPL-MCNC: 212 MG/DL (ref 200–360)
WBC NRBC COR # BLD AUTO: 6.71 10*3/MM3 (ref 3.4–10.8)

## 2024-04-16 PROCEDURE — 83540 ASSAY OF IRON: CPT

## 2024-04-16 PROCEDURE — 36415 COLL VENOUS BLD VENIPUNCTURE: CPT

## 2024-04-16 PROCEDURE — 80053 COMPREHEN METABOLIC PANEL: CPT

## 2024-04-16 PROCEDURE — 84466 ASSAY OF TRANSFERRIN: CPT

## 2024-04-16 PROCEDURE — 85025 COMPLETE CBC W/AUTO DIFF WBC: CPT

## 2024-04-16 PROCEDURE — 82728 ASSAY OF FERRITIN: CPT

## 2024-04-18 LAB
ALBUMIN SERPL ELPH-MCNC: 3.6 G/DL (ref 2.9–4.4)
ALBUMIN/GLOB SERPL: 1.3 {RATIO} (ref 0.7–1.7)
ALPHA1 GLOB SERPL ELPH-MCNC: 0.2 G/DL (ref 0–0.4)
ALPHA2 GLOB SERPL ELPH-MCNC: 0.8 G/DL (ref 0.4–1)
B-GLOBULIN SERPL ELPH-MCNC: 0.9 G/DL (ref 0.7–1.3)
GAMMA GLOB SERPL ELPH-MCNC: 1 G/DL (ref 0.4–1.8)
GLOBULIN SER-MCNC: 2.9 G/DL (ref 2.2–3.9)
IGA SERPL-MCNC: 360 MG/DL (ref 64–422)
IGG SERPL-MCNC: 1087 MG/DL (ref 586–1602)
IGM SERPL-MCNC: 73 MG/DL (ref 26–217)
INTERPRETATION SERPL IEP-IMP: ABNORMAL
KAPPA LC FREE SER-MCNC: 34.2 MG/L (ref 3.3–19.4)
KAPPA LC FREE/LAMBDA FREE SER: 1.04 {RATIO} (ref 0.26–1.65)
LABORATORY COMMENT REPORT: ABNORMAL
LAMBDA LC FREE SERPL-MCNC: 32.8 MG/L (ref 5.7–26.3)
M PROTEIN SERPL ELPH-MCNC: ABNORMAL G/DL
PROT SERPL-MCNC: 6.5 G/DL (ref 6–8.5)

## 2024-04-23 ENCOUNTER — OFFICE VISIT (OUTPATIENT)
Dept: ONCOLOGY | Facility: CLINIC | Age: 79
End: 2024-04-23
Payer: MEDICARE

## 2024-04-23 VITALS
HEIGHT: 63 IN | BODY MASS INDEX: 32.07 KG/M2 | TEMPERATURE: 98 F | OXYGEN SATURATION: 97 % | HEART RATE: 85 BPM | WEIGHT: 181 LBS | SYSTOLIC BLOOD PRESSURE: 119 MMHG | DIASTOLIC BLOOD PRESSURE: 72 MMHG

## 2024-04-23 DIAGNOSIS — E53.8 VITAMIN B12 DEFICIENCY: ICD-10-CM

## 2024-04-23 DIAGNOSIS — D47.2 MONOCLONAL GAMMOPATHY: Primary | ICD-10-CM

## 2024-04-23 DIAGNOSIS — E61.1 IRON DEFICIENCY: ICD-10-CM

## 2024-04-23 PROCEDURE — 3074F SYST BP LT 130 MM HG: CPT | Performed by: INTERNAL MEDICINE

## 2024-04-23 PROCEDURE — 99214 OFFICE O/P EST MOD 30 MIN: CPT | Performed by: INTERNAL MEDICINE

## 2024-04-23 PROCEDURE — 1125F AMNT PAIN NOTED PAIN PRSNT: CPT | Performed by: INTERNAL MEDICINE

## 2024-04-23 PROCEDURE — 3078F DIAST BP <80 MM HG: CPT | Performed by: INTERNAL MEDICINE

## 2024-04-23 NOTE — PROGRESS NOTES
.     REASON FOR FOLLOWUP:     1.  Pernicious anemia.  Was on vitamin B12 injection.  Patient was switched to sublingual vitamin B12 at 5000 mcg daily.  2.  Evidence of iron deficiency in January 2021 despite normal hemoglobin 12.5.  Patient was started on oral iron twice a day.   3.  Monoclonal gammopathy of undetermined significance, MGUS, IgA lambda.                                  HISTORY OF PRESENT ILLNESS:  The patient is a 79 y.o. year old female with type 2 diabetes, hypertension, coronary artery disease, vitamin B12 deficiency and iron deficiency, who presented today on 04/23/2024 for a 10-month follow-up evaluation to discuss the results of laboratory studies obtained on 04/16/2024.    She reports experiencing excessive sleepiness and a lack of energy. She denies any indications of bleeding. Approximately 1.5 years ago, she suffered a stroke and has been on anticoagulant therapy since then. Her current medication regimen includes oral iron, ferrous sulfate 325 mg once daily, sublingual vitamin B12 at 5000 mcg daily, and folic acid.     She has lost 20 pounds since her  passed away 1 year ago. She admits to being depressed and has been taking Effexor prescribed by Dr. Kevin Bolton.     The patient also reports she has been taking furosemide daily also because of ankle edema.  So she has been potassium supplement because of taking Lasix.      Lab studies on 4/16/2024 reported ferritin 102 ng/mL, free iron 59 saturation 18%, serum protein immunofixation was positive for IgA lambda, however not measurable M spike by SPEP.  Serum IgA was normal at 360 mg/dL, IgG 1087, Ig M73, free kappa chain 34.2 mg/L and free lambda chain 32.8 mg/L with a kappa/lambda ratio 1.04.  Patient had a normal hemoglobin 13.2, MCV 83.1 MCHC 32.3, platelets 160,000 WBC 6700 including neutrophils 4800 lymphocytes 1090.  Chemistry lab reported normal creatinine 0.91 normal electrolytes including calcium 10.0, normal liver  function panel, total protein 6.5 and albumin 3.6, glucose 104.        Past Medical History:   Diagnosis Date    Allergic     Seasonal alleries, Lisinopril, Dexamethasone    Anxiety     Cataract     Cararacts in both eyes.    Contact dermatitis     COVID-19 06/2022    DDD (degenerative disc disease), lumbar     Deep vein thrombosis June 11, 2022    Stroke    Depression     Diverticulosis June 2021    Dry eyes     Dry mouth     Essential hypertension     Female climacteric state     Headache     History of mammogram     8/2016    History of total right hip replacement 2013    Hypercalcemia     Hyperlipidemia     Iron deficiency anemia 05/27/2021    Added automatically from request for surgery [id number]    Low back pain     Lumbar area.    Neuromuscular disorder 03/2016    Nightmares REM-sleep type     Nonocclusive coronary atherosclerosis of native coronary artery     cath in 2008 with 30% LAD disease    Nontoxic multinodular goiter     Obesity     Osteoarthritis     both knees, back and hips    Osteopenia     Pain, joint, shoulder     Pernicious anemia     Pneumonia August 8, 2018    After Hospital stay.    Polycythemia     Rotator cuff tendinitis     Seasonal allergies     Sleep apnea     CPAP nightly    Snoring     Stroke 06/2022    Superficial phlebitis     Type 2 diabetes mellitus     Type II    Visual impairment August 30, 2019    Vitiligo      Past Surgical History:   Procedure Laterality Date    BREAST BIOPSY      BREAST LUMPECTOMY  1973    benign    CARDIAC CATHETERIZATION  2008    CATARACT EXTRACTION, BILATERAL Bilateral 2019    COLONOSCOPY  08/15/2010    COLONOSCOPY N/A 07/20/2021    Procedure: COLONOSCOPY TO CECUM;  Surgeon: Ellen Kothari MD;  Location: [facility] ENDOSCOPY;  Service: Gastroenterology;  Laterality: N/A;  pre: SCREENING FOR COLON CANCER  post: HEMORRHOIDS, DIVERTICULOSIS, TORTUOUS COLON    ENDOSCOPY N/A 07/20/2021    Procedure: ESOPHAGOGASTRODUODENOSCOPY WITH BX'S;  Surgeon: Ellen Kothari  MD;  Location: [facility] ENDOSCOPY;  Service: Gastroenterology;  Laterality: N/A;  pre: IRON DEFICIENCY ANEMIA  post: GASTRITIS, SMALL HIATAL HERNIA    EYE SURGERY      EYE SURGERY Left 05/2022    HYSTERECTOMY  1982    JOINT REPLACEMENT  07/10/2013    Total Hip Replacement    REPLACEMENT TOTAL KNEE Left 08/06/2018    Dr. Bishnu Larson    REPLACEMENT TOTAL KNEE Right     [facility]     TONSILLECTOMY  1967    TOTAL HIP ARTHROPLASTY Right 2013    UPPER GASTROINTESTINAL ENDOSCOPY  07/20/2021       HEMATOLOGY HISTORY:  The patient is a 76 y.o. year old female with history of type 2 diabetes, hypertension, coronary artery disease, who presented on 1/18/2021 for initial evaluation because of pernicious anemia.     Patient reports she has been given vitamin B12 injection monthly for the past 4 months. I reviewed her laboratory data in the Epic system and she had laboratory study on 01/14/2020 that reported vitamin B12 level of 437 pg/mL. She had normal hemoglobin 12.2, MCV 80.3, MCHC 32.5 and platelets 325,000, WBC 6400. Subsequently vitamin B12 level was retested on 08/21/2020 at 326 pg/mL with folate 7.05 ng/mL and normal hemoglobin 12.1, MCV 82.3, platelets 307,000 and WBC 6220. Apparently the patient was started on vitamin B12 injection.     Her laboratory studies on 11/30/2020 found that she had vitamin B12 level of 775 pg/mL and folate 5.68 ng/mL. She also had iron study that day reporting ferritin 52.8 ng/mL, free iron 74; however, no iron saturation.  Her laboratory study on 11/30/2020 also reported elevated parietal cell antibody 33.2 units. Has normal intrinsic factor 1.0 AU/mL. Negative for direct MELIZA rheumatoid factor and also negative for SSA antibody and SSB antibody.     Patient continues to have significant fatigue. She denies melena or hematochezia. She reports not taking oral iron nor folic acid. She reports no weight loss. No low fever.     I reviewed her previous CBC results dating back all the way to  04/15/2015 and most of the time she had normal hemoglobin. However, she has frequent microcytosis or marginal MCV value. She reports no family history of sickle cell disease or sickle cell trait. Suspect this patient of having iron deficiency.     I reviewed her peripheral blood smear on 1/18/2021. She does have anisocytosis. However, no target cells, no stomatocytes, no schistocytes. Morphology of WBC and platelets unremarkable.     I saw her originally on 1/18/2021 for consultation.  At that time patient had normal hemoglobin 12.5, MCV 80.2, platelets 263,000 and WBC 7550. Iron study came back reporting ferritin 49 ng/mL, free iron 38, TIBC 329 and iron saturation 12%.  Vitamin B12 level was 715 pg/mL and folate 5.94 ng/mL.  Indeed this patient has evidence of iron deficiency.     I recommended to start oral ferrous sulfate 325 mg b.i.d. for 3 months.  We also recommended switching vitamin B12 injection to sublingual B12 at 5000 mcg daily and oral folic acid 1 mg daily.  She voiced understanding and agreeable.    Laboratory study on 4/6/2021 reported improved hemoglobin 13.5, MCV 81.4, platelets 288,000 and WBC 7020 including ANC 5260.  Iron study reported improved ferritin 77.5 ng/mL, and iron saturation 20%.  Free iron 64 TIBC 316.  Patient also had supratherapeutic B12 > 2000 pg/mL and supratherapeutic folate > 20 ng/mL.     Patient reports on 10/18/2021 she is tolerating oral iron twice a day.  No nausea no vomiting.  No constipation or diarrhea.  She does have dark-colored stool since taking oral iron.      She continues to have some weight loss, per our records, she lost about 8 to 9 pounds in the past 6 months since April 2021.  She reports no fever no sweating or chills.    Laboratory study on 10/18/2021 reported normal CBC, supratherapeutic for both vitamin B12 > 2000 pg/mL, and folate > 20 ng/mL.  She also had further improved iron study with ferritin 100.4 ng/mL and iron saturation 22% and a normal  hemoglobin 12.8 with MCV 81.9, ferritin 100.4 ng/mL and iron saturation 22% with a free iron 65 TIBC 297.     Her vitamin B12 level was supratherapeutic > 2000 pg/mL on 2/17/2022 and also on 6/13/2022.    Laboratory study today on 11/14/2022 reported normal CBC with Hb 12.5, MCV 83.7, platelets 271,000, WBC 8100 including ANC 5590, lymphocytes 1400, iron study ferritin 84.7 ng/mL, free iron 49 TIBC 323 and iron saturation 15%.  Chemistry lab reported unremarkable CMP.       Laboratory studies today on 6/28/2023 report normal hemoglobin 12.5, MCV 85.1, MCHC 30.9, platelets 266,000, and WBC 7,030 including neutrophils 4920. The iron study reported ferritin 82 ng/mL, free iron 98, and TIBC 315, and iron saturation 31%. Chemistry labs reported unremarkable results except the glucose 139.    Lab study on 05/10/2023 reported supratherapeutic B12 level > 2000 pg/mL.    Laboratory study on 05/18/2023 reported monoclonal IgA lambda by immunofixation, however negative for M spike by serum protein electrophoresis. Quantification of immunoglobulins normal IgG 98, IgA 334, and IgM 67 without free light chains.    Laboratory Studies on 04/16/2024: Positive for monoclonal IgA Kendal; however, negative SPP study. Serum IgA 360, IgG 1087, IgM 73. Serum free kappa chain 34.2. Lambda light chain 32.8. Hemoglobin 13.3, MCV 83.1, platelets 160,000, WBC 6710, neutrophils 4800, lymphocytes 1090, monocytes 460. Ferritin 102 ng/mL, free iron 58, TIBC 360, marginal low iron saturation 18 percent. Normal liver function and electrolytes. Creatinine 0.91. Glucose 104.    MEDICATIONS    Current Outpatient Medications:     Accu-Chek FastClix Lancets misc, Use to check blood sugar once daily, Disp: 102 each, Rfl: 5    amLODIPine (NORVASC) 5 MG tablet, Take 1 tablet by mouth Daily for 270 days., Disp: 90 tablet, Rfl: 2    apixaban (Eliquis) 5 MG tablet tablet, Take 1 tablet by mouth Every 12 (Twelve) Hours., Disp: 180 tablet, Rfl: 1    atorvastatin  (LIPITOR) 20 MG tablet, Take 1 tablet by mouth Every Morning for 270 days., Disp: 90 tablet, Rfl: 2    Blood Glucose Monitoring Suppl (Blood Glucose Monitor System) w/Device kit, BS meter Accu-chek Catherine plus meter. DX E11.9, Disp: 1 each, Rfl: 1    Calcium Carb-Cholecalciferol 500-200 MG-UNIT tablet, Take 2 tablets by mouth daily., Disp: , Rfl:     clonazePAM (KlonoPIN) 0.5 MG tablet, Take 1 tablet by mouth Every Night., Disp: , Rfl:     Cyanocobalamin ([employer] Vitamin B-12) 5000 MCG sublingual tablet, Place 5,000 mcg under the tongue Daily., Disp: 90 tablet, Rfl: 3    ferrous sulfate 325 (65 FE) MG tablet, TAKE 1 TABLET BY MOUTH EVERY DAY WITH BREAKFAST, Disp: 90 tablet, Rfl: 1    fluticasone (FLONASE) 50 MCG/ACT nasal spray, 1 spray into the nostril(s) as directed by provider As Needed., Disp: , Rfl:     Fluzone High-Dose Quadrivalent 0.7 ML suspension prefilled syringe injection, , Disp: , Rfl:     folic acid (FOLVITE) 1 MG tablet, TAKE 1 TABLET BY MOUTH EVERY DAY, Disp: 90 tablet, Rfl: 1    furosemide (LASIX) 40 MG tablet, Take 1 tablet by mouth Daily., Disp: 90 tablet, Rfl: 1    glucose blood (Accu-Chek Catherine Plus) test strip, Use as instructed, Disp: 360 each, Rfl: 1    metFORMIN (GLUCOPHAGE) 500 MG tablet, Take 1 tablet by mouth 2 (Two) Times a Day With Meals. Indications: Type 2 Diabetes, Disp: 180 tablet, Rfl: 2    metoprolol succinate XL (TOPROL-XL) 50 MG 24 hr tablet, TAKE 1 TABLET BY MOUTH EVERY DAY, Disp: 30 tablet, Rfl: 0    Mirabegron ER (MYRBETRIQ) 50 MG tablet sustained-release 24 hour 24 hr tablet, Take 50 mg by mouth Daily., Disp: , Rfl:     pilocarpine (SALAGEN) 5 MG tablet, 1 tablet 3 (Three) Times a Day., Disp: , Rfl:     potassium chloride (KAYCIEL) 20 mEq/15 mL solution, MIX 15ML IN 4 OUNCES OF LIQUID AND DRINK DAILY, Disp: 1350 mL, Rfl: 2    prednisoLONE acetate (PRED FORTE) 1 % ophthalmic suspension, INSTILL 1 DROP INTO BOTH EYES TWICE A DAY TO REPLACE DUREZOL, Disp: , Rfl:     Prolensa 0.07 %  solution, PLACE ONE DROP IN BOTH EYES ONCE DAILY FOR MAINTENANCE, Disp: , Rfl:     Restasis 0.05 % ophthalmic emulsion, 2 (two) times a day., Disp: , Rfl:     risedronate (ACTONEL) 35 MG tablet, TAKE 1 TABLET WEEKLY WITH WATER ON EMPTY STOMACH, NOTHING BY MOUTH OR LIE DOWN FOR NEXT 30 MINUTES., Disp: 12 tablet, Rfl: 2    venlafaxine (EFFEXOR) 75 MG tablet, Take 1 tablet by mouth 2 (Two) Times a Day. Pt is taking 1 tab in the morning and 2 tabs at night, Disp: , Rfl:     ALLERGIES:     Allergies   Allergen Reactions    Hydrochlorothiazide Other (See Comments)     In 2015-developed hypercalcemia-HCTZ was discontinued  Other reaction(s): Other (See Comments)  In 2015-developed hypercalcemia-HCTZ was discontinued  In 2015-developed hypercalcemia-HCTZ was discontinued    Dexamethasone Dermatitis     Other reaction(s): Other (See Comments)  CONTACT DERMATITIS    Lisinopril Cough     Other reaction(s): Other (See Comments)  COUGH       SOCIAL HISTORY:       Social History     Socioeconomic History    Marital status:    Tobacco Use    Smoking status: Never    Smokeless tobacco: Never    Tobacco comments:     I have never smoked.   Vaping Use    Vaping status: Never Used   Substance and Sexual Activity    Alcohol use: No     Comment: CAFFEINE: none    Drug use: No    Sexual activity: Not Currently     Partners: Male     Birth control/protection: Post-menopausal     Comment: I had a Hysterectomy in .         FAMILY HISTORY:  Family History   Problem Relation Age of Onset    Diabetes Mother         Mother    Thyroid disease Mother     Hypertension Father         Father      Heart disease Father         Father      Arthritis Father         Father    Hyperlipidemia Father         Father    Asthma Sister         Sister      Hypertension Sister         Sister      Miscarriages / Stillbirths Sister         Sister    Thyroid disease Sister         Sister    Diabetes Sister         Sister     "Hypertension Sister         Sister    Early death Sister         Infant    Hypertension Brother         Brother      Diabetes Brother         Brother    Kidney disease Brother         Brother, Renal Failure    Vision loss Brother         Brother    Cancer Brother         Brother    Lung cancer Brother     Kidney disease Brother         Brother,  Renal Failure    Stroke Brother         Brother       REVIEW OF SYSTEMS:  See HPI           Vitals:    24 1431   BP: 119/72   Pulse: 85   Temp: 98 °F (36.7 °C)   SpO2: 97%   Weight: 82.1 kg (181 lb)   Height: 160 cm (62.99\")   PainSc:   6   PainLoc: Back         2024     2:31 PM   Current Status   ECOG score 0      PHYSICAL EXAM:    2024  GENERAL:  Well-developed, well-nourished in no acute distress.    SKIN:  Warm, dry without rashes, purpura or petechiae.  HEENT:  Normocephalic.   LYMPHATICS:  No cervical, supraclavicular adenopathy.  CHEST: Normal respiratory effort.  Lungs clear to auscultation. Good airflow.  CARDIAC:  Regular rate and rhythm without murmurs. Normal S1,S2.  ABDOMEN:  Soft, nontender with no organomegaly or masses.  Bowel sounds normal.  EXTREMITIES:  No lower extremity edema.      RECENT LABS:  Lab Results   Component Value Date    WBC 6.71 2024    HGB 13.3 2024    HCT 41.2 2024    MCV 83.1 2024     2024     Lab Results   Component Value Date    NEUTROABS 4.80 2024     Lab Results   Component Value Date    IRON 58 2024    TIBC 316 2024    FERRITIN 102.00 2024   Iron saturation 18% on 2024, was 31% on 2023.     Lab Results   Component Value Date    [id number] >2,000 (H) 2023     Lab Results   Component Value Date    FOLATE >20.00 10/18/2021         Assessment & Plan     ASSESSMENT:   1. Pernicious anemia with vitamin B12 deficiency.   She apparently has some improvement of vitamin B12 level after 3 monthly B12 injections. However, B12 could be further " improved to a much higher level.   I recommended on 1/18/2021 to try sublingual vitamin B12 supplementation to see if that helps. If it does not then she will need to have vitamin B12 injection twice a month. We will obtain laboratory study to document new baseline today.   On 4/6/2021, patient had a supratherapeutic B12 level > 2000 pg/mL.  On 10/18/2021, vitamin B12 > 2000 pg/mL.  She will continue sublingual B12.   Her vitamin B12 level was supratherapeutic > 2000 pg/mL on 2/17/2022 and also on 6/13/2022.  On 11/14/2022 patient reports she takes vitamin B12 every other day.  The patient had supratherapeutic B12 level in 05/2023. She will continue vitamin B12 daily.  6/20/2023 vitamin B12 > 2000 pg/mL.   On 4/23/2024 patient reports continue on vitamin B12 daily.     2. Suboptimal folate level.  She has marginally normal folic acid 5.68 ng/mL on 11/30/2020 and actually was deteriorating compared to the level in 08/2020.   Confirmed low normal folate level 5.94 on 1/18/2021.  I recommended to start folic acid 1 mg daily to help with erythropoiesis.   On 4/6/2021 patient had supratherapeutic folate > 20 ng/mL.   On 10/18/2021, supratherapeutic folate > 20 ng/mL.  On 4/23/2024 patient continues on daily folic acid supplement.    3.  Iron deficiency anemia.   Patient reports no family history for sickle cell disease or sickle cell trait.   I am highly suspicious of this patient having iron deficiency which could cause significant fatigue.   Lab study on 1/18/2021 reported iron saturation 12% with free iron 38 TIBC 329, and ferritin 49 ng/mL.    We started patient on ferrous sulfate 325 mg twice a day.  Patient reported good tolerance of oral iron treatment.  Lab study on 4/6/2021 reported improved ferritin 78, and normalized iron saturation 20% and hemoglobin 13.5.   On 10/18/2021, hemoglobin 12.8, with ferritin 100.4 ng/mL and iron saturation 22%.  This is further improved.  Continue oral iron twice a day.    Laboratory study on 11/14/2022 reported normal CBC with Hb 12.5, MCV 83.7, ferritin 84.7 ng/mL, free iron 49 TIBC 323 and iron saturation 15%. On 11/14/22, oral iron once a day and also folic acid daily.    On 5/10/2023 B12 level > 2000 pg/mL.  Laboratory studies on 06/28/2023 reported normal hemoglobin 12.5, MCV 85.1, however, low MCHC 30.9. Her iron studies showed normalized iron saturation 31%, however, stable ferritin level 82. I asked the patient to continue oral iron once a day.  Patient also had a B12 level > 2000 pg/mL.  Lab studies on 4/16/2024 reported ferritin 102 ng/mL, free iron 59 saturation 18%, normal hemoglobin 13.2, MCV 83.1 MCHC 32.3.      4. COVID-19 vaccine.   Received 2 doses of Pfizer vaccine.   Patient had COVID-19 infection in 2022.  Continues to have fatigue from the infection.    5. Monoclonal gammopathy of undetermined significance, MGS, IgA lambda  The patient had an abnormal laboratory study on 05/18/2023 for assessment because of mild hypercalcemia with the peak calcium level 10.9 on 05/10/2023. Laboratory study on 05/18/2023 reported normal PTH and PTHrP. Serum protein immunofixation was positive for monoclonal IgA lambda, however, negative for M spike by protein electrophoresis. Quantification of serum IgA was also normal at 334 together with normal IgG 980 and IgM 67. Discussed with the patient, this is a premenarchal situation, with risk of developing multiple myeloma 1%. However, she is having unquantifiable monoclonal protein, the risk of developing multiple myeloma would be smaller. Nevertheless, this study needs to be repeated on an annual basis.  Laboratory Studies on 04/16/2024: Positive for monoclonal IgA Kendal; however, negative SPP study. Serum IgA 360, IgG 1087, IgM 73. Serum free kappa chain 34.2. Lambda light chain 32.8.  Normal liver function and electrolytes. Creatinine 0.91. Glucose 104 mg/dL.      PLAN:    1. Continue oral iron once a day.   2. Continue sublingual  vitamin B12 once a day.   3. Continue folic acid 1 mg daily.   4. Continue Eliquis anticoagulation twice a day because of the stroke.  5. I will see the patient in 12 months again, about 2 weeks after laboratory studies for CBC, CMP, serum paraprotein studies and iron studies.  6. The patient will continue to follow up with the primary care physician and for management of depression and atrial fibrillation.    I discussed with the patient about laboratory results and further management plan.  Patient voiced understanding and agreeable.       Miguel A Nicholson MD PhD     CC:   Reji Talamantes MD          Transcribed from ambient dictation for Miguel A Nicholson MD PhD by Edna Castañeda.  04/23/24   16:06 EDT    Patient or patient representative verbalized consent to the visit recording.  I have personally performed the services described in this document as transcribed by the above individual, and it is both accurate and complete.  Miguel A Nicholson MD PhD

## 2024-05-14 ENCOUNTER — OFFICE VISIT (OUTPATIENT)
Dept: ENDOCRINOLOGY | Age: 79
End: 2024-05-14
Payer: MEDICARE

## 2024-05-14 VITALS
OXYGEN SATURATION: 98 % | BODY MASS INDEX: 32.18 KG/M2 | TEMPERATURE: 97.6 F | DIASTOLIC BLOOD PRESSURE: 76 MMHG | SYSTOLIC BLOOD PRESSURE: 132 MMHG | HEART RATE: 90 BPM | HEIGHT: 63 IN | WEIGHT: 181.6 LBS

## 2024-05-14 DIAGNOSIS — M81.0 AGE-RELATED OSTEOPOROSIS WITHOUT CURRENT PATHOLOGICAL FRACTURE: ICD-10-CM

## 2024-05-14 DIAGNOSIS — G47.33 OBSTRUCTIVE SLEEP APNEA ON CPAP: ICD-10-CM

## 2024-05-14 DIAGNOSIS — I10 ESSENTIAL HYPERTENSION: ICD-10-CM

## 2024-05-14 DIAGNOSIS — I25.10 NONOCCLUSIVE CORONARY ATHEROSCLEROSIS OF NATIVE CORONARY ARTERY: ICD-10-CM

## 2024-05-14 DIAGNOSIS — E11.9 TYPE 2 DIABETES MELLITUS WITHOUT COMPLICATION, WITHOUT LONG-TERM CURRENT USE OF INSULIN: Primary | ICD-10-CM

## 2024-05-14 DIAGNOSIS — E78.5 HYPERLIPIDEMIA, UNSPECIFIED HYPERLIPIDEMIA TYPE: ICD-10-CM

## 2024-05-14 DIAGNOSIS — E04.2 NONTOXIC MULTINODULAR GOITER: ICD-10-CM

## 2024-05-14 DIAGNOSIS — D47.2 MONOCLONAL GAMMOPATHY: ICD-10-CM

## 2024-05-14 PROCEDURE — 3075F SYST BP GE 130 - 139MM HG: CPT | Performed by: INTERNAL MEDICINE

## 2024-05-14 PROCEDURE — 3078F DIAST BP <80 MM HG: CPT | Performed by: INTERNAL MEDICINE

## 2024-05-14 PROCEDURE — 99214 OFFICE O/P EST MOD 30 MIN: CPT | Performed by: INTERNAL MEDICINE

## 2024-05-14 NOTE — PROGRESS NOTES
Subjective   Dejah Casey is a 79 y.o. female.     History of Present Illness     She was found to have a goiter on examination last August 2012. She had thyroid ultrasound done on 08/21/2012 which showed a multinodular goiter with the dominant solid nodule in the left measuring 1.8 cm. She had followup thyroid ultrasound in 4/14 at [facility] showed multinodular goiter with stable nodules.       She denies any pressure symptoms or dysphagia. She denies any bowel changes.  She has no previous history of head or neck radiation therapy.      She has known diabetes mellitus since 2003 and has been on metformin 500 mg twice a day. FBS  .  Her last meal was at 1 PM.     She has no microalbuminuria on urine sample taken 11/23.   Her last eye exam was 1/24.  She has no retinopathy.  She denies tingling in her feet.       She has hyperlipidemia and is on Lipitor 20 mg once a day.  She denies any myalgia.        She has mild coronary artery disease by cardiac catheterization done in 2008 by Dr. CAMILLE Mcmullen. She denies any chest pain.  She had a normal nuclear stress tests in July 2018.  She is following up with Dr. Leal.      She has hypertension and has been on amlodipine 5 mg once a day, furosemide 40 mg 3 times a week and Toprol 50 mg/day. She was taken off hydrochlorothiazide because of hypercalcemia. She had cough with lisinopril in the past. She has not used ARB in the past.  She denies orthopnea or PND or pedal edema.      She has overactive bladder diagnosed by Dr. Dean and is on Myrbetriq.  She was taken off Toviaz because of mouth dryness.  She was taken off Vesicare because of cost.  She denies mouth dryness     She has osteopenia on bone density done at [facility] in November 2015.  She is on Tums 1 tablet as needed for gas.  Bone density done in December 2017 showed improvement of the left hip density and a slight decrease in the lumbar spine.     Bone density done in August 2022 showed osteoporosis.  She  "started on risedronate 35 mg weekly on February 17, 2023.  She denies side effects.    She will see a physical therapist for scoliosis.     She has sleep apnea and is using her CPAP regularly.  She wakes up tired.  She is following with Dr. Marcum.     She denies constipation.  She denies heartburn, melena or hematochezia.  Her last colonoscopy was in July 2021 and she has hemorrhoids and diverticulosis.  EGD done in July 2021 showed gastritis and small hiatal hernia.     She has vitamin B12 deficiency, iron deficiency and monoclonal gammopathy of undetermined significance.  She is on vitamin B12 5000 mcg SL daily, ferrous sulfate 325 mg/day, and folic acid 1 mg/day.  Parietal cell antibodies were elevated.  Intrinsic factor antibody was normal.  She is being followed by Dr. Nicholson.     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  She denies muscle weakness.  She is on Eliquis.  She denies bleeding.         The following portions of the patient's history were reviewed and updated as appropriate: allergies, current medications, past family history, past medical history, past social history, past surgical history, and problem list.    Review of Systems   Eyes:  Negative for visual disturbance.   Respiratory:  Negative for shortness of breath.    Gastrointestinal:  Negative for anal bleeding, blood in stool, constipation and diarrhea.   Genitourinary:  Negative for hematuria.   Musculoskeletal:  Negative for myalgias.   Neurological:  Negative for numbness.     Vitals:    05/14/24 1435   BP: 132/76   Pulse: 90   Temp: 97.6 °F (36.4 °C)   TempSrc: Temporal   SpO2: 98%   Weight: 82.4 kg (181 lb 9.6 oz)   Height: 160 cm (62.99\")      Objective   Physical Exam  Constitutional:       Appearance: Normal appearance. She is obese.   Eyes:      General: No scleral icterus.        Right eye: No discharge.         Left eye: No discharge.      Extraocular Movements: Extraocular movements intact.      " Conjunctiva/sclera: Conjunctivae normal.   Neck:      Vascular: No carotid bruit.   Cardiovascular:      Rate and Rhythm: Normal rate and regular rhythm.      Heart sounds: Normal heart sounds. No murmur heard.     No gallop.   Pulmonary:      Breath sounds: Normal breath sounds. No rales.   Chest:      Chest wall: No tenderness.   Abdominal:      General: Bowel sounds are normal.      Palpations: Abdomen is soft.      Tenderness: There is no right CVA tenderness or left CVA tenderness.   Musculoskeletal:      Right lower leg: No edema.      Left lower leg: No edema.   Lymphadenopathy:      Cervical: No cervical adenopathy.   Neurological:      Mental Status: She is alert and oriented to person, place, and time.      Comments: Intact light touch in lower extremities.       Lab on 04/16/2024   Component Date Value Ref Range Status    Glucose 04/16/2024 104 (H)  65 - 99 mg/dL Final    BUN 04/16/2024 20  8 - 23 mg/dL Final    Creatinine 04/16/2024 0.91  0.57 - 1.00 mg/dL Final    Sodium 04/16/2024 141  136 - 145 mmol/L Final    Potassium 04/16/2024 3.8  3.5 - 5.2 mmol/L Final    Chloride 04/16/2024 102  98 - 107 mmol/L Final    CO2 04/16/2024 27.8  22.0 - 29.0 mmol/L Final    Calcium 04/16/2024 10.0  8.6 - 10.5 mg/dL Final    Total Protein 04/16/2024 6.8  6.0 - 8.5 g/dL Final    Albumin 04/16/2024 4.1  3.5 - 5.2 g/dL Final    ALT (SGPT) 04/16/2024 12  1 - 33 U/L Final    AST (SGOT) 04/16/2024 18  1 - 32 U/L Final    Alkaline Phosphatase 04/16/2024 65  39 - 117 U/L Final    Total Bilirubin 04/16/2024 0.2  0.0 - 1.2 mg/dL Final    Globulin 04/16/2024 2.7  gm/dL Final    A/G Ratio 04/16/2024 1.5  g/dL Final    BUN/Creatinine Ratio 04/16/2024 22.0  7.0 - 25.0 Final    Anion Gap 04/16/2024 11.2  5.0 - 15.0 mmol/L Final    eGFR 04/16/2024 63.9  >60.0 mL/min/1.73 Final    Ferritin 04/16/2024 102.00  13.00 - 150.00 ng/mL Final    Iron 04/16/2024 58  37 - 145 mcg/dL Final    Iron Saturation (TSAT) 04/16/2024 18 (L)  20 - 50 %  Final    Transferrin 04/16/2024 212  200 - 360 mg/dL Final    TIBC 04/16/2024 316  298 - 536 mcg/dL Final    IgG 04/16/2024 1087  586 - 1602 mg/dL Final    IgA 04/16/2024 360  64 - 422 mg/dL Final    IgM 04/16/2024 73  26 - 217 mg/dL Final    Total Protein 04/16/2024 6.5  6.0 - 8.5 g/dL Final    Albumin 04/16/2024 3.6  2.9 - 4.4 g/dL Final    Alpha-1-Globulin 04/16/2024 0.2  0.0 - 0.4 g/dL Final    Alpha-2-Globulin 04/16/2024 0.8  0.4 - 1.0 g/dL Final    Beta Globulin 04/16/2024 0.9  0.7 - 1.3 g/dL Final    Gamma Globulin 04/16/2024 1.0  0.4 - 1.8 g/dL Final    M-Alex 04/16/2024 Comment:  Not Observed g/dL Final    Due to the small quantity of monoclonal protein, unable to  quantitate the M-spike.    Globulin 04/16/2024 2.9  2.2 - 3.9 g/dL Final    A/G Ratio 04/16/2024 1.3  0.7 - 1.7 Final    Immunofixation Reflex, Serum 04/16/2024 Comment (A)   Final    Immunofixation shows IgA monoclonal protein with lambda light chain  specificity.    Please note 04/16/2024 Comment   Final    Protein electrophoresis scan will follow via computer, mail, or   delivery.    Free Light Chain, Kappa 04/16/2024 34.2 (H)  3.3 - 19.4 mg/L Final    Free Lambda Light Chains 04/16/2024 32.8 (H)  5.7 - 26.3 mg/L Final    Kappa/Lambda Ratio 04/16/2024 1.04  0.26 - 1.65 Final    WBC 04/16/2024 6.71  3.40 - 10.80 10*3/mm3 Final    RBC 04/16/2024 4.96  3.77 - 5.28 10*6/mm3 Final    Hemoglobin 04/16/2024 13.3  12.0 - 15.9 g/dL Final    Hematocrit 04/16/2024 41.2  34.0 - 46.6 % Final    MCV 04/16/2024 83.1  79.0 - 97.0 fL Final    MCH 04/16/2024 26.8  26.6 - 33.0 pg Final    MCHC 04/16/2024 32.3  31.5 - 35.7 g/dL Final    RDW 04/16/2024 14.3  12.3 - 15.4 % Final    RDW-SD 04/16/2024 42.8  37.0 - 54.0 fl Final    MPV 04/16/2024 11.8  6.0 - 12.0 fL Final    Platelets 04/16/2024 160  140 - 450 10*3/mm3 Final    Neutrophil % 04/16/2024 71.5  42.7 - 76.0 % Final    Lymphocyte % 04/16/2024 16.2 (L)  19.6 - 45.3 % Final    Monocyte % 04/16/2024  6.9  5.0 - 12.0 % Final    Eosinophil % 04/16/2024 3.9  0.3 - 6.2 % Final    Basophil % 04/16/2024 1.2  0.0 - 1.5 % Final    Immature Grans % 04/16/2024 0.3  0.0 - 0.5 % Final    Neutrophils, Absolute 04/16/2024 4.80  1.70 - 7.00 10*3/mm3 Final    Lymphocytes, Absolute 04/16/2024 1.09  0.70 - 3.10 10*3/mm3 Final    Monocytes, Absolute 04/16/2024 0.46  0.10 - 0.90 10*3/mm3 Final    Eosinophils, Absolute 04/16/2024 0.26  0.00 - 0.40 10*3/mm3 Final    Basophils, Absolute 04/16/2024 0.08  0.00 - 0.20 10*3/mm3 Final    Immature Grans, Absolute 04/16/2024 0.02  0.00 - 0.05 10*3/mm3 Final    nRBC 04/16/2024 0.0  0.0 - 0.2 /100 WBC Final     Assessment & Plan   Diagnoses and all orders for this visit:    1. Type 2 diabetes mellitus without complication, without long-term current use of insulin (Primary)  -     Comprehensive Metabolic Panel  -     Hemoglobin A1c    2. Nontoxic multinodular goiter  -     TSH  -     T4, Free    3. Hyperlipidemia, unspecified hyperlipidemia type  -     Lipid Panel    4. Essential hypertension    5. Age-related osteoporosis without current pathological fracture  -     Vitamin D,25-Hydroxy    6. Obstructive sleep apnea on CPAP    7. Monoclonal gammopathy    8. Nonocclusive coronary atherosclerosis of native coronary artery      Check thyroid function tests.    Continue metformin 500 mg twice a day.    Continue Lipitor 20 mg/day.    Follow-up with Dr. Leal as scheduled.    Continue alendronate 35 mg weekly.    Continue CPAP.    Copy of my note sent to Dr. Lowe, Dr. Marcum and Dr. Leal.    RTC 6 mos

## 2024-05-15 LAB
25(OH)D3+25(OH)D2 SERPL-MCNC: 64.7 NG/ML (ref 30–100)
ALBUMIN SERPL-MCNC: 4.2 G/DL (ref 3.5–5.2)
ALBUMIN/GLOB SERPL: 1.8 G/DL
ALP SERPL-CCNC: 66 U/L (ref 39–117)
ALT SERPL-CCNC: 13 U/L (ref 1–33)
AST SERPL-CCNC: 13 U/L (ref 1–32)
BILIRUB SERPL-MCNC: 0.3 MG/DL (ref 0–1.2)
BUN SERPL-MCNC: 19 MG/DL (ref 8–23)
BUN/CREAT SERPL: 23.2 (ref 7–25)
CALCIUM SERPL-MCNC: 10.3 MG/DL (ref 8.6–10.5)
CHLORIDE SERPL-SCNC: 105 MMOL/L (ref 98–107)
CHOLEST SERPL-MCNC: 123 MG/DL (ref 0–200)
CO2 SERPL-SCNC: 28.6 MMOL/L (ref 22–29)
CREAT SERPL-MCNC: 0.82 MG/DL (ref 0.57–1)
EGFRCR SERPLBLD CKD-EPI 2021: 72.4 ML/MIN/1.73
GLOBULIN SER CALC-MCNC: 2.4 GM/DL
GLUCOSE SERPL-MCNC: 98 MG/DL (ref 65–99)
HBA1C MFR BLD: 5.9 % (ref 4.8–5.6)
HDLC SERPL-MCNC: 65 MG/DL (ref 40–60)
IMP & REVIEW OF LAB RESULTS: NORMAL
LDLC SERPL CALC-MCNC: 46 MG/DL (ref 0–100)
POTASSIUM SERPL-SCNC: 4.4 MMOL/L (ref 3.5–5.2)
PROT SERPL-MCNC: 6.6 G/DL (ref 6–8.5)
SODIUM SERPL-SCNC: 144 MMOL/L (ref 136–145)
T4 FREE SERPL-MCNC: 0.99 NG/DL (ref 0.93–1.7)
TRIGL SERPL-MCNC: 53 MG/DL (ref 0–150)
TSH SERPL DL<=0.005 MIU/L-ACNC: 1.12 UIU/ML (ref 0.27–4.2)
VLDLC SERPL CALC-MCNC: 12 MG/DL (ref 5–40)

## 2024-05-17 NOTE — PROGRESS NOTES
Hemoglobin A1c 5.9%.  Diabetes is well-controlled.  LDL 46.  HDL 65.  Continue Lipitor 20 mg/day.  Normal thyroid function tests.  Normal vitamin D.  Send copy of labs to Dr. Ellen Lowe.  Copy of labs sent to patient through [employer].

## 2024-05-20 DIAGNOSIS — I63.10 CEREBRAL INFARCTION DUE TO EMBOLISM OF UNSPECIFIED PRECEREBRAL ARTERY: ICD-10-CM

## 2024-05-20 DIAGNOSIS — I10 ESSENTIAL HYPERTENSION: ICD-10-CM

## 2024-05-21 RX ORDER — APIXABAN 5 MG/1
5 TABLET, FILM COATED ORAL EVERY 12 HOURS
Qty: 180 TABLET | Refills: 3 | Status: SHIPPED | OUTPATIENT
Start: 2024-05-21

## 2024-05-21 RX ORDER — METOPROLOL SUCCINATE 50 MG/1
50 TABLET, EXTENDED RELEASE ORAL DAILY
Qty: 90 TABLET | Refills: 3 | Status: SHIPPED | OUTPATIENT
Start: 2024-05-21

## 2024-06-12 DIAGNOSIS — M85.89 OSTEOPENIA OF MULTIPLE SITES: ICD-10-CM

## 2024-06-12 RX ORDER — POTASSIUM CHLORIDE 20MEQ/15ML
20 LIQUID (ML) ORAL DAILY
Qty: 1350 ML | Refills: 2 | Status: SHIPPED | OUTPATIENT
Start: 2024-06-12

## 2024-07-01 RX ORDER — FOLIC ACID 1 MG/1
TABLET ORAL
Qty: 90 TABLET | Refills: 1 | Status: SHIPPED | OUTPATIENT
Start: 2024-07-01

## 2024-08-08 ENCOUNTER — TELEPHONE (OUTPATIENT)
Dept: ENDOCRINOLOGY | Age: 79
End: 2024-08-08
Payer: MEDICARE

## 2024-08-08 DIAGNOSIS — M85.89 OSTEOPENIA OF MULTIPLE SITES: ICD-10-CM

## 2024-08-08 RX ORDER — POTASSIUM CHLORIDE 20MEQ/15ML
20 LIQUID (ML) ORAL DAILY
Qty: 1350 ML | Refills: 2 | Status: SHIPPED | OUTPATIENT
Start: 2024-08-08

## 2024-08-08 NOTE — TELEPHONE ENCOUNTER
Caller: Casey Dejah RIVERA    Relationship: Self    Best call back number: [phone]     Requested Prescriptions:   Requested Prescriptions      No prescriptions requested or ordered in this encounter    [facility]    Pharmacy where request should be sent:    [facility], [address]  Last office visit with prescribing clinician: 5/14/2024     Next office visit with prescribing clinician: 11/14/2024     Additional details provided by patient: [facility] SAYS THAT THEY ARE OUT OF STICK ON POTASSIUM ORAL SOLUTION, PT REQUESTS THAT IT BE TRANSFERRED TO [facility].     Does the patient have less than a 3 day supply:  [x] Yes  [] No    Would you like a call back once the refill request has been completed: [x] Yes [] No    If the office needs to give you a call back, can they leave a voicemail: [x] Yes [] No    Calixto Blackwell Rep   08/08/24 14:16 EDT

## 2024-08-21 DIAGNOSIS — M85.89 OSTEOPENIA OF MULTIPLE SITES: ICD-10-CM

## 2024-08-21 RX ORDER — RISEDRONATE SODIUM 35 MG/1
TABLET, FILM COATED ORAL
Qty: 12 TABLET | Refills: 2 | Status: SHIPPED | OUTPATIENT
Start: 2024-08-21

## 2024-08-21 NOTE — TELEPHONE ENCOUNTER
Rx Refill Note  Requested Prescriptions     Pending Prescriptions Disp Refills    risedronate (ACTONEL) 35 MG tablet [Pharmacy Med Name: RISEDRONATE  35MG  TAB] 12 tablet 3     Sig: TAKE 1 TABLET BY MOUTH WEEKLY  WITH 8 OZ OF PLAIN WATER 30  MINUTES BEFORE FIRST FOOD, DRINK OR MEDS. STAY UPRIGHT FOR 30  MINS      Last office visit with prescribing clinician: 5/14/2024   Last telemedicine visit with prescribing clinician: Visit date not found   Next office visit with prescribing clinician: 11/14/2024                         Would you like a call back once the refill request has been completed: [] Yes [] No    If the office needs to give you a call back, can they leave a voicemail: [] Yes [] No    Amy Yusuf MA  08/21/24, 07:39 EDT

## 2024-08-24 ENCOUNTER — APPOINTMENT (OUTPATIENT)
Dept: CT IMAGING | Facility: HOSPITAL | Age: 79
End: 2024-08-24
Payer: MEDICARE

## 2024-08-24 ENCOUNTER — HOSPITAL ENCOUNTER (EMERGENCY)
Facility: HOSPITAL | Age: 79
Discharge: HOME | End: 2024-08-24
Attending: EMERGENCY MEDICINE
Payer: MEDICARE

## 2024-08-24 VITALS
RESPIRATION RATE: 16 BRPM | HEART RATE: 92 BPM | DIASTOLIC BLOOD PRESSURE: 67 MMHG | HEIGHT: 63 IN | TEMPERATURE: 97.4 F | OXYGEN SATURATION: 94 % | WEIGHT: 179 LBS | SYSTOLIC BLOOD PRESSURE: 133 MMHG | BODY MASS INDEX: 31.71 KG/M2

## 2024-08-24 DIAGNOSIS — N20.1 LEFT URETERAL STONE: Primary | ICD-10-CM

## 2024-08-24 DIAGNOSIS — R10.9 ACUTE LEFT FLANK PAIN: ICD-10-CM

## 2024-08-24 LAB
ALBUMIN SERPL-MCNC: 4.5 G/DL (ref 3.5–5.2)
ALBUMIN/GLOB SERPL: 1.6 G/DL
ALP SERPL-CCNC: 78 U/L (ref 39–117)
ALT SERPL W P-5'-P-CCNC: 12 U/L (ref 1–33)
ANION GAP SERPL CALCULATED.3IONS-SCNC: 12 MMOL/L (ref 5–15)
AST SERPL-CCNC: 15 U/L (ref 1–32)
BASOPHILS # BLD AUTO: 0.05 10*3/MM3 (ref 0–0.2)
BASOPHILS NFR BLD AUTO: 0.5 % (ref 0–1.5)
BILIRUB SERPL-MCNC: 0.2 MG/DL (ref 0–1.2)
BILIRUB UR QL STRIP: NEGATIVE
BUN SERPL-MCNC: 19 MG/DL (ref 8–23)
BUN/CREAT SERPL: 23.8 (ref 7–25)
CALCIUM SPEC-SCNC: 10.4 MG/DL (ref 8.6–10.5)
CHLORIDE SERPL-SCNC: 102 MMOL/L (ref 98–107)
CLARITY UR: CLEAR
CO2 SERPL-SCNC: 27 MMOL/L (ref 22–29)
COLOR UR: YELLOW
CREAT SERPL-MCNC: 0.8 MG/DL (ref 0.57–1)
DEPRECATED RDW RBC AUTO: 39.7 FL (ref 37–54)
EGFRCR SERPLBLD CKD-EPI 2021: 74.6 ML/MIN/1.73
EOSINOPHIL # BLD AUTO: 0.05 10*3/MM3 (ref 0–0.4)
EOSINOPHIL NFR BLD AUTO: 0.5 % (ref 0.3–6.2)
ERYTHROCYTE [DISTWIDTH] IN BLOOD BY AUTOMATED COUNT: 13.2 % (ref 12.3–15.4)
GLOBULIN UR ELPH-MCNC: 2.8 GM/DL
GLUCOSE SERPL-MCNC: 147 MG/DL (ref 65–99)
GLUCOSE UR STRIP-MCNC: NEGATIVE MG/DL
HCT VFR BLD AUTO: 41.3 % (ref 34–46.6)
HGB BLD-MCNC: 13.2 G/DL (ref 12–15.9)
HGB UR QL STRIP.AUTO: NEGATIVE
IMM GRANULOCYTES # BLD AUTO: 0.04 10*3/MM3 (ref 0–0.05)
IMM GRANULOCYTES NFR BLD AUTO: 0.4 % (ref 0–0.5)
KETONES UR QL STRIP: ABNORMAL
LEUKOCYTE ESTERASE UR QL STRIP.AUTO: NEGATIVE
LIPASE SERPL-CCNC: 49 U/L (ref 13–60)
LYMPHOCYTES # BLD AUTO: 0.84 10*3/MM3 (ref 0.7–3.1)
LYMPHOCYTES NFR BLD AUTO: 7.7 % (ref 19.6–45.3)
MCH RBC QN AUTO: 26.3 PG (ref 26.6–33)
MCHC RBC AUTO-ENTMCNC: 32 G/DL (ref 31.5–35.7)
MCV RBC AUTO: 82.4 FL (ref 79–97)
MONOCYTES # BLD AUTO: 0.3 10*3/MM3 (ref 0.1–0.9)
MONOCYTES NFR BLD AUTO: 2.7 % (ref 5–12)
NEUTROPHILS NFR BLD AUTO: 88.2 % (ref 42.7–76)
NEUTROPHILS NFR BLD AUTO: 9.66 10*3/MM3 (ref 1.7–7)
NITRITE UR QL STRIP: NEGATIVE
NRBC BLD AUTO-RTO: 0 /100 WBC (ref 0–0.2)
PH UR STRIP.AUTO: 8.5 [PH] (ref 5–8)
PLATELET # BLD AUTO: 256 10*3/MM3 (ref 140–450)
PMV BLD AUTO: 10.3 FL (ref 6–12)
POTASSIUM SERPL-SCNC: 4.3 MMOL/L (ref 3.5–5.2)
PROT SERPL-MCNC: 7.3 G/DL (ref 6–8.5)
PROT UR QL STRIP: NEGATIVE
RBC # BLD AUTO: 5.01 10*6/MM3 (ref 3.77–5.28)
SODIUM SERPL-SCNC: 141 MMOL/L (ref 136–145)
SP GR UR STRIP: 1.01 (ref 1–1.03)
UROBILINOGEN UR QL STRIP: ABNORMAL
WBC NRBC COR # BLD AUTO: 10.94 10*3/MM3 (ref 3.4–10.8)

## 2024-08-24 PROCEDURE — 81003 URINALYSIS AUTO W/O SCOPE: CPT | Performed by: NURSE PRACTITIONER

## 2024-08-24 PROCEDURE — 80053 COMPREHEN METABOLIC PANEL: CPT | Performed by: NURSE PRACTITIONER

## 2024-08-24 PROCEDURE — 85025 COMPLETE CBC W/AUTO DIFF WBC: CPT | Performed by: NURSE PRACTITIONER

## 2024-08-24 PROCEDURE — 83690 ASSAY OF LIPASE: CPT | Performed by: NURSE PRACTITIONER

## 2024-08-24 PROCEDURE — 99285 EMERGENCY DEPT VISIT HI MDM: CPT

## 2024-08-24 PROCEDURE — 25510000001 IOPAMIDOL 61 % SOLUTION: Performed by: EMERGENCY MEDICINE

## 2024-08-24 PROCEDURE — 74177 CT ABD & PELVIS W/CONTRAST: CPT

## 2024-08-24 PROCEDURE — 25810000003 SODIUM CHLORIDE 0.9 % SOLUTION: Performed by: NURSE PRACTITIONER

## 2024-08-24 RX ORDER — HYDROCODONE BITARTRATE AND ACETAMINOPHEN 7.5; 325 MG/1; MG/1
1 TABLET ORAL ONCE
Status: COMPLETED | OUTPATIENT
Start: 2024-08-24 | End: 2024-08-24

## 2024-08-24 RX ORDER — HYDROCODONE BITARTRATE AND ACETAMINOPHEN 5; 325 MG/1; MG/1
1 TABLET ORAL EVERY 4 HOURS PRN
Qty: 12 TABLET | Refills: 0 | Status: SHIPPED | OUTPATIENT
Start: 2024-08-24

## 2024-08-24 RX ORDER — TAMSULOSIN HYDROCHLORIDE 0.4 MG/1
1 CAPSULE ORAL DAILY
Qty: 7 CAPSULE | Refills: 0 | Status: SHIPPED | OUTPATIENT
Start: 2024-08-24

## 2024-08-24 RX ORDER — SODIUM CHLORIDE 0.9 % (FLUSH) 0.9 %
10 SYRINGE (ML) INJECTION AS NEEDED
Status: DISCONTINUED | OUTPATIENT
Start: 2024-08-24 | End: 2024-08-24 | Stop reason: HOSPADM

## 2024-08-24 RX ORDER — ONDANSETRON 4 MG/1
4 TABLET, ORALLY DISINTEGRATING ORAL EVERY 6 HOURS PRN
Qty: 12 TABLET | Refills: 0 | Status: SHIPPED | OUTPATIENT
Start: 2024-08-24

## 2024-08-24 RX ORDER — IOPAMIDOL 612 MG/ML
100 INJECTION, SOLUTION INTRAVASCULAR
Status: COMPLETED | OUTPATIENT
Start: 2024-08-24 | End: 2024-08-24

## 2024-08-24 RX ADMIN — IOPAMIDOL 100 ML: 612 INJECTION, SOLUTION INTRAVENOUS at 09:03

## 2024-08-24 RX ADMIN — HYDROCODONE BITARTRATE AND ACETAMINOPHEN 1 TABLET: 7.5; 325 TABLET ORAL at 10:32

## 2024-08-24 RX ADMIN — Medication 10 ML: at 08:11

## 2024-08-24 RX ADMIN — SODIUM CHLORIDE 1000 ML: 9 INJECTION, SOLUTION INTRAVENOUS at 08:11

## 2024-08-24 NOTE — DISCHARGE INSTRUCTIONS
Kidney stone follow-up:  Take medications as prescribed, continue all current home medications    Strain all urine if instructed  Follow up with Urology-call to schedule appointment    Return for any of the followin.  Inability to keep down food or fluids  2.  Pain medication not helping, taking edge off  3.  Fever >102

## 2024-08-24 NOTE — ED PROVIDER NOTES
EMERGENCY DEPARTMENT ENCOUNTER  Room Number:  05/05  PCP: Ellen Lowe MD  Independent Historians: Patient      HPI:  Chief Complaint: had concerns including Flank Pain and Constipation.     A complete HPI/ROS/PMH/PSH/SH/FH are unobtainable due to: None    Chronic or social conditions impacting patient care (Social Determinants of Health): None      Context: Dejah Casey is a 79 y.o. female with a medical history of hyperlipidemia, HTN, DM 2, CVA, anticoagulated on Eliquis who presents to the ED c/o acute left-sided abdominal pain for the past 2 days.  She describes it as constant.  She states it does feel better when she is standing.  She denies previous episodes of pain like this in the past.  She has had some nausea but denies vomiting and diarrhea.  She denies urinary symptoms, chest pain, shortness of breath.  She states her last bowel movement was 3 days ago and this is not typical for her.      Review of prior external notes (non-ED) -and- Review of prior external test results outside of this encounter:  Medical records reviewed in [facility], patient had transthoracic echo 6/13/2022 which showed  8/22 and LVEF of 61-65%.  Normal LV systolic function.    Prescription drug monitoring program review:     N/A    PAST MEDICAL HISTORY  Active Ambulatory Problems     Diagnosis Date Noted    Essential hypertension 03/17/2016    Other hyperlipidemia 03/17/2016    Overactive bladder 03/17/2016    Osteoarthritis 03/17/2016    Type 2 diabetes mellitus without complication, without long-term current use of insulin 03/17/2016    Vitamin D deficiency 03/17/2016    Nontoxic multinodular goiter 06/06/2016    Chronic depression 08/25/2016    Obstructive sleep apnea on CPAP 08/25/2016    Osteoarthritis of spine with radiculopathy, lumbar region 08/25/2016    Chronic venous insufficiency 05/01/2018    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    Nonocclusive coronary atherosclerosis of native coronary artery     Iron deficiency 01/18/2021     Vitamin B12 deficiency 05/27/2021    Primary osteoarthritis of right knee 10/25/2018    Primary osteoarthritis of left knee 06/29/2018    Grief reaction with prolonged bereavement 10/13/2021    Embolic stroke 06/14/2022    APC (atrial premature contractions)     Corn of left foot 09/22/2022    Age-related osteoporosis without current pathological fracture 12/13/2022    Monoclonal gammopathy 06/28/2023     Resolved Ambulatory Problems     Diagnosis Date Noted    Anxiety 03/17/2016    Erythrocytosis 03/17/2016    Menopausal syndrome 03/17/2016    Headache 03/17/2016    Hypercalcemia 03/17/2016    Hypertension 03/17/2016    Adiposity 03/17/2016    Knee pain, left 05/20/2016    Coronary artery disease involving native coronary artery of native heart without angina pectoris 06/06/2016    Osteopenia 06/06/2016    Routine health maintenance 07/28/2016    Breast cancer screening 07/28/2016    Arthritis of left hip 07/28/2016    Sleep disorder 08/25/2016    Left foot pain 11/22/2016    Paroxysmal tachycardia 04/11/2017    Right flank pain 08/01/2017    Chronic bronchitis 06/12/2018    H/O total knee replacement, right 12/03/2018    Class 2 obesity due to excess calories without serious comorbidity with body mass index (BMI) of 38.0 to 38.9 in adult 07/02/2020    Tachycardia 07/02/2020    Hypokalemia 08/21/2020    Vitamin B 12 deficiency 09/09/2020    Pernicious anemia 01/18/2021    Iron deficiency anemia 05/27/2021    Colon cancer screening 05/27/2021    Weight loss 05/27/2021    Diastolic dysfunction 06/02/2021    Constipation 02/15/2022    Acute hypoxemic respiratory failure 06/12/2022    Dysphagia 06/12/2022    Acute respiratory alkalosis 06/14/2022    Acute hypoxemic respiratory failure 06/14/2022    Encounter for health counseling related to travel 07/13/2022    Senile osteoporosis 09/05/2022     Past Medical History:   Diagnosis Date    Allergic     Cataract     Contact dermatitis     COVID-19 06/2022    DDD  (degenerative disc disease), lumbar     Deep vein thrombosis 2022    Depression     Diverticulosis 2021    Dry eyes     Dry mouth     Female climacteric state     History of mammogram     History of total right hip replacement     Hyperlipidemia     Low back pain     Neuromuscular disorder 2016    Nightmares REM-sleep type     Obesity     Pain, joint, shoulder     Pneumonia 2018    Polycythemia     Rotator cuff tendinitis     Seasonal allergies     Sleep apnea     Snoring     Stroke 2022    Superficial phlebitis     Type 2 diabetes mellitus     Visual impairment 2019    Vitiligo          PAST SURGICAL HISTORY  Past Surgical History:   Procedure Laterality Date    BREAST BIOPSY      BREAST LUMPECTOMY  1973    benign    CARDIAC CATHETERIZATION      CATARACT EXTRACTION, BILATERAL Bilateral     COLONOSCOPY  08/15/2010    COLONOSCOPY N/A 2021    Procedure: COLONOSCOPY TO CECUM;  Surgeon: Ellen Kothari MD;  Location:  [facility];  Service: Gastroenterology;  Laterality: N/A;  pre: SCREENING FOR COLON CANCER  post: HEMORRHOIDS, DIVERTICULOSIS, TORTUOUS COLON    ENDOSCOPY N/A 2021    Procedure: ESOPHAGOGASTRODUODENOSCOPY WITH BX'S;  Surgeon: Ellen Kothari MD;  Location: [facility];  Service: Gastroenterology;  Laterality: N/A;  pre: IRON DEFICIENCY ANEMIA  post: GASTRITIS, SMALL HIATAL HERNIA    EYE SURGERY      EYE SURGERY Left 2022    HYSTERECTOMY  1982    JOINT REPLACEMENT  07/10/2013    Total Hip Replacement    REPLACEMENT TOTAL KNEE Left 2018    Dr. Bishnu Larson    REPLACEMENT TOTAL KNEE Right     [facility]     TONSILLECTOMY  1967    TOTAL HIP ARTHROPLASTY Right 2013    UPPER GASTROINTESTINAL ENDOSCOPY  2021         FAMILY HISTORY  Family History   Problem Relation Age of Onset    Diabetes Mother         Mother    Thyroid disease Mother     Hypertension Father         Father      Heart disease Father         Father       Arthritis Father         Father    Hyperlipidemia Father         Father    Asthma Sister         Sister      Hypertension Sister         Sister      Miscarriages / Stillbirths Sister         Sister    Thyroid disease Sister         Sister    Diabetes Sister         Sister    Hypertension Sister         Sister    Early death Sister         Infant    Hypertension Brother         Brother      Diabetes Brother         Brother    Kidney disease Brother         Brother, Renal Failure    Vision loss Brother         Brother    Cancer Brother         Brother    Lung cancer Brother     Kidney disease Brother         Brother,  Renal Failure    Stroke Brother         Brother         SOCIAL HISTORY  Social History     Socioeconomic History    Marital status:    Tobacco Use    Smoking status: Never    Smokeless tobacco: Never    Tobacco comments:     I have never smoked.   Vaping Use    Vaping status: Never Used   Substance and Sexual Activity    Alcohol use: No     Comment: CAFFEINE: none    Drug use: No    Sexual activity: Not Currently     Partners: Male     Birth control/protection: Post-menopausal     Comment: I had a Hysterectomy in .         ALLERGIES  Hydrochlorothiazide, Dexamethasone, and Lisinopril      REVIEW OF SYSTEMS  Review of Systems  Included in HPI  All systems reviewed and negative except for those discussed in HPI.      PHYSICAL EXAM    I have reviewed the triage vital signs and nursing notes.    ED Triage Vitals   Temp Heart Rate Resp BP SpO2   24 0518 24 0518 24 0518 24 0537 24 0518   97.4 °F (36.3 °C) 75 16 125/66 100 %       Physical Exam    GENERAL: Elderly appearing appearing, nontoxic appearing, not distressed  HENT: normocephalic, atraumatic  EYES: no scleral icterus, PERRL  CV: regular rhythm, regular rate, no murmur  RESPIRATORY: normal effort, CTAB  ABDOMEN: soft normal bowel sounds, newness to palpation left left side of  abdomen.  No CVA or flank tenderness bilaterally  MUSCULOSKELETAL: no deformity  NEURO: alert, moves all extremities, follows commands, mental status normal/baseline  SKIN: warm, dry, no rash   Psych: Appropriate mood and affect  Nursing notes and vital signs reviewed    Vital signs and nursing notes reviewed.                  LAB RESULTS  Recent Results (from the past 24 hour(s))   Urinalysis With Microscopic If Indicated (No Culture) - Urine, Clean Catch    Collection Time: 08/24/24  7:42 AM    Specimen: Urine, Clean Catch   Result Value Ref Range    Color, UA Yellow Yellow, Straw    Appearance, UA Clear Clear    pH, UA 8.5 (H) 5.0 - 8.0    Specific Gravity, UA 1.015 1.005 - 1.030    Glucose, UA Negative Negative    Ketones, UA 15 mg/dL (1+) (A) Negative    Bilirubin, UA Negative Negative    Blood, UA Negative Negative    Protein, UA Negative Negative    Leuk Esterase, UA Negative Negative    Nitrite, UA Negative Negative    Urobilinogen, UA 1.0 E.U./dL 0.2 - 1.0 E.U./dL   Comprehensive Metabolic Panel    Collection Time: 08/24/24  8:00 AM    Specimen: Arm, Right; Blood   Result Value Ref Range    Glucose 147 (H) 65 - 99 mg/dL    BUN 19 8 - 23 mg/dL    Creatinine 0.80 0.57 - 1.00 mg/dL    Sodium 141 136 - 145 mmol/L    Potassium 4.3 3.5 - 5.2 mmol/L    Chloride 102 98 - 107 mmol/L    CO2 27.0 22.0 - 29.0 mmol/L    Calcium 10.4 8.6 - 10.5 mg/dL    Total Protein 7.3 6.0 - 8.5 g/dL    Albumin 4.5 3.5 - 5.2 g/dL    ALT (SGPT) 12 1 - 33 U/L    AST (SGOT) 15 1 - 32 U/L    Alkaline Phosphatase 78 39 - 117 U/L    Total Bilirubin 0.2 0.0 - 1.2 mg/dL    Globulin 2.8 gm/dL    A/G Ratio 1.6 g/dL    BUN/Creatinine Ratio 23.8 7.0 - 25.0    Anion Gap 12.0 5.0 - 15.0 mmol/L    eGFR 74.6 >60.0 mL/min/1.73   Lipase    Collection Time: 08/24/24  8:00 AM    Specimen: Arm, Right; Blood   Result Value Ref Range    Lipase 49 13 - 60 U/L   CBC Auto Differential    Collection Time: 08/24/24  8:00 AM    Specimen: Arm, Right; Blood   Result  Value Ref Range    WBC 10.94 (H) 3.40 - 10.80 10*3/mm3    RBC 5.01 3.77 - 5.28 10*6/mm3    Hemoglobin 13.2 12.0 - 15.9 g/dL    Hematocrit 41.3 34.0 - 46.6 %    MCV 82.4 79.0 - 97.0 fL    MCH 26.3 (L) 26.6 - 33.0 pg    MCHC 32.0 31.5 - 35.7 g/dL    RDW 13.2 12.3 - 15.4 %    RDW-SD 39.7 37.0 - 54.0 fl    MPV 10.3 6.0 - 12.0 fL    Platelets 256 140 - 450 10*3/mm3    Neutrophil % 88.2 (H) 42.7 - 76.0 %    Lymphocyte % 7.7 (L) 19.6 - 45.3 %    Monocyte % 2.7 (L) 5.0 - 12.0 %    Eosinophil % 0.5 0.3 - 6.2 %    Basophil % 0.5 0.0 - 1.5 %    Immature Grans % 0.4 0.0 - 0.5 %    Neutrophils, Absolute 9.66 (H) 1.70 - 7.00 10*3/mm3    Lymphocytes, Absolute 0.84 0.70 - 3.10 10*3/mm3    Monocytes, Absolute 0.30 0.10 - 0.90 10*3/mm3    Eosinophils, Absolute 0.05 0.00 - 0.40 10*3/mm3    Basophils, Absolute 0.05 0.00 - 0.20 10*3/mm3    Immature Grans, Absolute 0.04 0.00 - 0.05 10*3/mm3    nRBC 0.0 0.0 - 0.2 /100 WBC         RADIOLOGY  CT Abdomen Pelvis With Contrast    Result Date: 8/24/2024  CT ABDOMEN PELVIS W CONTRAST-  INDICATIONS: Left-sided abdominal pain  TECHNIQUE: Radiation dose reduction techniques were utilized, including automated exposure control and exposure modulation based on body size. Enhanced ABDOMEN AND PELVIS CT  COMPARISON: None available  FINDINGS:  A 3 mm stone is apparent in the distal left ureter on axial image 110, coronal image 92 without conspicuous hydronephrosis.  A right renal low density is seen that is too small to characterize.  Structures in the pelvis are partly obscured by attenuation artifact from right hip arthroplasty hardware.  Otherwise unremarkable appearance of the liver, gallbladder, spleen, adrenal glands, pancreas, kidneys, bladder.  No bowel obstruction or abnormal bowel thickening is identified. The appendix appears folded back on itself, but does not appear inflamed. Fatty infiltration of the proximal colon wall may be sequela of past colitis.  No free intraperitoneal gas or free  fluid. Umbilical hernia fat is present.  Scattered small mesenteric and para-aortic lymph nodes are seen that are not significant by size criteria.  Abdominal aorta is not aneurysmal. Aortic and other arterial calcifications are present.  The lung bases shows scarring/atelectasis, old granulomatous disease. The heart appears enlarged.  Degenerative changes are seen in the spine. No acute fracture is identified.          1. A 3 mm distal left ureteral stent without conspicuous hydronephrosis.  2. No acute inflammatory process of bowel is identified, follow-up as indications persist.  This report was finalized on 8/24/2024 9:37 AM by Dr. Yan Moe M.D on Workstation: [id number]         MEDICATIONS GIVEN IN ER  Medications   sodium chloride 0.9 % bolus 1,000 mL (0 mL Intravenous Stopped 8/24/24 1032)   iopamidol (ISOVUE-300) 61 % injection 100 mL (100 mL Intravenous Given by Other 8/24/24 0903)   HYDROcodone-acetaminophen (NORCO) 7.5-325 MG per tablet 1 tablet (1 tablet Oral Given 8/24/24 1032)         ORDERS PLACED DURING THIS VISIT:  Orders Placed This Encounter   Procedures    CT Abdomen Pelvis With Contrast    Comprehensive Metabolic Panel    Urinalysis With Microscopic If Indicated (No Culture) - Urine, Clean Catch    Lipase    CBC Auto Differential    CBC & Differential         DIFFERENTIAL DIAGNOSIS:  Differential diagnosis for abdominal pain include but are not limited to the following:    -Hepatobiliary pathology such as cholecystitis, cholangitis, and symptomatic cholelithiasis  -GERD  -Pancreatitis  -Small or large bowel obstruction  -Appendicitis  -Diverticulitis  -UTI including pyelonephritis  -Ureteral stone  -Zoster  -Colitis, including infectious and ischemic  -Atypical ACS            OUTPATIENT MEDICATION MANAGEMENT:  No current Epic-ordered facility-administered medications on file.     Current Outpatient Medications Ordered in Epic   Medication Sig Dispense Refill    Accu-Chek FastClix  Lancets misc Use to check blood sugar once daily 102 each 5    amLODIPine (NORVASC) 5 MG tablet Take 1 tablet by mouth Daily for 270 days. 90 tablet 2    atorvastatin (LIPITOR) 20 MG tablet Take 1 tablet by mouth Every Morning for 270 days. 90 tablet 2    Blood Glucose Monitoring Suppl (Blood Glucose Monitor System) w/Device kit BS meter Accu-chek Catherine plus meter. DX E11.9 1 each 1    Calcium Carb-Cholecalciferol 500-200 MG-UNIT tablet Take 2 tablets by mouth daily.      clonazePAM (KlonoPIN) 0.5 MG tablet Take 1 tablet by mouth Every Night.      Cyanocobalamin ([employer] Vitamin B-12) 5000 MCG sublingual tablet Place 5,000 mcg under the tongue Daily. 90 tablet 3    Eliquis 5 MG tablet tablet TAKE 1 TABLET BY MOUTH EVERY 12  HOURS 180 tablet 3    ferrous sulfate 325 (65 FE) MG tablet TAKE 1 TABLET BY MOUTH EVERY DAY WITH BREAKFAST 90 tablet 1    folic acid (FOLVITE) 1 MG tablet TAKE 1 TABLET BY MOUTH EVERY DAY 90 tablet 1    furosemide (LASIX) 40 MG tablet Take 1 tablet by mouth Daily. (Patient taking differently: Take 1 tablet by mouth. 1 tablet 3 x week) 90 tablet 1    glucose blood (Accu-Chek Catherine Plus) test strip Use as instructed 360 each 1    HYDROcodone-acetaminophen (NORCO) 5-325 MG per tablet Take 1 tablet by mouth Every 4 (Four) Hours As Needed for Moderate Pain. 12 tablet 0    metFORMIN (GLUCOPHAGE) 500 MG tablet Take 1 tablet by mouth 2 (Two) Times a Day With Meals. Indications: Type 2 Diabetes 180 tablet 2    metoprolol succinate XL (TOPROL-XL) 50 MG 24 hr tablet TAKE 1 TABLET BY MOUTH DAILY 90 tablet 3    Mirabegron ER (MYRBETRIQ) 50 MG tablet sustained-release 24 hour 24 hr tablet Take 50 mg by mouth Daily.      ondansetron ODT (ZOFRAN-ODT) 4 MG disintegrating tablet Place 1 tablet on the tongue Every 6 (Six) Hours As Needed for Nausea or Vomiting. 12 tablet 0    pilocarpine (SALAGEN) 5 MG tablet 1 tablet 3 (Three) Times a Day.      potassium chloride (KAYCIEL) 20 mEq/15 mL solution Take 15 mL by mouth  Daily. 1350 mL 2    prednisoLONE acetate (PRED FORTE) 1 % ophthalmic suspension INSTILL 1 DROP INTO BOTH EYES TWICE A DAY TO REPLACE DUREZOL      Prolensa 0.07 % solution PLACE ONE DROP IN BOTH EYES ONCE DAILY FOR MAINTENANCE      Restasis 0.05 % ophthalmic emulsion 2 (two) times a day.      risedronate (ACTONEL) 35 MG tablet TAKE 1 TABLET BY MOUTH WEEKLY  WITH 8 OZ OF PLAIN WATER 30  MINUTES BEFORE FIRST FOOD, DRINK OR MEDS. STAY UPRIGHT FOR 30  MINS 12 tablet 2    tamsulosin (FLOMAX) 0.4 MG capsule 24 hr capsule Take 1 capsule by mouth Daily. 7 capsule 0    venlafaxine (EFFEXOR) 75 MG tablet Take 1 tablet by mouth 2 (Two) Times a Day. Pt is taking 1 tab in the morning and 2 tabs at night           PROCEDURES  Procedures        PROGRESS, DATA ANALYSIS, CONSULTS, AND MEDICAL DECISION MAKING  All labs have been independently interpreted by me.  All radiology studies have been reviewed by me. All EKG's have been independently viewed and interpreted by me.  Discussion below represents my analysis of pertinent findings related to patient's condition, differential diagnosis, treatment plan and final disposition        Clinical Scores:                  ED Course as of 08/24/24 1500   Sat Aug 24, 2024   0732 79-year-old female who presents with 2-day history of left-sided abdominal pain and no bowel movement for the past 3 days.  Labs and CT of the abdomen and pelvis have been ordered.  Patient declines need for pain medication at this time. [MS]   0733 Reviewed pt's history and workup with Dr. Chambers.  After a bedside evaluation, he agrees with the plan of care.     [MS]   0826 Lipase: 49 [MS]   0827 Glucose(!): 147 [MS]   0827 WBC(!): 10.94 [MS]   0911 CT Abdomen Pelvis With Contrast  My independent interpretation of the imaging study is no gross free air [TR]   1020 Patient and family updated on workup here today which showed unremarkable lab work.  CT of the abdomen pelvis did show a 3 mm distal left ureteral stone.   Patient seems to be comfortable at this time and [facility]  We will discharge home with prescription for pain control, antiemetics and Flomax.  She should call [facility] on Monday to schedule an appointment.  She was instructed to return to the ER with fever, nausea, vomiting, increased pain.  Patient and family both verbalized understanding and are agreeable to this plan. [MS]      ED Course User Index  [MS] Ellie Escamilla APRN  [TR] Jovanny Chambers MD             AS OF 15:00 EDT VITALS:    BP - 133/67  HR - 92  TEMP - 97.4 °F (36.3 °C) (Tympanic)  O2 SATS - 94%      DIAGNOSIS  Final diagnoses:   Left ureteral stone   Acute left flank pain         DISPOSITION  ED Disposition       ED Disposition   Discharge    Condition   Stable    Comment   --              Discussed plan for discharge, as there is no emergent indication for admission. Pt/family is agreeable and understands need for follow up and repeat testing.  Pt is aware that discharge does not mean that nothing is wrong but it indicates no emergency is present that requires admission and they must continue care with follow-up as given below or physician of their choice.   Patient/Family voiced understanding of above instructions.  Patient discharged in stable condition.    FOLLOW UP   Ellen Lowe MD  [address] [facility]  [address]  [place] [address]  [phone]    Call in 2 days      [facility]  [address] [place] [address]  [phone]  Call in 1 day        RX     Medication List        New Prescriptions      HYDROcodone-acetaminophen 5-325 MG per tablet  Commonly known as: NORCO  Take 1 tablet by mouth Every 4 (Four) Hours As Needed for Moderate Pain.     ondansetron ODT 4 MG disintegrating tablet  Commonly known as: ZOFRAN-ODT  Place 1 tablet on the tongue Every 6 (Six) Hours As Needed for Nausea or Vomiting.     tamsulosin 0.4 MG capsule 24 hr capsule  Commonly known as:  FLOMAX  Take 1 capsule by mouth Daily.            Changed      furosemide 40 MG tablet  Commonly known as: LASIX  Take 1 tablet by mouth Daily.  What changed:   when to take this  additional instructions               Where to Get Your Medications        These medications were sent to [facility]/pharmacy #[id number] - [facility], [place] - [address] AT [facility] [phone]  - [phone]   [address] [facility] [address]      Phone: [phone]   HYDROcodone-acetaminophen 5-325 MG per tablet  ondansetron ODT 4 MG disintegrating tablet  tamsulosin 0.4 MG capsule 24 hr capsule         [facility] report reviewed.  Risks, benefits, alternatives discussed with patient.  Pt consents to treatment and agrees to follow up with PMD tomorrow for further care and any other prescriptions.             Please note that portions of this document were completed with a voice recognition program.    Note Disclaimer: At [facility], we believe that sharing information builds trust and better relationships. You are receiving this note because you recently visited [facility]. It is possible you will see health information before a provider has talked with you about it. This kind of information can be easy to misunderstand. To help you fully understand what it means for your health, we urge you to discuss this note with your provider.         Ellie Escamilla, APRN  08/24/24 0089

## 2024-08-24 NOTE — ED NOTES
"   08/24/24 0810   Peripheral IV 08/24/24 0810 Left Antecubital   Placement date: If unknown, DO NOT use \"Add Comment\" note/Placement time: If unknown, DO NOT use \"Add Comment\" note: 08/24/24 0810   Hand Hygiene Completed: Yes  Size (Gauge): 20 G  Orientation: Left  Location: Antecubital  Site Prep: Chlorhexidine  T...   Site Assessment Clean;Dry;Intact   Dressing Type Transparent   Line Status Blood return noted;Saline locked;Flushed   Dressing Status Clean;Dry;Intact   Dressing Intervention New dressing   Phlebitis 0-->no symptoms     .Ultrasound Inserted IV Site: SLOANE    Catheter Length: 1.88    Diameter: 0.3    Depth: 0.75      Vascular Access Score= 5  1) Palpable / Visible / Dis  2) Palpable / Viasible / Not Distended  3) Easily Palpable / Not Visibile  4) Poorly Palpable / Visible  5) Poorly / Nonpalpable / NV    "

## 2024-08-24 NOTE — ED PROVIDER NOTES
EMERGENCY DEPARTMENT MD ATTESTATION NOTE    Room Number:  05/05  PCP: Ellen Lowe MD  Independent Historians: Patient and Family    HPI:  A complete HPI/ROS/PMH/PSH/SH/FH are unobtainable due to: None    Chronic or social conditions impacting patient care (Social Determinants of Health): None      Context: Dejah Casey is a 79 y.o. female with a medical history of diverticulosis, anxiety, polycythemia, type 2 diabetes who presents to the ED c/o acute left lower quadrant and left flank pain.  The patient reports for the last 2 days she has had left lower quadrant abdominal pain as well as left flank pain and constipation.  She denies prior similar episodes.  She reports pain is severe.  Nothing makes it worse or better.        Review of prior external notes (non-ED) -and- Review of prior external test results outside of this encounter:  Laboratory evaluation 5/14/2024 shows normal CMP    Prescription drug monitoring program review:           PHYSICAL EXAM    I have reviewed the triage vital signs and nursing notes.    ED Triage Vitals   Temp Heart Rate Resp BP SpO2   08/24/24 0518 08/24/24 0518 08/24/24 0518 08/24/24 0537 08/24/24 0518   97.4 °F (36.3 °C) 75 16 125/66 100 %      Temp src Heart Rate Source Patient Position BP Location FiO2 (%)   08/24/24 0518 08/24/24 0518 08/24/24 0537 08/24/24 0537 --   Tympanic Monitor Sitting Right arm        Physical Exam  GENERAL: Awake, alert, no acute distress  SKIN: Warm, dry  HENT: Normocephalic, atraumatic  EYES: no scleral icterus  CV: regular rhythm, regular rate  RESPIRATORY: normal effort, lungs clear  ABDOMEN: soft, mild tenderness in left lower quadrant, nondistended  MUSCULOSKELETAL: no deformity  NEURO: alert, moves all extremities, follows commands            MEDICATIONS GIVEN IN ER  Medications   sodium chloride 0.9 % bolus 1,000 mL (0 mL Intravenous Stopped 8/24/24 1032)   iopamidol (ISOVUE-300) 61 % injection 100 mL (100 mL Intravenous Given by Other  8/24/24 0903)   HYDROcodone-acetaminophen (NORCO) 7.5-325 MG per tablet 1 tablet (1 tablet Oral Given 8/24/24 1032)         ORDERS PLACED DURING THIS VISIT:  Orders Placed This Encounter   Procedures    CT Abdomen Pelvis With Contrast    Comprehensive Metabolic Panel    Urinalysis With Microscopic If Indicated (No Culture) - Urine, Clean Catch    Lipase    CBC Auto Differential    CBC & Differential         PROCEDURES  Procedures            PROGRESS, DATA ANALYSIS, CONSULTS, AND MEDICAL DECISION MAKING  All labs have been independently interpreted by me.  All radiology studies have been reviewed by me. All EKG's have been independently viewed and interpreted by me.  Discussion below represents my analysis of pertinent findings related to patient's condition, differential diagnosis, treatment plan and final disposition.    Differential diagnosis includes but is not limited to UTI, kidney stone, diverticulitis, pyelonephritis, constipation, bowel obstruction.    Clinical Scores:                   ED Course as of 08/24/24 1548   Sat Aug 24, 2024   0732 79-year-old female who presents with 2-day history of left-sided abdominal pain and no bowel movement for the past 3 days.  Labs and CT of the abdomen and pelvis have been ordered.  Patient declines need for pain medication at this time. [MS]   0733 Reviewed pt's history and workup with Dr. Chambers.  After a bedside evaluation, he agrees with the plan of care.     [MS]   0826 Lipase: 49 [MS]   0827 Glucose(!): 147 [MS]   0827 WBC(!): 10.94 [MS]   0911 CT Abdomen Pelvis With Contrast  My independent interpretation of the imaging study is no gross free air [TR]   1020 Patient and family updated on workup here today which showed unremarkable lab work.  CT of the abdomen pelvis did show a 3 mm distal left ureteral stone.  Patient seems to be comfortable at this time and [facility]  We will discharge home with prescription for pain control,  antiemetics and Flomax.  She should call [facility] on Monday to schedule an appointment.  She was instructed to return to the ER with fever, nausea, vomiting, increased pain.  Patient and family both verbalized understanding and are agreeable to this plan. [MS]      ED Course User Index  [MS] AndiEllie, APRN  [TR] Jovanny Chambers MD       MDM: Plan to obtain chemistries and blood counts as well as urinalysis.  We will CT of the abdomen pelvis to rule out diverticulitis and other structural causes of left lower quadrant and left flank pain.      COMPLEXITY OF CARE  Admission was considered but after careful review of the patient's presentation, physical examination, diagnostic results, and response to treatment the patient may be safely discharged with outpatient follow-up.    Please note that portions of this document were completed with a voice recognition program.    Note Disclaimer: At [facility], we believe that sharing information builds trust and better relationships. You are receiving this note because you recently visited [facility]. It is possible you will see health information before a provider has talked with you about it. This kind of information can be easy to misunderstand. To help you fully understand what it means for your health, we urge you to discuss this note with your provider.         Jovanny Chambers MD  08/24/24 0392       Jovanny Chambers MD  08/24/24 5851

## 2024-09-18 RX ORDER — FERROUS SULFATE 325(65) MG
TABLET ORAL
Qty: 90 TABLET | Refills: 1 | Status: SHIPPED | OUTPATIENT
Start: 2024-09-18

## 2024-10-10 ENCOUNTER — TELEPHONE (OUTPATIENT)
Dept: CARDIOLOGY | Facility: CLINIC | Age: 79
End: 2024-10-10
Payer: MEDICARE

## 2024-10-10 NOTE — TELEPHONE ENCOUNTER
Pt had a CT of the abdomen/ pelvis on 08/24 and was noted to have an enlarged heart.  Pt would like to ensure this is not something she should worry about.  Pt's next appt is 01/16/25.

## 2024-10-16 DIAGNOSIS — E11.9 TYPE 2 DIABETES MELLITUS WITHOUT COMPLICATION, WITHOUT LONG-TERM CURRENT USE OF INSULIN: ICD-10-CM

## 2024-10-17 NOTE — TELEPHONE ENCOUNTER
Rx Refill Note  Requested Prescriptions     Pending Prescriptions Disp Refills    metFORMIN (GLUCOPHAGE) 500 MG tablet [Pharmacy Med Name: metFORMIN HCl 500 MG Oral Tablet] 180 tablet 3     Sig: TAKE 1 TABLET BY MOUTH TWICE  DAILY WITH MEALS FOR TYPE 2  DIABETES      Last office visit with prescribing clinician: 5/14/2024   Last telemedicine visit with prescribing clinician: Visit date not found   Next office visit with prescribing clinician: 11/14/2024                         Would you like a call back once the refill request has been completed: [] Yes [] No    If the office needs to give you a call back, can they leave a voicemail: [] Yes [] No    Sarah Yusuf  10/17/24, 07:48 EDT

## 2024-11-14 ENCOUNTER — OFFICE VISIT (OUTPATIENT)
Dept: ENDOCRINOLOGY | Age: 79
End: 2024-11-14
Payer: MEDICARE

## 2024-11-14 VITALS
BODY MASS INDEX: 31.64 KG/M2 | TEMPERATURE: 98.5 F | HEART RATE: 81 BPM | HEIGHT: 63 IN | OXYGEN SATURATION: 98 % | DIASTOLIC BLOOD PRESSURE: 62 MMHG | SYSTOLIC BLOOD PRESSURE: 114 MMHG | WEIGHT: 178.6 LBS

## 2024-11-14 DIAGNOSIS — E78.5 HYPERLIPIDEMIA, UNSPECIFIED HYPERLIPIDEMIA TYPE: ICD-10-CM

## 2024-11-14 DIAGNOSIS — Z87.442 HISTORY OF KIDNEY STONES: ICD-10-CM

## 2024-11-14 DIAGNOSIS — I25.10 NONOCCLUSIVE CORONARY ATHEROSCLEROSIS OF NATIVE CORONARY ARTERY: ICD-10-CM

## 2024-11-14 DIAGNOSIS — I10 ESSENTIAL HYPERTENSION: ICD-10-CM

## 2024-11-14 DIAGNOSIS — G47.33 OBSTRUCTIVE SLEEP APNEA ON CPAP: ICD-10-CM

## 2024-11-14 DIAGNOSIS — E11.9 TYPE 2 DIABETES MELLITUS WITHOUT COMPLICATION, WITHOUT LONG-TERM CURRENT USE OF INSULIN: ICD-10-CM

## 2024-11-14 DIAGNOSIS — M81.0 AGE-RELATED OSTEOPOROSIS WITHOUT CURRENT PATHOLOGICAL FRACTURE: ICD-10-CM

## 2024-11-14 DIAGNOSIS — E04.2 NONTOXIC MULTINODULAR GOITER: Primary | ICD-10-CM

## 2024-11-14 DIAGNOSIS — E53.8 VITAMIN B12 DEFICIENCY: ICD-10-CM

## 2024-11-14 RX ORDER — CYCLOSPORINE 0.5 MG/ML
1 EMULSION OPHTHALMIC 2 TIMES DAILY
COMMUNITY

## 2024-11-14 RX ORDER — DIFLUPREDNATE OPHTHALMIC 0.5 MG/ML
EMULSION OPHTHALMIC
COMMUNITY
Start: 2024-11-08

## 2024-11-14 RX ORDER — DOXYCYCLINE 50 MG/1
1 TABLET ORAL EVERY 12 HOURS SCHEDULED
COMMUNITY
Start: 2024-11-06

## 2024-11-14 NOTE — PROGRESS NOTES
Subjective   Dejah Casey is a 79 y.o. female.     History of Present Illness     She was found to have a goiter on examination last August 2012. She had thyroid ultrasound done on 08/21/2012 which showed a multinodular goiter with the dominant solid nodule in the left measuring 1.8 cm. She had followup thyroid ultrasound in 4/14 at [facility] showed multinodular goiter with stable nodules.       She denies any pressure symptoms or dysphagia. She denies any bowel changes.  She has no previous history of head or neck radiation therapy.      She has known diabetes mellitus since 2003 and has been on metformin 500 mg twice a day.  Blood sugar .  She has lost 3 pounds since May 2024.  Her last meal was at 1 PM.     She has no microalbuminuria on urine sample taken 11/23.   Her last eye exam was October 2024.  She has iridocyclitis.  She has no retinopathy.  She denies tingling in her feet.       She has hyperlipidemia and is on Lipitor 20 mg once a day.  She denies any myalgia.        She has mild coronary artery disease by cardiac catheterization done in 2008 by Dr. CAMILLE Mcmullen. She denies any chest pain.  She had a normal nuclear stress tests in July 2018.  She is following up with Dr. Leal.      She has hypertension and has been on amlodipine 5 mg once a day, furosemide 40 mg 3 times a week and Toprol 50 mg/day. She was taken off hydrochlorothiazide because of hypercalcemia. She had cough with lisinopril in the past. She has not used ARB in the past.  She denies orthopnea or PND or pedal edema.      She has overactive bladder diagnosed by Dr. Dean and is on Myrbetriq.  She was taken off Toviaz because of mouth dryness.  She was taken off Vesicare because of cost.  She denies mouth dryness.  Is being followed by Dr. Gagnon.    She spontaneously passed a kidney stone in August 2024.  CT of the abdomen done in August 2024 showed a 3 mm distal left ureteral stone without conspicuous hydronephrosis.  She was seen  by Dr. Villanueva at that time.  The stone was not recovered.     She has osteopenia on bone density done at [facility] in November 2015.  She is on calcium with vitamin D 500 mg/200 units 2 tablets daily.  Bone density done in December 2017 showed improvement of the left hip density and a slight decrease in the lumbar spine.      Bone density done in August 2022 showed osteoporosis.  She started on risedronate 35 mg weekly on February 17, 2023.  She denies side effects.     She has sleep apnea and is using her CPAP regularly.  She wakes up tired.  She is on Klonopin 0.5 mg at bedtime.  She is following with Dr. Marcum.     She denies constipation.  She denies heartburn, melena or hematochezia.  Her last colonoscopy was in July 2021 and she has hemorrhoids and diverticulosis.  EGD done in July 2021 showed gastritis and small hiatal hernia.     She has vitamin B12 deficiency, iron deficiency and monoclonal gammopathy of undetermined significance.  She is on vitamin B12 5000 mcg SL daily, ferrous sulfate 325 mg/day, and folic acid 1 mg/day.  Parietal cell antibodies were elevated.  Intrinsic factor antibody was normal.  She is being followed by Dr. Nicholson.     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  She denies muscle weakness.  She is on Eliquis.  She denies bleeding.         The following portions of the patient's history were reviewed and updated as appropriate: allergies, current medications, past family history, past medical history, past social history, past surgical history, and problem list.    Review of Systems   Constitutional:  Positive for fatigue.        Sleepy     Eyes:  Negative for visual disturbance.   Respiratory:  Negative for shortness of breath and wheezing.    Cardiovascular:  Negative for chest pain and palpitations.   Gastrointestinal: Negative.    Genitourinary: Negative.    Musculoskeletal:  Negative for myalgias.   Neurological:  Negative for numbness.  "    Vitals:    11/14/24 1436   BP: 114/62   Pulse: 81   Temp: 98.5 °F (36.9 °C)   TempSrc: Oral   SpO2: 98%   Weight: 81 kg (178 lb 9.6 oz)   Height: 160 cm (62.99\")      Objective   Physical Exam  Constitutional:       Appearance: Normal appearance. She is not toxic-appearing or diaphoretic.   Eyes:      General: No scleral icterus.        Right eye: No discharge.         Left eye: No discharge.   Neck:      Vascular: No carotid bruit.   Cardiovascular:      Rate and Rhythm: Normal rate and regular rhythm.      Heart sounds: Normal heart sounds. No murmur heard.     No friction rub. No gallop.   Pulmonary:      Breath sounds: No rales.   Chest:      Chest wall: No tenderness.   Abdominal:      General: Bowel sounds are normal.      Palpations: Abdomen is soft.      Tenderness: There is no right CVA tenderness or left CVA tenderness.   Musculoskeletal:      Right lower leg: No edema.      Left lower leg: No edema.   Lymphadenopathy:      Cervical: No cervical adenopathy.   Neurological:      Mental Status: She is alert and oriented to person, place, and time.       Admission on 08/24/2024, Discharged on 08/24/2024   Component Date Value Ref Range Status    Glucose 08/24/2024 147 (H)  65 - 99 mg/dL Final    BUN 08/24/2024 19  8 - 23 mg/dL Final    Creatinine 08/24/2024 0.80  0.57 - 1.00 mg/dL Final    Sodium 08/24/2024 141  136 - 145 mmol/L Final    Potassium 08/24/2024 4.3  3.5 - 5.2 mmol/L Final    Chloride 08/24/2024 102  98 - 107 mmol/L Final    CO2 08/24/2024 27.0  22.0 - 29.0 mmol/L Final    Calcium 08/24/2024 10.4  8.6 - 10.5 mg/dL Final    Total Protein 08/24/2024 7.3  6.0 - 8.5 g/dL Final    Albumin 08/24/2024 4.5  3.5 - 5.2 g/dL Final    ALT (SGPT) 08/24/2024 12  1 - 33 U/L Final    AST (SGOT) 08/24/2024 15  1 - 32 U/L Final    Alkaline Phosphatase 08/24/2024 78  39 - 117 U/L Final    Total Bilirubin 08/24/2024 0.2  0.0 - 1.2 mg/dL Final    Globulin 08/24/2024 2.8  gm/dL Final    A/G Ratio 08/24/2024 1.6  " g/dL Final    BUN/Creatinine Ratio 08/24/2024 23.8  7.0 - 25.0 Final    Anion Gap 08/24/2024 12.0  5.0 - 15.0 mmol/L Final    eGFR 08/24/2024 74.6  >60.0 mL/min/1.73 Final    Color, UA 08/24/2024 Yellow  Yellow, Straw Final    Appearance, UA 08/24/2024 Clear  Clear Final    pH, UA 08/24/2024 8.5 (H)  5.0 - 8.0 Final    Specific Gravity, UA 08/24/2024 1.015  1.005 - 1.030 Final    Glucose, UA 08/24/2024 Negative  Negative Final    Ketones, UA 08/24/2024 15 mg/dL (1+) (A)  Negative Final    Bilirubin, UA 08/24/2024 Negative  Negative Final    Blood, UA 08/24/2024 Negative  Negative Final    Protein, UA 08/24/2024 Negative  Negative Final    Leuk Esterase, UA 08/24/2024 Negative  Negative Final    Nitrite, UA 08/24/2024 Negative  Negative Final    Urobilinogen, UA 08/24/2024 1.0 E.U./dL  0.2 - 1.0 E.U./dL Final    Lipase 08/24/2024 49  13 - 60 U/L Final    WBC 08/24/2024 10.94 (H)  3.40 - 10.80 10*3/mm3 Final    RBC 08/24/2024 5.01  3.77 - 5.28 10*6/mm3 Final    Hemoglobin 08/24/2024 13.2  12.0 - 15.9 g/dL Final    Hematocrit 08/24/2024 41.3  34.0 - 46.6 % Final    MCV 08/24/2024 82.4  79.0 - 97.0 fL Final    MCH 08/24/2024 26.3 (L)  26.6 - 33.0 pg Final    MCHC 08/24/2024 32.0  31.5 - 35.7 g/dL Final    RDW 08/24/2024 13.2  12.3 - 15.4 % Final    RDW-SD 08/24/2024 39.7  37.0 - 54.0 fl Final    MPV 08/24/2024 10.3  6.0 - 12.0 fL Final    Platelets 08/24/2024 256  140 - 450 10*3/mm3 Final    Neutrophil % 08/24/2024 88.2 (H)  42.7 - 76.0 % Final    Lymphocyte % 08/24/2024 7.7 (L)  19.6 - 45.3 % Final    Monocyte % 08/24/2024 2.7 (L)  5.0 - 12.0 % Final    Eosinophil % 08/24/2024 0.5  0.3 - 6.2 % Final    Basophil % 08/24/2024 0.5  0.0 - 1.5 % Final    Immature Grans % 08/24/2024 0.4  0.0 - 0.5 % Final    Neutrophils, Absolute 08/24/2024 9.66 (H)  1.70 - 7.00 10*3/mm3 Final    Lymphocytes, Absolute 08/24/2024 0.84  0.70 - 3.10 10*3/mm3 Final    Monocytes, Absolute 08/24/2024 0.30  0.10 - 0.90 10*3/mm3 Final     Eosinophils, Absolute 08/24/2024 0.05  0.00 - 0.40 10*3/mm3 Final    Basophils, Absolute 08/24/2024 0.05  0.00 - 0.20 10*3/mm3 Final    Immature Grans, Absolute 08/24/2024 0.04  0.00 - 0.05 10*3/mm3 Final    nRBC 08/24/2024 0.0  0.0 - 0.2 /100 WBC Final     Assessment & Plan   Diagnoses and all orders for this visit:    1. Nontoxic multinodular goiter (Primary)  -     TSH  -     T4, Free  -     T3, Free    2. Type 2 diabetes mellitus without complication, without long-term current use of insulin  -     Comprehensive Metabolic Panel  -     Hemoglobin A1c  -     Microalbumin / Creatinine Urine Ratio - Urine, Clean Catch    3. Hyperlipidemia, unspecified hyperlipidemia type  -     Lipid Panel    4. Nonocclusive coronary atherosclerosis of native coronary artery    5. Essential hypertension    6. History of kidney stones    7. Age-related osteoporosis without current pathological fracture  -     DEXA Bone Density Axial; Future  -     Vitamin D,25-Hydroxy    8. Obstructive sleep apnea on CPAP    9. Vitamin B12 deficiency  -     Vitamin B12      Check thyroid function test.    Continue metformin 500 mg twice a day.  Check hemoglobin A1c and urine microalbumin.  Continue Lipitor 20 mg/day.    Continue Toprol-XL, amlodipine, and furosemide.  Will defer blood pressure management to Dr. Leal.    Follow-up with Dr. Gagnon as scheduled.  Continue Myrbetriq.    Continue risedronate 35 mg weekly and calcium with vitamin D 2 tablets daily.  Schedule [facility] density at [facility].    Continue CPAP.  Advised to discuss with Dr. Marcum about getting off Klonopin.    Continue ferrous sulfate, folic acid and vitamin B12 per Dr. Nicholson.    Copy of my note sent to Dr. Lowe, Dr. Nicholson, Dr. CAMPBELL Gagnon, Dr. Carter and Dr. Marcum.    RTC 4 mos

## 2024-11-15 LAB
25(OH)D3+25(OH)D2 SERPL-MCNC: 67.9 NG/ML (ref 30–100)
ALBUMIN SERPL-MCNC: 4.3 G/DL (ref 3.8–4.8)
ALBUMIN/CREAT UR: 9 MG/G CREAT (ref 0–29)
ALP SERPL-CCNC: 69 IU/L (ref 44–121)
ALT SERPL-CCNC: 16 IU/L (ref 0–32)
AST SERPL-CCNC: 15 IU/L (ref 0–40)
BILIRUB SERPL-MCNC: 0.2 MG/DL (ref 0–1.2)
BUN SERPL-MCNC: 16 MG/DL (ref 8–27)
BUN/CREAT SERPL: 19 (ref 12–28)
CALCIUM SERPL-MCNC: 10.6 MG/DL (ref 8.7–10.3)
CHLORIDE SERPL-SCNC: 103 MMOL/L (ref 96–106)
CHOLEST SERPL-MCNC: 127 MG/DL (ref 100–199)
CO2 SERPL-SCNC: 27 MMOL/L (ref 20–29)
CREAT SERPL-MCNC: 0.85 MG/DL (ref 0.57–1)
CREAT UR-MCNC: 115.8 MG/DL
EGFRCR SERPLBLD CKD-EPI 2021: 69 ML/MIN/1.73
GLOBULIN SER CALC-MCNC: 2.4 G/DL (ref 1.5–4.5)
GLUCOSE SERPL-MCNC: 81 MG/DL (ref 70–99)
HBA1C MFR BLD: 6.3 % (ref 4.8–5.6)
HDLC SERPL-MCNC: 67 MG/DL
IMP & REVIEW OF LAB RESULTS: NORMAL
LDLC SERPL CALC-MCNC: 45 MG/DL (ref 0–99)
MICROALBUMIN UR-MCNC: 10.9 UG/ML
POTASSIUM SERPL-SCNC: 4.1 MMOL/L (ref 3.5–5.2)
PROT SERPL-MCNC: 6.7 G/DL (ref 6–8.5)
SODIUM SERPL-SCNC: 142 MMOL/L (ref 134–144)
T3FREE SERPL-MCNC: 2.4 PG/ML (ref 2–4.4)
T4 FREE SERPL-MCNC: 0.98 NG/DL (ref 0.82–1.77)
TRIGL SERPL-MCNC: 72 MG/DL (ref 0–149)
TSH SERPL DL<=0.005 MIU/L-ACNC: 0.07 UIU/ML (ref 0.45–4.5)
VIT B12 SERPL-MCNC: >2000 PG/ML (ref 232–1245)
VLDLC SERPL CALC-MCNC: 15 MG/DL (ref 5–40)

## 2024-11-15 NOTE — PROGRESS NOTES
TSH 0.068.  Normal free T4 0.98 ng per DL.  Normal free T3 at 2.4 pg/mL.  Recheck thyroid function test with next follow-up.  Hemoglobin A1c 6.3%.  Diabetes is well-controlled.  Normal urine microalbumin.  Normal vitamin D at 67.9 ng/mL.  Continue calcium plus D 500 mg / 200 units 2 tablets daily.  Vitamin B12 greater than 2000 pg/mL.  May decrease vitamin B12 5000 mcg sublingual to every other day.  LDL 45.  HDL 67.  Triglycerides 72.  Continue Lipitor 20 mg/day.  Send copy of labs to Dr. Lowe and Dr. Carter.  Copy of labs sent to Dr. Nicholson.  Please notify patient of results and instructions.

## 2024-11-25 DIAGNOSIS — E11.9 DIABETES MELLITUS TYPE II, CONTROLLED, WITH NO COMPLICATIONS: ICD-10-CM

## 2024-11-25 DIAGNOSIS — E11.9 TYPE 2 DIABETES MELLITUS WITHOUT COMPLICATION, WITHOUT LONG-TERM CURRENT USE OF INSULIN: ICD-10-CM

## 2024-11-25 RX ORDER — BLOOD SUGAR DIAGNOSTIC
STRIP MISCELLANEOUS
Qty: 100 EACH | Refills: 0 | Status: SHIPPED | OUTPATIENT
Start: 2024-11-25

## 2024-11-25 NOTE — TELEPHONE ENCOUNTER
Rx Refill Note  Requested Prescriptions     Pending Prescriptions Disp Refills    glucose blood (Accu-Chek Catherine Plus) test strip 360 each 1     Sig: Use as instructed      Last office visit with prescribing clinician: 11/14/2024   Last telemedicine visit with prescribing clinician: Visit date not found   Next office visit with prescribing clinician: 3/24/2025                         Would you like a call back once the refill request has been completed: [] Yes [] No    If the office needs to give you a call back, can they leave a voicemail: [] Yes [] No    Sarah Yusuf  11/25/24, 16:00 EST

## 2024-11-25 NOTE — TELEPHONE ENCOUNTER
Caller: Dejah Casey RIVERA    Relationship: Self    Best call back number: [phone]    Requested Prescriptions:   Requested Prescriptions     Pending Prescriptions Disp Refills    glucose blood (Accu-Chek Catherine Plus) test strip 360 each 1     Sig: Use as instructed        Pharmacy where request should be sent:    [facility]   Last office visit with prescribing clinician: 11/14/2024   Last telemedicine visit with prescribing clinician: Visit date not found   Next office visit with prescribing clinician: 3/24/2025     Additional details provided by patient: PATIENT HAS A FEW LEFT    Does the patient have less than a 3 day supply:  [x] Yes  [] No    Would you like a call back once the refill request has been completed: [] Yes [] No    If the office needs to give you a call back, can they leave a voicemail: [] Yes [] No    Calixto Peter Rep   11/25/24 15:58 EST

## 2024-12-02 RX ORDER — FUROSEMIDE 40 MG/1
40 TABLET ORAL DAILY
Qty: 90 TABLET | Refills: 0 | Status: SHIPPED | OUTPATIENT
Start: 2024-12-02

## 2024-12-04 RX ORDER — FOLIC ACID 1 MG/1
TABLET ORAL
Qty: 90 TABLET | Refills: 1 | Status: SHIPPED | OUTPATIENT
Start: 2024-12-04

## 2024-12-27 NOTE — TELEPHONE ENCOUNTER
----- Message from Annika Hernandez sent at 1/6/2017  2:21 PM EST -----  Contact: pt - Dr Ricardo's pt - RE: Echo results  Pt calling and would like results of Echo that was done [facility] [place]. Pt informs rec'd results in mail but does not understand narrative. Could you please call pt to discuss? Please advise. Thanks      Pt # [phone]  
Informed pt of comments.  
There was mild thickening of the muscle of the left ventricle and trace backflow of blood across the mitral valve. This may be do to high blood pressure. No major problem. Discuss with Dr Ricardo on his return.   cfb  
Good

## 2024-12-29 DIAGNOSIS — I10 ESSENTIAL HYPERTENSION: ICD-10-CM

## 2024-12-30 RX ORDER — METOPROLOL SUCCINATE 50 MG/1
50 TABLET, EXTENDED RELEASE ORAL DAILY
Qty: 30 TABLET | OUTPATIENT
Start: 2024-12-30

## 2025-01-02 ENCOUNTER — HOSPITAL ENCOUNTER (OUTPATIENT)
Facility: HOSPITAL | Age: 80
Discharge: HOME | End: 2025-01-02
Payer: MEDICARE

## 2025-01-26 DIAGNOSIS — M85.89 OSTEOPENIA OF MULTIPLE SITES: ICD-10-CM

## 2025-01-27 RX ORDER — POTASSIUM CHLORIDE 20 MEQ/15ML
SOLUTION ORAL
Qty: 1419 ML | Refills: 1 | Status: SHIPPED | OUTPATIENT
Start: 2025-01-27

## 2025-01-27 NOTE — TELEPHONE ENCOUNTER
Rx Refill Note  Requested Prescriptions     Pending Prescriptions Disp Refills    Potassium Chloride 10 % solution [Pharmacy Med Name: POTASSIUM CHL LIQ 10% SF] 1419 mL 3     Sig: DILUTE 15 ML IN AT LEAST 1/2  GLASS OF WATER OR JUICE AND  DRINK BY MOUTH DAILY      Last office visit with prescribing clinician: 11/14/2024   Last telemedicine visit with prescribing clinician: Visit date not found   Next office visit with prescribing clinician: 3/24/2025                         Would you like a call back once the refill request has been completed: [] Yes [] No    If the office needs to give you a call back, can they leave a voicemail: [] Yes [] No    Amy Yusuf MA  01/27/25, 07:28 EST

## 2025-02-07 DIAGNOSIS — E11.9 DIABETES MELLITUS TYPE II, CONTROLLED, WITH NO COMPLICATIONS: ICD-10-CM

## 2025-02-07 DIAGNOSIS — E11.9 TYPE 2 DIABETES MELLITUS WITHOUT COMPLICATION, WITHOUT LONG-TERM CURRENT USE OF INSULIN: ICD-10-CM

## 2025-02-07 RX ORDER — BLOOD SUGAR DIAGNOSTIC
1 STRIP MISCELLANEOUS DAILY
Qty: 100 EACH | Refills: 3 | Status: SHIPPED | OUTPATIENT
Start: 2025-02-07

## 2025-02-07 NOTE — TELEPHONE ENCOUNTER
Rx Refill Note  Requested Prescriptions     Pending Prescriptions Disp Refills    glucose blood (Accu-Chek Guide Test) test strip [Pharmacy Med Name: Accu-Chek Guide In Vitro Strip] 100 each 3     Sig: USE AS DIRECTED      Last office visit with prescribing clinician: Visit date not found   Last telemedicine visit with prescribing clinician: Visit date not found   Next office visit with prescribing clinician: Visit date not found                         Would you like a call back once the refill request has been completed: [] Yes [] No    If the office needs to give you a call back, can they leave a voicemail: [] Yes [] No    Sarah Yusuf  02/07/25, 07:38 EST

## 2025-02-11 ENCOUNTER — HOSPITAL ENCOUNTER (OUTPATIENT)
Facility: HOSPITAL | Age: 80
Discharge: HOME | End: 2025-02-11
Payer: MEDICARE

## 2025-02-11 RX ORDER — FUROSEMIDE 40 MG/1
40 TABLET ORAL DAILY
Qty: 90 TABLET | Refills: 1 | Status: SHIPPED | OUTPATIENT
Start: 2025-02-11

## 2025-02-26 ENCOUNTER — HOSPITAL ENCOUNTER (OUTPATIENT)
Facility: HOSPITAL | Age: 80
Discharge: HOME | End: 2025-02-26
Admitting: INTERNAL MEDICINE
Payer: MEDICARE

## 2025-02-26 DIAGNOSIS — M81.0 AGE-RELATED OSTEOPOROSIS WITHOUT CURRENT PATHOLOGICAL FRACTURE: ICD-10-CM

## 2025-02-26 PROCEDURE — 77080 DXA BONE DENSITY AXIAL: CPT

## 2025-03-02 DIAGNOSIS — M85.89 OSTEOPENIA OF MULTIPLE SITES: ICD-10-CM

## 2025-03-02 NOTE — PROGRESS NOTES
Lumbar spine with significant 6.9% increase in bone density.  Left femoral neck has insignificant decrease of 2.2%.  Maintain adequate dietary calcium and vitamin D intake.  Continue risedronate.  Repeat bone density in 2027.  Send copy of labs to Dr. Lowe.  Copy of labs sent to patient through [email or web address].

## 2025-03-03 RX ORDER — RISEDRONATE SODIUM 35 MG/1
TABLET, FILM COATED ORAL
Qty: 12 TABLET | Refills: 2 | Status: SHIPPED | OUTPATIENT
Start: 2025-03-03

## 2025-03-03 NOTE — PROGRESS NOTES
RM:________     PCP: Ellen Lowe MD    : 1945  AGE: 80 y.o.  EST PATIENT           Wt Readings from Last 3 Encounters:   24 81 kg (178 lb 9.6 oz)   24 81.2 kg (179 lb)   24 82.4 kg (181 lb 9.6 oz)           CP______  SOA_______ DIZZINESS _____ FATIGUE ______  PALPS ______    WT: ____________ BP: __________L __________R HR______    ALLERGIES:Hydrochlorothiazide, Dexamethasone, and Lisinopril      Social History     Tobacco Use    Smoking status: Never    Smokeless tobacco: Never    Tobacco comments:     I have never smoked.   Vaping Use    Vaping status: Never Used   Substance Use Topics    Alcohol use: No     Comment: CAFFEINE: none    Drug use: No

## 2025-03-03 NOTE — TELEPHONE ENCOUNTER
Rx Refill Note  Requested Prescriptions     Pending Prescriptions Disp Refills    risedronate (ACTONEL) 35 MG tablet [Pharmacy Med Name: RISEDRONATE  35MG  TAB] 12 tablet 3     Sig: TAKE 1 TABLET BY MOUTH WEEKLY  WITH 8 OZ OF PLAIN WATER 30  MINUTES BEFORE FIRST FOOD, DRINK OR MEDS. STAY UPRIGHT FOR 30  MINS      Last office visit with prescribing clinician: 11/14/2024   Last telemedicine visit with prescribing clinician: Visit date not found   Next office visit with prescribing clinician: 3/24/2025                         Would you like a call back once the refill request has been completed: [] Yes [] No    If the office needs to give you a call back, can they leave a voicemail: [] Yes [] No    Amy Yusuf MA  03/03/25, 07:57 EST

## 2025-03-05 ENCOUNTER — OFFICE VISIT (OUTPATIENT)
Dept: CARDIOLOGY | Facility: CLINIC | Age: 80
End: 2025-03-05
Payer: MEDICARE

## 2025-03-05 VITALS
HEART RATE: 81 BPM | DIASTOLIC BLOOD PRESSURE: 72 MMHG | HEIGHT: 63 IN | BODY MASS INDEX: 31.25 KG/M2 | WEIGHT: 176.4 LBS | SYSTOLIC BLOOD PRESSURE: 108 MMHG

## 2025-03-05 DIAGNOSIS — I63.10 CEREBROVASCULAR ACCIDENT (CVA) DUE TO EMBOLISM OF PRECEREBRAL ARTERY: ICD-10-CM

## 2025-03-05 DIAGNOSIS — I10 ESSENTIAL HYPERTENSION: ICD-10-CM

## 2025-03-05 DIAGNOSIS — E78.2 MIXED HYPERLIPIDEMIA: ICD-10-CM

## 2025-03-05 DIAGNOSIS — G57.11 MERALGIA PARESTHETICA OF RIGHT SIDE: ICD-10-CM

## 2025-03-05 DIAGNOSIS — I49.1 APC (ATRIAL PREMATURE CONTRACTIONS): ICD-10-CM

## 2025-03-05 DIAGNOSIS — I25.10 NONOCCLUSIVE CORONARY ATHEROSCLEROSIS OF NATIVE CORONARY ARTERY: Primary | ICD-10-CM

## 2025-03-05 DIAGNOSIS — I87.2 CHRONIC VENOUS INSUFFICIENCY: ICD-10-CM

## 2025-03-05 PROCEDURE — 3074F SYST BP LT 130 MM HG: CPT | Performed by: INTERNAL MEDICINE

## 2025-03-05 PROCEDURE — 3078F DIAST BP <80 MM HG: CPT | Performed by: INTERNAL MEDICINE

## 2025-03-05 PROCEDURE — 1160F RVW MEDS BY RX/DR IN RCRD: CPT | Performed by: INTERNAL MEDICINE

## 2025-03-05 PROCEDURE — 93000 ELECTROCARDIOGRAM COMPLETE: CPT | Performed by: INTERNAL MEDICINE

## 2025-03-05 PROCEDURE — 1159F MED LIST DOCD IN RCRD: CPT | Performed by: INTERNAL MEDICINE

## 2025-03-05 PROCEDURE — 99214 OFFICE O/P EST MOD 30 MIN: CPT | Performed by: INTERNAL MEDICINE

## 2025-03-05 NOTE — LETTER
2025     Ellen Lowe MD  [address]  [address]  [place] [id number]    Patient: Dejah Casey   YOB: 1945   Date of Visit: 3/5/2025     Dear Ellen Lowe MD:       Thank you for referring Dejah Casey to me for evaluation. Below are the relevant portions of my assessment and plan of care.    If you have questions, please do not hesitate to call me. I look forward to following Dejah along with you.         Sincerely,        Daniel Leal MD        CC: No Recipients    Daniel Leal MD  25 [id number]  Sign when Signing Visit  Date of Office Visit: 25  Encounter Provider: Daniel Leal MD  Place of Service: [facility] CARDIOLOGY  Patient Name: Dejah Casey  :1945    Chief Complaint   Patient presents with   • Coronary Artery Disease   :     HPI:     Ms. Casey is 80 y.o. and presents today to follow up. I have reviewed prior notes and there are no changes except for any new updates described below. I have also reviewed any information entered into the medical record by the patient or by ancillary staff.     She was followed by [facility] until 2017. I first saw her in 2017.  In , she had a cath which showed minimal coronary atherosclerosis (30% LAD).  In 2016, she was having palpitations; a Holter showed rare PVCs and PACs and rare bursts of PACs lasting up to six beats. Metoprolol was started, which helped. She had an echocardiogram in 2017 that was unremarkable; it showed an LVEF of 55%, grade I diastolic dysfunction, and mild MR.  A proBNP in 2017 was normal (30). In 2017, a perfusion stress test was completely normal.  This was performed due to dyspnea.  Another perfusion stress was performed in 2018 and was normal (it was also performed because of dyspnea).    In early , she again reported dyspnea, leg swelling, and palpitations. An echo revealed normal LVSF,  grade 1A diastolic dysfunction, and no significant valvular disease or PHTN. I started furosemide.  She was previously on HCTZ but it was stopped in 2015 due to hypercalcemia. She has had issues with her potassium in the past as well.     She presented in June 2022 with bilateral subacute strokes. An echo revealed normal LVSF and moderate LA dilation. Even though a Zio did not show any atrial arrhythmias, anticoagulation was recommended given the bilateral nature of the strokes and the atrial dilation.  In follow-up, we discussed potentially placing a loop recorder, but she wanted to avoid any procedures and just remain on apixaban.    She denies chest pain, palpitations, syncope, lightheadedness, orthopnea, or PND. She has mild exertional dyspnea, which is stable. She has chronic mild pedal edema.  She has had some paresthesias on the left anterior thigh.    Past Medical History:   Diagnosis Date   • Allergic     Seasonal alleries, Lisinopril, Dexamethasone   • Anxiety    • Arrhythmia    • Cataract     Cararacts in both eyes.   • Contact dermatitis    • COVID-19 06/2022   • DDD (degenerative disc disease), lumbar    • Deep vein thrombosis June 11, 2022    Stroke   • Depression    • Diverticulosis June 2021   • Dry eyes    • Dry mouth    • Essential hypertension    • Female climacteric state    • Headache    • History of mammogram     8/2016   • History of total right hip replacement 2013   • Hypercalcemia    • Hyperlipidemia    • Iron deficiency anemia 05/27/2021    Added automatically from request for surgery [id number]   • Low back pain     Lumbar area.   • Neuromuscular disorder 03/2016   • Nightmares REM-sleep type    • Nonocclusive coronary atherosclerosis of native coronary artery     cath in 2008 with 30% LAD disease   • Nontoxic multinodular goiter    • Obesity    • Osteoarthritis     both knees, back and hips   • Osteopenia    • Pain, joint, shoulder    • Pernicious anemia    • Pneumonia August 8, 2018     After Hospital stay.   • Polycythemia    • Rotator cuff tendinitis    • Seasonal allergies    • Sleep apnea     CPAP nightly   • Snoring    • Stroke 06/2022   • Superficial phlebitis    • Type 2 diabetes mellitus     Type II   • Visual impairment August 30, 2019   • Vitiligo        Past Surgical History:   Procedure Laterality Date   • BREAST BIOPSY     • BREAST LUMPECTOMY  1973    benign   • CARDIAC CATHETERIZATION  2008   • CATARACT EXTRACTION, BILATERAL Bilateral 2019   • COLONOSCOPY  08/15/2010   • COLONOSCOPY N/A 07/20/2021    Procedure: COLONOSCOPY TO CECUM;  Surgeon: Ellen Kothari MD;  Location: [facility] ENDOSCOPY;  Service: Gastroenterology;  Laterality: N/A;  pre: SCREENING FOR COLON CANCER  post: HEMORRHOIDS, DIVERTICULOSIS, TORTUOUS COLON   • ENDOSCOPY N/A 07/20/2021    Procedure: ESOPHAGOGASTRODUODENOSCOPY WITH BX'S;  Surgeon: Ellen Kothari MD;  Location: [facility] ENDOSCOPY;  Service: Gastroenterology;  Laterality: N/A;  pre: IRON DEFICIENCY ANEMIA  post: GASTRITIS, SMALL HIATAL HERNIA   • EYE SURGERY     • EYE SURGERY Left 05/2022   • HYSTERECTOMY  1982   • JOINT REPLACEMENT  07/10/2013    Total Hip Replacement   • REPLACEMENT TOTAL KNEE Left 08/06/2018    Dr. Bishnu Larson   • REPLACEMENT TOTAL KNEE Right     [place]    • TONSILLECTOMY  1967   • TOTAL HIP ARTHROPLASTY Right 2013   • UPPER GASTROINTESTINAL ENDOSCOPY  07/20/2021       Social History     Socioeconomic History   • Marital status:    Tobacco Use   • Smoking status: Never     Passive exposure: Never   • Smokeless tobacco: Never   • Tobacco comments:     I have never smoked.   Vaping Use   • Vaping status: Never Used   Substance and Sexual Activity   • Alcohol use: No     Comment: CAFFEINE: none   • Drug use: No   • Sexual activity: Not Currently     Partners: Male     Birth control/protection: Post-menopausal     Comment: I had a Hysterectomy in 1982.       Family History   Problem Relation Age of Onset   • Diabetes Mother          Mother   • Thyroid disease Mother    • Hypertension Father         Father     • Heart disease Father         Father     • Arthritis Father         Father   • Hyperlipidemia Father         Father   • Asthma Sister         Sister     • Hypertension Sister         Sister     • Miscarriages / Stillbirths Sister         Sister   • Thyroid disease Sister         Sister   • Diabetes Sister         Sister   • Hypertension Sister         Sister   • Early death Sister         Infant   • Hypertension Brother         Brother     • Diabetes Brother         Brother   • Kidney disease Brother         Brother, Renal Failure   • Vision loss Brother         Brother   • Cancer Brother         Brother   • Lung cancer Brother    • Kidney disease Brother         Brother,  Renal Failure   • Stroke Brother         Brother       Review of Systems   Musculoskeletal:  Positive for joint pain.   Genitourinary:  Positive for frequency.   Neurological:  Positive for dizziness.   All other systems reviewed and are negative.      Allergies   Allergen Reactions   • Hydrochlorothiazide Other (See Comments)     In 2015-developed hypercalcemia-HCTZ was discontinued  Other reaction(s): Other (See Comments)  In 2015-developed hypercalcemia-HCTZ was discontinued  In 2015-developed hypercalcemia-HCTZ was discontinued   • Dexamethasone Dermatitis     Other reaction(s): Other (See Comments)  CONTACT DERMATITIS   • Lisinopril Cough     Other reaction(s): Other (See Comments)  COUGH         Current Outpatient Medications:   •  Accu-Chek FastClix Lancets misc, Use to check blood sugar once daily, Disp: 102 each, Rfl: 5  •  Blood Glucose Monitoring Suppl (Blood Glucose Monitor System) w/Device kit, BS meter Accu-chek Catherine plus meter. DX E11.9, Disp: 1 each, Rfl: 1  •  Calcium Carb-Cholecalciferol 500-200 MG-UNIT tablet, Take 2 tablets by mouth daily., Disp: , Rfl:   •  clonazePAM (KlonoPIN) 0.5 MG tablet, Take 1 tablet  by mouth Every Night., Disp: , Rfl:   •  Cyanocobalamin ([employer] Vitamin B-12) 5000 MCG sublingual tablet, Place 5,000 mcg under the tongue Daily., Disp: 90 tablet, Rfl: 3  •  cycloSPORINE (RESTASIS) 0.05 % ophthalmic emulsion, 1 drop 2 (Two) Times a Day., Disp: , Rfl:   •  difluprednate (DUREZOL) 0.05 % ophthalmic emulsion, instill 1 drop into both eyes twice a day, Disp: , Rfl:   •  doxycycline (ADOXA) 50 MG tablet, Take 1 tablet by mouth Every 12 (Twelve) Hours., Disp: , Rfl:   •  Eliquis 5 MG tablet tablet, TAKE 1 TABLET BY MOUTH EVERY 12  HOURS, Disp: 180 tablet, Rfl: 3  •  ferrous sulfate 325 (65 FE) MG tablet, TAKE 1 TABLET BY MOUTH EVERY DAY WITH BREAKFAST, Disp: 90 tablet, Rfl: 1  •  folic acid (FOLVITE) 1 MG tablet, TAKE 1 TABLET BY MOUTH EVERY DAY, Disp: 90 tablet, Rfl: 1  •  furosemide (LASIX) 40 MG tablet, TAKE 1 TABLET BY MOUTH DAILY, Disp: 90 tablet, Rfl: 1  •  glucose blood (Accu-Chek Guide Test) test strip, 1 each by Other route Daily., Disp: 100 each, Rfl: 3  •  metFORMIN (GLUCOPHAGE) 500 MG tablet, TAKE 1 TABLET BY MOUTH TWICE  DAILY WITH MEALS FOR TYPE 2  DIABETES, Disp: 180 tablet, Rfl: 2  •  metoprolol succinate XL (TOPROL-XL) 50 MG 24 hr tablet, TAKE 1 TABLET BY MOUTH DAILY, Disp: 90 tablet, Rfl: 3  •  Mirabegron ER (MYRBETRIQ) 50 MG tablet sustained-release 24 hour 24 hr tablet, Take 50 mg by mouth Daily., Disp: , Rfl:   •  pilocarpine (SALAGEN) 5 MG tablet, 1 tablet 3 (Three) Times a Day., Disp: , Rfl:   •  Potassium Chloride 10 % solution, DILUTE 15 ML IN AT LEAST 1/2  GLASS OF WATER OR JUICE AND  DRINK BY MOUTH DAILY, Disp: 1419 mL, Rfl: 1  •  Prolensa 0.07 % solution, PLACE ONE DROP IN BOTH EYES ONCE DAILY FOR MAINTENANCE, Disp: , Rfl:   •  risedronate (ACTONEL) 35 MG tablet, TAKE 1 TABLET BY MOUTH WEEKLY  WITH 8 OZ OF PLAIN WATER 30  MINUTES BEFORE FIRST FOOD, DRINK OR MEDS. STAY UPRIGHT FOR 30  MINS, Disp: 12 tablet, Rfl: 2  •  venlafaxine (EFFEXOR) 75 MG tablet, Take 1 tablet by mouth 2  "(Two) Times a Day. Pt is taking 1 tab in the morning and 2 tabs at night, Disp: , Rfl:   •  amLODIPine (NORVASC) 5 MG tablet, Take 1 tablet by mouth Daily for 270 days., Disp: 90 tablet, Rfl: 2  •  atorvastatin (LIPITOR) 20 MG tablet, Take 1 tablet by mouth Every Morning for 270 days., Disp: 90 tablet, Rfl: 2      Objective:     Vitals:    03/05/25 1349   BP: 108/72   BP Location: Left arm   Pulse: 81   Weight: 80 kg (176 lb 6.4 oz)   Height: 160 cm (62.99\")       There were no vitals filed for this visit.        Body mass index is 31.26 kg/m².    Physical Exam  Vitals reviewed.   Constitutional:       Comments: overweight   HENT:      Head: Normocephalic.      Nose: Nose normal.   Eyes:      Conjunctiva/sclera: Conjunctivae normal.   Neck:      Vascular: No JVD.   Cardiovascular:      Rate and Rhythm: Normal rate and regular rhythm.      Pulses: Normal pulses and intact distal pulses.      Heart sounds: Normal heart sounds. No murmur heard.  Pulmonary:      Effort: Pulmonary effort is normal.      Breath sounds: Normal breath sounds.   Abdominal:      Palpations: Abdomen is soft.      Tenderness: There is no abdominal tenderness.      Comments: Obesity limits abdominal exam   Musculoskeletal:         General: No swelling. Normal range of motion.      Cervical back: Normal range of motion.   Skin:     General: Skin is warm and dry.   Neurological:      General: No focal deficit present.      Mental Status: She is alert.   Psychiatric:         Mood and Affect: Mood normal.           ECG 12 Lead    Date/Time: 3/5/2025 2:04 PM  Performed by: Daniel Leal MD    Authorized by: Daniel Leal MD  Comparison: compared with previous ECG   Similar to previous ECG  Rhythm: sinus rhythm  Conduction: conduction normal  ST Segments: ST segments normal  T Waves: T waves normal  QRS axis: normal  Other: no other findings    Clinical impression: normal ECG          Assessment:       Diagnosis Plan   1. Nonocclusive coronary " atherosclerosis of native coronary artery  ECG 12 Lead      2. Mixed hyperlipidemia        3. Essential hypertension        4. Chronic venous insufficiency        5. APC (atrial premature contractions)        6. Cerebrovascular accident (CVA) due to embolism of precerebral artery        7. Meralgia paresthetica of right side             Plan:     1/2. She had a cath in 2008 that showed nonocclusive CAD. She had a normal perfusion stress test in 2020. She's not on aspirin as she's anticoagulated.  She is on atorvastatin.       3/4. She has hypertension, grade 1A diastolic dysfunction, and chronic leg swelling and dyspnea. She's doing much better with compression stockings and furosemide. She does not tolerate spironolactone or HCTZ due to electrolyte issues.    5/6. She suffered bilateral strokes in June 2022, which are presumed to be embolic. An echo revealed normal LVSF and moderate LAE. She was noted to have PACs. No true AF was seen, but the suspicion was great enough to switch her to OAC.  We discussed placing a loop recorder, but she did not want to proceed with invasive testing.    7. She is describing meralgia paresthetica. I encouraged wearing loose pants when possible and to discuss with her PCP.    Sincerely,       Daniel Leal MD

## 2025-03-05 NOTE — PROGRESS NOTES
Date of Office Visit: 25  Encounter Provider: Daniel Leal MD  Place of Service: [facility]  Patient Name: Dejah Casey  :1945    Chief Complaint   Patient presents with    Coronary Artery Disease   :     HPI:     Ms. Casey is 80 y.o. and presents today to follow up. I have reviewed prior notes and there are no changes except for any new updates described below. I have also reviewed any information entered into the medical record by the patient or by ancillary staff.     She was followed by [facility] until 2017. I first saw her in 2017.  In , she had a cath which showed minimal coronary atherosclerosis (30% LAD).  In 2016, she was having palpitations; a Holter showed rare PVCs and PACs and rare bursts of PACs lasting up to six beats. Metoprolol was started, which helped. She had an echocardiogram in 2017 that was unremarkable; it showed an LVEF of 55%, grade I diastolic dysfunction, and mild MR.  A proBNP in  was normal (30). In 2017, a perfusion stress test was completely normal.  This was performed due to dyspnea.  Another perfusion stress was performed in 2018 and was normal (it was also performed because of dyspnea).    In early , she again reported dyspnea, leg swelling, and palpitations. An echo revealed normal LVSF, grade 1A diastolic dysfunction, and no significant valvular disease or PHTN. I started furosemide.  She was previously on HCTZ but it was stopped in  due to hypercalcemia. She has had issues with her potassium in the past as well.     She presented in 2022 with bilateral subacute strokes. An echo revealed normal LVSF and moderate LA dilation. Even though a Zio did not show any atrial arrhythmias, anticoagulation was recommended given the bilateral nature of the strokes and the atrial dilation.  In follow-up, we discussed potentially placing a loop recorder, but  she wanted to avoid any procedures and just remain on apixaban.    She denies chest pain, palpitations, syncope, lightheadedness, orthopnea, or PND. She has mild exertional dyspnea, which is stable. She has chronic mild pedal edema.  She has had some paresthesias on the left anterior thigh.    Past Medical History:   Diagnosis Date    Allergic     Seasonal alleries, Lisinopril, Dexamethasone    Anxiety     Arrhythmia     Cataract     Cararacts in both eyes.    Contact dermatitis     COVID-19 06/2022    DDD (degenerative disc disease), lumbar     Deep vein thrombosis June 11, 2022    Stroke    Depression     Diverticulosis June 2021    Dry eyes     Dry mouth     Essential hypertension     Female climacteric state     Headache     History of mammogram     8/2016    History of total right hip replacement 2013    Hypercalcemia     Hyperlipidemia     Iron deficiency anemia 05/27/2021    Added automatically from request for surgery [id number]    Low back pain     Lumbar area.    Neuromuscular disorder 03/2016    Nightmares REM-sleep type     Nonocclusive coronary atherosclerosis of native coronary artery     cath in 2008 with 30% LAD disease    Nontoxic multinodular goiter     Obesity     Osteoarthritis     both knees, back and hips    Osteopenia     Pain, joint, shoulder     Pernicious anemia     Pneumonia August 8, 2018    After Hospital stay.    Polycythemia     Rotator cuff tendinitis     Seasonal allergies     Sleep apnea     CPAP nightly    Snoring     Stroke 06/2022    Superficial phlebitis     Type 2 diabetes mellitus     Type II    Visual impairment August 30, 2019    Vitiligo        Past Surgical History:   Procedure Laterality Date    BREAST BIOPSY      BREAST LUMPECTOMY  1973    benign    CARDIAC CATHETERIZATION  2008    CATARACT EXTRACTION, BILATERAL Bilateral 2019    COLONOSCOPY  08/15/2010    COLONOSCOPY N/A 07/20/2021    Procedure: COLONOSCOPY TO CECUM;  Surgeon: Ellen Kothari MD;  Location: [facility]  ENDOSCOPY;  Service: Gastroenterology;  Laterality: N/A;  pre: SCREENING FOR COLON CANCER  post: HEMORRHOIDS, DIVERTICULOSIS, TORTUOUS COLON    ENDOSCOPY N/A 2021    Procedure: ESOPHAGOGASTRODUODENOSCOPY WITH BX'S;  Surgeon: Ellen Kothari MD;  Location: [facility] ENDOSCOPY;  Service: Gastroenterology;  Laterality: N/A;  pre: IRON DEFICIENCY ANEMIA  post: GASTRITIS, SMALL HIATAL HERNIA    EYE SURGERY      EYE SURGERY Left 2022    HYSTERECTOMY  1982    JOINT REPLACEMENT  07/10/2013    Total Hip Replacement    REPLACEMENT TOTAL KNEE Left 2018    Dr. Bishnu Larson    REPLACEMENT TOTAL KNEE Right     [facility]     TONSILLECTOMY  1967    TOTAL HIP ARTHROPLASTY Right 2013    UPPER GASTROINTESTINAL ENDOSCOPY  2021       Social History     Socioeconomic History    Marital status:    Tobacco Use    Smoking status: Never     Passive exposure: Never    Smokeless tobacco: Never    Tobacco comments:     I have never smoked.   Vaping Use    Vaping status: Never Used   Substance and Sexual Activity    Alcohol use: No     Comment: CAFFEINE: none    Drug use: No    Sexual activity: Not Currently     Partners: Male     Birth control/protection: Post-menopausal     Comment: I had a Hysterectomy in .       Family History   Problem Relation Age of Onset    Diabetes Mother         Mother    Thyroid disease Mother     Hypertension Father         Father      Heart disease Father         Father      Arthritis Father         Father    Hyperlipidemia Father         Father    Asthma Sister         Sister      Hypertension Sister         Sister      Miscarriages / Stillbirths Sister         Sister    Thyroid disease Sister         Sister    Diabetes Sister         Sister    Hypertension Sister         Sister    Early death Sister         Infant    Hypertension Brother         Brother      Diabetes Brother         Brother    Kidney disease Brother         Brother, Renal Failure     Vision loss Brother         Brother    Cancer Brother         Brother    Lung cancer Brother     Kidney disease Brother         Brother,  Renal Failure    Stroke Brother         Brother       Review of Systems   Musculoskeletal:  Positive for joint pain.   Genitourinary:  Positive for frequency.   Neurological:  Positive for dizziness.   All other systems reviewed and are negative.      Allergies   Allergen Reactions    Hydrochlorothiazide Other (See Comments)     In 2015-developed hypercalcemia-HCTZ was discontinued  Other reaction(s): Other (See Comments)  In 2015-developed hypercalcemia-HCTZ was discontinued  In 2015-developed hypercalcemia-HCTZ was discontinued    Dexamethasone Dermatitis     Other reaction(s): Other (See Comments)  CONTACT DERMATITIS    Lisinopril Cough     Other reaction(s): Other (See Comments)  COUGH         Current Outpatient Medications:     Accu-Chek FastClix Lancets misc, Use to check blood sugar once daily, Disp: 102 each, Rfl: 5    Blood Glucose Monitoring Suppl (Blood Glucose Monitor System) w/Device kit, BS meter Accu-chek Catherine plus meter. DX E11.9, Disp: 1 each, Rfl: 1    Calcium Carb-Cholecalciferol 500-200 MG-UNIT tablet, Take 2 tablets by mouth daily., Disp: , Rfl:     clonazePAM (KlonoPIN) 0.5 MG tablet, Take 1 tablet by mouth Every Night., Disp: , Rfl:     Cyanocobalamin ([employer] Vitamin B-12) 5000 MCG sublingual tablet, Place 5,000 mcg under the tongue Daily., Disp: 90 tablet, Rfl: 3    cycloSPORINE (RESTASIS) 0.05 % ophthalmic emulsion, 1 drop 2 (Two) Times a Day., Disp: , Rfl:     difluprednate (DUREZOL) 0.05 % ophthalmic emulsion, instill 1 drop into both eyes twice a day, Disp: , Rfl:     doxycycline (ADOXA) 50 MG tablet, Take 1 tablet by mouth Every 12 (Twelve) Hours., Disp: , Rfl:     Eliquis 5 MG tablet tablet, TAKE 1 TABLET BY MOUTH EVERY 12  HOURS, Disp: 180 tablet, Rfl: 3    ferrous sulfate 325 (65 FE) MG tablet, TAKE 1 TABLET BY MOUTH EVERY DAY WITH BREAKFAST, Disp:  "90 tablet, Rfl: 1    folic acid (FOLVITE) 1 MG tablet, TAKE 1 TABLET BY MOUTH EVERY DAY, Disp: 90 tablet, Rfl: 1    furosemide (LASIX) 40 MG tablet, TAKE 1 TABLET BY MOUTH DAILY, Disp: 90 tablet, Rfl: 1    glucose blood (Accu-Chek Guide Test) test strip, 1 each by Other route Daily., Disp: 100 each, Rfl: 3    metFORMIN (GLUCOPHAGE) 500 MG tablet, TAKE 1 TABLET BY MOUTH TWICE  DAILY WITH MEALS FOR TYPE 2  DIABETES, Disp: 180 tablet, Rfl: 2    metoprolol succinate XL (TOPROL-XL) 50 MG 24 hr tablet, TAKE 1 TABLET BY MOUTH DAILY, Disp: 90 tablet, Rfl: 3    Mirabegron ER (MYRBETRIQ) 50 MG tablet sustained-release 24 hour 24 hr tablet, Take 50 mg by mouth Daily., Disp: , Rfl:     pilocarpine (SALAGEN) 5 MG tablet, 1 tablet 3 (Three) Times a Day., Disp: , Rfl:     Potassium Chloride 10 % solution, DILUTE 15 ML IN AT LEAST 1/2  GLASS OF WATER OR JUICE AND  DRINK BY MOUTH DAILY, Disp: 1419 mL, Rfl: 1    Prolensa 0.07 % solution, PLACE ONE DROP IN BOTH EYES ONCE DAILY FOR MAINTENANCE, Disp: , Rfl:     risedronate (ACTONEL) 35 MG tablet, TAKE 1 TABLET BY MOUTH WEEKLY  WITH 8 OZ OF PLAIN WATER 30  MINUTES BEFORE FIRST FOOD, DRINK OR MEDS. STAY UPRIGHT FOR 30  MINS, Disp: 12 tablet, Rfl: 2    venlafaxine (EFFEXOR) 75 MG tablet, Take 1 tablet by mouth 2 (Two) Times a Day. Pt is taking 1 tab in the morning and 2 tabs at night, Disp: , Rfl:     amLODIPine (NORVASC) 5 MG tablet, Take 1 tablet by mouth Daily for 270 days., Disp: 90 tablet, Rfl: 2    atorvastatin (LIPITOR) 20 MG tablet, Take 1 tablet by mouth Every Morning for 270 days., Disp: 90 tablet, Rfl: 2      Objective:     Vitals:    03/05/25 1349   BP: 108/72   BP Location: Left arm   Pulse: 81   Weight: 80 kg (176 lb 6.4 oz)   Height: 160 cm (62.99\")       There were no vitals filed for this visit.        Body mass index is 31.26 kg/m².    Physical Exam  Vitals reviewed.   Constitutional:       Comments: overweight   HENT:      Head: Normocephalic.      Nose: Nose normal. "   Eyes:      Conjunctiva/sclera: Conjunctivae normal.   Neck:      Vascular: No JVD.   Cardiovascular:      Rate and Rhythm: Normal rate and regular rhythm.      Pulses: Normal pulses and intact distal pulses.      Heart sounds: Normal heart sounds. No murmur heard.  Pulmonary:      Effort: Pulmonary effort is normal.      Breath sounds: Normal breath sounds.   Abdominal:      Palpations: Abdomen is soft.      Tenderness: There is no abdominal tenderness.      Comments: Obesity limits abdominal exam   Musculoskeletal:         General: No swelling. Normal range of motion.      Cervical back: Normal range of motion.   Skin:     General: Skin is warm and dry.   Neurological:      General: No focal deficit present.      Mental Status: She is alert.   Psychiatric:         Mood and Affect: Mood normal.           ECG 12 Lead    Date/Time: 3/5/2025 2:04 PM  Performed by: Daniel Leal MD    Authorized by: Daniel Leal MD  Comparison: compared with previous ECG   Similar to previous ECG  Rhythm: sinus rhythm  Conduction: conduction normal  ST Segments: ST segments normal  T Waves: T waves normal  QRS axis: normal  Other: no other findings    Clinical impression: normal ECG          Assessment:       Diagnosis Plan   1. Nonocclusive coronary atherosclerosis of native coronary artery  ECG 12 Lead      2. Mixed hyperlipidemia        3. Essential hypertension        4. Chronic venous insufficiency        5. APC (atrial premature contractions)        6. Cerebrovascular accident (CVA) due to embolism of precerebral artery        7. Meralgia paresthetica of right side             Plan:     1/2. She had a cath in 2008 that showed nonocclusive CAD. She had a normal perfusion stress test in 2020. She's not on aspirin as she's anticoagulated.  She is on atorvastatin.       3/4. She has hypertension, grade 1A diastolic dysfunction, and chronic leg swelling and dyspnea. She's doing much better with compression stockings and  furosemide. She does not tolerate spironolactone or HCTZ due to electrolyte issues.    5/6. She suffered bilateral strokes in June 2022, which are presumed to be embolic. An echo revealed normal LVSF and moderate LAE. She was noted to have PACs. No true AF was seen, but the suspicion was great enough to switch her to OAC.  We discussed placing a loop recorder, but she did not want to proceed with invasive testing.    7. She is describing meralgia paresthetica. I encouraged wearing loose pants when possible and to discuss with her PCP.    Sincerely,       Daniel Leal MD

## 2025-03-17 RX ORDER — FERROUS SULFATE 325(65) MG
1 TABLET ORAL
Qty: 90 TABLET | Refills: 1 | Status: SHIPPED | OUTPATIENT
Start: 2025-03-17

## 2025-03-24 ENCOUNTER — OFFICE VISIT (OUTPATIENT)
Dept: ENDOCRINOLOGY | Age: 80
End: 2025-03-24
Payer: MEDICARE

## 2025-03-24 VITALS
OXYGEN SATURATION: 98 % | BODY MASS INDEX: 31.47 KG/M2 | HEIGHT: 63 IN | SYSTOLIC BLOOD PRESSURE: 116 MMHG | WEIGHT: 177.6 LBS | DIASTOLIC BLOOD PRESSURE: 72 MMHG | HEART RATE: 84 BPM | TEMPERATURE: 98.1 F

## 2025-03-24 DIAGNOSIS — E55.9 VITAMIN D DEFICIENCY: ICD-10-CM

## 2025-03-24 DIAGNOSIS — I10 ESSENTIAL HYPERTENSION: ICD-10-CM

## 2025-03-24 DIAGNOSIS — G47.33 OBSTRUCTIVE SLEEP APNEA ON CPAP: ICD-10-CM

## 2025-03-24 DIAGNOSIS — I25.10 NONOCCLUSIVE CORONARY ATHEROSCLEROSIS OF NATIVE CORONARY ARTERY: ICD-10-CM

## 2025-03-24 DIAGNOSIS — E78.5 HYPERLIPIDEMIA, UNSPECIFIED HYPERLIPIDEMIA TYPE: ICD-10-CM

## 2025-03-24 DIAGNOSIS — E53.8 VITAMIN B12 DEFICIENCY: ICD-10-CM

## 2025-03-24 DIAGNOSIS — Z87.442 HISTORY OF KIDNEY STONES: ICD-10-CM

## 2025-03-24 DIAGNOSIS — M81.0 AGE-RELATED OSTEOPOROSIS WITHOUT CURRENT PATHOLOGICAL FRACTURE: ICD-10-CM

## 2025-03-24 DIAGNOSIS — E04.2 NONTOXIC MULTINODULAR GOITER: ICD-10-CM

## 2025-03-24 DIAGNOSIS — D47.2 MONOCLONAL GAMMOPATHY: ICD-10-CM

## 2025-03-24 DIAGNOSIS — E11.9 TYPE 2 DIABETES MELLITUS WITHOUT COMPLICATION, WITHOUT LONG-TERM CURRENT USE OF INSULIN: Primary | ICD-10-CM

## 2025-03-24 PROCEDURE — 99214 OFFICE O/P EST MOD 30 MIN: CPT | Performed by: INTERNAL MEDICINE

## 2025-03-24 PROCEDURE — 3078F DIAST BP <80 MM HG: CPT | Performed by: INTERNAL MEDICINE

## 2025-03-24 PROCEDURE — G2211 COMPLEX E/M VISIT ADD ON: HCPCS | Performed by: INTERNAL MEDICINE

## 2025-03-24 PROCEDURE — 3074F SYST BP LT 130 MM HG: CPT | Performed by: INTERNAL MEDICINE

## 2025-03-24 NOTE — PROGRESS NOTES
Subjective   Dejah Casey is a 80 y.o. female.     History of Present Illness         She has known diabetes mellitus since 2003 and has been on metformin 500 mg twice a day.  Blood sugar .  She has no significant weight change since November 2024.  Her last meal was at 1 PM.     She has no microalbuminuria on urine sample taken 11/24.   Her last eye exam was October 2024.  She has iridocyclitis.  She has no retinopathy.  She denies tingling in her feet.       She has hyperlipidemia and is on Lipitor 20 mg once a day.  She denies any myalgia.    She was found to have a goiter on examination last August 2012. She had thyroid ultrasound done on 08/21/2012 which showed a multinodular goiter with the dominant solid nodule in the left measuring 1.8 cm. She had followup thyroid ultrasound in 4/14 at [facility] showed multinodular goiter with stable nodules.       She denies any pressure symptoms or dysphagia. She denies any bowel changes.  She has no previous history of head or neck radiation therapy.     She has mild coronary artery disease by cardiac catheterization done in 2008 by Dr. CAMILLE Mcmullen. She denies any chest pain.  She had a normal nuclear stress tests in July 2018.  She is following up with Dr. Leal.      She has hypertension and has been on amlodipine 5 mg once a day, furosemide 40 mg 3 times a week and Toprol 50 mg/day. She was taken off hydrochlorothiazide because of hypercalcemia. She had cough with lisinopril in the past. She has not used ARB in the past.  She denies orthopnea or PND or pedal edema.      She has overactive bladder diagnosed by Dr. Dean and is on Myrbetriq.  She was taken off Toviaz because of mouth dryness.  She was taken off Vesicare because of cost.  She denies mouth dryness.  She is being followed by Dr. Gagnon.     She spontaneously passed a kidney stone in August 2024.  CT of the abdomen done in August 2024 showed a 3 mm distal left ureteral stone without conspicuous  hydronephrosis.  She was seen by Dr. Villanueva at that time.  The stone was not recovered.     She has osteopenia on bone density done [facility] in November 2015.   Bone density done in December 2017 showed improvement of the left hip density and a slight decrease in the lumbar spine.      Bone density done in August 2022 showed osteoporosis.  Bone density done in February 2025 showed osteoporosis of the lumbar spine and left hip.  There is 6.9% increase in the lumbar bone density and an insignificant 2.2% decrease on the left hip.    She started on risedronate 35 mg weekly on February 17, 2023.  She denies side effects.  She is on calcium with vitamin D 500 mg / 200 units 2 tablets daily.     She has sleep apnea and is using her CPAP regularly.  She wakes up tired.  She is on Klonopin 0.5 mg at bedtime.  She is following with Dr. Marcum.     She denies constipation.  She denies heartburn, melena or hematochezia.  Her last colonoscopy was in July 2021 and she has hemorrhoids and diverticulosis.  EGD done in July 2021 showed gastritis and small hiatal hernia.     She has vitamin B12 deficiency, iron deficiency and monoclonal gammopathy of undetermined significance.  She is on vitamin B12 5000 mcg SL every other day, ferrous sulfate 325 mg/day, and folic acid 1 mg/day.  Parietal cell antibodies were elevated.  Intrinsic factor antibody was normal.  She is being followed by Dr. Nicholson.     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  She denies muscle weakness.  She is on Eliquis.  She denies bleeding.         The following portions of the patient's history were reviewed and updated as appropriate: allergies, current medications, past family history, past medical history, past social history, past surgical history, and problem list.    Review of Systems   Eyes:  Negative for visual disturbance.   Respiratory:  Negative for shortness of breath and wheezing.    Cardiovascular:  Negative for  "chest pain and palpitations.   Gastrointestinal: Negative.    Endocrine: Negative for cold intolerance and heat intolerance.   Genitourinary: Negative.    Musculoskeletal:  Negative for myalgias.   Neurological:  Negative for numbness.     Vitals:    03/24/25 1449   BP: 116/72   Pulse: 84   Temp: 98.1 °F (36.7 °C)   TempSrc: Oral   SpO2: 98%   Weight: 80.6 kg (177 lb 9.6 oz)   Height: 160 cm (62.99\")      Objective   Physical Exam  Constitutional:       General: She is not in acute distress.     Appearance: Normal appearance. She is not ill-appearing, toxic-appearing or diaphoretic.   Eyes:      General: No scleral icterus.        Right eye: No discharge.         Left eye: No discharge.   Cardiovascular:      Rate and Rhythm: Normal rate and regular rhythm.      Heart sounds: Normal heart sounds. No murmur heard.     No friction rub.   Abdominal:      General: There is no distension.      Palpations: Abdomen is soft.      Tenderness: There is no abdominal tenderness.   Musculoskeletal:      Right lower leg: No edema.      Left lower leg: No edema.      Comments: Feet warm.  No cyanosis, pedal edema or clubbing.  No plantar ulcers.   Neurological:      Mental Status: She is alert.      Comments: Intact light touch in lower extremities.       Office Visit on 11/14/2024   Component Date Value Ref Range Status    TSH 11/14/2024 0.068 (L)  0.450 - 4.500 uIU/mL Final    Free T4 11/14/2024 0.98  0.82 - 1.77 ng/dL Final    T3, Free 11/14/2024 2.4  2.0 - 4.4 pg/mL Final    Glucose 11/14/2024 81  70 - 99 mg/dL Final    BUN 11/14/2024 16  8 - 27 mg/dL Final    Creatinine 11/14/2024 0.85  0.57 - 1.00 mg/dL Final    EGFR Result 11/14/2024 69  >59 mL/min/1.73 Final    BUN/Creatinine Ratio 11/14/2024 19  12 - 28 Final    Sodium 11/14/2024 142  134 - 144 mmol/L Final    Potassium 11/14/2024 4.1  3.5 - 5.2 mmol/L Final    Chloride 11/14/2024 103  96 - 106 mmol/L Final    Total CO2 11/14/2024 27  20 - 29 mmol/L Final    Calcium " 11/14/2024 10.6 (H)  8.7 - 10.3 mg/dL Final    Total Protein 11/14/2024 6.7  6.0 - 8.5 g/dL Final    Albumin 11/14/2024 4.3  3.8 - 4.8 g/dL Final    Globulin 11/14/2024 2.4  1.5 - 4.5 g/dL Final    Total Bilirubin 11/14/2024 0.2  0.0 - 1.2 mg/dL Final    Alkaline Phosphatase 11/14/2024 69  44 - 121 IU/L Final    AST (SGOT) 11/14/2024 15  0 - 40 IU/L Final    ALT (SGPT) 11/14/2024 16  0 - 32 IU/L Final    Hemoglobin A1C 11/14/2024 6.3 (H)  4.8 - 5.6 % Final    Comment:          Prediabetes: 5.7 - 6.4           Diabetes: >6.4           Glycemic control for adults with diabetes: <7.0      Creatinine, Urine 11/14/2024 115.8  Not Estab. mg/dL Final    Microalbumin, Urine 11/14/2024 10.9  Not Estab. ug/mL Final    Microalbumin/Creatinine Ratio 11/14/2024 9  0 - 29 mg/g creat Final    Comment:                        Normal:                0 -  29                         Moderately increased: 30 - 300                         Severely increased:       >300      Vitamin B-12 11/14/2024 >2000 (H)  232 - 1245 pg/mL Final    25 Hydroxy, Vitamin D 11/14/2024 67.9  30.0 - 100.0 ng/mL Final    Comment: Vitamin D deficiency has been defined by the [place] of  Medicine and an Endocrine Society practice guideline as a  level of serum 25-OH vitamin D less than 20 ng/mL (1,2).  The Endocrine Society went on to further define vitamin D  insufficiency as a level between 21 and 29 ng/mL (2).  1. IOM ([place] of Medicine). 2010. Dietary reference     intakes for calcium and D. [place]: The     National Academies Press.  2. Pepper MF, Aurelio MCKEON, Nga COOK, et al.     Evaluation, treatment, and prevention of vitamin D     deficiency: an Endocrine Society clinical practice     guideline. JCEM. 2011 Jul; 96(7):1911-30.      Total Cholesterol 11/14/2024 127  100 - 199 mg/dL Final    Triglycerides 11/14/2024 72  0 - 149 mg/dL Final    HDL Cholesterol 11/14/2024 67  >39 mg/dL Final    VLDL Cholesterol Neto 11/14/2024 15  5 - 40  mg/dL Final    LDL Chol Calc ([facility]) 11/14/2024 45  0 - 99 mg/dL Final    Interpretation 11/14/2024 Note   Final    Supplemental report is available.     Assessment & Plan   Diagnoses and all orders for this visit:    1. Type 2 diabetes mellitus without complication, without long-term current use of insulin (Primary)    2. Hyperlipidemia, unspecified hyperlipidemia type    3. Nontoxic multinodular goiter    4. Nonocclusive coronary atherosclerosis of native coronary artery    5. Essential hypertension    6. History of kidney stones    7. Age-related osteoporosis without current pathological fracture    8. Obstructive sleep apnea on CPAP    9. Vitamin B12 deficiency    10. Vitamin D deficiency    11. Monoclonal gammopathy      Continue metformin 500 mg twice a day.  Follow-up with ophthalmologist as scheduled.    Check thyroid function test.    Continue Lipitor 20 mg/day.    Continue amlodipine, furosemide, and Toprol XL.  Follow-up with Dr. Leal as scheduled.    Follow-up with Dr. Gagnon.    Continue risedronate 35 mg weekly until February 2028.  Continue calcium with vitamin D 2 tablets daily.  Repeat bone density in February 2027.  Discussed about regular exercise.  Continue CPAP.    Follow-up with Dr. Nicholson as scheduled.    Copy of my note sent to Dr. Ellen Lowe, Dr. Carter, Dr. Gagnon, Dr. Nicholson, and Dr. Leal.    RTC 4 mos.

## 2025-03-25 LAB
25(OH)D3+25(OH)D2 SERPL-MCNC: 56.1 NG/ML (ref 30–100)
ALBUMIN SERPL-MCNC: 4 G/DL (ref 3.5–5.2)
ALBUMIN/GLOB SERPL: 1.5 G/DL
ALP SERPL-CCNC: 86 U/L (ref 39–117)
ALT SERPL-CCNC: 18 U/L (ref 1–33)
AST SERPL-CCNC: 15 U/L (ref 1–32)
BILIRUB SERPL-MCNC: 0.3 MG/DL (ref 0–1.2)
BUN SERPL-MCNC: 16 MG/DL (ref 8–23)
BUN/CREAT SERPL: 21.6 (ref 7–25)
CALCIUM SERPL-MCNC: 9.9 MG/DL (ref 8.6–10.5)
CHLORIDE SERPL-SCNC: 105 MMOL/L (ref 98–107)
CHOLEST SERPL-MCNC: 128 MG/DL (ref 0–200)
CO2 SERPL-SCNC: 27.9 MMOL/L (ref 22–29)
CREAT SERPL-MCNC: 0.74 MG/DL (ref 0.57–1)
EGFRCR SERPLBLD CKD-EPI 2021: 81.4 ML/MIN/1.73
GLOBULIN SER CALC-MCNC: 2.6 GM/DL
GLUCOSE SERPL-MCNC: 83 MG/DL (ref 65–99)
HBA1C MFR BLD: 6 % (ref 4.8–5.6)
HDLC SERPL-MCNC: 67 MG/DL (ref 40–60)
IMP & REVIEW OF LAB RESULTS: NORMAL
LDLC SERPL CALC-MCNC: 47 MG/DL (ref 0–100)
POTASSIUM SERPL-SCNC: 4.8 MMOL/L (ref 3.5–5.2)
PROT SERPL-MCNC: 6.6 G/DL (ref 6–8.5)
SODIUM SERPL-SCNC: 144 MMOL/L (ref 136–145)
TRIGL SERPL-MCNC: 66 MG/DL (ref 0–150)
TSH SERPL DL<=0.005 MIU/L-ACNC: 0.69 UIU/ML (ref 0.27–4.2)
VLDLC SERPL CALC-MCNC: 14 MG/DL (ref 5–40)

## 2025-04-03 DIAGNOSIS — E53.8 VITAMIN B12 DEFICIENCY: ICD-10-CM

## 2025-04-03 DIAGNOSIS — D47.2 MONOCLONAL GAMMOPATHY: Primary | ICD-10-CM

## 2025-04-03 DIAGNOSIS — E61.1 IRON DEFICIENCY: ICD-10-CM

## 2025-04-04 DIAGNOSIS — I63.10 CEREBRAL INFARCTION DUE TO EMBOLISM OF UNSPECIFIED PRECEREBRAL ARTERY: ICD-10-CM

## 2025-04-08 RX ORDER — APIXABAN 5 MG/1
5 TABLET, FILM COATED ORAL
Qty: 180 TABLET | Refills: 2 | Status: SHIPPED | OUTPATIENT
Start: 2025-04-08

## 2025-04-09 ENCOUNTER — LAB (OUTPATIENT)
Dept: LAB | Facility: HOSPITAL | Age: 80
End: 2025-04-09
Payer: MEDICARE

## 2025-04-09 DIAGNOSIS — D47.2 MONOCLONAL GAMMOPATHY: ICD-10-CM

## 2025-04-09 DIAGNOSIS — E61.1 IRON DEFICIENCY: ICD-10-CM

## 2025-04-09 DIAGNOSIS — E53.8 VITAMIN B12 DEFICIENCY: ICD-10-CM

## 2025-04-09 LAB
ALBUMIN SERPL-MCNC: 3.9 G/DL (ref 3.5–5.2)
ALBUMIN/GLOB SERPL: 1.3 G/DL
ALP SERPL-CCNC: 75 U/L (ref 39–117)
ALT SERPL W P-5'-P-CCNC: 21 U/L (ref 1–33)
ANION GAP SERPL CALCULATED.3IONS-SCNC: 13.7 MMOL/L (ref 5–15)
AST SERPL-CCNC: 14 U/L (ref 1–32)
BASOPHILS # BLD AUTO: 0.05 10*3/MM3 (ref 0–0.2)
BASOPHILS NFR BLD AUTO: 0.7 % (ref 0–1.5)
BILIRUB SERPL-MCNC: 0.3 MG/DL (ref 0–1.2)
BUN SERPL-MCNC: 15 MG/DL (ref 8–23)
BUN/CREAT SERPL: 18.3 (ref 7–25)
CALCIUM SPEC-SCNC: 9.9 MG/DL (ref 8.6–10.5)
CHLORIDE SERPL-SCNC: 100 MMOL/L (ref 98–107)
CO2 SERPL-SCNC: 25.3 MMOL/L (ref 22–29)
CREAT SERPL-MCNC: 0.82 MG/DL (ref 0.57–1)
DEPRECATED RDW RBC AUTO: 42.5 FL (ref 37–54)
EGFRCR SERPLBLD CKD-EPI 2021: 72 ML/MIN/1.73
EOSINOPHIL # BLD AUTO: 0.29 10*3/MM3 (ref 0–0.4)
EOSINOPHIL NFR BLD AUTO: 4.1 % (ref 0.3–6.2)
ERYTHROCYTE [DISTWIDTH] IN BLOOD BY AUTOMATED COUNT: 14.1 % (ref 12.3–15.4)
FERRITIN SERPL-MCNC: 115 NG/ML (ref 13–150)
FOLATE SERPL-MCNC: >20 NG/ML (ref 4.78–24.2)
GLOBULIN UR ELPH-MCNC: 2.9 GM/DL
GLUCOSE SERPL-MCNC: 97 MG/DL (ref 65–99)
HCT VFR BLD AUTO: 40.6 % (ref 34–46.6)
HGB BLD-MCNC: 12.9 G/DL (ref 12–15.9)
IMM GRANULOCYTES # BLD AUTO: 0.02 10*3/MM3 (ref 0–0.05)
IMM GRANULOCYTES NFR BLD AUTO: 0.3 % (ref 0–0.5)
IRON 24H UR-MRATE: 71 MCG/DL (ref 37–145)
IRON SATN MFR SERPL: 22 % (ref 20–50)
LYMPHOCYTES # BLD AUTO: 0.98 10*3/MM3 (ref 0.7–3.1)
LYMPHOCYTES NFR BLD AUTO: 13.8 % (ref 19.6–45.3)
MCH RBC QN AUTO: 26.3 PG (ref 26.6–33)
MCHC RBC AUTO-ENTMCNC: 31.8 G/DL (ref 31.5–35.7)
MCV RBC AUTO: 82.7 FL (ref 79–97)
MONOCYTES # BLD AUTO: 0.48 10*3/MM3 (ref 0.1–0.9)
MONOCYTES NFR BLD AUTO: 6.8 % (ref 5–12)
NEUTROPHILS NFR BLD AUTO: 5.27 10*3/MM3 (ref 1.7–7)
NEUTROPHILS NFR BLD AUTO: 74.3 % (ref 42.7–76)
NRBC BLD AUTO-RTO: 0 /100 WBC (ref 0–0.2)
PLATELET # BLD AUTO: 290 10*3/MM3 (ref 140–450)
PMV BLD AUTO: 9.9 FL (ref 6–12)
POTASSIUM SERPL-SCNC: 3.9 MMOL/L (ref 3.5–5.2)
PROT SERPL-MCNC: 6.8 G/DL (ref 6–8.5)
RBC # BLD AUTO: 4.91 10*6/MM3 (ref 3.77–5.28)
SODIUM SERPL-SCNC: 139 MMOL/L (ref 136–145)
TIBC SERPL-MCNC: 329 MCG/DL (ref 298–536)
TRANSFERRIN SERPL-MCNC: 221 MG/DL (ref 200–360)
VIT B12 BLD-MCNC: >2000 PG/ML (ref 211–946)
WBC NRBC COR # BLD AUTO: 7.09 10*3/MM3 (ref 3.4–10.8)

## 2025-04-09 PROCEDURE — 82607 VITAMIN B-12: CPT | Performed by: INTERNAL MEDICINE

## 2025-04-09 PROCEDURE — 83540 ASSAY OF IRON: CPT

## 2025-04-09 PROCEDURE — 82746 ASSAY OF FOLIC ACID SERUM: CPT | Performed by: INTERNAL MEDICINE

## 2025-04-09 PROCEDURE — 84466 ASSAY OF TRANSFERRIN: CPT

## 2025-04-09 PROCEDURE — 85025 COMPLETE CBC W/AUTO DIFF WBC: CPT

## 2025-04-09 PROCEDURE — 36415 COLL VENOUS BLD VENIPUNCTURE: CPT

## 2025-04-09 PROCEDURE — 80053 COMPREHEN METABOLIC PANEL: CPT

## 2025-04-09 PROCEDURE — 82728 ASSAY OF FERRITIN: CPT

## 2025-04-14 LAB
ALBUMIN SERPL ELPH-MCNC: 3.5 G/DL (ref 2.9–4.4)
ALBUMIN/GLOB SERPL: 1.2 {RATIO} (ref 0.7–1.7)
ALPHA1 GLOB SERPL ELPH-MCNC: 0.3 G/DL (ref 0–0.4)
ALPHA2 GLOB SERPL ELPH-MCNC: 0.9 G/DL (ref 0.4–1)
B-GLOBULIN SERPL ELPH-MCNC: 1.1 G/DL (ref 0.7–1.3)
GAMMA GLOB SERPL ELPH-MCNC: 0.9 G/DL (ref 0.4–1.8)
GLOBULIN SER-MCNC: 3.1 G/DL (ref 2.2–3.9)
IGA SERPL-MCNC: 366 MG/DL (ref 64–422)
IGG SERPL-MCNC: 1067 MG/DL (ref 586–1602)
IGM SERPL-MCNC: 81 MG/DL (ref 26–217)
INTERPRETATION SERPL IEP-IMP: ABNORMAL
KAPPA LC FREE SER-MCNC: 29.8 MG/L (ref 3.3–19.4)
KAPPA LC FREE/LAMBDA FREE SER: 0.96 {RATIO} (ref 0.26–1.65)
LABORATORY COMMENT REPORT: ABNORMAL
LAMBDA LC FREE SERPL-MCNC: 31 MG/L (ref 5.7–26.3)
M PROTEIN SERPL ELPH-MCNC: ABNORMAL G/DL
PROT SERPL-MCNC: 6.6 G/DL (ref 6–8.5)

## 2025-04-23 ENCOUNTER — OFFICE VISIT (OUTPATIENT)
Dept: ONCOLOGY | Facility: CLINIC | Age: 80
End: 2025-04-23
Payer: MEDICARE

## 2025-04-23 VITALS
RESPIRATION RATE: 16 BRPM | TEMPERATURE: 97.7 F | SYSTOLIC BLOOD PRESSURE: 111 MMHG | WEIGHT: 177.8 LBS | HEART RATE: 75 BPM | OXYGEN SATURATION: 97 % | BODY MASS INDEX: 30.35 KG/M2 | DIASTOLIC BLOOD PRESSURE: 70 MMHG | HEIGHT: 64 IN

## 2025-04-23 DIAGNOSIS — E53.8 VITAMIN B12 DEFICIENCY: ICD-10-CM

## 2025-04-23 DIAGNOSIS — D47.2 MONOCLONAL GAMMOPATHY: ICD-10-CM

## 2025-04-23 DIAGNOSIS — E61.1 IRON DEFICIENCY: ICD-10-CM

## 2025-04-23 NOTE — PROGRESS NOTES
.     REASON FOR FOLLOWUP:     1.  Pernicious anemia.  Was on vitamin B12 injection.  Patient was switched to sublingual vitamin B12 at 5000 mcg daily.  2.  Evidence of iron deficiency in January 2021 despite normal hemoglobin 12.5.  Patient was started on oral iron twice a day.   3.  Monoclonal gammopathy of undetermined significance, MGUS, IgA lambda.                              HISTORY OF PRESENT ILLNESS:  The patient is a 80 y.o. year old female with type 2 diabetes, hypertension, coronary artery disease, vitamin B12 deficiency and iron deficiency.    The patient returns to the office today, 4/23/2025 for annual follow-up and lab review.  She had labs performed 1 week ago.  She does continue on B12, folic acid and iron supplementation.  She reports today she does continue to struggle with fatigue which has been ongoing for several years.  Management of her nutrient deficiencies has not provided any benefit in her energy.    Past Medical History:   Diagnosis Date    Allergic     Seasonal alleries, Lisinopril, Dexamethasone    Anxiety     Arrhythmia     Cataract     Cararacts in both eyes.    Contact dermatitis     COVID-19 06/2022    DDD (degenerative disc disease), lumbar     Deep vein thrombosis June 11, 2022    Stroke    Depression     Diverticulosis June 2021    Dry eyes     Dry mouth     Essential hypertension     Female climacteric state     Headache     History of mammogram     8/2016    History of total right hip replacement 2013    Hypercalcemia     Hyperlipidemia     Iron deficiency anemia 05/27/2021    Added automatically from request for surgery [id number]    Low back pain     Lumbar area.    Neuromuscular disorder 03/2016    Nightmares REM-sleep type     Nonocclusive coronary atherosclerosis of native coronary artery     cath in 2008 with 30% LAD disease    Nontoxic multinodular goiter     Obesity     Osteoarthritis     both knees, back and hips    Osteopenia     Pain, joint, shoulder      Pernicious anemia     Pneumonia August 8, 2018    After Hospital stay.    Polycythemia     Rotator cuff tendinitis     Seasonal allergies     Sleep apnea     CPAP nightly    Snoring     Stroke 06/2022    Superficial phlebitis     Type 2 diabetes mellitus     Type II    Visual impairment August 30, 2019    Vitiligo      Past Surgical History:   Procedure Laterality Date    BREAST BIOPSY      BREAST LUMPECTOMY  1973    benign    CARDIAC CATHETERIZATION  2008    CATARACT EXTRACTION, BILATERAL Bilateral 2019    COLONOSCOPY  08/15/2010    COLONOSCOPY N/A 07/20/2021    Procedure: COLONOSCOPY TO CECUM;  Surgeon: Ellen Kothari MD;  Location:  [facility];  Service: Gastroenterology;  Laterality: N/A;  pre: SCREENING FOR COLON CANCER  post: HEMORRHOIDS, DIVERTICULOSIS, TORTUOUS COLON    ENDOSCOPY N/A 07/20/2021    Procedure: ESOPHAGOGASTRODUODENOSCOPY WITH BX'S;  Surgeon: Ellen Kothari MD;  Location: [facility];  Service: Gastroenterology;  Laterality: N/A;  pre: IRON DEFICIENCY ANEMIA  post: GASTRITIS, SMALL HIATAL HERNIA    EYE SURGERY      EYE SURGERY Left 05/2022    HYSTERECTOMY  1982    JOINT REPLACEMENT  07/10/2013    Total Hip Replacement    REPLACEMENT TOTAL KNEE Left 08/06/2018    Dr. Bishnu Larson    REPLACEMENT TOTAL KNEE Right     [facility]     TONSILLECTOMY  1967    TOTAL HIP ARTHROPLASTY Right 2013    UPPER GASTROINTESTINAL ENDOSCOPY  07/20/2021       HEMATOLOGY HISTORY:  The patient is a 76 y.o. year old female with history of type 2 diabetes, hypertension, coronary artery disease, who presented on 1/18/2021 for initial evaluation because of pernicious anemia.     Patient reports she has been given vitamin B12 injection monthly for the past 4 months. I reviewed her laboratory data in the Epic system and she had laboratory study on 01/14/2020 that reported vitamin B12 level of 437 pg/mL. She had normal hemoglobin 12.2, MCV 80.3, MCHC 32.5 and platelets 325,000, WBC 6400. Subsequently vitamin B12  level was retested on 08/21/2020 at 326 pg/mL with folate 7.05 ng/mL and normal hemoglobin 12.1, MCV 82.3, platelets 307,000 and WBC 6220. Apparently the patient was started on vitamin B12 injection.     Her laboratory studies on 11/30/2020 found that she had vitamin B12 level of 775 pg/mL and folate 5.68 ng/mL. She also had iron study that day reporting ferritin 52.8 ng/mL, free iron 74; however, no iron saturation.  Her laboratory study on 11/30/2020 also reported elevated parietal cell antibody 33.2 units. Has normal intrinsic factor 1.0 AU/mL. Negative for direct MELIZA rheumatoid factor and also negative for SSA antibody and SSB antibody.     Patient continues to have significant fatigue. She denies melena or hematochezia. She reports not taking oral iron nor folic acid. She reports no weight loss. No low fever.     I reviewed her previous CBC results dating back all the way to 04/15/2015 and most of the time she had normal hemoglobin. However, she has frequent microcytosis or marginal MCV value. She reports no family history of sickle cell disease or sickle cell trait. Suspect this patient of having iron deficiency.     I reviewed her peripheral blood smear on 1/18/2021. She does have anisocytosis. However, no target cells, no stomatocytes, no schistocytes. Morphology of WBC and platelets unremarkable.     I saw her originally on 1/18/2021 for consultation.  At that time patient had normal hemoglobin 12.5, MCV 80.2, platelets 263,000 and WBC 7550. Iron study came back reporting ferritin 49 ng/mL, free iron 38, TIBC 329 and iron saturation 12%.  Vitamin B12 level was 715 pg/mL and folate 5.94 ng/mL.  Indeed this patient has evidence of iron deficiency.     I recommended to start oral ferrous sulfate 325 mg b.i.d. for 3 months.  We also recommended switching vitamin B12 injection to sublingual B12 at 5000 mcg daily and oral folic acid 1 mg daily.  She voiced understanding and agreeable.    Laboratory study on  4/6/2021 reported improved hemoglobin 13.5, MCV 81.4, platelets 288,000 and WBC 7020 including ANC 5260.  Iron study reported improved ferritin 77.5 ng/mL, and iron saturation 20%.  Free iron 64 TIBC 316.  Patient also had supratherapeutic B12 > 2000 pg/mL and supratherapeutic folate > 20 ng/mL.     Patient reports on 10/18/2021 she is tolerating oral iron twice a day.  No nausea no vomiting.  No constipation or diarrhea.  She does have dark-colored stool since taking oral iron.      She continues to have some weight loss, per our records, she lost about 8 to 9 pounds in the past 6 months since April 2021.  She reports no fever no sweating or chills.    Laboratory study on 10/18/2021 reported normal CBC, supratherapeutic for both vitamin B12 > 2000 pg/mL, and folate > 20 ng/mL.  She also had further improved iron study with ferritin 100.4 ng/mL and iron saturation 22% and a normal hemoglobin 12.8 with MCV 81.9, ferritin 100.4 ng/mL and iron saturation 22% with a free iron 65 TIBC 297.     Her vitamin B12 level was supratherapeutic > 2000 pg/mL on 2/17/2022 and also on 6/13/2022.    Laboratory study today on 11/14/2022 reported normal CBC with Hb 12.5, MCV 83.7, platelets 271,000, WBC 8100 including ANC 5590, lymphocytes 1400, iron study ferritin 84.7 ng/mL, free iron 49 TIBC 323 and iron saturation 15%.  Chemistry lab reported unremarkable CMP.       Laboratory studies today on 6/28/2023 report normal hemoglobin 12.5, MCV 85.1, MCHC 30.9, platelets 266,000, and WBC 7,030 including neutrophils 4920. The iron study reported ferritin 82 ng/mL, free iron 98, and TIBC 315, and iron saturation 31%. Chemistry labs reported unremarkable results except the glucose 139.    Lab study on 05/10/2023 reported supratherapeutic B12 level > 2000 pg/mL.    Laboratory study on 05/18/2023 reported monoclonal IgA lambda by immunofixation, however negative for M spike by serum protein electrophoresis. Quantification of immunoglobulins  normal IgG 98, IgA 334, and IgM 67 without free light chains.    Laboratory Studies on 04/16/2024: Positive for monoclonal IgA Kendal; however, negative SPP study. Serum IgA 360, IgG 1087, IgM 73. Serum free kappa chain 34.2. Lambda light chain 32.8. Hemoglobin 13.3, MCV 83.1, platelets 160,000, WBC 6710, neutrophils 4800, lymphocytes 1090, monocytes 460. Ferritin 102 ng/mL, free iron 58, TIBC 360, marginal low iron saturation 18 percent. Normal liver function and electrolytes. Creatinine 0.91. Glucose 104.    MEDICATIONS    Current Outpatient Medications:     Accu-Chek FastClix Lancets misc, Use to check blood sugar once daily, Disp: 102 each, Rfl: 5    amLODIPine (NORVASC) 5 MG tablet, Take 1 tablet by mouth Daily for 270 days., Disp: 90 tablet, Rfl: 2    apixaban (Eliquis) 5 MG tablet tablet, TAKE 1 TABLET BY MOUTH EVERY 12  HOURS, Disp: 180 tablet, Rfl: 2    atorvastatin (LIPITOR) 20 MG tablet, Take 1 tablet by mouth Every Morning for 270 days., Disp: 90 tablet, Rfl: 2    Blood Glucose Monitoring Suppl (Blood Glucose Monitor System) w/Device kit, BS meter Accu-chek Catherine plus meter. DX E11.9, Disp: 1 each, Rfl: 1    Calcium Carb-Cholecalciferol 500-200 MG-UNIT tablet, Take 2 tablets by mouth daily., Disp: , Rfl:     clonazePAM (KlonoPIN) 0.5 MG tablet, Take 0.5 tablets by mouth Every Night., Disp: , Rfl:     Cyanocobalamin ([employer] Vitamin B-12) 5000 MCG sublingual tablet, Place 5,000 mcg under the tongue Daily. (Patient taking differently: Place 1 tablet under the tongue Every Other Day.), Disp: 90 tablet, Rfl: 3    cycloSPORINE (RESTASIS) 0.05 % ophthalmic emulsion, 1 drop 2 (Two) Times a Day., Disp: , Rfl:     difluprednate (DUREZOL) 0.05 % ophthalmic emulsion, instill 1 drop into both eyes twice a day, Disp: , Rfl:     doxycycline (ADOXA) 50 MG tablet, Take 1 tablet by mouth Every 12 (Twelve) Hours., Disp: , Rfl:     ferrous sulfate 325 (65 FE) MG tablet, TAKE 1 TABLET BY MOUTH EVERY DAY WITH BREAKFAST, Disp: 90  tablet, Rfl: 1    folic acid (FOLVITE) 1 MG tablet, TAKE 1 TABLET BY MOUTH EVERY DAY, Disp: 90 tablet, Rfl: 1    furosemide (LASIX) 40 MG tablet, TAKE 1 TABLET BY MOUTH DAILY (Patient taking differently: Take 1 tablet by mouth. 3 x week), Disp: 90 tablet, Rfl: 1    glucose blood (Accu-Chek Guide Test) test strip, 1 each by Other route Daily., Disp: 100 each, Rfl: 3    metFORMIN (GLUCOPHAGE) 500 MG tablet, TAKE 1 TABLET BY MOUTH TWICE  DAILY WITH MEALS FOR TYPE 2  DIABETES, Disp: 180 tablet, Rfl: 2    metoprolol succinate XL (TOPROL-XL) 50 MG 24 hr tablet, TAKE 1 TABLET BY MOUTH DAILY, Disp: 90 tablet, Rfl: 3    Mirabegron ER (MYRBETRIQ) 50 MG tablet sustained-release 24 hour 24 hr tablet, Take 50 mg by mouth Daily., Disp: , Rfl:     pilocarpine (SALAGEN) 5 MG tablet, 1 tablet 3 (Three) Times a Day., Disp: , Rfl:     Potassium Chloride 10 % solution, DILUTE 15 ML IN AT LEAST 1/2  GLASS OF WATER OR JUICE AND  DRINK BY MOUTH DAILY, Disp: 1419 mL, Rfl: 1    Prolensa 0.07 % solution, PLACE ONE DROP IN BOTH EYES ONCE DAILY FOR MAINTENANCE, Disp: , Rfl:     risedronate (ACTONEL) 35 MG tablet, TAKE 1 TABLET BY MOUTH WEEKLY  WITH 8 OZ OF PLAIN WATER 30  MINUTES BEFORE FIRST FOOD, DRINK OR MEDS. STAY UPRIGHT FOR 30  MINS (Patient taking differently: Every 7 (Seven) Days. TAKE 1 TABLET BY MOUTH WEEKLY  WITH 8 OZ OF PLAIN WATER 30  MINUTES BEFORE FIRST FOOD, DRINK OR MEDS. STAY UPRIGHT FOR 30  MINS), Disp: 12 tablet, Rfl: 2    venlafaxine (EFFEXOR) 75 MG tablet, Take 1 tablet by mouth 2 (Two) Times a Day. Pt is taking 1  BID, Disp: , Rfl:     ALLERGIES:     Allergies   Allergen Reactions    Hydrochlorothiazide Other (See Comments)     In 2015-developed hypercalcemia-HCTZ was discontinued  Other reaction(s): Other (See Comments)  In 2015-developed hypercalcemia-HCTZ was discontinued  In 2015-developed hypercalcemia-HCTZ was discontinued    Dexamethasone Dermatitis     Other reaction(s): Other (See Comments)  CONTACT DERMATITIS  "   Lisinopril Cough     Other reaction(s): Other (See Comments)  COUGH       SOCIAL HISTORY:       Social History     Socioeconomic History    Marital status:    Tobacco Use    Smoking status: Never     Passive exposure: Never    Smokeless tobacco: Never    Tobacco comments:     I have never smoked.   Vaping Use    Vaping status: Never Used   Substance and Sexual Activity    Alcohol use: No     Comment: CAFFEINE: none    Drug use: No    Sexual activity: Not Currently     Partners: Male     Birth control/protection: Post-menopausal     Comment: I had a Hysterectomy in .         FAMILY HISTORY:  Family History   Problem Relation Age of Onset    Diabetes Mother         Mother    Thyroid disease Mother     Hypertension Father         Father      Heart disease Father         Father      Arthritis Father         Father    Hyperlipidemia Father         Father    Asthma Sister         Sister      Hypertension Sister         Sister      Miscarriages / Stillbirths Sister         Sister    Thyroid disease Sister         Sister    Diabetes Sister         Sister    Hypertension Sister         Sister    Early death Sister         Infant    Hypertension Brother         Brother      Diabetes Brother         Brother    Kidney disease Brother         Brother, Renal Failure    Vision loss Brother         Brother    Cancer Brother         Brother    Lung cancer Brother     Kidney disease Brother         Brother,  Renal Failure    Stroke Brother         Brother       REVIEW OF SYSTEMS:  See HPI           Vitals:    25 1402   BP: 111/70   Pulse: 75   Resp: 16   Temp: 97.7 °F (36.5 °C)   TempSrc: Temporal   SpO2: 97%   Weight: 80.6 kg (177 lb 12.8 oz)   Height: 161.3 cm (63.5\")   PainSc: 0-No pain         2025     2:02 PM   Current Status   ECOG score 0      PHYSICAL EXAM:    GENERAL:  Well-developed, well-nourished in no acute distress.    SKIN:  Warm, dry without rashes, " purpura or petechiae.  HEENT:  Normocephalic.   LYMPHATICS:  No cervical, supraclavicular adenopathy.  CHEST: Normal respiratory effort.  Lungs clear to auscultation. Good airflow.  CARDIAC:  Regular rate and rhythm without murmurs. Normal S1,S2.  ABDOMEN:  Soft, nontender with no organomegaly or masses.  Bowel sounds normal.  EXTREMITIES:  No lower extremity edema.      RECENT LABS:  Immunofixation (YISEL), Protein Electrophoresis (PE), and Quantitative Free Kappa and Lambda Light Chains (FLC), Serum (04/09/2025 13:42)  Vitamin B12 (04/09/2025 13:42)  Folate (04/09/2025 13:42)  Iron Profile (04/09/2025 13:42)  Ferritin (04/09/2025 13:42)  Comprehensive Metabolic Panel (04/09/2025 13:42)  CBC & Differential (04/09/2025 13:42)    Assessment & Plan     ASSESSMENT:   1. Pernicious anemia with vitamin B12 deficiency.   She apparently has some improvement of vitamin B12 level after 3 monthly B12 injections. However, B12 could be further improved to a much higher level.   I recommended on 1/18/2021 to try sublingual vitamin B12 supplementation to see if that helps. If it does not then she will need to have vitamin B12 injection twice a month. We will obtain laboratory study to document new baseline today.   On 4/6/2021, patient had a supratherapeutic B12 level > 2000 pg/mL.  On 10/18/2021, vitamin B12 > 2000 pg/mL.  She will continue sublingual B12.   Her vitamin B12 level was supratherapeutic > 2000 pg/mL on 2/17/2022 and also on 6/13/2022.  On 11/14/2022 patient reports she takes vitamin B12 every other day.  The patient had supratherapeutic B12 level in 05/2023. She will continue vitamin B12 daily.  6/20/2023 vitamin B12 > 2000 pg/mL.   Most recent B12 level greater than 2000.  The patient does continue on B12 supplementation daily    2. Suboptimal folate level.  She has marginally normal folic acid 5.68 ng/mL on 11/30/2020 and actually was deteriorating compared to the level in 08/2020.   Confirmed low normal folate  level 5.94 on 1/18/2021.  I recommended to start folic acid 1 mg daily to help with erythropoiesis.   On 4/6/2021 patient had supratherapeutic folate > 20 ng/mL.   On 10/18/2021, supratherapeutic folate > 20 ng/mL.  On 4/23/2024 patient continues on daily folic acid supplement.  Most recent folate was greater than 20, the patient does continue on daily folic acid supplementation    3.  Iron deficiency anemia.   Patient reports no family history for sickle cell disease or sickle cell trait.   I am highly suspicious of this patient having iron deficiency which could cause significant fatigue.   Lab study on 1/18/2021 reported iron saturation 12% with free iron 38 TIBC 329, and ferritin 49 ng/mL.    We started patient on ferrous sulfate 325 mg twice a day.  Patient reported good tolerance of oral iron treatment.  Lab study on 4/6/2021 reported improved ferritin 78, and normalized iron saturation 20% and hemoglobin 13.5.   On 10/18/2021, hemoglobin 12.8, with ferritin 100.4 ng/mL and iron saturation 22%.  This is further improved.  Continue oral iron twice a day.   Laboratory study on 11/14/2022 reported normal CBC with Hb 12.5, MCV 83.7, ferritin 84.7 ng/mL, free iron 49 TIBC 323 and iron saturation 15%. On 11/14/22, oral iron once a day and also folic acid daily.    On 5/10/2023 B12 level > 2000 pg/mL.  Laboratory studies on 06/28/2023 reported normal hemoglobin 12.5, MCV 85.1, however, low MCHC 30.9. Her iron studies showed normalized iron saturation 31%, however, stable ferritin level 82. I asked the patient to continue oral iron once a day.  Patient also had a B12 level > 2000 pg/mL.  Lab studies on 4/16/2024 reported ferritin 102 ng/mL, free iron 59 saturation 18%, normal hemoglobin 13.2, MCV 83.1 MCHC 32.3.    4/9/2025 without iron deficiency with hemoglobin normal at 12.9, ferritin 115, iron saturation 22%.  MCV normal at 82.7.  The patient does continue on ferrous sulfate once daily.    4. COVID-19 vaccine.    Received 2 doses of Pfizer vaccine.   Patient had COVID-19 infection in 2022.  Continues to have fatigue from the infection.    5. Monoclonal gammopathy of undetermined significance, MGS, IgA lambda  The patient had an abnormal laboratory study on 05/18/2023 for assessment because of mild hypercalcemia with the peak calcium level 10.9 on 05/10/2023. Laboratory study on 05/18/2023 reported normal PTH and PTHrP. Serum protein immunofixation was positive for monoclonal IgA lambda, however, negative for M spike by protein electrophoresis. Quantification of serum IgA was also normal at 334 together with normal IgG 980 and IgM 67. Discussed with the patient, this is a premenarchal situation, with risk of developing multiple myeloma 1%. However, she is having unquantifiable monoclonal protein, the risk of developing multiple myeloma would be smaller. Nevertheless, this study needs to be repeated on an annual basis.  Laboratory Studies on 04/16/2024: Positive for monoclonal IgA Kendal; however, negative SPP study. Serum IgA 360, IgG 1087, IgM 73. Serum free kappa chain 34.2. Lambda light chain 32.8.  Normal liver function and electrolytes. Creatinine 0.91. Glucose 104 mg/dL.  4/9/2025 repeat labs noted to be positive for IgA lambda monoclonal protein, though too small to quantify.  IgA 366    *Ongoing fatigue  It was reviewed today, there is no hematologic cause of her fatigue.  Patient was encouraged to discuss further with her primary care provider    PLAN:    Continue oral iron once daily  Continue sublingual B12 1000 mcg daily  Continue folic acid 1 mg daily  Continue anticoagulation with Eliquis 5 mg twice daily  Return in 1 year for MD follow-up with Dr. Nicholson with labs 1 week prior including CBC, CMP, serum paraprotein studies, iron profile, ferritin, folate and B12  The patient will continue to follow up with the primary care physician and for management of depression and atrial fibrillation.       Eli Martinez  RAYSHAWN Cobos     CC:   Reji Talamantes MD          Transcribed from ambient dictation for RAYSHAWN Fleming by Edna Castañeda.  04/23/24   16:06 EDT

## 2025-04-27 DIAGNOSIS — I10 ESSENTIAL HYPERTENSION: ICD-10-CM

## 2025-04-28 RX ORDER — METOPROLOL SUCCINATE 50 MG/1
50 TABLET, EXTENDED RELEASE ORAL DAILY
Qty: 90 TABLET | Refills: 3 | Status: SHIPPED | OUTPATIENT
Start: 2025-04-28

## 2025-05-30 RX ORDER — FOLIC ACID 1 MG/1
1000 TABLET ORAL DAILY
Qty: 90 TABLET | Refills: 1 | Status: SHIPPED | OUTPATIENT
Start: 2025-05-30

## 2025-06-07 DIAGNOSIS — E11.9 TYPE 2 DIABETES MELLITUS WITHOUT COMPLICATION, WITHOUT LONG-TERM CURRENT USE OF INSULIN: ICD-10-CM

## 2025-06-09 NOTE — TELEPHONE ENCOUNTER
Rx Refill Note  Requested Prescriptions     Pending Prescriptions Disp Refills    metFORMIN (GLUCOPHAGE) 500 MG tablet [Pharmacy Med Name: metFORMIN HCl 500 MG Oral Tablet] 180 tablet 3     Sig: TAKE 1 TABLET BY MOUTH TWICE  DAILY WITH MEALS FOR TYPE 2  DIABETES      Last office visit with prescribing clinician: 3/24/2025   Last telemedicine visit with prescribing clinician: Visit date not found   Next office visit with prescribing clinician: 8/13/2025                         Would you like a call back once the refill request has been completed: [] Yes [] No    If the office needs to give you a call back, can they leave a voicemail: [] Yes [] No  Amy Yusuf MA  6/9/2025  07:59 EDT

## 2025-08-13 ENCOUNTER — OFFICE VISIT (OUTPATIENT)
Dept: ENDOCRINOLOGY | Age: 80
End: 2025-08-13
Payer: MEDICARE

## 2025-08-13 VITALS
WEIGHT: 175 LBS | HEIGHT: 64 IN | DIASTOLIC BLOOD PRESSURE: 68 MMHG | BODY MASS INDEX: 29.88 KG/M2 | SYSTOLIC BLOOD PRESSURE: 118 MMHG | OXYGEN SATURATION: 98 % | TEMPERATURE: 97.5 F | HEART RATE: 85 BPM

## 2025-08-13 DIAGNOSIS — Z87.442 HISTORY OF KIDNEY STONES: ICD-10-CM

## 2025-08-13 DIAGNOSIS — I10 ESSENTIAL HYPERTENSION: ICD-10-CM

## 2025-08-13 DIAGNOSIS — I25.10 NONOCCLUSIVE CORONARY ATHEROSCLEROSIS OF NATIVE CORONARY ARTERY: ICD-10-CM

## 2025-08-13 DIAGNOSIS — E11.9 TYPE 2 DIABETES MELLITUS WITHOUT COMPLICATION, WITHOUT LONG-TERM CURRENT USE OF INSULIN: Primary | ICD-10-CM

## 2025-08-13 DIAGNOSIS — G47.33 OBSTRUCTIVE SLEEP APNEA ON CPAP: ICD-10-CM

## 2025-08-13 DIAGNOSIS — M81.0 AGE-RELATED OSTEOPOROSIS WITHOUT CURRENT PATHOLOGICAL FRACTURE: ICD-10-CM

## 2025-08-13 DIAGNOSIS — E53.8 VITAMIN B12 DEFICIENCY: ICD-10-CM

## 2025-08-13 DIAGNOSIS — D47.2 MONOCLONAL GAMMOPATHY: ICD-10-CM

## 2025-08-13 DIAGNOSIS — E78.5 HYPERLIPIDEMIA, UNSPECIFIED HYPERLIPIDEMIA TYPE: ICD-10-CM

## 2025-08-13 DIAGNOSIS — E55.9 VITAMIN D DEFICIENCY: ICD-10-CM

## 2025-08-13 DIAGNOSIS — E04.2 NONTOXIC MULTINODULAR GOITER: ICD-10-CM

## 2025-08-13 PROCEDURE — 3078F DIAST BP <80 MM HG: CPT | Performed by: INTERNAL MEDICINE

## 2025-08-13 PROCEDURE — 3074F SYST BP LT 130 MM HG: CPT | Performed by: INTERNAL MEDICINE

## 2025-08-13 PROCEDURE — G2211 COMPLEX E/M VISIT ADD ON: HCPCS | Performed by: INTERNAL MEDICINE

## 2025-08-13 PROCEDURE — 99214 OFFICE O/P EST MOD 30 MIN: CPT | Performed by: INTERNAL MEDICINE

## 2025-08-13 RX ORDER — ATORVASTATIN CALCIUM 40 MG/1
40 TABLET, FILM COATED ORAL DAILY
COMMUNITY
Start: 2025-04-28

## 2025-08-14 LAB
25(OH)D3+25(OH)D2 SERPL-MCNC: 60.7 NG/ML (ref 30–100)
ALBUMIN SERPL-MCNC: 4.1 G/DL (ref 3.7–4.7)
ALP SERPL-CCNC: 75 IU/L (ref 44–121)
ALT SERPL-CCNC: 16 IU/L (ref 0–32)
AST SERPL-CCNC: 15 IU/L (ref 0–40)
BILIRUB SERPL-MCNC: <0.2 MG/DL (ref 0–1.2)
BUN SERPL-MCNC: 12 MG/DL (ref 8–27)
BUN/CREAT SERPL: 18 (ref 12–28)
CALCIUM SERPL-MCNC: 10 MG/DL (ref 8.7–10.3)
CHLORIDE SERPL-SCNC: 107 MMOL/L (ref 96–106)
CHOLEST SERPL-MCNC: 127 MG/DL (ref 100–199)
CO2 SERPL-SCNC: 22 MMOL/L (ref 20–29)
CREAT SERPL-MCNC: 0.65 MG/DL (ref 0.57–1)
EGFRCR SERPLBLD CKD-EPI 2021: 88 ML/MIN/1.73
GGT SERPL-CCNC: 10 IU/L (ref 0–60)
GLOBULIN SER CALC-MCNC: 2.5 G/DL (ref 1.5–4.5)
GLUCOSE SERPL-MCNC: 98 MG/DL (ref 70–99)
HBA1C MFR BLD: 6 % (ref 4.8–5.6)
HDLC SERPL-MCNC: 68 MG/DL
IMP & REVIEW OF LAB RESULTS: NORMAL
LDLC SERPL CALC-MCNC: 48 MG/DL (ref 0–99)
POTASSIUM SERPL-SCNC: 4.7 MMOL/L (ref 3.5–5.2)
PROT SERPL-MCNC: 6.6 G/DL (ref 6–8.5)
SODIUM SERPL-SCNC: 144 MMOL/L (ref 134–144)
T4 FREE SERPL-MCNC: 1.1 NG/DL (ref 0.82–1.77)
TRIGL SERPL-MCNC: 43 MG/DL (ref 0–149)
TSH SERPL DL<=0.005 MIU/L-ACNC: 0.03 UIU/ML (ref 0.45–4.5)
VIT B12 SERPL-MCNC: >2000 PG/ML (ref 232–1245)
VLDLC SERPL CALC-MCNC: 11 MG/DL (ref 5–40)

## 2025-08-18 LAB
Lab: NORMAL
Lab: NORMAL

## 2025-08-19 DIAGNOSIS — M85.89 OSTEOPENIA OF MULTIPLE SITES: ICD-10-CM

## 2025-08-20 LAB
T3FREE SERPL-MCNC: 2.7 PG/ML (ref 2–4.4)
WRITTEN AUTHORIZATION: NORMAL

## 2025-08-20 RX ORDER — POTASSIUM CHLORIDE 20 MEQ/15ML
SOLUTION ORAL
Qty: 1419 ML | Refills: 0 | Status: SHIPPED | OUTPATIENT
Start: 2025-08-20

## (undated) DEVICE — MSK ENDO PORT O2 POM ELITE CURAPLEX A/

## (undated) DEVICE — TUBING, SUCTION, 1/4" X 10', STRAIGHT: Brand: MEDLINE

## (undated) DEVICE — FRCP BX RADJAW4 NDL 2.8 240CM LG OG BX40

## (undated) DEVICE — BITEBLOCK OMNI BLOC

## (undated) DEVICE — KT ORCA ORCAPOD DISP STRL

## (undated) DEVICE — SENSR O2 OXIMAX FNGR A/ 18IN NONSTR

## (undated) DEVICE — LN SMPL CO2 SHTRM SD STREAM W/M LUER